# Patient Record
Sex: FEMALE | Race: WHITE | Employment: FULL TIME | URBAN - METROPOLITAN AREA
[De-identification: names, ages, dates, MRNs, and addresses within clinical notes are randomized per-mention and may not be internally consistent; named-entity substitution may affect disease eponyms.]

---

## 2017-07-09 PROBLEM — R10.84 GENERALIZED ABDOMINAL PAIN: Status: ACTIVE | Noted: 2017-07-09

## 2017-07-09 PROBLEM — R19.5 OCCULT GI BLEEDING: Status: ACTIVE | Noted: 2017-07-09

## 2018-03-26 ENCOUNTER — HOSPITAL ENCOUNTER (OUTPATIENT)
Dept: SURGERY | Age: 43
Discharge: HOME | End: 2018-03-26
Attending: SPECIALIST | Admitting: SPECIALIST

## 2018-03-26 VITALS
HEART RATE: 72 BPM | WEIGHT: 223 LBS | OXYGEN SATURATION: 97 % | TEMPERATURE: 98 F | DIASTOLIC BLOOD PRESSURE: 83 MMHG | BODY MASS INDEX: 43.78 KG/M2 | HEIGHT: 60 IN | SYSTOLIC BLOOD PRESSURE: 152 MMHG | RESPIRATION RATE: 16 BRPM

## 2018-03-26 RX ORDER — DICYCLOMINE HYDROCHLORIDE 10 MG/1
10 CAPSULE ORAL
Qty: 90 CAPSULE | Refills: 3 | Status: SHIPPED | OUTPATIENT
Start: 2018-03-26 | End: 2019-04-18 | Stop reason: SDUPTHER

## 2018-03-26 RX ORDER — SODIUM CHLORIDE 9 MG/ML
INJECTION, SOLUTION INTRAVENOUS CONTINUOUS
Status: DISCONTINUED | OUTPATIENT
Start: 2018-03-26 | End: 2018-03-27 | Stop reason: HOSPADM

## 2018-03-26 RX ORDER — SODIUM CHLORIDE, SODIUM LACTATE, POTASSIUM CHLORIDE, CALCIUM CHLORIDE 600; 310; 30; 20 MG/100ML; MG/100ML; MG/100ML; MG/100ML
INJECTION, SOLUTION INTRAVENOUS CONTINUOUS
Status: CANCELLED | OUTPATIENT
Start: 2018-03-26

## 2018-03-26 RX ADMIN — SODIUM CHLORIDE: 9 INJECTION, SOLUTION INTRAVENOUS at 12:32

## 2018-03-26 ASSESSMENT — PAIN - FUNCTIONAL ASSESSMENT: PAIN_FUNCTIONAL_ASSESSMENT: 0-10

## 2018-03-26 ASSESSMENT — PAIN SCALES - GENERAL
PAINLEVEL_OUTOF10: 0
PAINLEVEL_OUTOF10: 0

## 2018-03-26 ASSESSMENT — PAIN DESCRIPTION - DESCRIPTORS: DESCRIPTORS: ACHING

## 2018-03-26 ASSESSMENT — LIFESTYLE VARIABLES: SMOKING_STATUS: 1

## 2018-03-26 NOTE — PROGRESS NOTES
1426 Patient transferred from GI lab to [facility] via cart, patient is very sleepy and is resting quietly with her eyes closed. 1440 Patient's son is at bedside, Dr Charbel Morrison to talk to patient and her son. 1450 Patient is sitting up drinking a twist mist.1508 Patient and her son given home instructions. 1520 Patient up to bathroom to void via wheelchair. 1530 Patient discharged to home, her son will drive her.

## 2018-03-26 NOTE — BRIEF OP NOTE
BRIEF COLONOSCOPY REPORT:    Impression:    1) normal exam    2) suspect IBS        Suggest:   1) Bentyl 10 mg ac tid       The complete operative report (including photos) is available in the following locations:   1) soft chart now   2) report will also be scanned and can then be found by going to \"chart review\" then \"notes\" then \"op report\" or by going to \"chart review\" then \"media\" then \"op report\". For review of photos, may need to go to page 2.

## 2018-10-31 ENCOUNTER — APPOINTMENT (OUTPATIENT)
Dept: GENERAL RADIOLOGY | Age: 43
End: 2018-10-31
Payer: MEDICAID

## 2018-10-31 ENCOUNTER — APPOINTMENT (OUTPATIENT)
Dept: CT IMAGING | Age: 43
End: 2018-10-31
Payer: MEDICAID

## 2018-10-31 ENCOUNTER — HOSPITAL ENCOUNTER (EMERGENCY)
Age: 43
Discharge: HOME | End: 2018-10-31
Payer: MEDICAID

## 2018-10-31 VITALS
HEIGHT: 61 IN | WEIGHT: 225 LBS | BODY MASS INDEX: 42.48 KG/M2 | SYSTOLIC BLOOD PRESSURE: 147 MMHG | RESPIRATION RATE: 18 BRPM | DIASTOLIC BLOOD PRESSURE: 89 MMHG | TEMPERATURE: 98.7 F | HEART RATE: 64 BPM | OXYGEN SATURATION: 97 %

## 2018-10-31 DIAGNOSIS — M25.512 ACUTE PAIN OF LEFT SHOULDER: ICD-10-CM

## 2018-10-31 DIAGNOSIS — S80.12XA CONTUSION OF MULTIPLE SITES OF LEFT LOWER EXTREMITY, INITIAL ENCOUNTER: ICD-10-CM

## 2018-10-31 DIAGNOSIS — M54.50 BILATERAL LOW BACK PAIN WITHOUT SCIATICA, UNSPECIFIED CHRONICITY: ICD-10-CM

## 2018-10-31 DIAGNOSIS — S16.1XXA STRAIN OF NECK MUSCLE, INITIAL ENCOUNTER: ICD-10-CM

## 2018-10-31 DIAGNOSIS — T14.8XXA ABRASION: ICD-10-CM

## 2018-10-31 DIAGNOSIS — R07.89 CHEST WALL PAIN: ICD-10-CM
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
## 2018-10-31 PROCEDURE — 73590 X-RAY EXAM OF LOWER LEG: CPT

## 2018-10-31 PROCEDURE — 71045 X-RAY EXAM CHEST 1 VIEW: CPT

## 2018-10-31 PROCEDURE — 6360000002 HC RX W HCPCS: Performed by: PHYSICIAN ASSISTANT

## 2018-10-31 PROCEDURE — 6370000000 HC RX 637 (ALT 250 FOR IP): Performed by: PHYSICIAN ASSISTANT

## 2018-10-31 PROCEDURE — 72100 X-RAY EXAM L-S SPINE 2/3 VWS: CPT

## 2018-10-31 PROCEDURE — 72125 CT NECK SPINE W/O DYE: CPT

## 2018-10-31 PROCEDURE — 71250 CT THORAX DX C-: CPT

## 2018-10-31 PROCEDURE — 96372 THER/PROPH/DIAG INJ SC/IM: CPT

## 2018-10-31 PROCEDURE — 99284 EMERGENCY DEPT VISIT MOD MDM: CPT

## 2018-10-31 PROCEDURE — 73030 X-RAY EXAM OF SHOULDER: CPT

## 2018-10-31 PROCEDURE — 72072 X-RAY EXAM THORAC SPINE 3VWS: CPT

## 2018-10-31 RX ORDER — KETOROLAC TROMETHAMINE 30 MG/ML
30 INJECTION, SOLUTION INTRAMUSCULAR; INTRAVENOUS ONCE
Status: COMPLETED | OUTPATIENT
Start: 2018-10-31 | End: 2018-10-31

## 2018-10-31 RX ORDER — NAPROXEN 500 MG/1
500 TABLET ORAL 2 TIMES DAILY PRN
Qty: 30 TABLET | Refills: 0 | Status: ON HOLD | OUTPATIENT
Start: 2018-10-31 | End: 2019-02-08 | Stop reason: HOSPADM

## 2018-10-31 RX ORDER — DIAZEPAM 5 MG/1
5 TABLET ORAL ONCE
Status: COMPLETED | OUTPATIENT
Start: 2018-10-31 | End: 2018-10-31

## 2018-10-31 RX ORDER — METHOCARBAMOL 500 MG/1
500 TABLET, FILM COATED ORAL 4 TIMES DAILY
Qty: 20 TABLET | Refills: 0 | Status: SHIPPED | OUTPATIENT
Start: 2018-10-31 | End: 2019-03-04 | Stop reason: ALTCHOICE

## 2018-10-31 RX ADMIN — DIAZEPAM 5 MG: 5 TABLET ORAL at 14:26

## 2018-10-31 RX ADMIN — KETOROLAC TROMETHAMINE 30 MG: 30 INJECTION, SOLUTION INTRAMUSCULAR; INTRAVENOUS at 14:26

## 2018-10-31 ASSESSMENT — PAIN DESCRIPTION - FREQUENCY: FREQUENCY: CONTINUOUS

## 2018-10-31 ASSESSMENT — PAIN DESCRIPTION - DESCRIPTORS: DESCRIPTORS: SORE;TENDER

## 2018-10-31 ASSESSMENT — PAIN DESCRIPTION - ONSET: ONSET: ON-GOING

## 2018-10-31 ASSESSMENT — PAIN SCALES - GENERAL
PAINLEVEL_OUTOF10: 9
PAINLEVEL_OUTOF10: 9

## 2018-10-31 ASSESSMENT — PAIN DESCRIPTION - PAIN TYPE: TYPE: ACUTE PAIN
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
## 2019-02-03 ENCOUNTER — APPOINTMENT (OUTPATIENT)
Dept: GENERAL RADIOLOGY | Age: 44
DRG: 313 | End: 2019-02-03
Payer: MEDICAID

## 2019-02-03 ENCOUNTER — HOSPITAL ENCOUNTER (INPATIENT)
Age: 44
LOS: 5 days | Discharge: HOME HEALTH | DRG: 313 | End: 2019-02-08
Attending: EMERGENCY MEDICINE | Admitting: INTERNAL MEDICINE
Payer: MEDICAID
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
## 2019-02-03 DIAGNOSIS — S91.052A DOG BITE OF ANKLE, LEFT, INITIAL ENCOUNTER: Primary | ICD-10-CM
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
## 2019-02-03 DIAGNOSIS — S82.892B TYPE I OR II OPEN FRACTURE OF LEFT ANKLE, INITIAL ENCOUNTER: ICD-10-CM

## 2019-02-03 PROBLEM — S91.059A: Status: ACTIVE | Noted: 2019-02-03

## 2019-02-03 PROBLEM — S82.52XB: Status: ACTIVE | Noted: 2019-02-03

## 2019-02-03 PROBLEM — S82.892A: Status: ACTIVE | Noted: 2019-02-03

## 2019-02-03 PROBLEM — S82.52XA: Status: ACTIVE | Noted: 2019-02-03

## 2019-02-03 PROCEDURE — 6360000002 HC RX W HCPCS

## 2019-02-03 PROCEDURE — 2580000003 HC RX 258: Performed by: NURSE PRACTITIONER

## 2019-02-03 PROCEDURE — 6360000002 HC RX W HCPCS: Performed by: EMERGENCY MEDICINE

## 2019-02-03 PROCEDURE — 2580000003 HC RX 258: Performed by: EMERGENCY MEDICINE

## 2019-02-03 PROCEDURE — 1200000000 HC SEMI PRIVATE

## 2019-02-03 PROCEDURE — 6360000002 HC RX W HCPCS: Performed by: NURSE PRACTITIONER

## 2019-02-03 PROCEDURE — 4500000029 HC MAJOR PROCEDURE

## 2019-02-03 PROCEDURE — 99284 EMERGENCY DEPT VISIT MOD MDM: CPT

## 2019-02-03 PROCEDURE — 90471 IMMUNIZATION ADMIN: CPT | Performed by: EMERGENCY MEDICINE

## 2019-02-03 PROCEDURE — 73090 X-RAY EXAM OF FOREARM: CPT

## 2019-02-03 PROCEDURE — 6360000002 HC RX W HCPCS: Performed by: PHYSICIAN ASSISTANT

## 2019-02-03 PROCEDURE — 90715 TDAP VACCINE 7 YRS/> IM: CPT | Performed by: EMERGENCY MEDICINE

## 2019-02-03 PROCEDURE — 73610 X-RAY EXAM OF ANKLE: CPT

## 2019-02-03 PROCEDURE — 96376 TX/PRO/DX INJ SAME DRUG ADON: CPT

## 2019-02-03 PROCEDURE — 6370000000 HC RX 637 (ALT 250 FOR IP): Performed by: EMERGENCY MEDICINE

## 2019-02-03 PROCEDURE — 96365 THER/PROPH/DIAG IV INF INIT: CPT

## 2019-02-03 PROCEDURE — 6370000000 HC RX 637 (ALT 250 FOR IP): Performed by: NURSE PRACTITIONER

## 2019-02-03 PROCEDURE — 2500000003 HC RX 250 WO HCPCS: Performed by: EMERGENCY MEDICINE

## 2019-02-03 PROCEDURE — 96375 TX/PRO/DX INJ NEW DRUG ADDON: CPT

## 2019-02-03 PROCEDURE — 73590 X-RAY EXAM OF LOWER LEG: CPT

## 2019-02-03 RX ORDER — 0.9 % SODIUM CHLORIDE 0.9 %
1000 INTRAVENOUS SOLUTION INTRAVENOUS ONCE
Status: COMPLETED | OUTPATIENT
Start: 2019-02-03 | End: 2019-02-03

## 2019-02-03 RX ORDER — HYDROMORPHONE HCL 110MG/55ML
PATIENT CONTROLLED ANALGESIA SYRINGE INTRAVENOUS
Status: COMPLETED
Start: 2019-02-03 | End: 2019-02-03

## 2019-02-03 RX ORDER — DICYCLOMINE HYDROCHLORIDE 10 MG/1
10 CAPSULE ORAL
Status: DISCONTINUED | OUTPATIENT
Start: 2019-02-04 | End: 2019-02-08 | Stop reason: HOSPADM

## 2019-02-03 RX ORDER — CEFAZOLIN SODIUM 1 G/50ML
1 INJECTION, SOLUTION INTRAVENOUS ONCE
Status: COMPLETED | OUTPATIENT
Start: 2019-02-03 | End: 2019-02-03

## 2019-02-03 RX ORDER — FENTANYL CITRATE 50 UG/ML
100 INJECTION, SOLUTION INTRAMUSCULAR; INTRAVENOUS EVERY 30 MIN PRN
Status: DISPENSED | OUTPATIENT
Start: 2019-02-03 | End: 2019-02-03

## 2019-02-03 RX ORDER — SODIUM CHLORIDE 0.9 % (FLUSH) 0.9 %
10 SYRINGE (ML) INJECTION PRN
Status: DISCONTINUED | OUTPATIENT
Start: 2019-02-03 | End: 2019-02-04 | Stop reason: SDUPTHER

## 2019-02-03 RX ORDER — FENTANYL CITRATE 50 UG/ML
50 INJECTION, SOLUTION INTRAMUSCULAR; INTRAVENOUS ONCE
Status: COMPLETED | OUTPATIENT
Start: 2019-02-03 | End: 2019-02-03

## 2019-02-03 RX ORDER — IBUPROFEN 600 MG/1
600 TABLET ORAL EVERY 6 HOURS PRN
Status: DISCONTINUED | OUTPATIENT
Start: 2019-02-03 | End: 2019-02-03

## 2019-02-03 RX ORDER — ONDANSETRON 4 MG/1
4 TABLET, ORALLY DISINTEGRATING ORAL ONCE
Status: COMPLETED | OUTPATIENT
Start: 2019-02-03 | End: 2019-02-03

## 2019-02-03 RX ORDER — PANTOPRAZOLE SODIUM 20 MG/1
20 TABLET, DELAYED RELEASE ORAL DAILY
Status: DISCONTINUED | OUTPATIENT
Start: 2019-02-04 | End: 2019-02-08 | Stop reason: HOSPADM

## 2019-02-03 RX ORDER — METHOCARBAMOL 500 MG/1
500 TABLET, FILM COATED ORAL 4 TIMES DAILY
Status: DISCONTINUED | OUTPATIENT
Start: 2019-02-03 | End: 2019-02-08 | Stop reason: HOSPADM

## 2019-02-03 RX ORDER — LIDOCAINE HYDROCHLORIDE AND EPINEPHRINE 10; 10 MG/ML; UG/ML
20 INJECTION, SOLUTION INFILTRATION; PERINEURAL ONCE
Status: COMPLETED | OUTPATIENT
Start: 2019-02-03 | End: 2019-02-03

## 2019-02-03 RX ORDER — CEFAZOLIN SODIUM 1 G/50ML
1 INJECTION, SOLUTION INTRAVENOUS ONCE
Status: DISCONTINUED | OUTPATIENT
Start: 2019-02-03 | End: 2019-02-03

## 2019-02-03 RX ORDER — ONDANSETRON 2 MG/ML
4 INJECTION INTRAMUSCULAR; INTRAVENOUS EVERY 6 HOURS PRN
Status: DISCONTINUED | OUTPATIENT
Start: 2019-02-03 | End: 2019-02-08 | Stop reason: HOSPADM

## 2019-02-03 RX ORDER — SODIUM CHLORIDE 0.9 % (FLUSH) 0.9 %
10 SYRINGE (ML) INJECTION EVERY 12 HOURS SCHEDULED
Status: DISCONTINUED | OUTPATIENT
Start: 2019-02-03 | End: 2019-02-04 | Stop reason: SDUPTHER

## 2019-02-03 RX ORDER — SODIUM CHLORIDE 9 MG/ML
INJECTION, SOLUTION INTRAVENOUS CONTINUOUS
Status: DISCONTINUED | OUTPATIENT
Start: 2019-02-03 | End: 2019-02-07

## 2019-02-03 RX ORDER — FENTANYL CITRATE 50 UG/ML
INJECTION, SOLUTION INTRAMUSCULAR; INTRAVENOUS
Status: COMPLETED
Start: 2019-02-03 | End: 2019-02-03

## 2019-02-03 RX ORDER — NAPROXEN 500 MG/1
500 TABLET ORAL 2 TIMES DAILY PRN
Status: DISCONTINUED | OUTPATIENT
Start: 2019-02-03 | End: 2019-02-08 | Stop reason: HOSPADM

## 2019-02-03 RX ORDER — LIDOCAINE HYDROCHLORIDE AND EPINEPHRINE 10; 10 MG/ML; UG/ML
20 INJECTION, SOLUTION INFILTRATION; PERINEURAL ONCE
Status: DISCONTINUED | OUTPATIENT
Start: 2019-02-03 | End: 2019-02-08 | Stop reason: HOSPADM

## 2019-02-03 RX ADMIN — SODIUM CHLORIDE 1000 ML: 9 INJECTION, SOLUTION INTRAVENOUS at 18:36

## 2019-02-03 RX ADMIN — FENTANYL CITRATE 50 MCG: 50 INJECTION, SOLUTION INTRAMUSCULAR; INTRAVENOUS at 18:22

## 2019-02-03 RX ADMIN — CEFAZOLIN SODIUM 1 G: 1 INJECTION, SOLUTION INTRAVENOUS at 18:39

## 2019-02-03 RX ADMIN — AMPICILLIN SODIUM AND SULBACTAM SODIUM 3 G: 2; 1 INJECTION, POWDER, FOR SOLUTION INTRAMUSCULAR; INTRAVENOUS at 22:56

## 2019-02-03 RX ADMIN — SODIUM CHLORIDE: 900 INJECTION INTRAVENOUS at 22:57

## 2019-02-03 RX ADMIN — TETANUS TOXOID, REDUCED DIPHTHERIA TOXOID AND ACELLULAR PERTUSSIS VACCINE, ADSORBED 0.5 ML: 5; 2.5; 8; 8; 2.5 SUSPENSION INTRAMUSCULAR at 21:47

## 2019-02-03 RX ADMIN — HYDROMORPHONE HYDROCHLORIDE 2 MG: 2 INJECTION INTRAMUSCULAR; INTRAVENOUS; SUBCUTANEOUS at 20:13

## 2019-02-03 RX ADMIN — HYDROMORPHONE HYDROCHLORIDE 1 MG: 1 INJECTION, SOLUTION INTRAMUSCULAR; INTRAVENOUS; SUBCUTANEOUS at 21:10

## 2019-02-03 RX ADMIN — METHOCARBAMOL TABLETS 500 MG: 500 TABLET, COATED ORAL at 22:55

## 2019-02-03 RX ADMIN — FENTANYL CITRATE 100 MCG: 50 INJECTION INTRAMUSCULAR; INTRAVENOUS at 19:00

## 2019-02-03 RX ADMIN — FENTANYL CITRATE 100 MCG: 50 INJECTION INTRAMUSCULAR; INTRAVENOUS at 18:35

## 2019-02-03 RX ADMIN — SODIUM CHLORIDE, PRESERVATIVE FREE 10 ML: 5 INJECTION INTRAVENOUS at 23:28

## 2019-02-03 RX ADMIN — ONDANSETRON 4 MG: 4 TABLET, ORALLY DISINTEGRATING ORAL at 20:08

## 2019-02-03 RX ADMIN — HYDROMORPHONE HYDROCHLORIDE 0.5 MG: 1 INJECTION, SOLUTION INTRAMUSCULAR; INTRAVENOUS; SUBCUTANEOUS at 22:56

## 2019-02-03 RX ADMIN — LIDOCAINE HYDROCHLORIDE AND EPINEPHRINE 20 ML: 10; 10 INJECTION, SOLUTION INFILTRATION; PERINEURAL at 21:00

## 2019-02-03 ASSESSMENT — PAIN SCALES - GENERAL
PAINLEVEL_OUTOF10: 10

## 2019-02-03 ASSESSMENT — PAIN DESCRIPTION - FREQUENCY
FREQUENCY: CONTINUOUS
FREQUENCY: CONTINUOUS

## 2019-02-03 ASSESSMENT — PAIN DESCRIPTION - DESCRIPTORS: DESCRIPTORS: ACHING;THROBBING;SHARP

## 2019-02-03 ASSESSMENT — PAIN DESCRIPTION - ONSET
ONSET: ON-GOING
ONSET: SUDDEN

## 2019-02-03 ASSESSMENT — PAIN DESCRIPTION - LOCATION: LOCATION: ANKLE;ARM

## 2019-02-03 ASSESSMENT — PAIN DESCRIPTION - ORIENTATION: ORIENTATION: LEFT;RIGHT

## 2019-02-03 ASSESSMENT — PAIN DESCRIPTION - PAIN TYPE: TYPE: ACUTE PAIN

## 2019-02-03 ASSESSMENT — PAIN DESCRIPTION - PROGRESSION: CLINICAL_PROGRESSION: GRADUALLY WORSENING

## 2019-02-04 ENCOUNTER — ANESTHESIA (OUTPATIENT)
Dept: OPERATING ROOM | Age: 44
DRG: 313 | End: 2019-02-04
Payer: MEDICAID

## 2019-02-04 ENCOUNTER — ANESTHESIA EVENT (OUTPATIENT)
Dept: OPERATING ROOM | Age: 44
DRG: 313 | End: 2019-02-04
Payer: MEDICAID

## 2019-02-04 VITALS
SYSTOLIC BLOOD PRESSURE: 122 MMHG | TEMPERATURE: 99.5 F | DIASTOLIC BLOOD PRESSURE: 71 MMHG | RESPIRATION RATE: 18 BRPM | OXYGEN SATURATION: 100 %
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
## 2019-02-04 PROBLEM — S91.052A DOG BITE OF ANKLE, LEFT, INITIAL ENCOUNTER: Status: ACTIVE | Noted: 2019-02-03

## 2019-02-04 LAB
ANION GAP SERPL CALCULATED.3IONS-SCNC: 12 MMOL/L (ref 4–16)
BASOPHILS ABSOLUTE: 0 K/CU MM
BASOPHILS RELATIVE PERCENT: 0.3 % (ref 0–1)
BUN BLDV-MCNC: 19 MG/DL (ref 6–23)
CALCIUM SERPL-MCNC: 8.3 MG/DL (ref 8.3–10.6)
CHLORIDE BLD-SCNC: 105 MMOL/L (ref 99–110)
CO2: 21 MMOL/L (ref 21–32)
CREAT SERPL-MCNC: 0.9 MG/DL (ref 0.6–1.1)
DIFFERENTIAL TYPE: ABNORMAL
EOSINOPHILS ABSOLUTE: 0 K/CU MM
EOSINOPHILS RELATIVE PERCENT: 0.4 % (ref 0–3)
GFR AFRICAN AMERICAN: >60 ML/MIN/1.73M2
GFR NON-AFRICAN AMERICAN: >60 ML/MIN/1.73M2
GLUCOSE BLD-MCNC: 120 MG/DL (ref 70–99)
HCT VFR BLD CALC: 40 % (ref 37–47)
HEMOGLOBIN: 12.3 GM/DL (ref 12.5–16)
IMMATURE NEUTROPHIL %: 0.4 % (ref 0–0.43)
LYMPHOCYTES ABSOLUTE: 1.4 K/CU MM
LYMPHOCYTES RELATIVE PERCENT: 12.1 % (ref 24–44)
MCH RBC QN AUTO: 28.4 PG (ref 27–31)
MCHC RBC AUTO-ENTMCNC: 30.8 % (ref 32–36)
MCV RBC AUTO: 92.4 FL (ref 78–100)
MONOCYTES ABSOLUTE: 1 K/CU MM
MONOCYTES RELATIVE PERCENT: 8.5 % (ref 0–4)
NUCLEATED RBC %: 0 %
PDW BLD-RTO: 13 % (ref 11.7–14.9)
PLATELET # BLD: 161 K/CU MM (ref 140–440)
PMV BLD AUTO: 11.1 FL (ref 7.5–11.1)
POTASSIUM SERPL-SCNC: 3.9 MMOL/L (ref 3.5–5.1)
RBC # BLD: 4.33 M/CU MM (ref 4.2–5.4)
SEGMENTED NEUTROPHILS ABSOLUTE COUNT: 8.8 K/CU MM
SEGMENTED NEUTROPHILS RELATIVE PERCENT: 78.3 % (ref 36–66)
SODIUM BLD-SCNC: 138 MMOL/L (ref 135–145)
TOTAL IMMATURE NEUTOROPHIL: 0.04 K/CU MM
TOTAL NUCLEATED RBC: 0 K/CU MM
WBC # BLD: 11.2 K/CU MM (ref 4–10.5)

## 2019-02-04 PROCEDURE — 2500000003 HC RX 250 WO HCPCS: Performed by: ORTHOPAEDIC SURGERY

## 2019-02-04 PROCEDURE — 2580000003 HC RX 258

## 2019-02-04 PROCEDURE — 6370000000 HC RX 637 (ALT 250 FOR IP): Performed by: NURSE PRACTITIONER

## 2019-02-04 PROCEDURE — 2709999900 HC NON-CHARGEABLE SUPPLY: Performed by: ORTHOPAEDIC SURGERY

## 2019-02-04 PROCEDURE — 7100000000 HC PACU RECOVERY - FIRST 15 MIN: Performed by: ORTHOPAEDIC SURGERY

## 2019-02-04 PROCEDURE — 3600000002 HC SURGERY LEVEL 2 BASE: Performed by: ORTHOPAEDIC SURGERY

## 2019-02-04 PROCEDURE — 2500000003 HC RX 250 WO HCPCS: Performed by: NURSE ANESTHETIST, CERTIFIED REGISTERED

## 2019-02-04 PROCEDURE — 6360000002 HC RX W HCPCS: Performed by: INTERNAL MEDICINE

## 2019-02-04 PROCEDURE — 3700000000 HC ANESTHESIA ATTENDED CARE: Performed by: ORTHOPAEDIC SURGERY

## 2019-02-04 PROCEDURE — 3600000012 HC SURGERY LEVEL 2 ADDTL 15MIN: Performed by: ORTHOPAEDIC SURGERY

## 2019-02-04 PROCEDURE — 6370000000 HC RX 637 (ALT 250 FOR IP): Performed by: NURSE ANESTHETIST, CERTIFIED REGISTERED

## 2019-02-04 PROCEDURE — 0QBH0ZZ EXCISION OF LEFT TIBIA, OPEN APPROACH: ICD-10-PCS | Performed by: ORTHOPAEDIC SURGERY

## 2019-02-04 PROCEDURE — 6360000002 HC RX W HCPCS: Performed by: NURSE ANESTHETIST, CERTIFIED REGISTERED

## 2019-02-04 PROCEDURE — C9359 IMPLNT,BON VOID FILLER-PUTTY: HCPCS | Performed by: ORTHOPAEDIC SURGERY

## 2019-02-04 PROCEDURE — 85025 COMPLETE CBC W/AUTO DIFF WBC: CPT

## 2019-02-04 PROCEDURE — 99254 IP/OBS CNSLTJ NEW/EST MOD 60: CPT | Performed by: INTERNAL MEDICINE

## 2019-02-04 PROCEDURE — 0QUH0JZ SUPPLEMENT LEFT TIBIA WITH SYNTHETIC SUBSTITUTE, OPEN APPROACH: ICD-10-PCS | Performed by: ORTHOPAEDIC SURGERY

## 2019-02-04 PROCEDURE — 6360000002 HC RX W HCPCS: Performed by: NURSE PRACTITIONER

## 2019-02-04 PROCEDURE — 80048 BASIC METABOLIC PNL TOTAL CA: CPT

## 2019-02-04 PROCEDURE — 6360000002 HC RX W HCPCS: Performed by: ORTHOPAEDIC SURGERY

## 2019-02-04 PROCEDURE — 2700000000 HC OXYGEN THERAPY PER DAY

## 2019-02-04 PROCEDURE — 2580000003 HC RX 258: Performed by: INTERNAL MEDICINE

## 2019-02-04 PROCEDURE — 94761 N-INVAS EAR/PLS OXIMETRY MLT: CPT

## 2019-02-04 PROCEDURE — 0JQG0ZZ REPAIR RIGHT LOWER ARM SUBCUTANEOUS TISSUE AND FASCIA, OPEN APPROACH: ICD-10-PCS | Performed by: ORTHOPAEDIC SURGERY

## 2019-02-04 PROCEDURE — 2580000003 HC RX 258: Performed by: ORTHOPAEDIC SURGERY

## 2019-02-04 PROCEDURE — 13122 CMPLX RPR S/A/L ADDL 5 CM/>: CPT | Performed by: ORTHOPAEDIC SURGERY

## 2019-02-04 PROCEDURE — 2580000003 HC RX 258: Performed by: NURSE ANESTHETIST, CERTIFIED REGISTERED

## 2019-02-04 PROCEDURE — 0JQP0ZZ REPAIR LEFT LOWER LEG SUBCUTANEOUS TISSUE AND FASCIA, OPEN APPROACH: ICD-10-PCS | Performed by: ORTHOPAEDIC SURGERY

## 2019-02-04 PROCEDURE — 36415 COLL VENOUS BLD VENIPUNCTURE: CPT

## 2019-02-04 PROCEDURE — 3700000001 HC ADD 15 MINUTES (ANESTHESIA): Performed by: ORTHOPAEDIC SURGERY

## 2019-02-04 PROCEDURE — 6370000000 HC RX 637 (ALT 250 FOR IP): Performed by: ORTHOPAEDIC SURGERY

## 2019-02-04 PROCEDURE — 1200000000 HC SEMI PRIVATE

## 2019-02-04 PROCEDURE — 0YQL0ZZ REPAIR LEFT ANKLE REGION, OPEN APPROACH: ICD-10-PCS | Performed by: ORTHOPAEDIC SURGERY

## 2019-02-04 PROCEDURE — 7100000001 HC PACU RECOVERY - ADDTL 15 MIN: Performed by: ORTHOPAEDIC SURGERY

## 2019-02-04 PROCEDURE — 13121 CMPLX RPR S/A/L 2.6-7.5 CM: CPT | Performed by: ORTHOPAEDIC SURGERY

## 2019-02-04 PROCEDURE — 2580000003 HC RX 258: Performed by: NURSE PRACTITIONER

## 2019-02-04 DEVICE — DBX PUTTY, 1CC
Type: IMPLANTABLE DEVICE | Site: ANKLE | Status: FUNCTIONAL
Brand: DBX®

## 2019-02-04 RX ORDER — BUPIVACAINE HYDROCHLORIDE 5 MG/ML
INJECTION, SOLUTION PERINEURAL
Status: COMPLETED | OUTPATIENT
Start: 2019-02-04 | End: 2019-02-04

## 2019-02-04 RX ORDER — MIDAZOLAM HYDROCHLORIDE 1 MG/ML
INJECTION INTRAMUSCULAR; INTRAVENOUS PRN
Status: DISCONTINUED | OUTPATIENT
Start: 2019-02-04 | End: 2019-02-04 | Stop reason: SDUPTHER

## 2019-02-04 RX ORDER — FENTANYL CITRATE 50 UG/ML
INJECTION, SOLUTION INTRAMUSCULAR; INTRAVENOUS PRN
Status: DISCONTINUED | OUTPATIENT
Start: 2019-02-04 | End: 2019-02-04 | Stop reason: SDUPTHER

## 2019-02-04 RX ORDER — LIDOCAINE HYDROCHLORIDE 20 MG/ML
INJECTION, SOLUTION INFILTRATION; PERINEURAL PRN
Status: DISCONTINUED | OUTPATIENT
Start: 2019-02-04 | End: 2019-02-04 | Stop reason: SDUPTHER

## 2019-02-04 RX ORDER — DOCUSATE SODIUM 100 MG/1
100 CAPSULE, LIQUID FILLED ORAL 2 TIMES DAILY
Status: DISCONTINUED | OUTPATIENT
Start: 2019-02-04 | End: 2019-02-08 | Stop reason: HOSPADM

## 2019-02-04 RX ORDER — HYDROMORPHONE HCL 110MG/55ML
PATIENT CONTROLLED ANALGESIA SYRINGE INTRAVENOUS PRN
Status: DISCONTINUED | OUTPATIENT
Start: 2019-02-04 | End: 2019-02-04 | Stop reason: SDUPTHER

## 2019-02-04 RX ORDER — SODIUM CHLORIDE 0.9 % (FLUSH) 0.9 %
10 SYRINGE (ML) INJECTION EVERY 12 HOURS SCHEDULED
Status: DISCONTINUED | OUTPATIENT
Start: 2019-02-04 | End: 2019-02-08 | Stop reason: HOSPADM

## 2019-02-04 RX ORDER — FENTANYL CITRATE 50 UG/ML
25 INJECTION, SOLUTION INTRAMUSCULAR; INTRAVENOUS EVERY 5 MIN PRN
Status: DISCONTINUED | OUTPATIENT
Start: 2019-02-04 | End: 2019-02-04 | Stop reason: HOSPADM

## 2019-02-04 RX ORDER — SODIUM CHLORIDE, SODIUM LACTATE, POTASSIUM CHLORIDE, CALCIUM CHLORIDE 600; 310; 30; 20 MG/100ML; MG/100ML; MG/100ML; MG/100ML
INJECTION, SOLUTION INTRAVENOUS CONTINUOUS
Status: DISCONTINUED | OUTPATIENT
Start: 2019-02-04 | End: 2019-02-07

## 2019-02-04 RX ORDER — ONDANSETRON 2 MG/ML
INJECTION INTRAMUSCULAR; INTRAVENOUS PRN
Status: DISCONTINUED | OUTPATIENT
Start: 2019-02-04 | End: 2019-02-04 | Stop reason: SDUPTHER

## 2019-02-04 RX ORDER — OXYCODONE HYDROCHLORIDE AND ACETAMINOPHEN 5; 325 MG/1; MG/1
1 TABLET ORAL EVERY 4 HOURS PRN
Status: DISCONTINUED | OUTPATIENT
Start: 2019-02-04 | End: 2019-02-08 | Stop reason: HOSPADM

## 2019-02-04 RX ORDER — SCOLOPAMINE TRANSDERMAL SYSTEM 1 MG/1
PATCH, EXTENDED RELEASE TRANSDERMAL PRN
Status: DISCONTINUED | OUTPATIENT
Start: 2019-02-04 | End: 2019-02-04 | Stop reason: SDUPTHER

## 2019-02-04 RX ORDER — SODIUM CHLORIDE 9 MG/ML
INJECTION, SOLUTION INTRAVENOUS CONTINUOUS PRN
Status: DISCONTINUED | OUTPATIENT
Start: 2019-02-04 | End: 2019-02-04 | Stop reason: SDUPTHER

## 2019-02-04 RX ORDER — KETOROLAC TROMETHAMINE 30 MG/ML
15 INJECTION, SOLUTION INTRAMUSCULAR; INTRAVENOUS EVERY 6 HOURS
Status: DISPENSED | OUTPATIENT
Start: 2019-02-04 | End: 2019-02-06

## 2019-02-04 RX ORDER — SUCCINYLCHOLINE CHLORIDE 20 MG/ML
INJECTION INTRAMUSCULAR; INTRAVENOUS PRN
Status: DISCONTINUED | OUTPATIENT
Start: 2019-02-04 | End: 2019-02-04 | Stop reason: SDUPTHER

## 2019-02-04 RX ORDER — SODIUM CHLORIDE 0.9 % (FLUSH) 0.9 %
10 SYRINGE (ML) INJECTION PRN
Status: DISCONTINUED | OUTPATIENT
Start: 2019-02-04 | End: 2019-02-08 | Stop reason: HOSPADM

## 2019-02-04 RX ORDER — SODIUM CHLORIDE, SODIUM LACTATE, POTASSIUM CHLORIDE, CALCIUM CHLORIDE 600; 310; 30; 20 MG/100ML; MG/100ML; MG/100ML; MG/100ML
INJECTION, SOLUTION INTRAVENOUS
Status: COMPLETED
Start: 2019-02-04 | End: 2019-02-04

## 2019-02-04 RX ORDER — DEXAMETHASONE SODIUM PHOSPHATE 4 MG/ML
INJECTION, SOLUTION INTRA-ARTICULAR; INTRALESIONAL; INTRAMUSCULAR; INTRAVENOUS; SOFT TISSUE PRN
Status: DISCONTINUED | OUTPATIENT
Start: 2019-02-04 | End: 2019-02-04 | Stop reason: SDUPTHER

## 2019-02-04 RX ORDER — ACETAMINOPHEN 325 MG/1
650 TABLET ORAL EVERY 4 HOURS PRN
Status: DISCONTINUED | OUTPATIENT
Start: 2019-02-04 | End: 2019-02-08 | Stop reason: HOSPADM

## 2019-02-04 RX ORDER — PROPOFOL 10 MG/ML
INJECTION, EMULSION INTRAVENOUS PRN
Status: DISCONTINUED | OUTPATIENT
Start: 2019-02-04 | End: 2019-02-04 | Stop reason: SDUPTHER

## 2019-02-04 RX ORDER — OXYCODONE HYDROCHLORIDE AND ACETAMINOPHEN 5; 325 MG/1; MG/1
2 TABLET ORAL EVERY 4 HOURS PRN
Status: DISCONTINUED | OUTPATIENT
Start: 2019-02-04 | End: 2019-02-08 | Stop reason: HOSPADM

## 2019-02-04 RX ORDER — HYDRALAZINE HYDROCHLORIDE 20 MG/ML
5 INJECTION INTRAMUSCULAR; INTRAVENOUS EVERY 10 MIN PRN
Status: DISCONTINUED | OUTPATIENT
Start: 2019-02-04 | End: 2019-02-04 | Stop reason: HOSPADM

## 2019-02-04 RX ORDER — DIPHENHYDRAMINE HYDROCHLORIDE 50 MG/ML
INJECTION INTRAMUSCULAR; INTRAVENOUS PRN
Status: DISCONTINUED | OUTPATIENT
Start: 2019-02-04 | End: 2019-02-04 | Stop reason: SDUPTHER

## 2019-02-04 RX ADMIN — PHENYLEPHRINE HYDROCHLORIDE 150 MCG: 10 INJECTION INTRAVENOUS at 08:51

## 2019-02-04 RX ADMIN — KETOROLAC TROMETHAMINE 15 MG: 30 INJECTION, SOLUTION INTRAMUSCULAR; INTRAVENOUS at 23:08

## 2019-02-04 RX ADMIN — PHENYLEPHRINE HYDROCHLORIDE 150 MCG: 10 INJECTION INTRAVENOUS at 08:57

## 2019-02-04 RX ADMIN — SODIUM CHLORIDE, POTASSIUM CHLORIDE, SODIUM LACTATE AND CALCIUM CHLORIDE 1000 ML: 600; 310; 30; 20 INJECTION, SOLUTION INTRAVENOUS at 11:24

## 2019-02-04 RX ADMIN — NAPROXEN 500 MG: 500 TABLET ORAL at 17:40

## 2019-02-04 RX ADMIN — PHENYLEPHRINE HYDROCHLORIDE 150 MCG: 10 INJECTION INTRAVENOUS at 09:22

## 2019-02-04 RX ADMIN — AMPICILLIN SODIUM AND SULBACTAM SODIUM 1.5 G: 1; .5 INJECTION, POWDER, FOR SOLUTION INTRAMUSCULAR; INTRAVENOUS at 11:57

## 2019-02-04 RX ADMIN — DOCUSATE SODIUM 100 MG: 100 CAPSULE, LIQUID FILLED ORAL at 20:34

## 2019-02-04 RX ADMIN — DIPHENHYDRAMINE HYDROCHLORIDE 25 MG: 50 INJECTION INTRAMUSCULAR; INTRAVENOUS at 09:53

## 2019-02-04 RX ADMIN — AMPICILLIN SODIUM AND SULBACTAM SODIUM 3 G: 2; 1 INJECTION, POWDER, FOR SOLUTION INTRAMUSCULAR; INTRAVENOUS at 17:40

## 2019-02-04 RX ADMIN — FENTANYL CITRATE 100 MCG: 50 INJECTION INTRAMUSCULAR; INTRAVENOUS at 07:47

## 2019-02-04 RX ADMIN — LIDOCAINE HYDROCHLORIDE 100 MG: 20 INJECTION, SOLUTION INFILTRATION; PERINEURAL at 07:47

## 2019-02-04 RX ADMIN — PHENYLEPHRINE HYDROCHLORIDE 200 MCG: 10 INJECTION INTRAVENOUS at 07:57

## 2019-02-04 RX ADMIN — DOCUSATE SODIUM 100 MG: 100 CAPSULE, LIQUID FILLED ORAL at 14:28

## 2019-02-04 RX ADMIN — SUCCINYLCHOLINE CHLORIDE 100 MG: 20 INJECTION, SOLUTION INTRAMUSCULAR; INTRAVENOUS at 07:47

## 2019-02-04 RX ADMIN — DEXAMETHASONE SODIUM PHOSPHATE 4 MG: 4 INJECTION, SOLUTION INTRAMUSCULAR; INTRAVENOUS at 08:53

## 2019-02-04 RX ADMIN — PHENYLEPHRINE HYDROCHLORIDE 150 MCG: 10 INJECTION INTRAVENOUS at 08:39

## 2019-02-04 RX ADMIN — KETOROLAC TROMETHAMINE 15 MG: 30 INJECTION, SOLUTION INTRAMUSCULAR; INTRAVENOUS at 12:28

## 2019-02-04 RX ADMIN — PROPOFOL 200 MG: 10 INJECTION, EMULSION INTRAVENOUS at 07:47

## 2019-02-04 RX ADMIN — PHENYLEPHRINE HYDROCHLORIDE 150 MCG: 10 INJECTION INTRAVENOUS at 09:06

## 2019-02-04 RX ADMIN — PHENYLEPHRINE HYDROCHLORIDE 150 MCG: 10 INJECTION INTRAVENOUS at 08:45

## 2019-02-04 RX ADMIN — ASPIRIN 325 MG: 325 TABLET, DELAYED RELEASE ORAL at 20:34

## 2019-02-04 RX ADMIN — HYDROMORPHONE HYDROCHLORIDE 0.5 MG: 2 INJECTION INTRAMUSCULAR; INTRAVENOUS; SUBCUTANEOUS at 08:17

## 2019-02-04 RX ADMIN — HYDROMORPHONE HYDROCHLORIDE 0.5 MG: 2 INJECTION INTRAMUSCULAR; INTRAVENOUS; SUBCUTANEOUS at 08:32

## 2019-02-04 RX ADMIN — PHENYLEPHRINE HYDROCHLORIDE 150 MCG: 10 INJECTION INTRAVENOUS at 08:33

## 2019-02-04 RX ADMIN — SCOPALAMINE 1 PATCH: 1 PATCH, EXTENDED RELEASE TRANSDERMAL at 07:40

## 2019-02-04 RX ADMIN — METHOCARBAMOL TABLETS 500 MG: 500 TABLET, COATED ORAL at 20:34

## 2019-02-04 RX ADMIN — PHENYLEPHRINE HYDROCHLORIDE 150 MCG: 10 INJECTION INTRAVENOUS at 09:35

## 2019-02-04 RX ADMIN — HYDROMORPHONE HYDROCHLORIDE 0.5 MG: 1 INJECTION, SOLUTION INTRAMUSCULAR; INTRAVENOUS; SUBCUTANEOUS at 05:17

## 2019-02-04 RX ADMIN — OXYCODONE AND ACETAMINOPHEN 2 TABLET: 5; 325 TABLET ORAL at 14:28

## 2019-02-04 RX ADMIN — HYDROMORPHONE HYDROCHLORIDE 0.5 MG: 2 INJECTION INTRAMUSCULAR; INTRAVENOUS; SUBCUTANEOUS at 09:44

## 2019-02-04 RX ADMIN — AMPICILLIN SODIUM AND SULBACTAM SODIUM 1.5 G: 1; .5 INJECTION, POWDER, FOR SOLUTION INTRAMUSCULAR; INTRAVENOUS at 05:17

## 2019-02-04 RX ADMIN — SODIUM CHLORIDE: 9 INJECTION, SOLUTION INTRAVENOUS at 08:07

## 2019-02-04 RX ADMIN — MIDAZOLAM HYDROCHLORIDE 2 MG: 1 INJECTION, SOLUTION INTRAMUSCULAR; INTRAVENOUS at 07:42

## 2019-02-04 RX ADMIN — DICYCLOMINE HYDROCHLORIDE 10 MG: 10 CAPSULE ORAL at 11:57

## 2019-02-04 RX ADMIN — HYDROMORPHONE HYDROCHLORIDE 0.5 MG: 1 INJECTION, SOLUTION INTRAMUSCULAR; INTRAVENOUS; SUBCUTANEOUS at 01:56

## 2019-02-04 RX ADMIN — SODIUM CHLORIDE: 9 INJECTION, SOLUTION INTRAVENOUS at 09:44

## 2019-02-04 RX ADMIN — PHENYLEPHRINE HYDROCHLORIDE 100 MCG: 10 INJECTION INTRAVENOUS at 09:27

## 2019-02-04 RX ADMIN — PHENYLEPHRINE HYDROCHLORIDE 150 MCG: 10 INJECTION INTRAVENOUS at 09:12

## 2019-02-04 RX ADMIN — ASPIRIN 325 MG: 325 TABLET, DELAYED RELEASE ORAL at 14:28

## 2019-02-04 RX ADMIN — SODIUM CHLORIDE, SODIUM LACTATE, POTASSIUM CHLORIDE, CALCIUM CHLORIDE 1000 ML: 600; 310; 30; 20 INJECTION, SOLUTION INTRAVENOUS at 11:24

## 2019-02-04 RX ADMIN — METHOCARBAMOL TABLETS 500 MG: 500 TABLET, COATED ORAL at 14:28

## 2019-02-04 RX ADMIN — SODIUM CHLORIDE: 9 INJECTION, SOLUTION INTRAVENOUS at 07:15

## 2019-02-04 RX ADMIN — METHOCARBAMOL TABLETS 500 MG: 500 TABLET, COATED ORAL at 17:40

## 2019-02-04 RX ADMIN — HYDROMORPHONE HYDROCHLORIDE 0.5 MG: 2 INJECTION INTRAMUSCULAR; INTRAVENOUS; SUBCUTANEOUS at 08:19

## 2019-02-04 RX ADMIN — DICYCLOMINE HYDROCHLORIDE 10 MG: 10 CAPSULE ORAL at 17:40

## 2019-02-04 RX ADMIN — SODIUM CHLORIDE: 900 INJECTION INTRAVENOUS at 20:34

## 2019-02-04 RX ADMIN — ONDANSETRON HYDROCHLORIDE 4 MG: 2 SOLUTION INTRAMUSCULAR; INTRAVENOUS at 08:54

## 2019-02-04 RX ADMIN — OXYCODONE AND ACETAMINOPHEN 2 TABLET: 5; 325 TABLET ORAL at 19:50

## 2019-02-04 RX ADMIN — PHENYLEPHRINE HYDROCHLORIDE 150 MCG: 10 INJECTION INTRAVENOUS at 09:01

## 2019-02-04 ASSESSMENT — PULMONARY FUNCTION TESTS
PIF_VALUE: 19
PIF_VALUE: 17
PIF_VALUE: 1
PIF_VALUE: 20
PIF_VALUE: 14
PIF_VALUE: 14
PIF_VALUE: 20
PIF_VALUE: 20
PIF_VALUE: 14
PIF_VALUE: 0
PIF_VALUE: 20
PIF_VALUE: 20
PIF_VALUE: 21
PIF_VALUE: 20
PIF_VALUE: 5
PIF_VALUE: 18
PIF_VALUE: 0
PIF_VALUE: 20
PIF_VALUE: 2
PIF_VALUE: 16
PIF_VALUE: 15
PIF_VALUE: 14
PIF_VALUE: 19
PIF_VALUE: 25
PIF_VALUE: 20
PIF_VALUE: 14
PIF_VALUE: 21
PIF_VALUE: 21
PIF_VALUE: 25
PIF_VALUE: 1
PIF_VALUE: 14
PIF_VALUE: 20
PIF_VALUE: 19
PIF_VALUE: 25
PIF_VALUE: 20
PIF_VALUE: 24
PIF_VALUE: 20
PIF_VALUE: 16
PIF_VALUE: 20
PIF_VALUE: 24
PIF_VALUE: 25
PIF_VALUE: 24
PIF_VALUE: 6
PIF_VALUE: 20
PIF_VALUE: 14
PIF_VALUE: 20
PIF_VALUE: 24
PIF_VALUE: 1
PIF_VALUE: 5
PIF_VALUE: 20
PIF_VALUE: 24
PIF_VALUE: 24
PIF_VALUE: 19
PIF_VALUE: 19
PIF_VALUE: 3
PIF_VALUE: 15
PIF_VALUE: 0
PIF_VALUE: 16
PIF_VALUE: 17
PIF_VALUE: 20
PIF_VALUE: 23
PIF_VALUE: 16
PIF_VALUE: 20
PIF_VALUE: 17
PIF_VALUE: 1
PIF_VALUE: 20
PIF_VALUE: 14
PIF_VALUE: 24
PIF_VALUE: 25
PIF_VALUE: 20
PIF_VALUE: 14
PIF_VALUE: 21
PIF_VALUE: 20
PIF_VALUE: 23
PIF_VALUE: 20
PIF_VALUE: 14
PIF_VALUE: 20
PIF_VALUE: 1
PIF_VALUE: 14
PIF_VALUE: 20
PIF_VALUE: 20
PIF_VALUE: 16
PIF_VALUE: 19
PIF_VALUE: 21
PIF_VALUE: 14
PIF_VALUE: 20
PIF_VALUE: 25
PIF_VALUE: 16
PIF_VALUE: 16
PIF_VALUE: 20
PIF_VALUE: 20
PIF_VALUE: 23
PIF_VALUE: 24
PIF_VALUE: 20
PIF_VALUE: 20
PIF_VALUE: 19
PIF_VALUE: 0
PIF_VALUE: 4
PIF_VALUE: 34
PIF_VALUE: 21
PIF_VALUE: 20
PIF_VALUE: 16
PIF_VALUE: 16
PIF_VALUE: 21
PIF_VALUE: 0
PIF_VALUE: 20
PIF_VALUE: 17
PIF_VALUE: 25
PIF_VALUE: 20
PIF_VALUE: 0
PIF_VALUE: 9
PIF_VALUE: 14
PIF_VALUE: 20
PIF_VALUE: 19
PIF_VALUE: 20
PIF_VALUE: 20
PIF_VALUE: 14
PIF_VALUE: 20
PIF_VALUE: 0
PIF_VALUE: 0
PIF_VALUE: 20
PIF_VALUE: 19
PIF_VALUE: 14
PIF_VALUE: 20
PIF_VALUE: 14
PIF_VALUE: 20
PIF_VALUE: 15
PIF_VALUE: 1
PIF_VALUE: 20
PIF_VALUE: 20
PIF_VALUE: 15
PIF_VALUE: 2
PIF_VALUE: 20
PIF_VALUE: 21
PIF_VALUE: 20
PIF_VALUE: 16
PIF_VALUE: 1
PIF_VALUE: 24

## 2019-02-04 ASSESSMENT — PAIN DESCRIPTION - LOCATION
LOCATION: ARM;ANKLE
LOCATION: ANKLE;ARM
LOCATION: ANKLE;ARM

## 2019-02-04 ASSESSMENT — PAIN SCALES - GENERAL
PAINLEVEL_OUTOF10: 4
PAINLEVEL_OUTOF10: 8
PAINLEVEL_OUTOF10: 5
PAINLEVEL_OUTOF10: 9
PAINLEVEL_OUTOF10: 10
PAINLEVEL_OUTOF10: 8

## 2019-02-04 ASSESSMENT — PAIN DESCRIPTION - ONSET
ONSET: AWAKENED FROM SLEEP
ONSET: ON-GOING
ONSET: ON-GOING

## 2019-02-04 ASSESSMENT — LIFESTYLE VARIABLES: SMOKING_STATUS: 1

## 2019-02-04 ASSESSMENT — PAIN DESCRIPTION - PAIN TYPE
TYPE: ACUTE PAIN

## 2019-02-04 ASSESSMENT — PAIN DESCRIPTION - DESCRIPTORS
DESCRIPTORS: THROBBING;SQUEEZING
DESCRIPTORS: ACHING
DESCRIPTORS: ACHING;THROBBING

## 2019-02-04 ASSESSMENT — PAIN DESCRIPTION - ORIENTATION
ORIENTATION: RIGHT;LEFT

## 2019-02-04 ASSESSMENT — PAIN DESCRIPTION - FREQUENCY
FREQUENCY: CONTINUOUS

## 2019-02-04 ASSESSMENT — PAIN DESCRIPTION - PROGRESSION
CLINICAL_PROGRESSION: NOT CHANGED

## 2019-02-05 PROCEDURE — 1200000000 HC SEMI PRIVATE

## 2019-02-05 PROCEDURE — 6360000002 HC RX W HCPCS: Performed by: HOSPITALIST

## 2019-02-05 PROCEDURE — 94761 N-INVAS EAR/PLS OXIMETRY MLT: CPT

## 2019-02-05 PROCEDURE — 2700000000 HC OXYGEN THERAPY PER DAY

## 2019-02-05 PROCEDURE — 97116 GAIT TRAINING THERAPY: CPT

## 2019-02-05 PROCEDURE — 6370000000 HC RX 637 (ALT 250 FOR IP): Performed by: ORTHOPAEDIC SURGERY

## 2019-02-05 PROCEDURE — 97167 OT EVAL HIGH COMPLEX 60 MIN: CPT

## 2019-02-05 PROCEDURE — 6370000000 HC RX 637 (ALT 250 FOR IP): Performed by: NURSE PRACTITIONER

## 2019-02-05 PROCEDURE — 97535 SELF CARE MNGMENT TRAINING: CPT

## 2019-02-05 PROCEDURE — 99232 SBSQ HOSP IP/OBS MODERATE 35: CPT | Performed by: INTERNAL MEDICINE

## 2019-02-05 PROCEDURE — 6360000002 HC RX W HCPCS: Performed by: NURSE PRACTITIONER

## 2019-02-05 PROCEDURE — 6360000002 HC RX W HCPCS: Performed by: ORTHOPAEDIC SURGERY

## 2019-02-05 PROCEDURE — 97530 THERAPEUTIC ACTIVITIES: CPT

## 2019-02-05 PROCEDURE — 6360000002 HC RX W HCPCS: Performed by: INTERNAL MEDICINE

## 2019-02-05 PROCEDURE — 97162 PT EVAL MOD COMPLEX 30 MIN: CPT

## 2019-02-05 PROCEDURE — 2580000003 HC RX 258: Performed by: NURSE PRACTITIONER

## 2019-02-05 PROCEDURE — 2580000003 HC RX 258: Performed by: INTERNAL MEDICINE

## 2019-02-05 RX ORDER — LORAZEPAM 2 MG/ML
0.5 INJECTION INTRAMUSCULAR EVERY 6 HOURS PRN
Status: DISCONTINUED | OUTPATIENT
Start: 2019-02-05 | End: 2019-02-08 | Stop reason: HOSPADM

## 2019-02-05 RX ORDER — DIPHENHYDRAMINE HYDROCHLORIDE 50 MG/ML
25 INJECTION INTRAMUSCULAR; INTRAVENOUS EVERY 6 HOURS PRN
Status: DISCONTINUED | OUTPATIENT
Start: 2019-02-05 | End: 2019-02-08 | Stop reason: HOSPADM

## 2019-02-05 RX ORDER — OXYCODONE HYDROCHLORIDE AND ACETAMINOPHEN 5; 325 MG/1; MG/1
1 TABLET ORAL EVERY 4 HOURS PRN
Status: DISCONTINUED | OUTPATIENT
Start: 2019-02-05 | End: 2019-02-06

## 2019-02-05 RX ADMIN — PANTOPRAZOLE SODIUM 20 MG: 20 TABLET, DELAYED RELEASE ORAL at 06:04

## 2019-02-05 RX ADMIN — HYDROMORPHONE HYDROCHLORIDE 0.5 MG: 1 INJECTION, SOLUTION INTRAMUSCULAR; INTRAVENOUS; SUBCUTANEOUS at 21:43

## 2019-02-05 RX ADMIN — DICYCLOMINE HYDROCHLORIDE 10 MG: 10 CAPSULE ORAL at 06:04

## 2019-02-05 RX ADMIN — AMPICILLIN SODIUM AND SULBACTAM SODIUM 3 G: 2; 1 INJECTION, POWDER, FOR SOLUTION INTRAMUSCULAR; INTRAVENOUS at 18:15

## 2019-02-05 RX ADMIN — DOCUSATE SODIUM 100 MG: 100 CAPSULE, LIQUID FILLED ORAL at 21:43

## 2019-02-05 RX ADMIN — HYDROMORPHONE HYDROCHLORIDE 1 MG: 1 INJECTION, SOLUTION INTRAMUSCULAR; INTRAVENOUS; SUBCUTANEOUS at 10:18

## 2019-02-05 RX ADMIN — OXYCODONE AND ACETAMINOPHEN 2 TABLET: 5; 325 TABLET ORAL at 15:59

## 2019-02-05 RX ADMIN — AMPICILLIN SODIUM AND SULBACTAM SODIUM 3 G: 2; 1 INJECTION, POWDER, FOR SOLUTION INTRAMUSCULAR; INTRAVENOUS at 12:00

## 2019-02-05 RX ADMIN — HYDROMORPHONE HYDROCHLORIDE 0.5 MG: 1 INJECTION, SOLUTION INTRAMUSCULAR; INTRAVENOUS; SUBCUTANEOUS at 06:05

## 2019-02-05 RX ADMIN — KETOROLAC TROMETHAMINE 15 MG: 30 INJECTION, SOLUTION INTRAMUSCULAR; INTRAVENOUS at 12:58

## 2019-02-05 RX ADMIN — METHOCARBAMOL TABLETS 500 MG: 500 TABLET, COATED ORAL at 21:43

## 2019-02-05 RX ADMIN — ASPIRIN 325 MG: 325 TABLET, DELAYED RELEASE ORAL at 21:43

## 2019-02-05 RX ADMIN — DOCUSATE SODIUM 100 MG: 100 CAPSULE, LIQUID FILLED ORAL at 10:06

## 2019-02-05 RX ADMIN — KETOROLAC TROMETHAMINE 15 MG: 30 INJECTION, SOLUTION INTRAMUSCULAR; INTRAVENOUS at 18:15

## 2019-02-05 RX ADMIN — SODIUM CHLORIDE: 900 INJECTION INTRAVENOUS at 04:45

## 2019-02-05 RX ADMIN — AMPICILLIN SODIUM AND SULBACTAM SODIUM 3 G: 2; 1 INJECTION, POWDER, FOR SOLUTION INTRAMUSCULAR; INTRAVENOUS at 00:17

## 2019-02-05 RX ADMIN — METHOCARBAMOL TABLETS 500 MG: 500 TABLET, COATED ORAL at 18:14

## 2019-02-05 RX ADMIN — OXYCODONE AND ACETAMINOPHEN 2 TABLET: 5; 325 TABLET ORAL at 20:03

## 2019-02-05 RX ADMIN — ASPIRIN 325 MG: 325 TABLET, DELAYED RELEASE ORAL at 10:06

## 2019-02-05 RX ADMIN — AMPICILLIN SODIUM AND SULBACTAM SODIUM 3 G: 2; 1 INJECTION, POWDER, FOR SOLUTION INTRAMUSCULAR; INTRAVENOUS at 06:05

## 2019-02-05 RX ADMIN — SODIUM CHLORIDE: 900 INJECTION INTRAVENOUS at 16:08

## 2019-02-05 RX ADMIN — DICYCLOMINE HYDROCHLORIDE 10 MG: 10 CAPSULE ORAL at 11:08

## 2019-02-05 RX ADMIN — LORAZEPAM 0.5 MG: 2 INJECTION INTRAMUSCULAR; INTRAVENOUS at 11:13

## 2019-02-05 RX ADMIN — METHOCARBAMOL TABLETS 500 MG: 500 TABLET, COATED ORAL at 12:59

## 2019-02-05 RX ADMIN — DICYCLOMINE HYDROCHLORIDE 10 MG: 10 CAPSULE ORAL at 16:15

## 2019-02-05 RX ADMIN — KETOROLAC TROMETHAMINE 15 MG: 30 INJECTION, SOLUTION INTRAMUSCULAR; INTRAVENOUS at 06:04

## 2019-02-05 RX ADMIN — METHOCARBAMOL TABLETS 500 MG: 500 TABLET, COATED ORAL at 10:06

## 2019-02-05 RX ADMIN — OXYCODONE AND ACETAMINOPHEN 2 TABLET: 5; 325 TABLET ORAL at 00:15

## 2019-02-05 ASSESSMENT — PAIN DESCRIPTION - ORIENTATION
ORIENTATION: LEFT

## 2019-02-05 ASSESSMENT — PAIN SCALES - GENERAL
PAINLEVEL_OUTOF10: 9
PAINLEVEL_OUTOF10: 9
PAINLEVEL_OUTOF10: 7
PAINLEVEL_OUTOF10: 5
PAINLEVEL_OUTOF10: 9
PAINLEVEL_OUTOF10: 7
PAINLEVEL_OUTOF10: 9
PAINLEVEL_OUTOF10: 4
PAINLEVEL_OUTOF10: 5
PAINLEVEL_OUTOF10: 10
PAINLEVEL_OUTOF10: 6
PAINLEVEL_OUTOF10: 8
PAINLEVEL_OUTOF10: 9

## 2019-02-05 ASSESSMENT — PAIN DESCRIPTION - PAIN TYPE
TYPE: SURGICAL PAIN
TYPE: SURGICAL PAIN
TYPE: ACUTE PAIN
TYPE: SURGICAL PAIN

## 2019-02-05 ASSESSMENT — PAIN DESCRIPTION - DESCRIPTORS
DESCRIPTORS: SHARP
DESCRIPTORS: ACHING
DESCRIPTORS: ACHING

## 2019-02-05 ASSESSMENT — PAIN DESCRIPTION - LOCATION
LOCATION: LEG

## 2019-02-05 ASSESSMENT — PAIN DESCRIPTION - FREQUENCY
FREQUENCY: CONTINUOUS

## 2019-02-06 PROCEDURE — 6370000000 HC RX 637 (ALT 250 FOR IP): Performed by: ORTHOPAEDIC SURGERY

## 2019-02-06 PROCEDURE — 97530 THERAPEUTIC ACTIVITIES: CPT

## 2019-02-06 PROCEDURE — 6370000000 HC RX 637 (ALT 250 FOR IP): Performed by: NURSE PRACTITIONER

## 2019-02-06 PROCEDURE — 2580000003 HC RX 258: Performed by: NURSE PRACTITIONER

## 2019-02-06 PROCEDURE — 6360000002 HC RX W HCPCS: Performed by: INTERNAL MEDICINE

## 2019-02-06 PROCEDURE — 6360000002 HC RX W HCPCS: Performed by: NURSE PRACTITIONER

## 2019-02-06 PROCEDURE — 99024 POSTOP FOLLOW-UP VISIT: CPT | Performed by: ORTHOPAEDIC SURGERY

## 2019-02-06 PROCEDURE — 97110 THERAPEUTIC EXERCISES: CPT

## 2019-02-06 PROCEDURE — 99232 SBSQ HOSP IP/OBS MODERATE 35: CPT | Performed by: INTERNAL MEDICINE

## 2019-02-06 PROCEDURE — 6360000002 HC RX W HCPCS: Performed by: ORTHOPAEDIC SURGERY

## 2019-02-06 PROCEDURE — 6360000002 HC RX W HCPCS: Performed by: HOSPITALIST

## 2019-02-06 PROCEDURE — 1200000000 HC SEMI PRIVATE

## 2019-02-06 PROCEDURE — 6370000000 HC RX 637 (ALT 250 FOR IP): Performed by: HOSPITALIST

## 2019-02-06 PROCEDURE — 2580000003 HC RX 258: Performed by: INTERNAL MEDICINE

## 2019-02-06 RX ORDER — HYDROMORPHONE HCL 110MG/55ML
0.25 PATIENT CONTROLLED ANALGESIA SYRINGE INTRAVENOUS
Status: DISCONTINUED | OUTPATIENT
Start: 2019-02-06 | End: 2019-02-08 | Stop reason: HOSPADM

## 2019-02-06 RX ORDER — CLONAZEPAM 0.5 MG/1
0.5 TABLET ORAL 2 TIMES DAILY
Status: DISCONTINUED | OUTPATIENT
Start: 2019-02-06 | End: 2019-02-08 | Stop reason: HOSPADM

## 2019-02-06 RX ORDER — HYDROMORPHONE HCL 110MG/55ML
0.5 PATIENT CONTROLLED ANALGESIA SYRINGE INTRAVENOUS
Status: DISCONTINUED | OUTPATIENT
Start: 2019-02-06 | End: 2019-02-08 | Stop reason: HOSPADM

## 2019-02-06 RX ADMIN — CLONAZEPAM 0.5 MG: 0.5 TABLET ORAL at 21:00

## 2019-02-06 RX ADMIN — AMPICILLIN SODIUM AND SULBACTAM SODIUM 3 G: 2; 1 INJECTION, POWDER, FOR SOLUTION INTRAMUSCULAR; INTRAVENOUS at 00:17

## 2019-02-06 RX ADMIN — OXYCODONE AND ACETAMINOPHEN 2 TABLET: 5; 325 TABLET ORAL at 09:12

## 2019-02-06 RX ADMIN — KETOROLAC TROMETHAMINE 15 MG: 30 INJECTION, SOLUTION INTRAMUSCULAR; INTRAVENOUS at 06:55

## 2019-02-06 RX ADMIN — DICYCLOMINE HYDROCHLORIDE 10 MG: 10 CAPSULE ORAL at 18:18

## 2019-02-06 RX ADMIN — DIPHENHYDRAMINE HYDROCHLORIDE 25 MG: 50 INJECTION INTRAMUSCULAR; INTRAVENOUS at 00:17

## 2019-02-06 RX ADMIN — AMPICILLIN SODIUM AND SULBACTAM SODIUM 3 G: 2; 1 INJECTION, POWDER, FOR SOLUTION INTRAMUSCULAR; INTRAVENOUS at 06:56

## 2019-02-06 RX ADMIN — HYDROMORPHONE HYDROCHLORIDE 0.5 MG: 2 INJECTION, SOLUTION INTRAMUSCULAR; INTRAVENOUS; SUBCUTANEOUS at 23:49

## 2019-02-06 RX ADMIN — AMPICILLIN SODIUM AND SULBACTAM SODIUM 3 G: 2; 1 INJECTION, POWDER, FOR SOLUTION INTRAMUSCULAR; INTRAVENOUS at 18:17

## 2019-02-06 RX ADMIN — DICYCLOMINE HYDROCHLORIDE 10 MG: 10 CAPSULE ORAL at 06:55

## 2019-02-06 RX ADMIN — CLONAZEPAM 0.5 MG: 0.5 TABLET ORAL at 14:03

## 2019-02-06 RX ADMIN — SODIUM CHLORIDE: 900 INJECTION INTRAVENOUS at 02:53

## 2019-02-06 RX ADMIN — LORAZEPAM 0.5 MG: 2 INJECTION INTRAMUSCULAR; INTRAVENOUS at 11:30

## 2019-02-06 RX ADMIN — ASPIRIN 325 MG: 325 TABLET, DELAYED RELEASE ORAL at 09:12

## 2019-02-06 RX ADMIN — METHOCARBAMOL TABLETS 500 MG: 500 TABLET, COATED ORAL at 09:11

## 2019-02-06 RX ADMIN — DOCUSATE SODIUM 100 MG: 100 CAPSULE, LIQUID FILLED ORAL at 20:59

## 2019-02-06 RX ADMIN — KETOROLAC TROMETHAMINE 15 MG: 30 INJECTION, SOLUTION INTRAMUSCULAR; INTRAVENOUS at 00:22

## 2019-02-06 RX ADMIN — METHOCARBAMOL TABLETS 500 MG: 500 TABLET, COATED ORAL at 18:18

## 2019-02-06 RX ADMIN — PANTOPRAZOLE SODIUM 20 MG: 20 TABLET, DELAYED RELEASE ORAL at 06:56

## 2019-02-06 RX ADMIN — HYDROMORPHONE HYDROCHLORIDE 0.5 MG: 1 INJECTION, SOLUTION INTRAMUSCULAR; INTRAVENOUS; SUBCUTANEOUS at 18:18

## 2019-02-06 RX ADMIN — AMPICILLIN SODIUM AND SULBACTAM SODIUM 3 G: 2; 1 INJECTION, POWDER, FOR SOLUTION INTRAMUSCULAR; INTRAVENOUS at 23:49

## 2019-02-06 RX ADMIN — OXYCODONE AND ACETAMINOPHEN 2 TABLET: 5; 325 TABLET ORAL at 02:53

## 2019-02-06 RX ADMIN — HYDROMORPHONE HYDROCHLORIDE 0.5 MG: 1 INJECTION, SOLUTION INTRAMUSCULAR; INTRAVENOUS; SUBCUTANEOUS at 10:13

## 2019-02-06 RX ADMIN — METHOCARBAMOL TABLETS 500 MG: 500 TABLET, COATED ORAL at 14:03

## 2019-02-06 RX ADMIN — METHOCARBAMOL TABLETS 500 MG: 500 TABLET, COATED ORAL at 21:00

## 2019-02-06 RX ADMIN — OXYCODONE AND ACETAMINOPHEN 2 TABLET: 5; 325 TABLET ORAL at 21:00

## 2019-02-06 RX ADMIN — DOCUSATE SODIUM 100 MG: 100 CAPSULE, LIQUID FILLED ORAL at 09:11

## 2019-02-06 RX ADMIN — ASPIRIN 325 MG: 325 TABLET, DELAYED RELEASE ORAL at 21:00

## 2019-02-06 RX ADMIN — AMPICILLIN SODIUM AND SULBACTAM SODIUM 3 G: 2; 1 INJECTION, POWDER, FOR SOLUTION INTRAMUSCULAR; INTRAVENOUS at 11:23

## 2019-02-06 ASSESSMENT — PAIN DESCRIPTION - FREQUENCY: FREQUENCY: CONTINUOUS

## 2019-02-06 ASSESSMENT — PAIN SCALES - GENERAL
PAINLEVEL_OUTOF10: 10
PAINLEVEL_OUTOF10: 8
PAINLEVEL_OUTOF10: 7
PAINLEVEL_OUTOF10: 7
PAINLEVEL_OUTOF10: 9

## 2019-02-06 ASSESSMENT — PAIN DESCRIPTION - LOCATION: LOCATION: LEG

## 2019-02-06 ASSESSMENT — PAIN DESCRIPTION - ORIENTATION: ORIENTATION: LEFT

## 2019-02-06 ASSESSMENT — PAIN DESCRIPTION - ONSET: ONSET: ON-GOING

## 2019-02-06 ASSESSMENT — PAIN DESCRIPTION - PAIN TYPE: TYPE: ACUTE PAIN

## 2019-02-06 ASSESSMENT — PAIN DESCRIPTION - DESCRIPTORS: DESCRIPTORS: ACHING

## 2019-02-06 ASSESSMENT — PAIN DESCRIPTION - PROGRESSION: CLINICAL_PROGRESSION: NOT CHANGED

## 2019-02-07 LAB
ANION GAP SERPL CALCULATED.3IONS-SCNC: 8 MMOL/L (ref 4–16)
BUN BLDV-MCNC: 9 MG/DL (ref 6–23)
CALCIUM SERPL-MCNC: 8.2 MG/DL (ref 8.3–10.6)
CHLORIDE BLD-SCNC: 103 MMOL/L (ref 99–110)
CO2: 29 MMOL/L (ref 21–32)
CREAT SERPL-MCNC: 0.8 MG/DL (ref 0.6–1.1)
GFR AFRICAN AMERICAN: >60 ML/MIN/1.73M2
GFR NON-AFRICAN AMERICAN: >60 ML/MIN/1.73M2
GLUCOSE BLD-MCNC: 103 MG/DL (ref 70–99)
HCT VFR BLD CALC: 29.5 % (ref 37–47)
HEMOGLOBIN: 9.6 GM/DL (ref 12.5–16)
MCH RBC QN AUTO: 29.2 PG (ref 27–31)
MCHC RBC AUTO-ENTMCNC: 32.5 % (ref 32–36)
MCV RBC AUTO: 89.7 FL (ref 78–100)
PDW BLD-RTO: 13.2 % (ref 11.7–14.9)
PLATELET # BLD: 154 K/CU MM (ref 140–440)
PMV BLD AUTO: 11.8 FL (ref 7.5–11.1)
POTASSIUM SERPL-SCNC: 4.4 MMOL/L (ref 3.5–5.1)
RBC # BLD: 3.29 M/CU MM (ref 4.2–5.4)
SODIUM BLD-SCNC: 140 MMOL/L (ref 135–145)
WBC # BLD: 4.1 K/CU MM (ref 4–10.5)

## 2019-02-07 PROCEDURE — 1200000000 HC SEMI PRIVATE

## 2019-02-07 PROCEDURE — 6370000000 HC RX 637 (ALT 250 FOR IP): Performed by: HOSPITALIST

## 2019-02-07 PROCEDURE — 97116 GAIT TRAINING THERAPY: CPT

## 2019-02-07 PROCEDURE — 6370000000 HC RX 637 (ALT 250 FOR IP): Performed by: NURSE PRACTITIONER

## 2019-02-07 PROCEDURE — 85027 COMPLETE CBC AUTOMATED: CPT

## 2019-02-07 PROCEDURE — 6360000002 HC RX W HCPCS: Performed by: INTERNAL MEDICINE

## 2019-02-07 PROCEDURE — 97530 THERAPEUTIC ACTIVITIES: CPT

## 2019-02-07 PROCEDURE — 2580000003 HC RX 258: Performed by: INTERNAL MEDICINE

## 2019-02-07 PROCEDURE — 80048 BASIC METABOLIC PNL TOTAL CA: CPT

## 2019-02-07 PROCEDURE — 6370000000 HC RX 637 (ALT 250 FOR IP): Performed by: INTERNAL MEDICINE

## 2019-02-07 PROCEDURE — 99232 SBSQ HOSP IP/OBS MODERATE 35: CPT | Performed by: INTERNAL MEDICINE

## 2019-02-07 PROCEDURE — 2580000003 HC RX 258: Performed by: NURSE PRACTITIONER

## 2019-02-07 PROCEDURE — 2580000003 HC RX 258: Performed by: HOSPITALIST

## 2019-02-07 PROCEDURE — 6370000000 HC RX 637 (ALT 250 FOR IP): Performed by: ORTHOPAEDIC SURGERY

## 2019-02-07 PROCEDURE — 36415 COLL VENOUS BLD VENIPUNCTURE: CPT

## 2019-02-07 RX ORDER — AMOXICILLIN AND CLAVULANATE POTASSIUM 875; 125 MG/1; MG/1
1 TABLET, FILM COATED ORAL EVERY 12 HOURS SCHEDULED
Status: DISCONTINUED | OUTPATIENT
Start: 2019-02-07 | End: 2019-02-08 | Stop reason: HOSPADM

## 2019-02-07 RX ORDER — SODIUM CHLORIDE 0.9 % (FLUSH) 0.9 %
10 SYRINGE (ML) INJECTION PRN
Status: DISCONTINUED | OUTPATIENT
Start: 2019-02-07 | End: 2019-02-08 | Stop reason: HOSPADM

## 2019-02-07 RX ORDER — LIDOCAINE HYDROCHLORIDE 10 MG/ML
5 INJECTION, SOLUTION EPIDURAL; INFILTRATION; INTRACAUDAL; PERINEURAL ONCE
Status: DISCONTINUED | OUTPATIENT
Start: 2019-02-07 | End: 2019-02-08 | Stop reason: HOSPADM

## 2019-02-07 RX ORDER — SODIUM CHLORIDE 0.9 % (FLUSH) 0.9 %
10 SYRINGE (ML) INJECTION EVERY 12 HOURS SCHEDULED
Status: DISCONTINUED | OUTPATIENT
Start: 2019-02-07 | End: 2019-02-08 | Stop reason: HOSPADM

## 2019-02-07 RX ADMIN — HYDROMORPHONE HYDROCHLORIDE 0.5 MG: 2 INJECTION, SOLUTION INTRAMUSCULAR; INTRAVENOUS; SUBCUTANEOUS at 14:58

## 2019-02-07 RX ADMIN — SODIUM CHLORIDE: 900 INJECTION INTRAVENOUS at 03:45

## 2019-02-07 RX ADMIN — AMPICILLIN SODIUM AND SULBACTAM SODIUM 3 G: 2; 1 INJECTION, POWDER, FOR SOLUTION INTRAMUSCULAR; INTRAVENOUS at 05:52

## 2019-02-07 RX ADMIN — DOCUSATE SODIUM 100 MG: 100 CAPSULE, LIQUID FILLED ORAL at 20:50

## 2019-02-07 RX ADMIN — DICYCLOMINE HYDROCHLORIDE 10 MG: 10 CAPSULE ORAL at 14:57

## 2019-02-07 RX ADMIN — DICYCLOMINE HYDROCHLORIDE 10 MG: 10 CAPSULE ORAL at 10:58

## 2019-02-07 RX ADMIN — HYDROMORPHONE HYDROCHLORIDE 0.5 MG: 2 INJECTION, SOLUTION INTRAMUSCULAR; INTRAVENOUS; SUBCUTANEOUS at 07:43

## 2019-02-07 RX ADMIN — CLONAZEPAM 0.5 MG: 0.5 TABLET ORAL at 07:43

## 2019-02-07 RX ADMIN — AMOXICILLIN AND CLAVULANATE POTASSIUM 1 TABLET: 875; 125 TABLET, FILM COATED ORAL at 20:50

## 2019-02-07 RX ADMIN — ASPIRIN 325 MG: 325 TABLET, DELAYED RELEASE ORAL at 10:15

## 2019-02-07 RX ADMIN — PANTOPRAZOLE SODIUM 20 MG: 20 TABLET, DELAYED RELEASE ORAL at 05:52

## 2019-02-07 RX ADMIN — SODIUM CHLORIDE, PRESERVATIVE FREE 10 ML: 5 INJECTION INTRAVENOUS at 20:50

## 2019-02-07 RX ADMIN — AMPICILLIN SODIUM AND SULBACTAM SODIUM 3 G: 2; 1 INJECTION, POWDER, FOR SOLUTION INTRAMUSCULAR; INTRAVENOUS at 12:55

## 2019-02-07 RX ADMIN — OXYCODONE AND ACETAMINOPHEN 2 TABLET: 5; 325 TABLET ORAL at 03:58

## 2019-02-07 RX ADMIN — METHOCARBAMOL TABLETS 500 MG: 500 TABLET, COATED ORAL at 14:57

## 2019-02-07 RX ADMIN — DICYCLOMINE HYDROCHLORIDE 10 MG: 10 CAPSULE ORAL at 05:51

## 2019-02-07 RX ADMIN — ASPIRIN 325 MG: 325 TABLET, DELAYED RELEASE ORAL at 20:50

## 2019-02-07 RX ADMIN — DOCUSATE SODIUM 100 MG: 100 CAPSULE, LIQUID FILLED ORAL at 10:16

## 2019-02-07 RX ADMIN — METHOCARBAMOL TABLETS 500 MG: 500 TABLET, COATED ORAL at 20:50

## 2019-02-07 RX ADMIN — CLONAZEPAM 0.5 MG: 0.5 TABLET ORAL at 20:50

## 2019-02-07 RX ADMIN — OXYCODONE AND ACETAMINOPHEN 2 TABLET: 5; 325 TABLET ORAL at 20:50

## 2019-02-07 ASSESSMENT — PAIN DESCRIPTION - PROGRESSION
CLINICAL_PROGRESSION: NOT CHANGED
CLINICAL_PROGRESSION: NOT CHANGED

## 2019-02-07 ASSESSMENT — PAIN DESCRIPTION - LOCATION
LOCATION: ARM
LOCATION: LEG
LOCATION: ARM
LOCATION: ANKLE;ARM

## 2019-02-07 ASSESSMENT — PAIN DESCRIPTION - ONSET
ONSET: ON-GOING
ONSET: ON-GOING
ONSET: GRADUAL

## 2019-02-07 ASSESSMENT — PAIN DESCRIPTION - DESCRIPTORS
DESCRIPTORS: ACHING
DESCRIPTORS: ACHING
DESCRIPTORS: PRESSURE;POUNDING

## 2019-02-07 ASSESSMENT — PAIN DESCRIPTION - PAIN TYPE
TYPE: SURGICAL PAIN
TYPE: ACUTE PAIN
TYPE: ACUTE PAIN

## 2019-02-07 ASSESSMENT — PAIN DESCRIPTION - ORIENTATION
ORIENTATION: LEFT
ORIENTATION: RIGHT;LOWER
ORIENTATION: RIGHT;LOWER
ORIENTATION: RIGHT;LEFT

## 2019-02-07 ASSESSMENT — PAIN SCALES - GENERAL
PAINLEVEL_OUTOF10: 6
PAINLEVEL_OUTOF10: 10
PAINLEVEL_OUTOF10: 8
PAINLEVEL_OUTOF10: 8
PAINLEVEL_OUTOF10: 6
PAINLEVEL_OUTOF10: 8

## 2019-02-07 ASSESSMENT — PAIN DESCRIPTION - FREQUENCY
FREQUENCY: CONTINUOUS
FREQUENCY: INTERMITTENT
FREQUENCY: CONTINUOUS

## 2019-02-08 VITALS
TEMPERATURE: 97.8 F | BODY MASS INDEX: 50.09 KG/M2 | SYSTOLIC BLOOD PRESSURE: 135 MMHG | HEIGHT: 61 IN | WEIGHT: 265.3 LBS | OXYGEN SATURATION: 98 % | RESPIRATION RATE: 16 BRPM | DIASTOLIC BLOOD PRESSURE: 70 MMHG | HEART RATE: 71 BPM

## 2019-02-08 LAB
HCT VFR BLD CALC: 31.4 % (ref 37–47)
HEMOGLOBIN: 10 GM/DL (ref 12.5–16)
MCH RBC QN AUTO: 28.2 PG (ref 27–31)
MCHC RBC AUTO-ENTMCNC: 31.8 % (ref 32–36)
MCV RBC AUTO: 88.7 FL (ref 78–100)
PDW BLD-RTO: 13.2 % (ref 11.7–14.9)
PLATELET # BLD: 183 K/CU MM (ref 140–440)
PMV BLD AUTO: 11.6 FL (ref 7.5–11.1)
RBC # BLD: 3.54 M/CU MM (ref 4.2–5.4)
WBC # BLD: 4.3 K/CU MM (ref 4–10.5)

## 2019-02-08 PROCEDURE — 97530 THERAPEUTIC ACTIVITIES: CPT

## 2019-02-08 PROCEDURE — 85027 COMPLETE CBC AUTOMATED: CPT

## 2019-02-08 PROCEDURE — 99231 SBSQ HOSP IP/OBS SF/LOW 25: CPT | Performed by: INTERNAL MEDICINE

## 2019-02-08 PROCEDURE — 6370000000 HC RX 637 (ALT 250 FOR IP): Performed by: NURSE PRACTITIONER

## 2019-02-08 PROCEDURE — 99024 POSTOP FOLLOW-UP VISIT: CPT | Performed by: ORTHOPAEDIC SURGERY

## 2019-02-08 PROCEDURE — 36415 COLL VENOUS BLD VENIPUNCTURE: CPT

## 2019-02-08 PROCEDURE — 6370000000 HC RX 637 (ALT 250 FOR IP): Performed by: HOSPITALIST

## 2019-02-08 PROCEDURE — 6370000000 HC RX 637 (ALT 250 FOR IP): Performed by: INTERNAL MEDICINE

## 2019-02-08 PROCEDURE — 6370000000 HC RX 637 (ALT 250 FOR IP): Performed by: ORTHOPAEDIC SURGERY

## 2019-02-08 PROCEDURE — 97110 THERAPEUTIC EXERCISES: CPT

## 2019-02-08 PROCEDURE — 97116 GAIT TRAINING THERAPY: CPT

## 2019-02-08 RX ORDER — PSEUDOEPHEDRINE HCL 30 MG
100 TABLET ORAL 2 TIMES DAILY
Qty: 60 CAPSULE | Refills: 0 | Status: SHIPPED | OUTPATIENT
Start: 2019-02-08 | End: 2019-04-29 | Stop reason: ALTCHOICE

## 2019-02-08 RX ORDER — CLONAZEPAM 0.5 MG/1
0.5 TABLET ORAL 2 TIMES DAILY
Qty: 14 TABLET | Refills: 3 | Status: SHIPPED | OUTPATIENT
Start: 2019-02-08 | End: 2019-04-29 | Stop reason: ALTCHOICE

## 2019-02-08 RX ORDER — IBUPROFEN 600 MG/1
600 TABLET ORAL EVERY 6 HOURS PRN
Qty: 90 TABLET | Refills: 1 | Status: SHIPPED | OUTPATIENT
Start: 2019-02-08 | End: 2019-06-05 | Stop reason: SDUPTHER

## 2019-02-08 RX ORDER — OXYCODONE HYDROCHLORIDE AND ACETAMINOPHEN 5; 325 MG/1; MG/1
2 TABLET ORAL EVERY 4 HOURS PRN
Qty: 20 TABLET | Refills: 0 | Status: SHIPPED | OUTPATIENT
Start: 2019-02-08 | End: 2019-02-11 | Stop reason: SDUPTHER

## 2019-02-08 RX ORDER — AMOXICILLIN AND CLAVULANATE POTASSIUM 875; 125 MG/1; MG/1
1 TABLET, FILM COATED ORAL EVERY 12 HOURS SCHEDULED
Qty: 20 TABLET | Refills: 0 | Status: SHIPPED | OUTPATIENT
Start: 2019-02-08 | End: 2019-02-18

## 2019-02-08 RX ADMIN — OXYCODONE AND ACETAMINOPHEN 2 TABLET: 5; 325 TABLET ORAL at 09:55

## 2019-02-08 RX ADMIN — NAPROXEN 500 MG: 500 TABLET ORAL at 06:53

## 2019-02-08 RX ADMIN — METHOCARBAMOL TABLETS 500 MG: 500 TABLET, COATED ORAL at 11:48

## 2019-02-08 RX ADMIN — DICYCLOMINE HYDROCHLORIDE 10 MG: 10 CAPSULE ORAL at 06:41

## 2019-02-08 RX ADMIN — OXYCODONE AND ACETAMINOPHEN 2 TABLET: 5; 325 TABLET ORAL at 03:54

## 2019-02-08 RX ADMIN — AMOXICILLIN AND CLAVULANATE POTASSIUM 1 TABLET: 875; 125 TABLET, FILM COATED ORAL at 11:49

## 2019-02-08 RX ADMIN — DICYCLOMINE HYDROCHLORIDE 10 MG: 10 CAPSULE ORAL at 16:35

## 2019-02-08 RX ADMIN — DICYCLOMINE HYDROCHLORIDE 10 MG: 10 CAPSULE ORAL at 11:48

## 2019-02-08 RX ADMIN — ASPIRIN 325 MG: 325 TABLET, DELAYED RELEASE ORAL at 11:49

## 2019-02-08 RX ADMIN — CLONAZEPAM 0.5 MG: 0.5 TABLET ORAL at 11:48

## 2019-02-08 RX ADMIN — PANTOPRAZOLE SODIUM 20 MG: 20 TABLET, DELAYED RELEASE ORAL at 06:41

## 2019-02-08 RX ADMIN — METHOCARBAMOL TABLETS 500 MG: 500 TABLET, COATED ORAL at 16:35

## 2019-02-08 RX ADMIN — DOCUSATE SODIUM 100 MG: 100 CAPSULE, LIQUID FILLED ORAL at 11:48

## 2019-02-08 ASSESSMENT — PAIN DESCRIPTION - PAIN TYPE: TYPE: SURGICAL PAIN

## 2019-02-08 ASSESSMENT — PAIN DESCRIPTION - FREQUENCY: FREQUENCY: CONTINUOUS

## 2019-02-08 ASSESSMENT — PAIN SCALES - GENERAL
PAINLEVEL_OUTOF10: 8
PAINLEVEL_OUTOF10: 9
PAINLEVEL_OUTOF10: 5
PAINLEVEL_OUTOF10: 9

## 2019-02-08 ASSESSMENT — PAIN DESCRIPTION - ORIENTATION: ORIENTATION: RIGHT;LEFT

## 2019-02-08 ASSESSMENT — PAIN DESCRIPTION - ONSET: ONSET: ON-GOING

## 2019-02-08 ASSESSMENT — PAIN DESCRIPTION - DESCRIPTORS: DESCRIPTORS: ACHING;THROBBING

## 2019-02-08 ASSESSMENT — PAIN DESCRIPTION - LOCATION: LOCATION: ARM;ANKLE

## 2019-02-11 ENCOUNTER — OFFICE VISIT (OUTPATIENT)
Dept: FAMILY MEDICINE CLINIC | Age: 44
End: 2019-02-11
Payer: MEDICAID

## 2019-02-11 VITALS
WEIGHT: 242.2 LBS | HEART RATE: 82 BPM | HEIGHT: 61 IN | BODY MASS INDEX: 45.73 KG/M2 | SYSTOLIC BLOOD PRESSURE: 122 MMHG | OXYGEN SATURATION: 98 % | TEMPERATURE: 98.7 F | DIASTOLIC BLOOD PRESSURE: 78 MMHG

## 2019-02-11 DIAGNOSIS — F17.200 TOBACCO DEPENDENCE: ICD-10-CM

## 2019-02-11 DIAGNOSIS — S91.052D DOG BITE OF LEFT ANKLE, SUBSEQUENT ENCOUNTER: ICD-10-CM
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
## 2019-02-11 DIAGNOSIS — S82.52XE TYPE I OR II OPEN DISPLACED FRACTURE OF MEDIAL MALLEOLUS OF LEFT TIBIA WITH ROUTINE HEALING, SUBSEQUENT ENCOUNTER: Primary | ICD-10-CM

## 2019-02-11 PROCEDURE — 99496 TRANSJ CARE MGMT HIGH F2F 7D: CPT | Performed by: NURSE PRACTITIONER

## 2019-02-11 RX ORDER — OXYCODONE HYDROCHLORIDE AND ACETAMINOPHEN 5; 325 MG/1; MG/1
2 TABLET ORAL EVERY 4 HOURS PRN
Qty: 20 TABLET | Refills: 0 | Status: SHIPPED | OUTPATIENT
Start: 2019-02-11 | End: 2019-02-14

## 2019-02-13 ENCOUNTER — OFFICE VISIT (OUTPATIENT)
Dept: ORTHOPEDIC SURGERY | Age: 44
End: 2019-02-13

## 2019-02-13 VITALS — OXYGEN SATURATION: 95 % | HEART RATE: 89 BPM

## 2019-02-13 DIAGNOSIS — S82.52XB TYPE I OR II OPEN DISPLACED FRACTURE OF MEDIAL MALLEOLUS OF LEFT TIBIA, INITIAL ENCOUNTER: Primary | ICD-10-CM

## 2019-02-13 PROCEDURE — 99024 POSTOP FOLLOW-UP VISIT: CPT | Performed by: ORTHOPAEDIC SURGERY

## 2019-02-13 RX ORDER — HYDROCODONE BITARTRATE AND ACETAMINOPHEN 5; 325 MG/1; MG/1
1 TABLET ORAL EVERY 6 HOURS PRN
Qty: 18 TABLET | Refills: 0 | Status: SHIPPED | OUTPATIENT
Start: 2019-02-13 | End: 2019-02-20

## 2019-02-13 ASSESSMENT — ENCOUNTER SYMPTOMS
RESPIRATORY NEGATIVE: 1
VOMITING: 0
COLOR CHANGE: 0
SHORTNESS OF BREATH: 0
DIARRHEA: 0
NAUSEA: 0

## 2019-02-15 ASSESSMENT — ENCOUNTER SYMPTOMS
CHEST TIGHTNESS: 0
COLOR CHANGE: 0
BACK PAIN: 0

## 2019-02-20 ENCOUNTER — TELEPHONE (OUTPATIENT)
Dept: ORTHOPEDIC SURGERY | Age: 44
End: 2019-02-20

## 2019-02-21 ENCOUNTER — OFFICE VISIT (OUTPATIENT)
Dept: ORTHOPEDIC SURGERY | Age: 44
End: 2019-02-21

## 2019-02-21 DIAGNOSIS — Z48.89 ENCOUNTER FOR POSTOPERATIVE WOUND CHECK: Primary | ICD-10-CM

## 2019-02-21 DIAGNOSIS — S82.52XB TYPE I OR II OPEN DISPLACED FRACTURE OF MEDIAL MALLEOLUS OF LEFT TIBIA, INITIAL ENCOUNTER: ICD-10-CM

## 2019-02-21 PROCEDURE — 99024 POSTOP FOLLOW-UP VISIT: CPT | Performed by: PHYSICIAN ASSISTANT

## 2019-02-21 RX ORDER — AMOXICILLIN AND CLAVULANATE POTASSIUM 875; 125 MG/1; MG/1
1 TABLET, FILM COATED ORAL 2 TIMES DAILY
Qty: 20 TABLET | Refills: 0 | Status: SHIPPED | OUTPATIENT
Start: 2019-02-21 | End: 2019-03-03

## 2019-02-21 RX ORDER — HYDROCODONE BITARTRATE AND ACETAMINOPHEN 5; 325 MG/1; MG/1
1 TABLET ORAL EVERY 6 HOURS PRN
Qty: 28 TABLET | Refills: 0 | Status: SHIPPED | OUTPATIENT
Start: 2019-02-21 | End: 2019-02-28

## 2019-02-26 ENCOUNTER — TELEPHONE (OUTPATIENT)
Dept: ORTHOPEDIC SURGERY | Age: 44
End: 2019-02-26

## 2019-02-28 ENCOUNTER — TELEPHONE (OUTPATIENT)
Dept: ORTHOPEDIC SURGERY | Age: 44
End: 2019-02-28

## 2019-03-04 ENCOUNTER — OFFICE VISIT (OUTPATIENT)
Dept: INTERNAL MEDICINE CLINIC | Age: 44
End: 2019-03-04
Payer: MEDICAID

## 2019-03-04 VITALS
RESPIRATION RATE: 18 BRPM | WEIGHT: 238.2 LBS | HEIGHT: 61 IN | DIASTOLIC BLOOD PRESSURE: 70 MMHG | BODY MASS INDEX: 44.97 KG/M2 | SYSTOLIC BLOOD PRESSURE: 130 MMHG

## 2019-03-04 DIAGNOSIS — I10 ESSENTIAL HYPERTENSION: ICD-10-CM

## 2019-03-04 DIAGNOSIS — S91.052D DOG BITE OF ANKLE, LEFT, SUBSEQUENT ENCOUNTER: ICD-10-CM

## 2019-03-04 DIAGNOSIS — F41.9 ANXIETY AND DEPRESSION: ICD-10-CM

## 2019-03-04 DIAGNOSIS — Z00.00 ENCOUNTER FOR MEDICAL EXAMINATION TO ESTABLISH CARE: Primary | ICD-10-CM
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
## 2019-03-04 DIAGNOSIS — F32.A ANXIETY AND DEPRESSION: ICD-10-CM

## 2019-03-04 DIAGNOSIS — K21.9 GASTROESOPHAGEAL REFLUX DISEASE WITHOUT ESOPHAGITIS: ICD-10-CM

## 2019-03-04 DIAGNOSIS — F17.200 SMOKER: ICD-10-CM

## 2019-03-04 PROCEDURE — 99213 OFFICE O/P EST LOW 20 MIN: CPT | Performed by: NURSE PRACTITIONER

## 2019-03-04 PROCEDURE — G8484 FLU IMMUNIZE NO ADMIN: HCPCS | Performed by: NURSE PRACTITIONER

## 2019-03-04 PROCEDURE — G8427 DOCREV CUR MEDS BY ELIG CLIN: HCPCS | Performed by: NURSE PRACTITIONER

## 2019-03-04 PROCEDURE — G8417 CALC BMI ABV UP PARAM F/U: HCPCS | Performed by: NURSE PRACTITIONER

## 2019-03-04 PROCEDURE — 96160 PT-FOCUSED HLTH RISK ASSMT: CPT | Performed by: NURSE PRACTITIONER

## 2019-03-04 PROCEDURE — 4004F PT TOBACCO SCREEN RCVD TLK: CPT | Performed by: NURSE PRACTITIONER

## 2019-03-04 RX ORDER — CLONAZEPAM 0.5 MG/1
TABLET ORAL
Refills: 0 | COMMUNITY
Start: 2019-03-01 | End: 2019-04-29 | Stop reason: ALTCHOICE

## 2019-03-04 RX ORDER — PAROXETINE HYDROCHLORIDE 20 MG/1
20 TABLET, FILM COATED ORAL DAILY
Qty: 30 TABLET | Refills: 3 | Status: SHIPPED | OUTPATIENT
Start: 2019-03-04 | End: 2019-04-29

## 2019-03-04 RX ORDER — OXYCODONE HYDROCHLORIDE AND ACETAMINOPHEN 5; 325 MG/1; MG/1
1 TABLET ORAL EVERY 8 HOURS PRN
Qty: 20 TABLET | Refills: 0 | Status: SHIPPED | OUTPATIENT
Start: 2019-03-04 | End: 2019-03-11

## 2019-03-04 RX ORDER — NICOTINE 21 MG/24HR
1 PATCH, TRANSDERMAL 24 HOURS TRANSDERMAL EVERY 24 HOURS
Qty: 30 PATCH | Refills: 3 | Status: SHIPPED | OUTPATIENT
Start: 2019-03-04 | End: 2020-07-20 | Stop reason: CLARIF

## 2019-03-04 RX ORDER — OMEPRAZOLE 20 MG/1
20 CAPSULE, DELAYED RELEASE ORAL DAILY
Qty: 30 CAPSULE | Refills: 3 | Status: SHIPPED | OUTPATIENT
Start: 2019-03-04 | End: 2019-06-29 | Stop reason: SDUPTHER

## 2019-03-04 RX ORDER — AMOXICILLIN 875 MG/1
875 TABLET, COATED ORAL 2 TIMES DAILY
COMMUNITY
End: 2019-04-29 | Stop reason: ALTCHOICE

## 2019-03-04 RX ORDER — LISINOPRIL 5 MG/1
5 TABLET ORAL DAILY
Qty: 30 TABLET | Refills: 3 | Status: SHIPPED | OUTPATIENT
Start: 2019-03-04 | End: 2019-04-29 | Stop reason: ALTCHOICE

## 2019-03-04 RX ORDER — OXYCODONE HYDROCHLORIDE AND ACETAMINOPHEN 5; 325 MG/1; MG/1
TABLET ORAL
Refills: 0 | COMMUNITY
Start: 2019-02-14 | End: 2019-03-04 | Stop reason: SDUPTHER

## 2019-03-04 ASSESSMENT — PATIENT HEALTH QUESTIONNAIRE - PHQ9
4. FEELING TIRED OR HAVING LITTLE ENERGY: 3
10. IF YOU CHECKED OFF ANY PROBLEMS, HOW DIFFICULT HAVE THESE PROBLEMS MADE IT FOR YOU TO DO YOUR WORK, TAKE CARE OF THINGS AT HOME, OR GET ALONG WITH OTHER PEOPLE: 3
SUM OF ALL RESPONSES TO PHQ QUESTIONS 1-9: 24
8. MOVING OR SPEAKING SO SLOWLY THAT OTHER PEOPLE COULD HAVE NOTICED. OR THE OPPOSITE, BEING SO FIGETY OR RESTLESS THAT YOU HAVE BEEN MOVING AROUND A LOT MORE THAN USUAL: 3
SUM OF ALL RESPONSES TO PHQ QUESTIONS 1-9: 24
9. THOUGHTS THAT YOU WOULD BE BETTER OFF DEAD, OR OF HURTING YOURSELF: 1
DEPRESSION UNABLE TO ASSESS: FUNCTIONAL CAPACITY MOTIVATION LIMITS ACCURACY
7. TROUBLE CONCENTRATING ON THINGS, SUCH AS READING THE NEWSPAPER OR WATCHING TELEVISION: 2
5. POOR APPETITE OR OVEREATING: 3
1. LITTLE INTEREST OR PLEASURE IN DOING THINGS: 3
3. TROUBLE FALLING OR STAYING ASLEEP: 3
SUM OF ALL RESPONSES TO PHQ9 QUESTIONS 1 & 2: 6
2. FEELING DOWN, DEPRESSED OR HOPELESS: 3
6. FEELING BAD ABOUT YOURSELF - OR THAT YOU ARE A FAILURE OR HAVE LET YOURSELF OR YOUR FAMILY DOWN: 3

## 2019-03-05 ASSESSMENT — ENCOUNTER SYMPTOMS
VOMITING: 0
COUGH: 0
COLOR CHANGE: 0
EYES NEGATIVE: 1
APNEA: 0
DIARRHEA: 0
ALLERGIC/IMMUNOLOGIC NEGATIVE: 1
SINUS PRESSURE: 0
NAUSEA: 0
CHEST TIGHTNESS: 0
ABDOMINAL PAIN: 0
SHORTNESS OF BREATH: 0
SINUS PAIN: 0

## 2019-03-06 ENCOUNTER — OFFICE VISIT (OUTPATIENT)
Dept: INFECTIOUS DISEASES | Age: 44
End: 2019-03-06
Payer: MEDICAID

## 2019-03-06 ENCOUNTER — HOSPITAL ENCOUNTER (OUTPATIENT)
Dept: GENERAL RADIOLOGY | Age: 44
Discharge: HOME | End: 2019-03-06
Payer: MEDICAID

## 2019-03-06 ENCOUNTER — HOSPITAL ENCOUNTER (OUTPATIENT)
Age: 44
Discharge: HOME | End: 2019-03-06
Payer: MEDICAID

## 2019-03-06 VITALS
WEIGHT: 232.8 LBS | SYSTOLIC BLOOD PRESSURE: 147 MMHG | RESPIRATION RATE: 13 BRPM | DIASTOLIC BLOOD PRESSURE: 83 MMHG | HEART RATE: 64 BPM | BODY MASS INDEX: 43.99 KG/M2 | TEMPERATURE: 97.7 F
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
## 2019-03-06 DIAGNOSIS — S91.052S DOG BITE OF ANKLE, LEFT, SEQUELA: ICD-10-CM
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
## 2019-03-06 DIAGNOSIS — S41.151S DOG BITE OF ARM, RIGHT, SEQUELA: ICD-10-CM

## 2019-03-06 DIAGNOSIS — S91.052A DOG BITE OF ANKLE, LEFT, INITIAL ENCOUNTER: Primary | ICD-10-CM

## 2019-03-06 LAB
ALBUMIN SERPL-MCNC: 4.6 GM/DL (ref 3.4–5)
ALP BLD-CCNC: 105 IU/L (ref 40–129)
ALT SERPL-CCNC: 19 U/L (ref 10–40)
ANION GAP SERPL CALCULATED.3IONS-SCNC: 13 MMOL/L (ref 4–16)
AST SERPL-CCNC: 18 IU/L (ref 15–37)
BASOPHILS ABSOLUTE: 0.1 K/CU MM
BASOPHILS RELATIVE PERCENT: 1.2 % (ref 0–1)
BILIRUB SERPL-MCNC: 0.4 MG/DL (ref 0–1)
BILIRUBIN DIRECT: 0.2 MG/DL (ref 0–0.3)
BILIRUBIN, INDIRECT: 0.2 MG/DL (ref 0–0.7)
BUN BLDV-MCNC: 17 MG/DL (ref 6–23)
CALCIUM SERPL-MCNC: 8.8 MG/DL (ref 8.3–10.6)
CHLORIDE BLD-SCNC: 103 MMOL/L (ref 99–110)
CO2: 25 MMOL/L (ref 21–32)
CREAT SERPL-MCNC: 1 MG/DL (ref 0.6–1.1)
DIFFERENTIAL TYPE: ABNORMAL
EOSINOPHILS ABSOLUTE: 0.3 K/CU MM
EOSINOPHILS RELATIVE PERCENT: 6.6 % (ref 0–3)
ERYTHROCYTE SEDIMENTATION RATE: 35 MM/HR (ref 0–20)
GFR AFRICAN AMERICAN: >60 ML/MIN/1.73M2
GFR NON-AFRICAN AMERICAN: 60 ML/MIN/1.73M2
GLUCOSE BLD-MCNC: 86 MG/DL (ref 70–99)
HCT VFR BLD CALC: 42.7 % (ref 37–47)
HEMOGLOBIN: 13.9 GM/DL (ref 12.5–16)
IMMATURE NEUTROPHIL %: 0.4 % (ref 0–0.43)
LYMPHOCYTES ABSOLUTE: 1.6 K/CU MM
LYMPHOCYTES RELATIVE PERCENT: 31.1 % (ref 24–44)
MCH RBC QN AUTO: 28.1 PG (ref 27–31)
MCHC RBC AUTO-ENTMCNC: 32.6 % (ref 32–36)
MCV RBC AUTO: 86.3 FL (ref 78–100)
MONOCYTES ABSOLUTE: 0.4 K/CU MM
MONOCYTES RELATIVE PERCENT: 8 % (ref 0–4)
NUCLEATED RBC %: 0 %
PDW BLD-RTO: 12.9 % (ref 11.7–14.9)
PLATELET # BLD: 196 K/CU MM (ref 140–440)
PMV BLD AUTO: 12 FL (ref 7.5–11.1)
POTASSIUM SERPL-SCNC: 4.2 MMOL/L (ref 3.5–5.1)
RBC # BLD: 4.95 M/CU MM (ref 4.2–5.4)
SEGMENTED NEUTROPHILS ABSOLUTE COUNT: 2.6 K/CU MM
SEGMENTED NEUTROPHILS RELATIVE PERCENT: 52.7 % (ref 36–66)
SODIUM BLD-SCNC: 141 MMOL/L (ref 135–145)
TOTAL IMMATURE NEUTOROPHIL: 0.02 K/CU MM
TOTAL NUCLEATED RBC: 0 K/CU MM
TOTAL PROTEIN: 6.9 GM/DL (ref 6.4–8.2)
WBC # BLD: 5 K/CU MM (ref 4–10.5)

## 2019-03-06 PROCEDURE — 36415 COLL VENOUS BLD VENIPUNCTURE: CPT

## 2019-03-06 PROCEDURE — 82248 BILIRUBIN DIRECT: CPT

## 2019-03-06 PROCEDURE — 86141 C-REACTIVE PROTEIN HS: CPT

## 2019-03-06 PROCEDURE — 85652 RBC SED RATE AUTOMATED: CPT

## 2019-03-06 PROCEDURE — 73630 X-RAY EXAM OF FOOT: CPT

## 2019-03-06 PROCEDURE — 80053 COMPREHEN METABOLIC PANEL: CPT

## 2019-03-06 PROCEDURE — 85025 COMPLETE CBC W/AUTO DIFF WBC: CPT

## 2019-03-06 PROCEDURE — 73590 X-RAY EXAM OF LOWER LEG: CPT

## 2019-03-06 PROCEDURE — 99214 OFFICE O/P EST MOD 30 MIN: CPT | Performed by: INTERNAL MEDICINE

## 2019-03-07 ENCOUNTER — TELEPHONE (OUTPATIENT)
Dept: ORTHOPEDIC SURGERY | Age: 44
End: 2019-03-07

## 2019-03-07 LAB — HIGH SENSITIVE C-REACTIVE PROTEIN: 2 MG/L

## 2019-03-10 ASSESSMENT — ENCOUNTER SYMPTOMS
EYES NEGATIVE: 1
RESPIRATORY NEGATIVE: 1
GASTROINTESTINAL NEGATIVE: 1
ALLERGIC/IMMUNOLOGIC NEGATIVE: 1

## 2019-03-18 ENCOUNTER — OFFICE VISIT (OUTPATIENT)
Dept: ORTHOPEDIC SURGERY | Age: 44
End: 2019-03-18

## 2019-03-18 VITALS — RESPIRATION RATE: 14 BRPM | HEART RATE: 56 BPM | OXYGEN SATURATION: 98 %

## 2019-03-18 DIAGNOSIS — Z48.89 ENCOUNTER FOR POSTOPERATIVE WOUND CHECK: Primary | ICD-10-CM

## 2019-03-18 PROCEDURE — 99024 POSTOP FOLLOW-UP VISIT: CPT | Performed by: ORTHOPAEDIC SURGERY

## 2019-03-18 ASSESSMENT — ENCOUNTER SYMPTOMS
SHORTNESS OF BREATH: 0
COLOR CHANGE: 0

## 2019-03-28 ENCOUNTER — TELEPHONE (OUTPATIENT)
Dept: ORTHOPEDIC SURGERY | Age: 44
End: 2019-03-28

## 2019-03-28 NOTE — TELEPHONE ENCOUNTER
Home care called and patient slipped and fell again. Ankle is more swollen and painful. Advised home care patient should go to the ER to be evaluated and seen to make sure the ankle fx has not displaced.

## 2019-04-22 ENCOUNTER — TELEPHONE (OUTPATIENT)
Dept: ORTHOPEDIC SURGERY | Age: 44
End: 2019-04-22

## 2019-04-22 NOTE — TELEPHONE ENCOUNTER
Patient stated that there was a paper given to her at the last visit for to keep her insurance and her food stamps. She wants another 6 weeks due to her PT and she still has not followed up with the neurosurgereon.  Please Advise

## 2019-04-29 ENCOUNTER — OFFICE VISIT (OUTPATIENT)
Dept: ORTHOPEDIC SURGERY | Age: 44
End: 2019-04-29

## 2019-04-29 DIAGNOSIS — Z48.89 ENCOUNTER FOR POSTOPERATIVE WOUND CHECK: Primary | ICD-10-CM

## 2019-04-29 PROCEDURE — 99024 POSTOP FOLLOW-UP VISIT: CPT | Performed by: ORTHOPAEDIC SURGERY

## 2019-04-29 NOTE — LETTER
[address] [place] [address] and Sports [address] [place] [address]  Phone: [phone]  Fax: [phone]    Jeffrey Lehman DO        April 29, 2019     Patient: Elissa Johnson   YOB: 1975   Date of Visit: 4/29/2019       To Whom It May Concern: It is my medical opinion that Linda Hanks may return to work in 2 weeks with no restrictions     If you have any questions or concerns, please don't hesitate to call.     Sincerely,        Jeffrey Lehman DO

## 2019-05-02 ENCOUNTER — TELEPHONE (OUTPATIENT)
Dept: ORTHOPEDIC SURGERY | Age: 44
End: 2019-05-02

## 2019-05-02 ASSESSMENT — ENCOUNTER SYMPTOMS
SHORTNESS OF BREATH: 0
COLOR CHANGE: 0

## 2019-05-02 NOTE — TELEPHONE ENCOUNTER
Can we please sent a referral for Bryson Murray. Patient stated that she spoke with you in the last office visit and she states Dr. Dillon Michael said that she can not full use of the left foot and right arm from the scar tissue. Patient needs that put into a work note so she can hold off on getting a job. Please Advise.

## 2019-05-02 NOTE — PROGRESS NOTES
Subjective:      Patient ID: Ruben Weiner is a 53 y.o. female. Patient returns today for her left arm and right ankle. She reports persistent swelling and has been weight bearing as tolerated with the assistance of a cane. 5/10 pain level. She states that she feels like she needs to see a neurologist for burning, pins and needles and discoloration to arm. Pain level in arm 6/10. She has been using ibuprofen for pain relief. She states that since I saw her last she has continued to improve. she states that overall her incisions and lacerations have all healed well but she continues to have burning pain down her left leg. She is scheduled to meet with a neurologist for evaluation of chronic pain management. Patient denies any new injury to the involved extremity/ joint, denies numbness or tingling in the involved extremity and denies fever or chills. Review of Systems   Constitutional: Negative for activity change, chills and fever. Respiratory: Negative for shortness of breath. Cardiovascular: Negative for chest pain. Musculoskeletal: Positive for arthralgias, gait problem, joint swelling and myalgias. Skin: Negative for color change, pallor, rash and wound. Neurological: Negative for weakness and numbness. Objective:   Physical Exam   Constitutional: She is oriented to person, place, and time. Vital signs are normal. She appears well-developed and well-nourished. HENT:   Head: Normocephalic and atraumatic. Eyes: Pupils are equal, round, and reactive to light. Neck: Normal range of motion. Musculoskeletal: Normal range of motion. She exhibits tenderness. She exhibits no edema or deformity. Right wrist: She exhibits normal range of motion, no tenderness, no bony tenderness, no swelling, no effusion, no crepitus, no deformity and no laceration.         Left ankle: She exhibits normal range of motion, no swelling, no ecchymosis, no deformity, no laceration and normal pulse. Tenderness. No lateral malleolus and no medial malleolus tenderness found. Achilles tendon normal.   Neurological: She is alert and oriented to person, place, and time. She has normal strength. No sensory deficit. Skin: Skin is warm and dry. No rash noted. No erythema. No pallor. Right forearm-multiple lacerations healing well, no drainage, no signs of infection, compartment soft. She has full range of motion in the right fingers, wrist and elbow. Left ankle-all of the lacerations around the lower leg have healed well with carpal appearance to all of the scars, no redness, no signs of infection. Assessment:      Left ankle open fracture with multiple lacerations status post I&D and suture repair, 3 months    Right forearm multiple lacerations status post I&D and suture repair, 3 months        Plan:        I explained to her that she is progressing extremely well . I discussed with the patient today that their are symptoms are normal and should improve with time. I explained to her that she will likely always have some pain in her leg and forearm as a result of this injury. I reassured her that maximum improvement is about a year after this type of injury. Continue weight-bearing as tolerated. Continue range of motion exercises as instructed. Ice and elevate as needed. Tylenol or Motrin for pain. She will continue with formal physical therapy. I will let her stay off of work for the next 2 weeks after that she can return to work with no restrictions. I will get her set up for an evaluation by a neurologist.    Follow up  will be as needed.           Crista Sampson, DO

## 2019-06-05 ENCOUNTER — OFFICE VISIT (OUTPATIENT)
Dept: INTERNAL MEDICINE CLINIC | Age: 44
End: 2019-06-05
Payer: MEDICAID

## 2019-06-05 VITALS
SYSTOLIC BLOOD PRESSURE: 138 MMHG | BODY MASS INDEX: 44.18 KG/M2 | HEIGHT: 61 IN | HEART RATE: 80 BPM | RESPIRATION RATE: 14 BRPM | WEIGHT: 234 LBS | DIASTOLIC BLOOD PRESSURE: 84 MMHG | OXYGEN SATURATION: 98 %

## 2019-06-05 DIAGNOSIS — K21.9 GASTROESOPHAGEAL REFLUX DISEASE WITHOUT ESOPHAGITIS: ICD-10-CM

## 2019-06-05 DIAGNOSIS — F41.9 ANXIETY AND DEPRESSION: ICD-10-CM

## 2019-06-05 DIAGNOSIS — I10 ESSENTIAL HYPERTENSION: Primary | ICD-10-CM

## 2019-06-05 DIAGNOSIS — G47.00 INSOMNIA, UNSPECIFIED TYPE: ICD-10-CM

## 2019-06-05 DIAGNOSIS — F32.A ANXIETY AND DEPRESSION: ICD-10-CM

## 2019-06-05 DIAGNOSIS — F17.200 SMOKER: ICD-10-CM

## 2019-06-05 PROCEDURE — 99213 OFFICE O/P EST LOW 20 MIN: CPT | Performed by: NURSE PRACTITIONER

## 2019-06-05 PROCEDURE — G8417 CALC BMI ABV UP PARAM F/U: HCPCS | Performed by: NURSE PRACTITIONER

## 2019-06-05 PROCEDURE — G8427 DOCREV CUR MEDS BY ELIG CLIN: HCPCS | Performed by: NURSE PRACTITIONER

## 2019-06-05 PROCEDURE — 4004F PT TOBACCO SCREEN RCVD TLK: CPT | Performed by: NURSE PRACTITIONER

## 2019-06-05 RX ORDER — IBUPROFEN 600 MG/1
600 TABLET ORAL EVERY 6 HOURS PRN
Qty: 90 TABLET | Refills: 1 | Status: SHIPPED | OUTPATIENT
Start: 2019-06-05 | End: 2019-07-22 | Stop reason: SDUPTHER

## 2019-06-05 RX ORDER — BUPROPION HYDROCHLORIDE 150 MG/1
150 TABLET ORAL 2 TIMES DAILY
Qty: 60 TABLET | Refills: 1 | Status: SHIPPED | OUTPATIENT
Start: 2019-06-05 | End: 2019-08-11 | Stop reason: SDUPTHER

## 2019-06-05 RX ORDER — TRAZODONE HYDROCHLORIDE 50 MG/1
50 TABLET ORAL NIGHTLY
Qty: 90 TABLET | Refills: 1 | Status: SHIPPED | OUTPATIENT
Start: 2019-06-05 | End: 2020-06-19 | Stop reason: ALTCHOICE

## 2019-06-05 RX ORDER — PAROXETINE HYDROCHLORIDE 20 MG/1
1 TABLET, FILM COATED ORAL DAILY
Refills: 0 | COMMUNITY
Start: 2019-05-28 | End: 2019-06-05 | Stop reason: CLARIF

## 2019-06-05 RX ORDER — LISINOPRIL 5 MG/1
1 TABLET ORAL DAILY
Refills: 0 | COMMUNITY
Start: 2019-05-28 | End: 2020-07-20 | Stop reason: CLARIF

## 2019-06-05 ASSESSMENT — ENCOUNTER SYMPTOMS
SINUS PAIN: 0
VOMITING: 0
ALLERGIC/IMMUNOLOGIC NEGATIVE: 1
COLOR CHANGE: 0
SHORTNESS OF BREATH: 0
DIARRHEA: 0
EYES NEGATIVE: 1
COUGH: 0
ABDOMINAL PAIN: 0
SINUS PRESSURE: 0
APNEA: 0
CHEST TIGHTNESS: 0
NAUSEA: 0

## 2019-06-05 NOTE — PROGRESS NOTES
Angel Luis Woods  1975  06/05/19    Chief Complaint   Patient presents with    3 Month Follow-Up       SUBJECTIVE:      Patient presents to the office for 3 month follow up:    She still has not had labs completed since her March visit. As can been seen from her previous office visits she has been healing well since her traumatic injury being attacked by her dog. Dr Vivian Brown has followed her closely from ortho and she is now ambulating free of her walker and states she is no longer attending physical therapy or receiving home health care. Her concern is that she has some residual \"nerve pain\" in her leg which she requested to see neurology for but was told that is was normal at this time. She has not proceeded through with this neuro eval and is concerned about her ability to stand for prolonged times/return to work this upcoming week. She admits that she often forgets to take her Lisinopril as ordered, at least 1-2 times per week. Denies chest pain, dyspnea, edema, palpiations. Blood pressure has been averaging  130-140//80 over the last several months. Patient's reflux well controlled on current medications. Patient denies any blood in stool black stool, heartburn, reflux. Denies issues with frequent coughing or reflux while sleeping. Able to eat and drink appropriately without complications. Anxiety: She reports that she stopped taking her Paxil shortly after starting due to nausea which she experienced. She states that she also started using the Nicoderm patches at the same and she was unsure of which was causing her symptoms so she quit taking them both. She denies feeling depressed, but continues feeling mostly anxious at night and unable to stay asleep. She is also curious of other available options for smoking cessation. Health Maintenance:  -declines pneumonia vaccine again after strongly encouraged.      Review of Systems   Constitutional: Negative for activity change, appetite change, fatigue and fever. HENT: Negative for congestion, nosebleeds, sinus pressure and sinus pain. Eyes: Negative. Respiratory: Negative for apnea, cough, chest tightness and shortness of breath. Cardiovascular: Negative for chest pain and palpitations. Gastrointestinal: Negative for abdominal pain, diarrhea, nausea and vomiting. Endocrine: Negative. Genitourinary: Negative for difficulty urinating, flank pain and hematuria. Musculoskeletal: Positive for arthralgias. Negative for gait problem, joint swelling and myalgias. Skin: Negative for color change and rash. Allergic/Immunologic: Negative. Neurological: Negative for dizziness, light-headedness and headaches. Psychiatric/Behavioral: Positive for sleep disturbance. Negative for behavioral problems, decreased concentration, self-injury and suicidal ideas. The patient is nervous/anxious. OBJECTIVE:    /84 (Site: Left Upper Arm, Position: Sitting, Cuff Size: Large Adult)   Pulse 80   Resp 14   Ht 5' 1\" (1.549 m)   Wt 234 lb (106.1 kg)   LMP 06/05/2012   SpO2 98%   BMI 44.21 kg/m²     Physical Exam   Constitutional: She is oriented to person, place, and time. She appears well-developed and well-nourished. No distress. HENT:   Head: Normocephalic and atraumatic. Eyes: Pupils are equal, round, and reactive to light. Conjunctivae and EOM are normal.   Neck: Normal range of motion. Neck supple. No muscular tenderness present. No edema and no erythema present. Cardiovascular: Normal rate, regular rhythm and normal heart sounds. No murmur heard. Pulmonary/Chest: Effort normal and breath sounds normal. No respiratory distress. She has no wheezes. Abdominal: Soft. Bowel sounds are normal. There is no tenderness. Musculoskeletal: Normal range of motion. She exhibits no edema. Neurological: She is alert and oriented to person, place, and time. Coordination normal.   Skin: Skin is warm and dry.  Capillary refill takes less than 2 seconds. No rash noted. She is not diaphoretic. No erythema. Multiple scars in various stages of healing. No active drainage, surrounding erythema, or other signs of infection noted. Psychiatric: She has a normal mood and affect. Her behavior is normal. Judgment and thought content normal.       ASSESSMENT:    1. Essential hypertension    2. Gastroesophageal reflux disease without esophagitis    3. Anxiety and depression    4. Smoker    5. Insomnia, unspecified type        PLAN:    Naz Trammell was seen today for 3 month follow-up. Diagnoses and all orders for this visit:    Essential hypertension- still not at goal, however, do not feel comfortable increasing dose at this time as patient does not take her current med compliantly. Strongly encouraged her to take her lisinopril daily without any missed doses and if still elevated by her next appointment will consider increasing at that time. Gastroesophageal reflux disease without esophagitis- well controlled; continue current regiment    Anxiety and depression- start Trazodone at night for sleep aide and Wellbutrin for dual benefit of this and smoking cessation. Smoker- did not tolerate patches; will start Wellbutrin at this time and if not improving by next appointment will switch to Chantix. -     buPROPion (WELLBUTRIN XL) 150 MG extended release tablet; Take 1 tablet by mouth 2 times daily Take 1 tablet daily for first 3 days, then 1 tablet twice daily thereafter    Insomnia, unspecified type  -     traZODone (DESYREL) 50 MG tablet; Take 1 tablet by mouth nightly    Other orders  -     ibuprofen (ADVIL;MOTRIN) 600 MG tablet; Take 1 tablet by mouth every 6 hours as needed for Pain    Course of treatment, including any medications, possible imaging, referrals, and follow ups discussed with patient. All risks and benefits and possible side effects discussed with patient who agrees to plan of care and verbalizes understanding.  All

## 2019-06-17 ENCOUNTER — TELEPHONE (OUTPATIENT)
Dept: ORTHOPEDIC SURGERY | Age: 44
End: 2019-06-17

## 2019-06-17 DIAGNOSIS — S82.52XB TYPE I OR II OPEN DISPLACED FRACTURE OF MEDIAL MALLEOLUS OF LEFT TIBIA, INITIAL ENCOUNTER: Primary | ICD-10-CM

## 2019-06-19 ENCOUNTER — TELEPHONE (OUTPATIENT)
Dept: ORTHOPEDIC SURGERY | Age: 44
End: 2019-06-19

## 2019-06-19 NOTE — TELEPHONE ENCOUNTER
Would like prescription for cane sent to [facility] at [address] [place] [address]. Ph. [phone].

## 2019-06-20 NOTE — TELEPHONE ENCOUNTER
Maco rx faxed to [facility] since [facility] does not fill Rxs for DME

## 2019-07-23 RX ORDER — IBUPROFEN 600 MG/1
600 TABLET ORAL EVERY 6 HOURS PRN
Qty: 90 TABLET | Refills: 1 | Status: SHIPPED | OUTPATIENT
Start: 2019-07-23 | End: 2019-10-12 | Stop reason: SDUPTHER

## 2019-07-30 ENCOUNTER — APPOINTMENT (OUTPATIENT)
Dept: GENERAL RADIOLOGY | Age: 44
End: 2019-07-30
Payer: MEDICAID

## 2019-07-30 ENCOUNTER — APPOINTMENT (OUTPATIENT)
Dept: ULTRASOUND IMAGING | Age: 44
End: 2019-07-30
Payer: MEDICAID

## 2019-07-30 ENCOUNTER — HOSPITAL ENCOUNTER (EMERGENCY)
Age: 44
Discharge: HOME | End: 2019-07-30
Payer: MEDICAID

## 2019-07-30 VITALS
HEART RATE: 95 BPM | OXYGEN SATURATION: 98 % | DIASTOLIC BLOOD PRESSURE: 86 MMHG | RESPIRATION RATE: 16 BRPM | BODY MASS INDEX: 43.43 KG/M2 | TEMPERATURE: 97.9 F | SYSTOLIC BLOOD PRESSURE: 162 MMHG | HEIGHT: 61 IN | WEIGHT: 230 LBS

## 2019-07-30 DIAGNOSIS — M79.89 LEG SWELLING: Primary | ICD-10-CM

## 2019-07-30 PROCEDURE — 73590 X-RAY EXAM OF LOWER LEG: CPT

## 2019-07-30 PROCEDURE — 73630 X-RAY EXAM OF FOOT: CPT

## 2019-07-30 PROCEDURE — 99284 EMERGENCY DEPT VISIT MOD MDM: CPT

## 2019-07-30 PROCEDURE — 93971 EXTREMITY STUDY: CPT

## 2019-07-30 RX ORDER — TRAMADOL HYDROCHLORIDE 50 MG/1
50 TABLET ORAL EVERY 6 HOURS PRN
Qty: 12 TABLET | Refills: 0 | Status: SHIPPED | OUTPATIENT
Start: 2019-07-30 | End: 2019-08-02
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
## 2019-08-11 DIAGNOSIS — F17.200 SMOKER: ICD-10-CM

## 2019-08-12 RX ORDER — BUPROPION HYDROCHLORIDE 150 MG/1
TABLET ORAL
Qty: 60 TABLET | Refills: 1 | Status: SHIPPED | OUTPATIENT
Start: 2019-08-12 | End: 2020-06-19 | Stop reason: ALTCHOICE

## 2019-10-14 RX ORDER — IBUPROFEN 600 MG/1
TABLET ORAL
Qty: 90 TABLET | Refills: 1 | Status: SHIPPED | OUTPATIENT
Start: 2019-10-14 | End: 2020-04-27 | Stop reason: SDUPTHER

## 2020-04-21 ENCOUNTER — HOSPITAL ENCOUNTER (EMERGENCY)
Age: 45
Discharge: HOME | End: 2020-04-21
Payer: MEDICAID

## 2020-04-21 ENCOUNTER — APPOINTMENT (OUTPATIENT)
Dept: GENERAL RADIOLOGY | Age: 45
End: 2020-04-21
Payer: MEDICAID

## 2020-04-21 VITALS
DIASTOLIC BLOOD PRESSURE: 90 MMHG | OXYGEN SATURATION: 95 % | BODY MASS INDEX: 43.43 KG/M2 | RESPIRATION RATE: 18 BRPM | SYSTOLIC BLOOD PRESSURE: 181 MMHG | HEART RATE: 84 BPM | TEMPERATURE: 97.8 F | WEIGHT: 230 LBS | HEIGHT: 61 IN

## 2020-04-21 LAB
ALBUMIN SERPL-MCNC: 4 GM/DL (ref 3.4–5)
ALP BLD-CCNC: 103 IU/L (ref 40–129)
ALT SERPL-CCNC: 24 U/L (ref 10–40)
ANION GAP SERPL CALCULATED.3IONS-SCNC: 11 MMOL/L (ref 4–16)
AST SERPL-CCNC: 13 IU/L (ref 15–37)
BASE EXCESS MIXED: 0.7 (ref 0–2.3)
BASOPHILS ABSOLUTE: 0 K/CU MM
BASOPHILS RELATIVE PERCENT: 0.5 % (ref 0–1)
BILIRUB SERPL-MCNC: 0.2 MG/DL (ref 0–1)
BUN BLDV-MCNC: 18 MG/DL (ref 6–23)
CALCIUM SERPL-MCNC: 8.6 MG/DL (ref 8.3–10.6)
CHLORIDE BLD-SCNC: 104 MMOL/L (ref 99–110)
CO2: 23 MMOL/L (ref 21–32)
COMMENT: ABNORMAL
CREAT SERPL-MCNC: 1.1 MG/DL (ref 0.6–1.1)
DIFFERENTIAL TYPE: ABNORMAL
EOSINOPHILS ABSOLUTE: 0.3 K/CU MM
EOSINOPHILS RELATIVE PERCENT: 3.7 % (ref 0–3)
GFR AFRICAN AMERICAN: >60 ML/MIN/1.73M2
GFR NON-AFRICAN AMERICAN: 53 ML/MIN/1.73M2
GLUCOSE BLD-MCNC: 109 MG/DL (ref 70–99)
HCO3 VENOUS: 25.4 MMOL/L (ref 19–25)
HCT VFR BLD CALC: 39.9 % (ref 37–47)
HEMOGLOBIN: 13 GM/DL (ref 12.5–16)
IMMATURE NEUTROPHIL %: 0.4 % (ref 0–0.43)
LACTATE: 1.9 MMOL/L (ref 0.4–2)
LACTATE: 2.1 MMOL/L (ref 0.4–2)
LYMPHOCYTES ABSOLUTE: 1.5 K/CU MM
LYMPHOCYTES RELATIVE PERCENT: 18.9 % (ref 24–44)
MCH RBC QN AUTO: 27.5 PG (ref 27–31)
MCHC RBC AUTO-ENTMCNC: 32.6 % (ref 32–36)
MCV RBC AUTO: 84.4 FL (ref 78–100)
MONOCYTES ABSOLUTE: 0.5 K/CU MM
MONOCYTES RELATIVE PERCENT: 6.1 % (ref 0–4)
NUCLEATED RBC %: 0 %
O2 SAT, VEN: 88.5 % (ref 50–70)
PCO2, VEN: 40 MMHG (ref 38–52)
PDW BLD-RTO: 13.2 % (ref 11.7–14.9)
PH VENOUS: 7.41 (ref 7.32–7.42)
PLATELET # BLD: 168 K/CU MM (ref 140–440)
PMV BLD AUTO: 11.2 FL (ref 7.5–11.1)
PO2, VEN: 57 MMHG (ref 28–48)
POTASSIUM SERPL-SCNC: 4.1 MMOL/L (ref 3.5–5.1)
PRO-BNP: 368.7 PG/ML
RBC # BLD: 4.73 M/CU MM (ref 4.2–5.4)
SEGMENTED NEUTROPHILS ABSOLUTE COUNT: 5.6 K/CU MM
SEGMENTED NEUTROPHILS RELATIVE PERCENT: 70.4 % (ref 36–66)
SODIUM BLD-SCNC: 138 MMOL/L (ref 135–145)
TOTAL IMMATURE NEUTOROPHIL: 0.03 K/CU MM
TOTAL NUCLEATED RBC: 0 K/CU MM
TOTAL PROTEIN: 6.6 GM/DL (ref 6.4–8.2)
TROPONIN T: <0.01 NG/ML
WBC # BLD: 7.9 K/CU MM (ref 4–10.5)

## 2020-04-21 PROCEDURE — 84484 ASSAY OF TROPONIN QUANT: CPT

## 2020-04-21 PROCEDURE — 82805 BLOOD GASES W/O2 SATURATION: CPT

## 2020-04-21 PROCEDURE — 71045 X-RAY EXAM CHEST 1 VIEW: CPT

## 2020-04-21 PROCEDURE — 83880 ASSAY OF NATRIURETIC PEPTIDE: CPT

## 2020-04-21 PROCEDURE — 83605 ASSAY OF LACTIC ACID: CPT

## 2020-04-21 PROCEDURE — ~ 87635 HC SO COVID-19 ANY TECHNIQUE NON-CDC

## 2020-04-21 PROCEDURE — 6370000000 HC RX 637 (ALT 250 FOR IP): Performed by: PHYSICIAN ASSISTANT

## 2020-04-21 PROCEDURE — 80053 COMPREHEN METABOLIC PANEL: CPT

## 2020-04-21 PROCEDURE — 93010 ELECTROCARDIOGRAM REPORT: CPT | Performed by: INTERNAL MEDICINE

## 2020-04-21 PROCEDURE — U0002 COVID-19 LAB TEST NON-CDC: HCPCS

## 2020-04-21 PROCEDURE — 6360000002 HC RX W HCPCS: Performed by: PHYSICIAN ASSISTANT

## 2020-04-21 PROCEDURE — 99285 EMERGENCY DEPT VISIT HI MDM: CPT

## 2020-04-21 PROCEDURE — 93005 ELECTROCARDIOGRAM TRACING: CPT | Performed by: PHYSICIAN ASSISTANT

## 2020-04-21 PROCEDURE — 87040 BLOOD CULTURE FOR BACTERIA: CPT

## 2020-04-21 PROCEDURE — 85025 COMPLETE CBC W/AUTO DIFF WBC: CPT

## 2020-04-21 RX ORDER — DOXYCYCLINE 100 MG/1
100 TABLET ORAL 2 TIMES DAILY
Qty: 20 TABLET | Refills: 0 | Status: SHIPPED | OUTPATIENT
Start: 2020-04-21 | End: 2020-05-01

## 2020-04-21 RX ORDER — GUAIFENESIN/DEXTROMETHORPHAN 100-10MG/5
5 SYRUP ORAL 3 TIMES DAILY PRN
Qty: 120 ML | Refills: 0 | Status: SHIPPED | OUTPATIENT
Start: 2020-04-21 | End: 2020-05-01

## 2020-04-21 RX ORDER — ALBUTEROL SULFATE 90 UG/1
2 AEROSOL, METERED RESPIRATORY (INHALATION) EVERY 4 HOURS PRN
Qty: 1 INHALER | Refills: 1 | Status: SHIPPED | OUTPATIENT
Start: 2020-04-21 | End: 2020-05-29 | Stop reason: SDUPTHER

## 2020-04-21 RX ORDER — GUAIFENESIN/DEXTROMETHORPHAN 100-10MG/5
10 SYRUP ORAL ONCE
Status: COMPLETED | OUTPATIENT
Start: 2020-04-21 | End: 2020-04-21

## 2020-04-21 RX ADMIN — DEXAMETHASONE 10 MG: 2 TABLET ORAL at 08:17

## 2020-04-21 RX ADMIN — GUAIFENESIN AND DEXTROMETHORPHAN 10 ML: 100; 10 SYRUP ORAL at 08:17

## 2020-04-21 NOTE — ED PROVIDER NOTES
BRO   albuterol sulfate HFA (PROVENTIL HFA) 108 (90 Base) MCG/ACT inhaler Inhale 2 puffs into the lungs every 4 hours as needed for Wheezing or Shortness of Breath With spacer (and mask if indicated). Thanks. 4/21/20 5/21/20 Yes Jules Segal PA-C   guaiFENesin-dextromethorphan (ROBITUSSIN DM) 100-10 MG/5ML syrup Take 5 mLs by mouth 3 times daily as needed for Cough 4/21/20 5/1/20 Yes Jules Segal PA-C   ibuprofen (ADVIL;MOTRIN) 600 MG tablet take 1 tablet by mouth every 6 hours if needed for pain 10/14/19   SHUKRI Kaur CNP   buPROPion (WELLBUTRIN XL) 150 MG extended release tablet TAKE 1 TABLET BY MOUTH ONCE DAILY FOR 3 DAYS THEN TAKE 1 TABLET TWICE DAILY THEREAFTER 8/12/19   SHUKRI Kaur CNP   omeprazole (PRILOSEC) 20 MG delayed release capsule take 1 capsule by mouth once daily 7/1/19   SHUKRI Kaur CNP   lisinopril (PRINIVIL;ZESTRIL) 5 MG tablet take 1 tablet by mouth once daily 7/1/19   SHUKRI Kaur CNP   lisinopril (PRINIVIL;ZESTRIL) 5 MG tablet Take 1 tablet by mouth daily 5/28/19   Historical Provider, MD   traZODone (DESYREL) 50 MG tablet Take 1 tablet by mouth nightly 6/5/19   SHUKRI Kaur CNP   dicyclomine (BENTYL) 10 MG capsule take 1 capsule by mouth three times a day before meals 4/18/19   Lavonne Denny MD   nicotine (NICODERM CQ) 14 MG/24HR Place 1 patch onto the skin every 24 hours 3/4/19 3/3/20  SHUKRI Kaur CNP   aspirin 325 MG EC tablet Take 1 tablet by mouth 2 times daily 2/8/19   Yoshi Garner MD       Social history:  reports that she has been smoking cigarettes. She started smoking about 19 years ago. She has been smoking about 0.25 packs per day. She has never used smokeless tobacco. She reports current drug use. Drug: Marijuana. She reports that she does not drink alcohol.     Family history:    Family History   Problem Relation Age of Onset    Alcohol Abuse Mother     Sleep Apnea Father          Exam  BP (!) 181/90   Pulse

## 2020-04-21 NOTE — ED NOTES
Notified [employer] of [address] approval for Emergent Disease Panel.       Davia Dancer, RN  04/21/20 5265

## 2020-04-22 ENCOUNTER — CARE COORDINATION (OUTPATIENT)
Dept: CARE COORDINATION | Age: 45
End: 2020-04-22

## 2020-04-22 LAB — EMERGENT DISEASE RESULT: NOT DETECTED

## 2020-04-23 NOTE — CARE COORDINATION
Unsuccessful attempts to reach pt regarding COVID monitoring. No further outreaches at this time. Cristobal Engel RN, BSN  Care Coordinator  Cell [phone]  Email [email or web address]

## 2020-04-24 RX ORDER — TRAZODONE HYDROCHLORIDE 50 MG/1
50 TABLET ORAL NIGHTLY
Qty: 90 TABLET | Refills: 1 | OUTPATIENT
Start: 2020-04-24

## 2020-04-24 RX ORDER — OMEPRAZOLE 20 MG/1
CAPSULE, DELAYED RELEASE ORAL
Qty: 30 CAPSULE | Refills: 3 | OUTPATIENT
Start: 2020-04-24

## 2020-04-24 RX ORDER — IBUPROFEN 600 MG/1
TABLET ORAL
Qty: 90 TABLET | Refills: 1 | OUTPATIENT
Start: 2020-04-24

## 2020-04-26 LAB
CULTURE: NORMAL
CULTURE: NORMAL
Lab: NORMAL
Lab: NORMAL
SPECIMEN: NORMAL
SPECIMEN: NORMAL

## 2020-04-27 ENCOUNTER — OFFICE VISIT (OUTPATIENT)
Dept: PRIMARY CARE CLINIC | Age: 45
End: 2020-04-27
Payer: MEDICAID

## 2020-04-27 VITALS — HEART RATE: 74 BPM | OXYGEN SATURATION: 99 % | TEMPERATURE: 97.9 F

## 2020-04-27 PROBLEM — J18.9 PNEUMONIA DUE TO INFECTIOUS ORGANISM: Status: ACTIVE | Noted: 2020-04-27

## 2020-04-27 PROBLEM — R68.89 FLU-LIKE SYMPTOMS: Status: ACTIVE | Noted: 2020-04-27

## 2020-04-27 PROCEDURE — G8417 CALC BMI ABV UP PARAM F/U: HCPCS | Performed by: FAMILY MEDICINE

## 2020-04-27 PROCEDURE — 4004F PT TOBACCO SCREEN RCVD TLK: CPT | Performed by: FAMILY MEDICINE

## 2020-04-27 PROCEDURE — G8428 CUR MEDS NOT DOCUMENT: HCPCS | Performed by: FAMILY MEDICINE

## 2020-04-27 PROCEDURE — 99213 OFFICE O/P EST LOW 20 MIN: CPT | Performed by: FAMILY MEDICINE

## 2020-04-27 NOTE — PROGRESS NOTES
exudate or posterior oropharyngeal erythema. Eyes:      General:         Right eye: No discharge. Left eye: No discharge. Conjunctiva/sclera: Conjunctivae normal.   Neck:      Musculoskeletal: Normal range of motion and neck supple. No neck rigidity. Cardiovascular:      Rate and Rhythm: Normal rate and regular rhythm. Heart sounds: Normal heart sounds. Pulmonary:      Effort: Pulmonary effort is normal.      Breath sounds: Normal breath sounds. No wheezing or rales. Skin:     General: Skin is warm and dry. Neurological:      Mental Status: She is alert. TESTS:  Covid negative  POCT FLU:  [] Positive     []Negative    ASSESSMENT:  PNA- diagnosed 6 days ago, long term through antibiotic course. Covid negative. Feeling improved from last week but still with significant symptoms- requesting extension of work note. Note provided, continue antibiotics and supportive care. [] Flu  [] Possible COVID-19  [] Strep    PLAN:    [x] Discharge home with written instructions for:  [x] PNA management  [] Possible COVID-19 infection with self-quarantine and management of symptoms  [x] Follow-up with primary care physician or emergency department if worsens  [] Evaluation per physician/nurse practitioner in clinic  [] Sent to ER       An  electronic signature was used to authenticate this note. --Lon Lowry MD on 4/27/2020 at 2:40 PM  [address] with COVID-19 may have no symptoms, mild symptoms, such as fever, cough, and shortness of breath or they may have more severe illness, developing severe and fatal pneumonia. As a result, Advance Care Planning with attention to naming a health care decision maker (someone you trust to make healthcare decisions for you if you could not speak for yourself) and sharing other health care preferences is important BEFORE a possible health crisis.  Please contact your Primary Care Provider to discuss Advance Care including petting, snuggling, being kissed or licked, and sharing food. If you must care for your pet or be around animals while you are sick, wash your hands before and after you interact with pets and wear a facemask. Call ahead before visiting your doctor  If you have a medical appointment, call the healthcare provider and tell them that you have or may have COVID-19. This will help the healthcare providers office take steps to keep other people from getting infected or exposed. Wear a facemask  You should wear a facemask when you are around other people (e.g., sharing a room or vehicle) or pets and before you enter a healthcare providers office. If you are not able to wear a facemask (for example, because it causes trouble breathing), then people who live with you should not stay in the same room with you, or they should wear a facemask if they enter your room. Cover your coughs and sneezes  Cover your mouth and nose with a tissue when you cough or sneeze. Throw used tissues in a lined trash can. Immediately wash your hands with soap and water for at least 20 seconds or, if soap and water are not available, clean your hands with an alcohol-based hand  that contains at least 60% alcohol. Clean your hands often  Wash your hands often with soap and water for at least 20 seconds, especially after blowing your nose, coughing, or sneezing; going to the bathroom; and before eating or preparing food. If soap and water are not readily available, use an alcohol-based hand  with at least 60% alcohol, covering all surfaces of your hands and rubbing them together until they feel dry. Soap and water are the best option if hands are visibly dirty. Avoid touching your eyes, nose, and mouth with unwashed hands. Avoid sharing personal household items  You should not share dishes, drinking glasses, cups, eating utensils, towels, or bedding with other people or pets in your home.  After using these items, they should be washed thoroughly with soap and water. Clean all high-touch surfaces everyday  High touch surfaces include counters, tabletops, doorknobs, bathroom fixtures, toilets, phones, keyboards, tablets, and bedside tables. Also, clean any surfaces that may have blood, stool, or body fluids on them. Use a household cleaning spray or wipe, according to the label instructions. Labels contain instructions for safe and effective use of the cleaning product including precautions you should take when applying the product, such as wearing gloves and making sure you have good ventilation during use of the product. Monitor your symptoms  Seek prompt medical attention if your illness is worsening (e.g., difficulty breathing). Before seeking care, call your healthcare provider and tell them that you have, or are being evaluated for, COVID-19. Put on a facemask before you enter the facility. These steps will help the healthcare providers office to keep other people in the office or waiting room from getting infected or exposed. Ask your healthcare provider to call the local or state health department. Persons who are placed under active monitoring or facilitated self-monitoring should follow instructions provided by their local health department or occupational health professionals, as appropriate. When working with your local health department check their available hours. If you have a medical emergency and need to call 911, notify the dispatch personnel that you have, or are being evaluated for COVID-19. If possible, put on a facemask before emergency medical services arrive. Discontinuing home isolation  Patients with confirmed COVID-19 should remain under home isolation precautions until the risk of secondary transmission to others is thought to be low.  The decision to discontinue home isolation precautions should be made on a case-by-case basis, in consultation with healthcare providers and state and local health departments.

## 2020-04-28 LAB
EKG ATRIAL RATE: 74 BPM
EKG DIAGNOSIS: NORMAL
EKG P AXIS: 23 DEGREES
EKG P-R INTERVAL: 152 MS
EKG Q-T INTERVAL: 388 MS
EKG QRS DURATION: 84 MS
EKG QTC CALCULATION (BAZETT): 430 MS
EKG R AXIS: 19 DEGREES
EKG T AXIS: 63 DEGREES
EKG VENTRICULAR RATE: 74 BPM

## 2020-04-28 RX ORDER — IBUPROFEN 600 MG/1
600 TABLET ORAL EVERY 6 HOURS PRN
Qty: 90 TABLET | Refills: 1 | Status: SHIPPED | OUTPATIENT
Start: 2020-04-28 | End: 2020-07-20 | Stop reason: CLARIF

## 2020-05-07 RX ORDER — OMEPRAZOLE 20 MG/1
20 CAPSULE, DELAYED RELEASE ORAL DAILY
Qty: 30 CAPSULE | Refills: 3 | Status: SHIPPED | OUTPATIENT
Start: 2020-05-07 | End: 2020-09-02

## 2020-05-11 ENCOUNTER — HOSPITAL ENCOUNTER (OUTPATIENT)
Age: 45
Discharge: HOME | End: 2020-05-11
Payer: MEDICAID

## 2020-05-11 ENCOUNTER — HOSPITAL ENCOUNTER (OUTPATIENT)
Dept: GENERAL RADIOLOGY | Age: 45
Discharge: HOME | End: 2020-05-11
Payer: MEDICAID

## 2020-05-11 ENCOUNTER — TELEPHONE (OUTPATIENT)
Dept: INTERNAL MEDICINE CLINIC | Age: 45
End: 2020-05-11

## 2020-05-11 PROCEDURE — 71046 X-RAY EXAM CHEST 2 VIEWS: CPT

## 2020-05-11 RX ORDER — LEVOFLOXACIN 500 MG/1
500 TABLET, FILM COATED ORAL DAILY
Qty: 7 TABLET | Refills: 0 | Status: SHIPPED | OUTPATIENT
Start: 2020-05-11 | End: 2020-05-29 | Stop reason: SDUPTHER

## 2020-05-11 RX ORDER — PREDNISONE 10 MG/1
TABLET ORAL
Qty: 20 TABLET | Refills: 0 | Status: SHIPPED | OUTPATIENT
Start: 2020-05-11 | End: 2020-05-29 | Stop reason: SDUPTHER

## 2020-05-11 NOTE — TELEPHONE ENCOUNTER
Pt called and stated that she is still currently coughing with sob. Pt states she has tested negative for covid 19 per the hospital and had followed up at the flu clinic. Pt states she cannot go back to work until she is sx free for 3 days but is still experiencing sx. Please advise.

## 2020-05-29 ENCOUNTER — TELEPHONE (OUTPATIENT)
Dept: INTERNAL MEDICINE CLINIC | Age: 45
End: 2020-05-29

## 2020-05-29 RX ORDER — PREDNISONE 10 MG/1
TABLET ORAL
Qty: 20 TABLET | Refills: 0 | Status: SHIPPED | OUTPATIENT
Start: 2020-05-29 | End: 2020-07-20 | Stop reason: CLARIF

## 2020-05-29 RX ORDER — ALBUTEROL SULFATE 90 UG/1
2 AEROSOL, METERED RESPIRATORY (INHALATION) EVERY 4 HOURS PRN
Qty: 1 INHALER | Refills: 1 | Status: SHIPPED | OUTPATIENT
Start: 2020-05-29 | End: 2020-07-16

## 2020-05-29 RX ORDER — LEVOFLOXACIN 500 MG/1
500 TABLET, FILM COATED ORAL DAILY
Qty: 7 TABLET | Refills: 0 | Status: SHIPPED | OUTPATIENT
Start: 2020-05-29 | End: 2020-06-05

## 2020-05-29 RX ORDER — BENZONATATE 100 MG/1
100 CAPSULE ORAL 3 TIMES DAILY PRN
Qty: 30 CAPSULE | Refills: 0 | Status: SHIPPED | OUTPATIENT
Start: 2020-05-29 | End: 2020-06-05

## 2020-06-05 ENCOUNTER — HOSPITAL ENCOUNTER (OUTPATIENT)
Age: 45
Discharge: HOME | End: 2020-06-05
Payer: MEDICAID

## 2020-06-05 ENCOUNTER — HOSPITAL ENCOUNTER (OUTPATIENT)
Dept: GENERAL RADIOLOGY | Age: 45
Discharge: HOME | End: 2020-06-05
Payer: MEDICAID

## 2020-06-05 PROCEDURE — 71046 X-RAY EXAM CHEST 2 VIEWS: CPT

## 2020-06-19 ENCOUNTER — TELEPHONE (OUTPATIENT)
Dept: INTERNAL MEDICINE CLINIC | Age: 45
End: 2020-06-19

## 2020-06-19 RX ORDER — HYDROXYZINE HYDROCHLORIDE 25 MG/1
25 TABLET, FILM COATED ORAL EVERY 8 HOURS PRN
Qty: 30 TABLET | Refills: 0 | Status: SHIPPED | OUTPATIENT
Start: 2020-06-19 | End: 2020-06-29

## 2020-06-19 RX ORDER — BUSPIRONE HYDROCHLORIDE 5 MG/1
5 TABLET ORAL 2 TIMES DAILY
Qty: 60 TABLET | Refills: 0 | Status: SHIPPED | OUTPATIENT
Start: 2020-06-19 | End: 2020-07-19

## 2020-06-19 NOTE — TELEPHONE ENCOUNTER
Pt called and stated that she is still experiencing a cough but now is having panic attacks that have been consistent the past 4 days. Pt says the only thing that helps is a cool shower. Please advise.

## 2020-06-22 ENCOUNTER — APPOINTMENT (OUTPATIENT)
Dept: GENERAL RADIOLOGY | Age: 45
End: 2020-06-22
Payer: MEDICAID

## 2020-06-22 ENCOUNTER — HOSPITAL ENCOUNTER (EMERGENCY)
Age: 45
Discharge: HOME | End: 2020-06-22
Attending: EMERGENCY MEDICINE
Payer: MEDICAID

## 2020-06-22 VITALS
HEART RATE: 94 BPM | RESPIRATION RATE: 24 BRPM | OXYGEN SATURATION: 94 % | SYSTOLIC BLOOD PRESSURE: 169 MMHG | TEMPERATURE: 97.4 F | DIASTOLIC BLOOD PRESSURE: 98 MMHG

## 2020-06-22 LAB
ANION GAP SERPL CALCULATED.3IONS-SCNC: 11 MMOL/L (ref 4–16)
BASOPHILS ABSOLUTE: 0 K/CU MM
BASOPHILS RELATIVE PERCENT: 0.4 % (ref 0–1)
BUN BLDV-MCNC: 20 MG/DL (ref 6–23)
CALCIUM SERPL-MCNC: 8.7 MG/DL (ref 8.3–10.6)
CHLORIDE BLD-SCNC: 104 MMOL/L (ref 99–110)
CO2: 21 MMOL/L (ref 21–32)
CREAT SERPL-MCNC: 1 MG/DL (ref 0.6–1.1)
DIFFERENTIAL TYPE: ABNORMAL
EOSINOPHILS ABSOLUTE: 0.2 K/CU MM
EOSINOPHILS RELATIVE PERCENT: 2.8 % (ref 0–3)
GFR AFRICAN AMERICAN: >60 ML/MIN/1.73M2
GFR NON-AFRICAN AMERICAN: 59 ML/MIN/1.73M2
GLUCOSE BLD-MCNC: 92 MG/DL (ref 70–99)
HCT VFR BLD CALC: 38.8 % (ref 37–47)
HEMOGLOBIN: 12.4 GM/DL (ref 12.5–16)
IMMATURE NEUTROPHIL %: 0.4 % (ref 0–0.43)
LYMPHOCYTES ABSOLUTE: 2 K/CU MM
LYMPHOCYTES RELATIVE PERCENT: 26.4 % (ref 24–44)
MCH RBC QN AUTO: 27.3 PG (ref 27–31)
MCHC RBC AUTO-ENTMCNC: 32 % (ref 32–36)
MCV RBC AUTO: 85.3 FL (ref 78–100)
MONOCYTES ABSOLUTE: 0.6 K/CU MM
MONOCYTES RELATIVE PERCENT: 8.4 % (ref 0–4)
NUCLEATED RBC %: 0 %
PDW BLD-RTO: 13.8 % (ref 11.7–14.9)
PLATELET # BLD: 164 K/CU MM (ref 140–440)
PMV BLD AUTO: 12.1 FL (ref 7.5–11.1)
POTASSIUM SERPL-SCNC: 3.8 MMOL/L (ref 3.5–5.1)
PRO-BNP: 516.7 PG/ML
RBC # BLD: 4.55 M/CU MM (ref 4.2–5.4)
SEGMENTED NEUTROPHILS ABSOLUTE COUNT: 4.6 K/CU MM
SEGMENTED NEUTROPHILS RELATIVE PERCENT: 61.6 % (ref 36–66)
SODIUM BLD-SCNC: 136 MMOL/L (ref 135–145)
TOTAL IMMATURE NEUTOROPHIL: 0.03 K/CU MM
TOTAL NUCLEATED RBC: 0 K/CU MM
TROPONIN T: <0.01 NG/ML
WBC # BLD: 7.4 K/CU MM (ref 4–10.5)

## 2020-06-22 PROCEDURE — 96375 TX/PRO/DX INJ NEW DRUG ADDON: CPT

## 2020-06-22 PROCEDURE — 84484 ASSAY OF TROPONIN QUANT: CPT

## 2020-06-22 PROCEDURE — 99285 EMERGENCY DEPT VISIT HI MDM: CPT

## 2020-06-22 PROCEDURE — 80048 BASIC METABOLIC PNL TOTAL CA: CPT

## 2020-06-22 PROCEDURE — U0002 COVID-19 LAB TEST NON-CDC: HCPCS

## 2020-06-22 PROCEDURE — 96374 THER/PROPH/DIAG INJ IV PUSH: CPT

## 2020-06-22 PROCEDURE — 93005 ELECTROCARDIOGRAM TRACING: CPT | Performed by: EMERGENCY MEDICINE

## 2020-06-22 PROCEDURE — 6360000002 HC RX W HCPCS: Performed by: EMERGENCY MEDICINE

## 2020-06-22 PROCEDURE — 83880 ASSAY OF NATRIURETIC PEPTIDE: CPT

## 2020-06-22 PROCEDURE — 85025 COMPLETE CBC W/AUTO DIFF WBC: CPT

## 2020-06-22 PROCEDURE — 71045 X-RAY EXAM CHEST 1 VIEW: CPT

## 2020-06-22 RX ORDER — GUAIFENESIN AND DEXTROMETHORPHAN HYDROBROMIDE 100; 10 MG/5ML; MG/5ML
5 SOLUTION ORAL EVERY 4 HOURS PRN
Qty: 120 ML | Refills: 0 | Status: SHIPPED | OUTPATIENT
Start: 2020-06-22 | End: 2020-12-16 | Stop reason: SDUPTHER

## 2020-06-22 RX ORDER — LORAZEPAM 2 MG/ML
1 INJECTION INTRAMUSCULAR ONCE
Status: COMPLETED | OUTPATIENT
Start: 2020-06-22 | End: 2020-06-22

## 2020-06-22 RX ORDER — DOXYCYCLINE HYCLATE 100 MG
100 TABLET ORAL 2 TIMES DAILY
Qty: 14 TABLET | Refills: 0 | Status: SHIPPED | OUTPATIENT
Start: 2020-06-22 | End: 2020-06-29

## 2020-06-22 RX ORDER — KETOROLAC TROMETHAMINE 30 MG/ML
15 INJECTION, SOLUTION INTRAMUSCULAR; INTRAVENOUS ONCE
Status: COMPLETED | OUTPATIENT
Start: 2020-06-22 | End: 2020-06-22

## 2020-06-22 RX ADMIN — KETOROLAC TROMETHAMINE 15 MG: 30 INJECTION, SOLUTION INTRAMUSCULAR; INTRAVENOUS at 10:04

## 2020-06-22 RX ADMIN — LORAZEPAM 1 MG: 2 INJECTION INTRAMUSCULAR; INTRAVENOUS at 10:04

## 2020-06-22 ASSESSMENT — PAIN SCALES - GENERAL
PAINLEVEL_OUTOF10: 10
PAINLEVEL_OUTOF10: 10

## 2020-06-22 NOTE — ED NOTES
Reviewed discharge instructions, follow up care and prescriptions with patient at this time. No questions or concerns were voiced. Patient ambulated out of ED without issue.      Batool Rojo RN  06/22/20 [id number]

## 2020-06-22 NOTE — ED PROVIDER NOTES
EMERGENCY DEPARTMENT ENCOUNTER    Patient: Jen Mortensen  MRN: [id number]  : 1975  Date of Evaluation: 2020  ED Provider:  [address],[address] COMPLAINT  Chief Complaint   Patient presents with    Cough    Shortness of Breath       HPI  Jen Mortensen is a 50 y.o. female who presents with shortness of breath and productive cough and wheezing with some chest tightness for last 3 days. She reports she has some tightness in her chest when she is coughing but does not have any when she is not coughing and she denies any overt chest pain. Symptoms have been mostly constant for the last 3 days and are moderate in severity. Denies any known modifying factors. Denies any fever. Denies any other associated symptoms or complaints or concerns.       REVIEW OF SYSTEMS    Constitutional: negative for fever, chills  Neurological: negative for HA, focal weakness, loss of sensation  Ophthalmic: negative for vision change  ENT: negative for congestion, rhinorrhea  Cardiovascular: negative for chest pain  Respiratory: negative for SOB, cough  GI: negative for abdominal pain, nausea, vomiting, diarrhea, constipation  : negative for dysuria, hematuria  Musculoskeletal: negative for myalgias, decreased ROM, joint swelling  Dermatological: negative for rash, wounds  Heme: Negative for bleeding, bruising      PAST MEDICAL HISTORY  Past Medical History:   Diagnosis Date    Diverticulitis     GERD (gastroesophageal reflux disease)     IBS (irritable bowel syndrome)     Kidney stone        CURRENT MEDICATIONS  [unfilled]    ALLERGIES  Allergies   Allergen Reactions    Dye [Iodides]     Morphine Hives       SURGICAL HISTORY  Past Surgical History:   Procedure Laterality Date    ABDOMEN SURGERY      BLADDER SURGERY      CHOLECYSTECTOMY      COLONOSCOPY  2018    normal colonoscopy    HYSTERECTOMY      HYSTERECTOMY, TOTAL ABDOMINAL      INCISION AND DRAINAGE Left 2019    ANKLE INCISION AND DRAINAGE performed by Aly Mathew DO at [address] Right 2/4/2019    ARM INCISION AND DRAINAGE performed by Aly Mathew DO at [facility]      tumor removal    OVARIAN CYST REMOVAL      TONSILLECTOMY      TUBAL LIGATION         FAMILY HISTORY  Family History   Problem Relation Age of Onset    Alcohol Abuse Mother     Sleep Apnea Father        SOCIAL HISTORY  Social History     Socioeconomic History    Marital status: Single     Spouse name: None    Number of children: None    Years of education: None    Highest education level: None   Occupational History    None   Social Needs    Financial resource strain: None    Food insecurity     Worry: None     Inability: None    Transportation needs     Medical: None     Non-medical: None   Tobacco Use    Smoking status: Current Every Day Smoker     Packs/day: 0.25     Types: Cigarettes     Start date: 2/11/2001    Smokeless tobacco: Never Used   Substance and Sexual Activity    Alcohol use: No    Drug use: Yes     Types: Marijuana    Sexual activity: Yes     Partners: Male   Lifestyle    Physical activity     Days per week: None     Minutes per session: None    Stress: None   Relationships    Social connections     Talks on phone: None     Gets together: None     Attends [employer] service: None     Active member of club or organization: None     Attends meetings of clubs or organizations: None     Relationship status: None    Intimate partner violence     Fear of current or ex partner: None     Emotionally abused: None     Physically abused: None     Forced sexual activity: None   Other Topics Concern    None   Social History Narrative    None         **Past medical, family and social histories, and nursing notes reviewed and verified by me**      PHYSICAL EXAM  VITAL SIGNS:   ED Triage Vitals   Enc Vitals Group      BP 06/22/20 0908 (!) 181/121      Pulse 06/22/20 0908 82      Resp 06/22/20 0908 22 Temp 06/22/20 0910 97.4 °F (36.3 °C)      Temp Source 06/22/20 0910 Oral      SpO2 06/22/20 0908 97 %      Weight --       Height --       Head Circumference --       Peak Flow --       Pain Score --       Pain Loc --       Pain Edu? --       Excl. in [address] during ED course were reviewed and are as charted. Constitutional: Minimal distress, Non-toxic appearance  Eyes: Conjunctiva normal, No discharge  HENT: Normocephalic, Atraumatic, bilateral external ears normal, posterior oropharynx is nonerythematous and without exudate, uvula is midline, no trismus, no \"hot potato voice\" or dysphonia, oropharynx moist  Neck: Supple, no stridor, no grossly visible or palpable masses  Cardiovascular: Regular rate and rhythm, No murmurs, No rubs, No gallops  Pulmonary/Chest: Normal breath sounds, No respiratory distress or accessory muscle use, No wheezing, crackles or rhonchi. Abdomen: Soft, nondistended and nonrigid, No tenderness or peritoneal signs, No masses, normal bowel sounds  Back: No midline point tenderness, No paraspinous muscle tenderness.  No CVA tenderness  Extremities: No gross deformities, no edema, no tenderness  Neurologic: Normal motor function, Normal sensory function, No focal deficits  Skin: Warm, Dry, No erythema, No rash, No cyanosis, No mottling  Lymphatic: No lymphadenopathy in the following location(s): cervical  Psychiatric: Alert and oriented x3, Affect normal          EKG Interpretation    Interpreted by emergency department physician    Rhythm: normal sinus   Rate: normal  Axis: normal  Ectopy: none  Conduction: normal  ST Segments: normal  T Waves: normal  Q Waves: none    Clinical Impression: Normal EKG      RADIOLOGY/PROCEDURES/LABS/MEDICATIONS ADMINISTERED:    I have reviewed and interpreted all of the currently available lab results from this visit (if applicable):  Results for orders placed or performed during the hospital encounter of 06/22/20   CBC auto diff   Result Value ER immediately as above with any concerns. I provided the patient counseling with regard to my customary list of strict return precautions as well as return precautions specific to the cause for today's emergency department visit. The patient will return under these provided conditions, but should also return for new concerns or further worsening. Pt and/or family understand and agree with plan. Clinical Impression:  1. Cough    2. Dyspnea, unspecified type        Disposition referral (if applicable):  SHUKRI Lyons - CNP  [address]  [phone]    Schedule an appointment as soon as possible for a visit       [facility] Emergency Department  [facility] [place] [phone]  [phone]    If symptoms worsen      Disposition medications (if applicable):  New Prescriptions    DEXTROMETHORPHAN-GUAIFENESIN (TUSSIN DM)  MG/5ML SYRP    Take 5 mLs by mouth every 4 hours as needed for Cough    DOXYCYCLINE HYCLATE (VIBRA-TABS) 100 MG TABLET    Take 1 tablet by mouth 2 times daily for 7 days       ED Provider Disposition Time  DISPOSITION            Electronically signed by: Jb Nash M.D., 6/22/2020 11:41 AM      This dictation was created with voice recognition software. While attempts have been made to review the dictation as it is transcribed, on occasion the spoken word can be misinterpreted by the technology leading to omissions or inappropriate words, phrases or sentences.         Sirena Bonilla MD  06/22/20 [id number]

## 2020-06-22 NOTE — ED NOTES
[place], lactic acid, and first set of blood cultures drawn on IV start and sent to lab     [facility], [address] [place] [address]  06/22/20 [id number]

## 2020-06-22 NOTE — ED NOTES
Patient to ER for c/o cough and sob. Patient states she's been \"fighting pneumonia since April\". Patient tells she's had 3 different rounds of antibiotics and steroids, all of which she states she's had no relief. States she recently had an xray and was told her pneumonia was gone. Patient states for the past 2-3 days, she's been very sob and had a non productive cough. Also tells she was started on medication for anxiety but this has given no relief.  Reports no sleep at night due to cough     Jose, RN  06/22/20 3053

## 2020-06-23 ENCOUNTER — CARE COORDINATION (OUTPATIENT)
Dept: CARE COORDINATION | Age: 45
End: 2020-06-23

## 2020-06-23 LAB
SARS-COV-2: NOT DETECTED
SOURCE: NORMAL

## 2020-06-23 NOTE — CARE COORDINATION
ED follow up call completed. ED visit on 20 for cough and dyspnea. She has been sick since April. Additional covid testing pending. She is having a lot of anxiety and panic attacks as well . Started buspar last week and has vistaril to use prn. She has not seen much improvement yet. Plans follow up with PCP. Covid s/s discussed and care at home reviewed. Emailed patient additional resources related to [facility] (1-), patient hotline and enrolled in get well loop. Patient contacted regarding Kristyn Weiss. Discussed COVID-19 related testing which was pending at this time. Test results were pending. Patient informed of results, if available? No    Care Transition Nurse/ Ambulatory Care Manager contacted the patient by telephone to perform post discharge assessment. Verified name and  with patient as identifiers. Provided introduction to self, and explanation of the CTN/ACM role, and reason for call due to risk factors for infection and/or exposure to COVID-19. Symptoms reviewed with patient who verbalized the following symptoms: cough, shortness of breath, nausea, diarrhea and anxious feeling . Due to no new or worsening symptoms encounter was not routed to provider for escalation. Discussed follow-up appointments. If no appointment was previously scheduled, appointment scheduling offered: Yes discussed her getting an earlier appt due to her continued symptoms  REHABILITATION [facility] follow up appointment(s):   Future Appointments   Date Time Provider Vidal Morocho   2020  1:30 PM SHUKRI George -  [facility] follow up appointment(s): na       Patient has following risk factors of: pneumonia. CTN/ACM reviewed discharge instructions, medical action plan and red flags such as increased shortness of breath, increasing fever and signs of decompensation with patient who verbalized understanding.    Discussed exposure protocols and quarantine with CDC Guidelines What to do if you are sick with

## 2020-06-24 ENCOUNTER — TELEPHONE (OUTPATIENT)
Dept: INTERNAL MEDICINE CLINIC | Age: 45
End: 2020-06-24

## 2020-06-24 ENCOUNTER — CARE COORDINATION (OUTPATIENT)
Dept: CARE COORDINATION | Age: 45
End: 2020-06-24

## 2020-06-24 RX ORDER — POTASSIUM CHLORIDE 750 MG/1
10 TABLET, EXTENDED RELEASE ORAL DAILY
Qty: 30 TABLET | Refills: 0 | Status: SHIPPED | OUTPATIENT
Start: 2020-06-24 | End: 2020-07-20 | Stop reason: SDUPTHER

## 2020-06-24 RX ORDER — FUROSEMIDE 20 MG/1
20 TABLET ORAL DAILY
Qty: 30 TABLET | Refills: 0 | Status: SHIPPED | OUTPATIENT
Start: 2020-06-24 | End: 2020-07-20 | Stop reason: SDUPTHER

## 2020-06-26 ENCOUNTER — HOSPITAL ENCOUNTER (OUTPATIENT)
Dept: CT IMAGING | Age: 45
Discharge: HOME | End: 2020-06-26
Payer: MEDICAID

## 2020-06-26 PROCEDURE — 71250 CT THORAX DX C-: CPT

## 2020-06-29 ENCOUNTER — CARE COORDINATION (OUTPATIENT)
Dept: CARE COORDINATION | Age: 45
End: 2020-06-29

## 2020-07-13 ENCOUNTER — TELEPHONE (OUTPATIENT)
Dept: INTERNAL MEDICINE CLINIC | Age: 45
End: 2020-07-13

## 2020-07-13 RX ORDER — GUAIFENESIN AND CODEINE PHOSPHATE 100; 10 MG/5ML; MG/5ML
5 SOLUTION ORAL 2 TIMES DAILY PRN
Qty: 100 ML | Refills: 0 | Status: SHIPPED | OUTPATIENT
Start: 2020-07-13 | End: 2020-07-18

## 2020-07-13 NOTE — TELEPHONE ENCOUNTER
Pt states that she took her medication and started to feel better but is now back to where she was in the beginning. Pt reports cough, congestion, sob. She has an appt with Dr. Guillermo Locke on 7/21/2020. Please advise.

## 2020-07-13 NOTE — TELEPHONE ENCOUNTER
Pt called and informed that her appt with Dr. Wilbert Solomon was changed to 7/16/2020 at 9 am. Pt was encouraged to get stress test and echo completed and given the information to make the appt. Pt also request a cough medication per provider guaifenesin with codeine was sent in.

## 2020-07-15 NOTE — PROGRESS NOTES
Subjective:   CHIEF COMPLAINT / HPI:       Norberto Wheeler is a 50 y.o. female with history of HTN, GERD, current smoker, morbid obesity and recently diagnosed with COPD, pulmonary nodules and pnuemonia, presents today to the pulmonary clinic for evaluation of COPD and pneumonia. Olya Villanueva recently had a CT of her chest done on 6/26/2020 which demonstrated mild emphysema and bilateral upper lobes, small bilateral pleural effusions, multiple pulmonary nodules both solid and part solid most likely inflammatory reaction and a 6 mm right upper lobe and mediastinal adenopathy most likely reactive. She was also noted to have signs of congestive heart failure on the CT scan, labs obtained around the same time demonstrated BNP of 516.7, she was started on Lasix by her primary care provider. She states in 2/21/2020 she was diagnosed with pneumonia when she was seen in the emergency room for complaints of cough and shortness of breath. Chest x-ray obtained demonstrated moderate bilateral interstitial thickening due to interstitial edema, atypical pneumonia or bronchitis. She was placed on antibiotics and steroids at that time. Her COVID test completed at that time was negative. She was seen in the walk-in clinic 6 days later for complaints of cough shortness of breath and fatigue. She had completed half of her antibiotics dose and needed additional time off from work. .  On 5/11/20 she had a repeat chest x-ray done which demonstrated signs suspicious for pneumonia with perihilar reticular opacities and again mild cardiomegaly was noted. Primary care provider started her on Levaquin and prednisone taper. She had follow-up chest x-ray on 6/5/2020, which demonstrated mild cardiomegaly. She again had a second chest x-ray obtained on 6/22/2020 when she presented to the emergency room complaints of cough and shortness of breath.   Chest x-ray at that time demonstrated cardiomegaly a questionable small right pleural effusion and pulmonary edema. She followed up with her primary care provider who ordered a CT of her chest which was completed on 6/26/2020 with findings as noted above, and prompting pulmonary consult. She states she continues to have cough and was recently placed on Guasfin with codeine by her primary care provider    Iris Lwoery is a smoker she is currently smoking approximately half a pack of cigarettes a day. She states she has been cutting down but is not ready to fully quit smoking at this time. She states she was using the NicoDerm patches but is not using at this time. She states she also smokes marijuana several times a week. She states that she goes to bed when she feels tired anywhere from 11 PM to 3 AM.  She states she generally sleeps broken sleep through the night but for the most feels she sleeps 5 to 6 hours total.  She awakens during the night several times for nocturia, thirst, general restlessness. She states she is also woken up in the middle the night noticing that she was gasping. Family states she does snore and is a very restless sleeper. She states her  and she sleep in separate rooms due to her disruptive sleep. She states when she does wake up in the middle the night it can take her anywhere from 10 to 30 minutes to fall back to sleep. She denies symptoms of restless leg. States she does have chronic pain in her left ankle where she has had multiple surgeries from a dog bite. She states in the morning when she wakes up she does not feel well rested. She will often go back to sleep after her  leaves for work and may sleep several hours. She states she is sleepy throughout the day but does not nap. There have been no witnessed sleeps generally up. For states she has been practicing social distancing since the start of the pandemic for coronavirus. She states she is wearing a mask in public, washing her hands frequently or using hand . She denies any recent fever, chills, upper respiratory infection, malaise, change in sensation of taste or smell. She denies recent contact with persons with respiratory illness, positive for coronavirus or under investigation for possible coronavirus exposure. Influenza immunization: States she cannot remember if she received last year  Pneumococcal immunization: Has not received  Smoker: current quarter pack cigarettes a day, started smoking 2001, at most she states she smokes slightly over a pack a day  PCP Tam Villarreal CNP    Past Medical History:  Past Medical History:   Diagnosis Date    Diverticulitis     GERD (gastroesophageal reflux disease)     IBS (irritable bowel syndrome)     Kidney stone        Current Medications:      Current Outpatient Medications:     Spacer/Aero-Holding Chambers CAYLA, 1 Device by Does not apply route daily as needed (use with MDI), Disp: 1 Device, Rfl: 0    SYMBICORT 160-4.5 MCG/ACT AERO, Inhale 2 puffs into the lungs 2 times daily, Disp: 1 Inhaler, Rfl: 5    guaiFENesin-codeine (TUSSI-ORGANIDIN NR) 100-10 MG/5ML syrup, Take 5 mLs by mouth 2 times daily as needed for Cough or Congestion for up to 5 days. , Disp: 100 mL, Rfl: 0    furosemide (LASIX) 20 MG tablet, Take 1 tablet by mouth daily, Disp: 30 tablet, Rfl: 0    potassium chloride (KLOR-CON M) 10 MEQ extended release tablet, Take 1 tablet by mouth daily, Disp: 30 tablet, Rfl: 0    Dextromethorphan-guaiFENesin (TUSSIN DM)  MG/5ML SYRP, Take 5 mLs by mouth every 4 hours as needed for Cough, Disp: 120 mL, Rfl: 0    busPIRone (BUSPAR) 5 MG tablet, Take 1 tablet by mouth 2 times daily, Disp: 60 tablet, Rfl: 0    omeprazole (PRILOSEC) 20 MG delayed release capsule, Take 1 capsule by mouth Daily, Disp: 30 capsule, Rfl: 3    ibuprofen (ADVIL;MOTRIN) 600 MG tablet, Take 1 tablet by mouth every 6 hours as needed for Pain, Disp: 90 tablet, Rfl: 1    dicyclomine (BENTYL) 10 MG capsule, Take 1 capsule by mouth 3 times daily (before meals), Disp: 90 capsule, Rfl: 5    lisinopril (PRINIVIL;ZESTRIL) 5 MG tablet, take 1 tablet by mouth once daily, Disp: 30 tablet, Rfl: 3    lisinopril (PRINIVIL;ZESTRIL) 5 MG tablet, Take 1 tablet by mouth daily, Disp: , Rfl: 0    nicotine (NICODERM CQ) 14 MG/24HR, Place 1 patch onto the skin every 24 hours, Disp: 30 patch, Rfl: 3    aspirin 325 MG EC tablet, Take 1 tablet by mouth 2 times daily, Disp: 30 tablet, Rfl: 3    albuterol sulfate  (90 Base) MCG/ACT inhaler, inhale 2 puffs by mouth every 4 hours if needed for wheezing or shortness of breath with SPACER, Disp: 8.5 g, Rfl: 2    predniSONE (DELTASONE) 10 MG tablet, Take 4 tablets daily for 2 days, then 3 tablets for 2 days, 2 tablets for 2 days, then 1 tablet for 2 days (Patient not taking: Reported on 7/16/2020), Disp: 20 tablet, Rfl: 0    Allergies   Allergen Reactions    Dye [Iodides]     Morphine Hives       Social History:    Social History     Socioeconomic History    Marital status: Single     Spouse name: None    Number of children: None    Years of education: None    Highest education level: None   Occupational History    None   Social Needs    Financial resource strain: None    Food insecurity     Worry: None     Inability: None    Transportation needs     Medical: None     Non-medical: None   Tobacco Use    Smoking status: Current Every Day Smoker     Packs/day: 0.25     Types: Cigarettes     Start date: 2/11/2001    Smokeless tobacco: Never Used   Substance and Sexual Activity    Alcohol use: No    Drug use: Yes     Types: Marijuana    Sexual activity: Yes     Partners: Male   Lifestyle    Physical activity     Days per week: None     Minutes per session: None    Stress: None   Relationships    Social connections     Talks on phone: None     Gets together: None     Attends Yazdanism service: None     Active member of club or organization: None     Attends meetings of clubs or organizations: None     Relationship status: None    Intimate partner violence     Fear of current or ex partner: None     Emotionally abused: None     Physically abused: None     Forced sexual activity: None   Other Topics Concern    None   Social History Narrative    None       Family History:    Family History   Problem Relation Age of Onset    Alcohol Abuse Mother     Sleep Apnea Father          REVIEW OF SYSTEMS:    CONSTITUTIONAL:  negative for fevers, chills, diaphoresis, activity change, appetite change, night sweats and unexpected weight change. HEENT:  negative for hearing loss,  sinus pressure, nasal congestion, epistaxis and snoring  RESPIRATORY: Negative for shortness of breath, negative for wheeze, negative for cough  CARDIOVASCULAR:  Negative for chest pain, palpitations, exertional chest pressure/discomfort, edema, syncope  GASTROINTESTINAL: negative for nausea, vomiting, diarrhea, constipation, blood in stool and abdominal pain  GENITOURINARY:  negative for frequency, dysuria and hematuria  HEMATOLOGIC/LYMPHATIC:  negative for easy bruising, bleeding and lymphadenopathy  ALLERGIC/IMMUNOLOGIC:  negative for recurrent infections, angioedema, anaphylaxis and drug reaction  MUSCULOSKELETAL:  negative for  pain, joint swelling, decreased range of motion and muscle weakness  NEURO: negative for headache, AMS, decrease sensations  SKIN: Negative for rashes or lesions      Objective:   VITALS:   Vitals:    07/16/20 0900   BP: (!) 158/90   Pulse: 90   SpO2: 96%   Weight: 244 lb 6.4 oz (110.9 kg)   Height: 5' 1\" (1.549 m)     Neck circumference: 19  Inches  [place] - Total score: 18  MALLAMPATI: 3  Body mass index is 46.18 kg/m².     PHYSICAL EXAM:    CONSTITUTIONAL:  awake, alert, cooperative, no apparent distress, and appears stated age, obese  HEENT:  Supple and nontender,  trachea midline, no adenopathy, thyroid normal, no JVD, no wheezing or stridor over neck  CHEST: Chest expansion equal and symmetrical, no intercostal retraction, no increased work of breathing  LUNGS: Bilateral breath sounds clear and equal to auscultation with good air movement, no use of accessory muscles to support respiratory effort, no wheezes, no rales, no rhonchi, no cough noted during exam  CARDIOVASCULAR: Normal S1 and S2 , no murmurs or gallops ,no pericardial rubs  ABDOMEN: Large abdomen, normal bowel sounds, non-distended and no masses palpated, and no tenderness to palpation. LYMPHADENOPATHY:  no axillary or supraclavicular adenopathy. No cervical adenopathy  EXTREMITIES: No edema, no inflammation, no tenderness, no clubbing of digits  NEURO: Oriented X 3, No focal deficits  SKIN: warm and dry    LAB:CBC with Differential:    Lab Results   Component Value Date    WBC 7.4 06/22/2020    RBC 4.55 06/22/2020    HGB 12.4 06/22/2020    HCT 38.8 06/22/2020     06/22/2020    MCV 85.3 06/22/2020    MCH 27.3 06/22/2020    MCHC 32.0 06/22/2020    RDW 13.8 06/22/2020    SEGSPCT 61.6 06/22/2020    LYMPHOPCT 26.4 06/22/2020    MONOPCT 8.4 06/22/2020    BASOPCT 0.4 06/22/2020    MONOSABS 0.6 06/22/2020    LYMPHSABS 2.0 06/22/2020    EOSABS 0.2 06/22/2020    BASOSABS 0.0 06/22/2020    DIFFTYPE AUTOMATED DIFFERENTIAL 06/22/2020     BMP:    Lab Results   Component Value Date     06/22/2020    K 3.8 06/22/2020     06/22/2020    CO2 21 06/22/2020    BUN 20 06/22/2020    CREATININE 1.0 06/22/2020    CALCIUM 8.7 06/22/2020    GFRAA >60 06/22/2020    LABGLOM 59 06/22/2020    GLUCOSE 92 06/22/2020     Hepatic Function Panel:    Lab Results   Component Value Date    ALKPHOS 103 04/21/2020    ALT 24 04/21/2020    AST 13 04/21/2020    PROT 6.6 04/21/2020    PROT 6.0 03/19/2013    BILITOT 0.2 04/21/2020    BILIDIR 0.2 03/06/2019    IBILI 0.2 03/06/2019     ABG:    Lab Results   Component Value Date    HHR4ASL 21.0 07/03/2017    JPC0XWG 38.0 07/03/2017    PO2ART 67 07/03/2017       RADIOLOGY:    PFT: To be obtained      Assessment      1. Pulmonary emphysema, unspecified emphysema type ([address]  Rhesa Bernheim has not completed a PFT which we will schedule here in the near future. CT scan does indicate emphysema. With her history of smoking findings of emphysema on CT scan I will start her on Symbicort and have given her an office sample and have sent a prescription to her pharmacy. She has an albuterol rescue inhaler already and I have instructed her in the use. I will also order her a spacer and have demonstrated to her how to use a spacer with both the Symbicort and albuterol rescue inhaler. Once we have her PFT results we will reassess bronchodilator needs and if changes need to be made. 2. Pulmonary nodule  Pulmonary nodules seen on CT scan are most likely inflammatory related to her recent pneumonia. I will repeat a CT scan in 3 months to follow-up on these nodules. I have discussed with Rhesa Bernheim and her mother pulmonary nodules and what they could mean. We have also discussed the possibility of pulmonary nodules being precancerous and that her continuation of smoking could lead to cancer and worsening lung disease. 3. Congestive heart failure, unspecified HF chronicity, unspecified heart failure type [facility])  Following recent CT scan her primary care provider started her on 20 mg of Lasix daily. She has not been weighing herself daily she has limited her oral intake to 64 ounces a day but states she is still very thirsty after that. She does not have a cardiologist of record nor does she states she has a follow-up. Her mother is in the room with her suggest that they would like to follow-up with Dr. Roxy Beasley as that who the family has seen in the past. I will place a referral for Dr. Roxy Beasley, in the meantime we have discussed her diet and choice of processed meats which are high in sodium.   I have instructed her that she needs to look at the labels and she needs to limit her sodium intake especially being on the Lasix and having congestive heart failure. I have suggested that she purchase or borrow a scale and weigh herself every day should she have a 3 pound weight gain she needs to contact her primary care provider or cardiologist.  I have encouraged her to continue taking her Lasix. 4. Smoker  We have discussed her smoking habits, she states she has decreased her smoking habit but is not totally ready to quit smoking. She states her  still smokes as does her mother. She states she does not buy the cigarettes but she will \"bum cigarettes or take a hit off of someone else's cigarette\". We have discussed that no one should be smoking in the house or around her that this is putting her increased risk of worsening lung disease and cardiovascular disease. She has used NicoDerm patches in the past I have also instructed her that the use of NicoDerm patches continues to put nicotine in her system and will affect her cardiovascular disease. She states she has not wore nicotine patch in over a week. I have discussed with her alternatives to smoking such as hard candy, sugarless gum, alternative healthy activity. I have also instructed her mother who is in the room with her during the visit not to give her any further cigarettes when she is asking for cigarettes as it will continue to affect her overall health. 5. Hypersomnia  6. Fatigue, unspecified type  I have discussed with her that I have suspicion she may have ALAN and would like to do a sleep study. Her mother who is in the room with her states she does not want her to do a sleep study at this time she feels we are asking her to do too much. We will proceed however with an overnight oximetry and apnea link. She is aware as is her mother that if this test should demonstrate that she has sleep apnea with an AHI of greater than 5 she will be asked to complete a formal sleep study.     7. Morbid Obesity  [facility] states last month her weight varied by 40 pounds up and down throughout improve Diamond's overall health. We have discussed the need to maintain yearly flu immunization, pneumococcal vaccination. We have discussed Coronavirus precaution including: social distancing when needing to be in public, handwashing practice, wiping items touched in public such as gas pumps, door handles, shopping carts, etc. Self monitoring for infection - fever, chills, cough, SOB. Should they develop symptoms they should call office for further instructions. Return in about 3 months (around 10/16/2020) for Follow Up, PFT. This dictation was performed with a verbal recognition program and it was checked for errors. It is possible that there are still dictated errors within this office note. Any errors should be brought immediately to my attention for correction. All efforts were made to ensure that this office note is accurate.        Electronically signed by SHUKRI Castillo CNP on 7/17/2020 at 8:59 AM

## 2020-07-16 ENCOUNTER — INITIAL CONSULT (OUTPATIENT)
Dept: PULMONOLOGY | Age: 45
End: 2020-07-16
Payer: MEDICAID

## 2020-07-16 VITALS
BODY MASS INDEX: 46.14 KG/M2 | SYSTOLIC BLOOD PRESSURE: 158 MMHG | DIASTOLIC BLOOD PRESSURE: 90 MMHG | HEIGHT: 61 IN | WEIGHT: 244.4 LBS | HEART RATE: 90 BPM | OXYGEN SATURATION: 96 %

## 2020-07-16 PROCEDURE — G8427 DOCREV CUR MEDS BY ELIG CLIN: HCPCS | Performed by: NURSE PRACTITIONER

## 2020-07-16 PROCEDURE — 99244 OFF/OP CNSLTJ NEW/EST MOD 40: CPT | Performed by: NURSE PRACTITIONER

## 2020-07-16 PROCEDURE — 3023F SPIROM DOC REV: CPT | Performed by: NURSE PRACTITIONER

## 2020-07-16 PROCEDURE — G8926 SPIRO NO PERF OR DOC: HCPCS | Performed by: NURSE PRACTITIONER

## 2020-07-16 PROCEDURE — G8417 CALC BMI ABV UP PARAM F/U: HCPCS | Performed by: NURSE PRACTITIONER

## 2020-07-16 RX ORDER — BUDESONIDE AND FORMOTEROL FUMARATE DIHYDRATE 160; 4.5 UG/1; UG/1
2 AEROSOL RESPIRATORY (INHALATION) 2 TIMES DAILY
Qty: 1 INHALER | Refills: 5 | Status: SHIPPED | OUTPATIENT
Start: 2020-07-16 | End: 2020-12-09 | Stop reason: SDUPTHER

## 2020-07-16 RX ORDER — ALBUTEROL SULFATE 90 UG/1
AEROSOL, METERED RESPIRATORY (INHALATION)
Qty: 8.5 G | Refills: 2 | Status: SHIPPED | OUTPATIENT
Start: 2020-07-16 | End: 2020-09-28

## 2020-07-16 ASSESSMENT — SLEEP AND FATIGUE QUESTIONNAIRES
HOW LIKELY ARE YOU TO NOD OFF OR FALL ASLEEP WHILE SITTING INACTIVE IN A PUBLIC PLACE: 3
HOW LIKELY ARE YOU TO NOD OFF OR FALL ASLEEP WHILE WATCHING TV: 3
HOW LIKELY ARE YOU TO NOD OFF OR FALL ASLEEP WHILE SITTING AND READING: 3
HOW LIKELY ARE YOU TO NOD OFF OR FALL ASLEEP WHILE SITTING AND TALKING TO SOMEONE: 2
HOW LIKELY ARE YOU TO NOD OFF OR FALL ASLEEP WHEN YOU ARE A PASSENGER IN A CAR FOR AN HOUR WITHOUT A BREAK: 3
NECK CIRCUMFERENCE (INCHES): 19
HOW LIKELY ARE YOU TO NOD OFF OR FALL ASLEEP WHILE LYING DOWN TO REST IN THE AFTERNOON WHEN CIRCUMSTANCES PERMIT: 2
HOW LIKELY ARE YOU TO NOD OFF OR FALL ASLEEP IN A CAR, WHILE STOPPED FOR A FEW MINUTES IN TRAFFIC: 0
ESS TOTAL SCORE: 18
HOW LIKELY ARE YOU TO NOD OFF OR FALL ASLEEP WHILE SITTING QUIETLY AFTER LUNCH WITHOUT ALCOHOL: 2

## 2020-07-16 NOTE — PATIENT INSTRUCTIONS
Make appointment for echo and stress test TODAY    Monitor salt intake and weight    CT scan in 3 months    Symbicort 2 puffs 2 times a day with spacer    Start walking program      Patient Education        Learning About Emphysema  What is emphysema? Emphysema is damage to the air sacs in your lungs. In a healthy person, the tiny air sacs in the lungs are like balloons. As you breathe in and out, they get bigger and smaller to move air through your lungs. With emphysema, these air sacs lose their stretch. Less oxygen gets into your blood and you feel short of breath. Emphysema is a form of COPD (chronic obstructive pulmonary disease). Emphysema is usually caused by smoking. But chemical fumes, dust, or air pollution also can cause it over time. People who get it in their 35s or 45s may have a disorder that runs in families, called alpha-1 antitrypsin deficiency. But this is rare. What can you expect when you have emphysema? Emphysema gets worse over time. You cannot undo the damage to your lungs. Over time, you may find that:  · You get short of breath even when you do simple things like get dressed or fix a meal.  · It is hard to eat or exercise. · You lose weight and feel weaker. But there are things you can do to prevent more damage and feel better. What are the symptoms? The main symptoms of emphysema are:  · A cough that will not go away. · Mucus that comes up when you cough. · Shortness of breath that gets worse when you exercise. At times, your symptoms may suddenly flare up and get much worse. This is a called an exacerbation (say \"egg-XAVIER--BAY-charlene\"). When this happens, your usual symptoms quickly get worse and stay bad. This can be dangerous. You may have to go to the hospital.  How can you keep emphysema from getting worse? Don't smoke. That is the best way to keep emphysema from getting worse. If you already smoke, it is never too late to stop.  If you need help quitting, talk to your doctor about stop-smoking programs and medicines. These can increase your chances of quitting for good. You can do other things to keep emphysema from getting worse:  · Avoid bad air. Air pollution, chemical fumes, and dust also can make emphysema worse. · Get a flu shot every year. A shot may keep the flu from turning into something more serious, like pneumonia. A flu shot also may lower your chances of having a flare-up. · Get a pneumococcal shot. A shot can prevent some of the serious complications of pneumonia. Ask your doctor how often you should get this shot. How is emphysema treated? Emphysema is treated with medicines and oxygen. You also can take steps at home to stay healthy and keep your condition from getting worse. Medicines and oxygen therapy  · You may be taking medicines such as:  ? Bronchodilators. These help open your airways and make breathing easier. Bronchodilators are either short-acting (work for 6 to 9 hours) or long-acting (work for 24 hours). You inhale most bronchodilators, so they start to act quickly. Always carry your quick-relief inhaler with you in case you need it while you are away from home. ? Corticosteroids. These reduce airway inflammation. They come in pill or inhaled form. You must take these medicines every day for them to work well. ? Antibiotics. These medicines are used when you have a bacterial lung infection. · Take your medicines exactly as prescribed. Call your doctor if you think you are having a problem with your medicine. · Oxygen therapy boosts the amount of oxygen in your blood and helps you breathe easier. Use the flow rate your doctor has recommended, and do not change it without talking to your doctor first.  Other care at home  · If your doctor recommends it, get more exercise. Walking is a good choice. Bit by bit, increase the amount you walk every day. Try for at least 30 minutes on most days of the week.   · Learn breathing methods--such as

## 2020-07-20 ENCOUNTER — VIRTUAL VISIT (OUTPATIENT)
Dept: INTERNAL MEDICINE CLINIC | Age: 45
End: 2020-07-20
Payer: MEDICAID

## 2020-07-20 PROBLEM — S41.151S DOG BITE OF ARM, RIGHT, SEQUELA: Status: RESOLVED | Noted: 2019-03-06 | Resolved: 2020-07-20
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
## 2020-07-20 PROBLEM — S91.052S: Status: RESOLVED | Noted: 2019-03-06 | Resolved: 2020-07-20
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
## 2020-07-20 PROBLEM — R10.84 GENERALIZED ABDOMINAL PAIN: Status: RESOLVED | Noted: 2017-07-09 | Resolved: 2020-07-20

## 2020-07-20 PROBLEM — R68.89 FLU-LIKE SYMPTOMS: Status: RESOLVED | Noted: 2020-04-27 | Resolved: 2020-07-20

## 2020-07-20 PROCEDURE — G8427 DOCREV CUR MEDS BY ELIG CLIN: HCPCS | Performed by: NURSE PRACTITIONER

## 2020-07-20 PROCEDURE — 99214 OFFICE O/P EST MOD 30 MIN: CPT | Performed by: NURSE PRACTITIONER

## 2020-07-20 RX ORDER — FUROSEMIDE 20 MG/1
20 TABLET ORAL DAILY
Qty: 30 TABLET | Refills: 2 | Status: SHIPPED | OUTPATIENT
Start: 2020-07-20 | End: 2020-10-14

## 2020-07-20 RX ORDER — POTASSIUM CHLORIDE 750 MG/1
10 TABLET, EXTENDED RELEASE ORAL DAILY
Qty: 30 TABLET | Refills: 2 | Status: SHIPPED | OUTPATIENT
Start: 2020-07-20 | End: 2020-10-14

## 2020-07-20 RX ORDER — HYDROXYZINE HYDROCHLORIDE 25 MG/1
25 TABLET, FILM COATED ORAL 3 TIMES DAILY PRN
COMMUNITY
End: 2020-07-29 | Stop reason: SDUPTHER

## 2020-07-20 ASSESSMENT — ENCOUNTER SYMPTOMS
COUGH: 0
SINUS PRESSURE: 0
SHORTNESS OF BREATH: 0
COLOR CHANGE: 0
VOMITING: 0
APNEA: 0
NAUSEA: 0
SINUS PAIN: 0
DIARRHEA: 0
ABDOMINAL PAIN: 0
CHEST TIGHTNESS: 0

## 2020-07-20 NOTE — PROGRESS NOTES
2020    TELEHEALTH EVALUATION -- Audio/Visual (During YVIGQ-72 public health emergency)    HPI:    Rasheed Chaudhry (:  1975) has requested an audio/video evaluation for the following concern(s):    HTN: she is not currently on any anti-hypertensive medications at this time. She is working on reducing sodium in her diet and getting routine exercise. She would like to work on lowering BP with conservative measures alone before restarting medication. BP has been averaging 130-150/90 in the last month. Patient's reflux well controlled on current medications. Patient denies any blood in stool black stool, heartburn, reflux. Denies issues with frequent coughing or reflux while sleeping. Able to eat and drink appropriately without complications. Continues to do well on current Prilosec. Patient's anxiety is reasonably well controlled with current treatment. Patient denies any suicidal ideation, homicidal ideation, hallucinations. She prefers to avoid new medication at this time and pursue more natural measures for her management. Still using Hydroxyzine with modest benefit. She has been smoke free for approximately 4 days now, however, quit taking Wellbutrin as it made her sick to her stomach. She wants to quit cold turkey. She was seen by Pulmonology since our last appointment. Plan is for repeat CT in 3 months to re-evaluate the nodules. Was also started on Symbicort for emphysema; Pending PFT results    She is taking Lasix with potassium currently with noticeable increased diuresis. States her fatigue and SOB have improved since beginning this treatment. She did have noted CHF on chest imaging and is scheduled for Echo and Stress Test on . Has initial consult with cardiology, Dr Leatha Grady on  at 0800. Denies chest pain, palpitations or leg swelling. Patient also with ongoing pain of left ankle and RUE pain ever since previous dog bite with surgical intervention.  Is taking 600 well-developed and well-nourished [x] No apparent distress      [] Abnormal-   Mental status  [x] Alert and awake  [x] Oriented to person/place/time [x]Able to follow commands      Eyes:  EOM    [x]  Normal  [] Abnormal-  Sclera  [x]  Normal  [] Abnormal -         Discharge [x]  None visible  [] Abnormal -    HENT:   [x] Normocephalic, atraumatic. [] Abnormal   [x] Mouth/Throat: Mucous membranes are moist.     External Ears [x] Normal  [] Abnormal-     Neck: [x] No visualized mass     Pulmonary/Chest: [x] Respiratory effort normal.  [x] No visualized signs of difficulty breathing or respiratory distress        [] Abnormal-      Musculoskeletal:   [x] Normal gait with no signs of ataxia         [x] Normal range of motion of neck        [] Abnormal-       Neurological:        [x] No Facial Asymmetry (Cranial nerve 7 motor function) (limited exam to video visit)          [x] No gaze palsy        [] Abnormal-         Skin:        [x] No significant exanthematous lesions or discoloration noted on facial skin         [] Abnormal-            Psychiatric:       [x] Normal Affect [x] No Hallucinations        [] Abnormal-     Other pertinent observable physical exam findings-     ASSESSMENT/PLAN:  1. Essential hypertension- not at goal; DASH diet discussed at length and patient to continue working on weight loss; follow closely x 2 months and if not at goal by follow up will restart medication at that time. - CBC Auto Differential; Future  - Comprehensive Metabolic Panel; Future  - Lipid, Fasting; Future    2. Gastroesophageal reflux disease without esophagitis- well controlled; continue Omeprazole. 3. Anxiety and depression- stable on Hydroxyzine PRN; will continue to pursue exercise as outlet to relieve stress; pt prefers to hold on other medication at this time. - hydrOXYzine (ATARAX) 25 MG tablet;  Take 25 mg by mouth 3 times daily as needed for Itching  - CBC Auto Differential; Future  - Comprehensive Metabolic guardian) has also been advised to contact this office for worsening conditions or problems, and seek emergency medical treatment and/or call 911 if deemed necessary. Patient identification was verified at the start of the visit: Yes    Total time spent on this encounter: 25 minutes    Services were provided through a video synchronous discussion virtually to substitute for in-person clinic visit. Patient and provider were located at their individual homes. --SHUKRI Blackwood CNP on 7/20/2020 at 3:06 PM    An electronic signature was used to authenticate this note.

## 2020-07-22 NOTE — PROGRESS NOTES
Attempted to call patient in regards to needing a COVID test prior to her upcoming PFT test. No answer. Left call back number and encouraged patient to call asap.

## 2020-07-24 ENCOUNTER — HOSPITAL ENCOUNTER (OUTPATIENT)
Dept: LAB | Age: 45
Discharge: HOME | End: 2020-07-24
Payer: MEDICAID

## 2020-07-24 PROCEDURE — U0002 COVID-19 LAB TEST NON-CDC: HCPCS

## 2020-07-26 LAB
SARS-COV-2: NOT DETECTED
SOURCE: NORMAL

## 2020-07-29 ENCOUNTER — HOSPITAL ENCOUNTER (OUTPATIENT)
Dept: NON INVASIVE DIAGNOSTICS | Age: 45
Discharge: HOME | End: 2020-07-29
Payer: MEDICAID

## 2020-07-29 ENCOUNTER — HOSPITAL ENCOUNTER (OUTPATIENT)
Dept: CT IMAGING | Age: 45
Discharge: HOME | End: 2020-07-29
Payer: MEDICAID

## 2020-07-29 ENCOUNTER — HOSPITAL ENCOUNTER (OUTPATIENT)
Dept: PULMONOLOGY | Age: 45
Discharge: HOME | End: 2020-07-29
Payer: MEDICAID

## 2020-07-29 LAB
DLCO %PRED: 133 %
DLCO PRED: NORMAL
DLCO/VA %PRED: NORMAL
DLCO/VA PRED: NORMAL
DLCO/VA: NORMAL
DLCO: NORMAL
EXPIRATORY TIME-POST: NORMAL
EXPIRATORY TIME: NORMAL
FEF 25-75% %CHNG: NORMAL
FEF 25-75% %PRED-POST: NORMAL
FEF 25-75% %PRED-PRE: NORMAL
FEF 25-75% PRED: NORMAL
FEF 25-75%-POST: NORMAL
FEF 25-75%-PRE: NORMAL
FEV1 %PRED-POST: 83 %
FEV1 %PRED-PRE: 77 %
FEV1 PRED: NORMAL
FEV1-POST: NORMAL
FEV1-PRE: NORMAL
FEV1/FVC %PRED-POST: NORMAL
FEV1/FVC %PRED-PRE: NORMAL
FEV1/FVC PRED: NORMAL
FEV1/FVC-POST: 111 %
FEV1/FVC-PRE: 106 %
FVC %PRED-POST: NORMAL
FVC %PRED-PRE: NORMAL
FVC PRED: NORMAL
FVC-POST: NORMAL
FVC-PRE: NORMAL
GAW %PRED: NORMAL
GAW PRED: NORMAL
GAW: NORMAL
IC %PRED: NORMAL
IC PRED: NORMAL
IC: NORMAL
LV EF: 50 %
LVEF MODALITY: NORMAL
MEP: NORMAL
MIP: NORMAL
MVV %PRED-PRE: NORMAL
MVV PRED: NORMAL
MVV-PRE: NORMAL
PEF %PRED-POST: NORMAL
PEF %PRED-PRE: NORMAL
PEF PRED: NORMAL
PEF%CHNG: NORMAL
PEF-POST: NORMAL
PEF-PRE: NORMAL
RAW %PRED: NORMAL
RAW PRED: NORMAL
RAW: NORMAL
RV %PRED: NORMAL
RV PRED: NORMAL
RV: NORMAL
SVC %PRED: NORMAL
SVC PRED: NORMAL
SVC: NORMAL
TLC %PRED: 77 %
TLC PRED: NORMAL
TLC: NORMAL
VA %PRED: NORMAL
VA PRED: NORMAL
VA: NORMAL
VTG %PRED: NORMAL
VTG PRED: NORMAL
VTG: NORMAL

## 2020-07-29 PROCEDURE — 71250 CT THORAX DX C-: CPT

## 2020-07-29 PROCEDURE — 94060 EVALUATION OF WHEEZING: CPT

## 2020-07-29 PROCEDURE — 94726 PLETHYSMOGRAPHY LUNG VOLUMES: CPT

## 2020-07-29 PROCEDURE — 94729 DIFFUSING CAPACITY: CPT

## 2020-07-29 PROCEDURE — 93017 CV STRESS TEST TRACING ONLY: CPT

## 2020-07-29 PROCEDURE — 94618 PULMONARY STRESS TESTING: CPT

## 2020-07-29 PROCEDURE — 94760 N-INVAS EAR/PLS OXIMETRY 1: CPT

## 2020-07-29 PROCEDURE — 94727 GAS DIL/WSHOT DETER LNG VOL: CPT

## 2020-07-29 PROCEDURE — 93306 TTE W/DOPPLER COMPLETE: CPT

## 2020-07-29 RX ORDER — GABAPENTIN 100 MG/1
100 CAPSULE ORAL 2 TIMES DAILY
Qty: 60 CAPSULE | Refills: 2 | Status: SHIPPED | OUTPATIENT
Start: 2020-07-29 | End: 2020-12-08

## 2020-07-29 RX ORDER — HYDROXYZINE HYDROCHLORIDE 25 MG/1
25 TABLET, FILM COATED ORAL 3 TIMES DAILY PRN
Qty: 90 TABLET | Refills: 3 | Status: SHIPPED | OUTPATIENT
Start: 2020-07-29 | End: 2020-08-28

## 2020-07-29 ASSESSMENT — PULMONARY FUNCTION TESTS
FEV1_PERCENT_PREDICTED_PRE: 77
FEV1/FVC_PRE: 106
FEV1_PERCENT_PREDICTED_POST: 83
FEV1/FVC_POST: 111

## 2020-07-29 NOTE — PROGRESS NOTES
[facility] Pulmonary Function Lab - Six Minute Walk   Test Performed on: Room Air_X Oxygen at _____ lpm via N/C  Assist Device Used During Test:    None X Cane______ Walker_____  Shortness of Breath - Donita's Scale  0 Nothing at all 5 Severe    0.5 Very very slight 6    1 Very slight 7 Very severe   2 Slight 8     3 Moderate 9 Very very severe   4 Somewhat severe 10 Maximal     Time SpO2 Heart Rate Respiratory Rate  Donitas Scale Other Symptoms   Comments       Baseline 95   %     75  22    0  Patient came straight from cardio stress test                1 minute 93    %   100        ---------------    0                  2 minutes  93   %     103      ---------------     0                3 minutes 93    %     103      ----------------    2                  4 minutes                94   %     105      ----------------    2                 5 minutes                 93  %     106      ---------------- 2                    6 minutes                   92 %      103     --------------- 2                  Recovery   x 1 Min                97 %      80  26    .5               Recovery   x 2 Min                97   %      84     ---------------  0                 Number of Laps_7 X 170 feet = Total Distance 1190_feet  Stopped or paused before 6 minutes?  No X Yes _____   Pre Blood Pressure:178/91   Post Blood Pressure:170/94  Interpretation:

## 2020-08-06 NOTE — PROGRESS NOTES
Left message with Jamia Caraballo to call pulmonary office in regards to her recent apnea link testing that was completed which demonstrates she needs further testing for sleep study.   Sleep study has been ordered

## 2020-08-14 RX ORDER — IBUPROFEN 600 MG/1
600 TABLET ORAL EVERY 6 HOURS PRN
Qty: 90 TABLET | Refills: 1 | Status: ON HOLD | OUTPATIENT
Start: 2020-08-14 | End: 2020-08-17 | Stop reason: HOSPADM

## 2020-08-16 ENCOUNTER — APPOINTMENT (OUTPATIENT)
Dept: NUCLEAR MEDICINE | Age: 45
End: 2020-08-16
Payer: MEDICAID

## 2020-08-16 ENCOUNTER — APPOINTMENT (OUTPATIENT)
Dept: GENERAL RADIOLOGY | Age: 45
End: 2020-08-16
Payer: MEDICAID

## 2020-08-16 ENCOUNTER — HOSPITAL ENCOUNTER (OUTPATIENT)
Age: 45
Setting detail: OBSERVATION
Discharge: HOME | End: 2020-08-17
Attending: EMERGENCY MEDICINE | Admitting: INTERNAL MEDICINE
Payer: MEDICAID

## 2020-08-16 PROBLEM — R07.9 CHEST PAIN: Status: ACTIVE | Noted: 2020-08-16

## 2020-08-16 LAB
ALBUMIN SERPL-MCNC: 3.9 GM/DL (ref 3.4–5)
ALP BLD-CCNC: 94 IU/L (ref 40–128)
ALT SERPL-CCNC: 21 U/L (ref 10–40)
ANION GAP SERPL CALCULATED.3IONS-SCNC: 9 MMOL/L (ref 4–16)
AST SERPL-CCNC: 17 IU/L (ref 15–37)
BACTERIA: ABNORMAL /HPF
BASOPHILS ABSOLUTE: 0 K/CU MM
BASOPHILS RELATIVE PERCENT: 0.6 % (ref 0–1)
BILIRUB SERPL-MCNC: 0.3 MG/DL (ref 0–1)
BILIRUBIN URINE: NEGATIVE MG/DL
BLOOD, URINE: NEGATIVE
BUN BLDV-MCNC: 23 MG/DL (ref 6–23)
CALCIUM SERPL-MCNC: 9.3 MG/DL (ref 8.3–10.6)
CHLORIDE BLD-SCNC: 102 MMOL/L (ref 99–110)
CLARITY: CLEAR
CO2: 24 MMOL/L (ref 21–32)
COLOR: ABNORMAL
CREAT SERPL-MCNC: 1 MG/DL (ref 0.6–1.1)
D DIMER: 366 NG/ML(DDU)
DIFFERENTIAL TYPE: ABNORMAL
EKG ATRIAL RATE: 72 BPM
EKG DIAGNOSIS: NORMAL
EKG P AXIS: 31 DEGREES
EKG P-R INTERVAL: 158 MS
EKG Q-T INTERVAL: 402 MS
EKG QRS DURATION: 88 MS
EKG QTC CALCULATION (BAZETT): 440 MS
EKG R AXIS: 1 DEGREES
EKG T AXIS: 62 DEGREES
EKG VENTRICULAR RATE: 72 BPM
EOSINOPHILS ABSOLUTE: 0.2 K/CU MM
EOSINOPHILS RELATIVE PERCENT: 3.4 % (ref 0–3)
GFR AFRICAN AMERICAN: >60 ML/MIN/1.73M2
GFR NON-AFRICAN AMERICAN: 59 ML/MIN/1.73M2
GLUCOSE BLD-MCNC: 154 MG/DL (ref 70–99)
GLUCOSE, URINE: NEGATIVE MG/DL
GONADOTROPIN, CHORIONIC (HCG) QUANT: 4.9 UIU/ML
HCT VFR BLD CALC: 40.9 % (ref 37–47)
HEMOGLOBIN: 13 GM/DL (ref 12.5–16)
HIGH SENSITIVE C-REACTIVE PROTEIN: 2.5 MG/L
IMMATURE NEUTROPHIL %: 0.3 % (ref 0–0.43)
KETONES, URINE: NEGATIVE MG/DL
LEUKOCYTE ESTERASE, URINE: NEGATIVE
LIPASE: 26 IU/L (ref 13–60)
LYMPHOCYTES ABSOLUTE: 1.8 K/CU MM
LYMPHOCYTES RELATIVE PERCENT: 27.1 % (ref 24–44)
MCH RBC QN AUTO: 26.7 PG (ref 27–31)
MCHC RBC AUTO-ENTMCNC: 31.8 % (ref 32–36)
MCV RBC AUTO: 84.2 FL (ref 78–100)
MONOCYTES ABSOLUTE: 0.5 K/CU MM
MONOCYTES RELATIVE PERCENT: 7 % (ref 0–4)
NITRITE URINE, QUANTITATIVE: NEGATIVE
NUCLEATED RBC %: 0 %
PDW BLD-RTO: 13.8 % (ref 11.7–14.9)
PH, URINE: 6 (ref 5–8)
PLATELET # BLD: 169 K/CU MM (ref 140–440)
PMV BLD AUTO: 10.7 FL (ref 7.5–11.1)
POTASSIUM SERPL-SCNC: 4.1 MMOL/L (ref 3.5–5.1)
PRO-BNP: 456.2 PG/ML
PROTEIN UA: NEGATIVE MG/DL
RBC # BLD: 4.86 M/CU MM (ref 4.2–5.4)
RBC URINE: ABNORMAL /HPF (ref 0–6)
SEGMENTED NEUTROPHILS ABSOLUTE COUNT: 4.2 K/CU MM
SEGMENTED NEUTROPHILS RELATIVE PERCENT: 61.6 % (ref 36–66)
SODIUM BLD-SCNC: 135 MMOL/L (ref 135–145)
SPECIFIC GRAVITY UA: 1 (ref 1–1.03)
SQUAMOUS EPITHELIAL: 3 /HPF
TOTAL CK: 76 IU/L (ref 26–140)
TOTAL IMMATURE NEUTOROPHIL: 0.02 K/CU MM
TOTAL NUCLEATED RBC: 0 K/CU MM
TOTAL PROTEIN: 6.6 GM/DL (ref 6.4–8.2)
TRICHOMONAS: ABNORMAL /HPF
TROPONIN T: <0.01 NG/ML
TROPONIN T: <0.01 NG/ML
UROBILINOGEN, URINE: NORMAL MG/DL (ref 0.2–1)
WBC # BLD: 6.8 K/CU MM (ref 4–10.5)
WBC UA: 1 /HPF (ref 0–5)

## 2020-08-16 PROCEDURE — 3430000000 HC RX DIAGNOSTIC RADIOPHARMACEUTICAL: Performed by: EMERGENCY MEDICINE

## 2020-08-16 PROCEDURE — 99285 EMERGENCY DEPT VISIT HI MDM: CPT

## 2020-08-16 PROCEDURE — 93005 ELECTROCARDIOGRAM TRACING: CPT | Performed by: EMERGENCY MEDICINE

## 2020-08-16 PROCEDURE — 96374 THER/PROPH/DIAG INJ IV PUSH: CPT

## 2020-08-16 PROCEDURE — 93010 ELECTROCARDIOGRAM REPORT: CPT | Performed by: INTERNAL MEDICINE

## 2020-08-16 PROCEDURE — 84484 ASSAY OF TROPONIN QUANT: CPT

## 2020-08-16 PROCEDURE — A9540 TC99M MAA: HCPCS | Performed by: EMERGENCY MEDICINE

## 2020-08-16 PROCEDURE — 6370000000 HC RX 637 (ALT 250 FOR IP): Performed by: EMERGENCY MEDICINE

## 2020-08-16 PROCEDURE — 80053 COMPREHEN METABOLIC PANEL: CPT

## 2020-08-16 PROCEDURE — 83880 ASSAY OF NATRIURETIC PEPTIDE: CPT

## 2020-08-16 PROCEDURE — 82550 ASSAY OF CK (CPK): CPT

## 2020-08-16 PROCEDURE — 36415 COLL VENOUS BLD VENIPUNCTURE: CPT

## 2020-08-16 PROCEDURE — 85025 COMPLETE CBC W/AUTO DIFF WBC: CPT

## 2020-08-16 PROCEDURE — 71101 X-RAY EXAM UNILAT RIBS/CHEST: CPT

## 2020-08-16 PROCEDURE — G0378 HOSPITAL OBSERVATION PER HR: HCPCS

## 2020-08-16 PROCEDURE — 6370000000 HC RX 637 (ALT 250 FOR IP): Performed by: NURSE PRACTITIONER

## 2020-08-16 PROCEDURE — 78580 LUNG PERFUSION IMAGING: CPT

## 2020-08-16 PROCEDURE — 83690 ASSAY OF LIPASE: CPT

## 2020-08-16 PROCEDURE — 84702 CHORIONIC GONADOTROPIN TEST: CPT

## 2020-08-16 PROCEDURE — 85379 FIBRIN DEGRADATION QUANT: CPT

## 2020-08-16 PROCEDURE — 86141 C-REACTIVE PROTEIN HS: CPT

## 2020-08-16 PROCEDURE — 6360000002 HC RX W HCPCS: Performed by: EMERGENCY MEDICINE

## 2020-08-16 PROCEDURE — 81001 URINALYSIS AUTO W/SCOPE: CPT

## 2020-08-16 PROCEDURE — 93005 ELECTROCARDIOGRAM TRACING: CPT | Performed by: NURSE PRACTITIONER

## 2020-08-16 RX ORDER — PROMETHAZINE HYDROCHLORIDE 25 MG/1
12.5 TABLET ORAL EVERY 6 HOURS PRN
Status: DISCONTINUED | OUTPATIENT
Start: 2020-08-16 | End: 2020-08-17 | Stop reason: HOSPADM

## 2020-08-16 RX ORDER — KETOROLAC TROMETHAMINE 30 MG/ML
15 INJECTION, SOLUTION INTRAMUSCULAR; INTRAVENOUS EVERY 6 HOURS PRN
Status: DISCONTINUED | OUTPATIENT
Start: 2020-08-16 | End: 2020-08-17

## 2020-08-16 RX ORDER — ASPIRIN 81 MG/1
324 TABLET, CHEWABLE ORAL ONCE
Status: COMPLETED | OUTPATIENT
Start: 2020-08-16 | End: 2020-08-16

## 2020-08-16 RX ORDER — POLYETHYLENE GLYCOL 3350 17 G/17G
17 POWDER, FOR SOLUTION ORAL DAILY PRN
Status: DISCONTINUED | OUTPATIENT
Start: 2020-08-16 | End: 2020-08-17 | Stop reason: HOSPADM

## 2020-08-16 RX ORDER — SODIUM CHLORIDE 0.9 % (FLUSH) 0.9 %
10 SYRINGE (ML) INJECTION PRN
Status: DISCONTINUED | OUTPATIENT
Start: 2020-08-16 | End: 2020-08-17 | Stop reason: HOSPADM

## 2020-08-16 RX ORDER — HYDROXYZINE HYDROCHLORIDE 25 MG/1
25 TABLET, FILM COATED ORAL 3 TIMES DAILY PRN
Status: DISCONTINUED | OUTPATIENT
Start: 2020-08-16 | End: 2020-08-17 | Stop reason: HOSPADM

## 2020-08-16 RX ORDER — KETOROLAC TROMETHAMINE 30 MG/ML
30 INJECTION, SOLUTION INTRAMUSCULAR; INTRAVENOUS ONCE
Status: COMPLETED | OUTPATIENT
Start: 2020-08-16 | End: 2020-08-16

## 2020-08-16 RX ORDER — ASPIRIN 81 MG/1
81 TABLET, CHEWABLE ORAL DAILY
Status: DISCONTINUED | OUTPATIENT
Start: 2020-08-17 | End: 2020-08-17 | Stop reason: HOSPADM

## 2020-08-16 RX ORDER — LISINOPRIL 5 MG/1
5 TABLET ORAL DAILY
Status: ON HOLD | COMMUNITY
End: 2020-08-17 | Stop reason: HOSPADM

## 2020-08-16 RX ORDER — LIDOCAINE 4 G/G
1 PATCH TOPICAL DAILY
Status: DISCONTINUED | OUTPATIENT
Start: 2020-08-16 | End: 2020-08-16

## 2020-08-16 RX ORDER — GABAPENTIN 100 MG/1
100 CAPSULE ORAL 2 TIMES DAILY
Status: DISCONTINUED | OUTPATIENT
Start: 2020-08-16 | End: 2020-08-17 | Stop reason: HOSPADM

## 2020-08-16 RX ORDER — LISINOPRIL 5 MG/1
5 TABLET ORAL DAILY
Status: DISCONTINUED | OUTPATIENT
Start: 2020-08-17 | End: 2020-08-17

## 2020-08-16 RX ORDER — POTASSIUM CHLORIDE 750 MG/1
10 TABLET, FILM COATED, EXTENDED RELEASE ORAL DAILY
Status: DISCONTINUED | OUTPATIENT
Start: 2020-08-17 | End: 2020-08-17 | Stop reason: HOSPADM

## 2020-08-16 RX ORDER — ACETAMINOPHEN 325 MG/1
650 TABLET ORAL EVERY 6 HOURS PRN
Status: DISCONTINUED | OUTPATIENT
Start: 2020-08-16 | End: 2020-08-17 | Stop reason: HOSPADM

## 2020-08-16 RX ORDER — HYDROCODONE BITARTRATE AND ACETAMINOPHEN 5; 325 MG/1; MG/1
1 TABLET ORAL EVERY 6 HOURS PRN
Status: DISCONTINUED | OUTPATIENT
Start: 2020-08-16 | End: 2020-08-17 | Stop reason: HOSPADM

## 2020-08-16 RX ORDER — GUAIFENESIN/DEXTROMETHORPHAN 100-10MG/5
5 SYRUP ORAL EVERY 4 HOURS PRN
Status: DISCONTINUED | OUTPATIENT
Start: 2020-08-16 | End: 2020-08-17 | Stop reason: HOSPADM

## 2020-08-16 RX ORDER — ACETAMINOPHEN 650 MG/1
650 SUPPOSITORY RECTAL EVERY 6 HOURS PRN
Status: DISCONTINUED | OUTPATIENT
Start: 2020-08-16 | End: 2020-08-17 | Stop reason: HOSPADM

## 2020-08-16 RX ORDER — NITROGLYCERIN 0.4 MG/1
0.4 TABLET SUBLINGUAL EVERY 5 MIN PRN
Status: DISCONTINUED | OUTPATIENT
Start: 2020-08-16 | End: 2020-08-16

## 2020-08-16 RX ORDER — SODIUM CHLORIDE 0.9 % (FLUSH) 0.9 %
10 SYRINGE (ML) INJECTION EVERY 12 HOURS SCHEDULED
Status: DISCONTINUED | OUTPATIENT
Start: 2020-08-16 | End: 2020-08-17 | Stop reason: HOSPADM

## 2020-08-16 RX ORDER — PANTOPRAZOLE SODIUM 40 MG/1
40 TABLET, DELAYED RELEASE ORAL
Status: DISCONTINUED | OUTPATIENT
Start: 2020-08-17 | End: 2020-08-17 | Stop reason: HOSPADM

## 2020-08-16 RX ORDER — FUROSEMIDE 20 MG/1
20 TABLET ORAL DAILY
Status: DISCONTINUED | OUTPATIENT
Start: 2020-08-17 | End: 2020-08-17 | Stop reason: HOSPADM

## 2020-08-16 RX ORDER — ACETAMINOPHEN 500 MG
1000 TABLET ORAL ONCE
Status: COMPLETED | OUTPATIENT
Start: 2020-08-16 | End: 2020-08-16

## 2020-08-16 RX ORDER — ONDANSETRON 2 MG/ML
4 INJECTION INTRAMUSCULAR; INTRAVENOUS EVERY 6 HOURS PRN
Status: DISCONTINUED | OUTPATIENT
Start: 2020-08-16 | End: 2020-08-17 | Stop reason: HOSPADM

## 2020-08-16 RX ORDER — ALBUTEROL SULFATE 90 UG/1
2 AEROSOL, METERED RESPIRATORY (INHALATION) EVERY 4 HOURS PRN
Status: DISCONTINUED | OUTPATIENT
Start: 2020-08-16 | End: 2020-08-17 | Stop reason: HOSPADM

## 2020-08-16 RX ORDER — FAMOTIDINE 20 MG/1
20 TABLET, FILM COATED ORAL 2 TIMES DAILY
Status: DISCONTINUED | OUTPATIENT
Start: 2020-08-16 | End: 2020-08-17 | Stop reason: HOSPADM

## 2020-08-16 RX ORDER — DICYCLOMINE HYDROCHLORIDE 10 MG/1
10 CAPSULE ORAL 3 TIMES DAILY PRN
Status: DISCONTINUED | OUTPATIENT
Start: 2020-08-16 | End: 2020-08-17 | Stop reason: HOSPADM

## 2020-08-16 RX ORDER — BUDESONIDE AND FORMOTEROL FUMARATE DIHYDRATE 160; 4.5 UG/1; UG/1
2 AEROSOL RESPIRATORY (INHALATION) 2 TIMES DAILY
Status: DISCONTINUED | OUTPATIENT
Start: 2020-08-16 | End: 2020-08-17 | Stop reason: HOSPADM

## 2020-08-16 RX ADMIN — ACETAMINOPHEN 1000 MG: 500 TABLET ORAL at 16:04

## 2020-08-16 RX ADMIN — KETOROLAC TROMETHAMINE 30 MG: 30 INJECTION, SOLUTION INTRAMUSCULAR; INTRAVENOUS at 16:03

## 2020-08-16 RX ADMIN — Medication 6 MILLICURIE: at 18:00

## 2020-08-16 RX ADMIN — ASPIRIN 81 MG CHEWABLE TABLET 324 MG: 81 TABLET CHEWABLE at 16:04

## 2020-08-16 ASSESSMENT — PAIN DESCRIPTION - ORIENTATION: ORIENTATION: LEFT;MID

## 2020-08-16 ASSESSMENT — ENCOUNTER SYMPTOMS
SHORTNESS OF BREATH: 1
GASTROINTESTINAL NEGATIVE: 1
EYES NEGATIVE: 1
ALLERGIC/IMMUNOLOGIC NEGATIVE: 1

## 2020-08-16 ASSESSMENT — PAIN DESCRIPTION - DESCRIPTORS: DESCRIPTORS: SHOOTING

## 2020-08-16 ASSESSMENT — PAIN SCALES - GENERAL
PAINLEVEL_OUTOF10: 7

## 2020-08-16 ASSESSMENT — PAIN DESCRIPTION - LOCATION: LOCATION: BACK

## 2020-08-16 ASSESSMENT — PAIN DESCRIPTION - PAIN TYPE: TYPE: ACUTE PAIN;CHRONIC PAIN

## 2020-08-16 ASSESSMENT — PAIN DESCRIPTION - FREQUENCY: FREQUENCY: CONTINUOUS

## 2020-08-16 ASSESSMENT — PAIN DESCRIPTION - ONSET: ONSET: ON-GOING

## 2020-08-16 NOTE — ED NOTES
Radiology made aware that the lung perfusion scan was ordered for the pt       Lesli Pink  08/16/20 1552

## 2020-08-16 NOTE — ED PROVIDER NOTES
[facility]      TRIAGE CHIEF COMPLAINT:   Chest Pain (left sided chest pain radiating through to back, reproducible)      [place]:  Kaya Croft is a 50 y.o. female that presents with complaint of chest pain left-sided she has had symptoms for several days now constant reproducible. Denies any fevers nausea vomiting cough travel sick contacts history of DVT PE or AAA. History of CHF. Denies any other heart problems. Again constant pain hurts to move hurts to touch denies any injury or trauma she is right-handed. Pain left shoulder radiates to left back. History of pneumonia, was worried about that as well. States her EKG was told she was normal.  No other questions or concerns. No diaphoresis no paresthesias again pain constant for at least several days. REVIEW OF SYSTEMS:  At least 10 systems reviewed and otherwise acutely negative except as in the [address] Review of Systems   Constitutional: Positive for fatigue. HENT: Negative. Eyes: Negative. Respiratory: Positive for shortness of breath. Cardiovascular: Positive for chest pain. Gastrointestinal: Negative. Endocrine: Negative. Genitourinary: Negative. Musculoskeletal: Positive for arthralgias and myalgias. Skin: Negative. Allergic/Immunologic: Negative. Neurological: Negative. Hematological: Negative. Psychiatric/Behavioral: Negative. All other systems reviewed and are negative.       Past Medical History:   Diagnosis Date    Diverticulitis     Emphysema of lung ([address]     GERD (gastroesophageal reflux disease)     IBS (irritable bowel syndrome)     Kidney stone      Past Surgical History:   Procedure Laterality Date    ABDOMEN SURGERY      BLADDER SURGERY      CHOLECYSTECTOMY      COLONOSCOPY  03/26/2018    normal colonoscopy    HYSTERECTOMY      HYSTERECTOMY, TOTAL ABDOMINAL      INCISION AND DRAINAGE Left 2/4/2019    ANKLE INCISION AND DRAINAGE performed by Kyung Holstein, DO at [address] 2019    ARM INCISION AND DRAINAGE performed by Artemio Rodriguez DO at [address] [facility]      tumor removal    OVARIAN CYST REMOVAL      TONSILLECTOMY      TUBAL LIGATION       Family History   Problem Relation Age of Onset    Alcohol Abuse Mother     Sleep Apnea Father      Social History     Socioeconomic History    Marital status: Single     Spouse name: Not on file    Number of children: Not on file    Years of education: Not on file    Highest education level: Not on file   Occupational History    Not on file   Social Needs    Financial resource strain: Not on file    Food insecurity     Worry: Not on file     Inability: Not on file    Transportation needs     Medical: Not on file     Non-medical: Not on file   Tobacco Use    Smoking status: Former Smoker     Packs/day: 0.25     Types: Cigarettes     Start date: 2001     Last attempt to quit: 2020     Years since quittin.0    Smokeless tobacco: Never Used   Substance and Sexual Activity    Alcohol use: No    Drug use: Not Currently     Types: Marijuana    Sexual activity: Yes     Partners: Male   Lifestyle    Physical activity     Days per week: Not on file     Minutes per session: Not on file    Stress: Not on file   Relationships    Social connections     Talks on phone: Not on file     Gets together: Not on file     Attends Druze service: Not on file     Active member of club or organization: Not on file     Attends meetings of clubs or organizations: Not on file     Relationship status: Not on file    Intimate partner violence     Fear of current or ex partner: Not on file     Emotionally abused: Not on file     Physically abused: Not on file     Forced sexual activity: Not on file   Other Topics Concern    Not on file   Social History Narrative    Not on file     Current Facility-Administered Medications   Medication Dose Route Frequency Provider Last Rate Last Dose    nitroGLYCERIN (NITROSTAT) SL tablet 0.4 mg  0.4 mg Sublingual Q5 Min PRN Cholo Kendrick, DO        lidocaine 4 % external patch 1 patch  1 patch Transdermal Daily Cholo Kendrick, DO   1 patch at 08/16/20 1604     Current Outpatient Medications   Medication Sig Dispense Refill    ibuprofen (ADVIL;MOTRIN) 600 MG tablet Take 1 tablet by mouth every 6 hours as needed for Pain 90 tablet 1    hydrOXYzine (ATARAX) 25 MG tablet Take 1 tablet by mouth 3 times daily as needed for Anxiety 90 tablet 3    gabapentin (NEURONTIN) 100 MG capsule Take 1 capsule by mouth 2 times daily for 30 days.  Intended supply: 30 days 60 capsule 2    diclofenac 1 % CREA Place 1 Tube onto the skin daily as needed for Pain 120 g 0    potassium chloride (KLOR-CON M) 10 MEQ extended release tablet Take 1 tablet by mouth daily 30 tablet 2    furosemide (LASIX) 20 MG tablet Take 1 tablet by mouth daily 30 tablet 2    Spacer/Aero-Holding Chambers CAYLA 1 Device by Does not apply route daily as needed (use with MDI) 1 Device 0    SYMBICORT 160-4.5 MCG/ACT AERO Inhale 2 puffs into the lungs 2 times daily 1 Inhaler 5    albuterol sulfate  (90 Base) MCG/ACT inhaler inhale 2 puffs by mouth every 4 hours if needed for wheezing or shortness of breath with SPACER 8.5 g 2    Dextromethorphan-guaiFENesin (TUSSIN DM)  MG/5ML SYRP Take 5 mLs by mouth every 4 hours as needed for Cough 120 mL 0    omeprazole (PRILOSEC) 20 MG delayed release capsule Take 1 capsule by mouth Daily 30 capsule 3    dicyclomine (BENTYL) 10 MG capsule Take 1 capsule by mouth 3 times daily (before meals) 90 capsule 5      Allergies   Allergen Reactions    Dye [Iodides]     Morphine Hives     Current Facility-Administered Medications   Medication Dose Route Frequency Provider Last Rate Last Dose    nitroGLYCERIN (NITROSTAT) SL tablet 0.4 mg  0.4 mg Sublingual Q5 Min PRN Cholo Kendrick, DO        lidocaine 4 % external patch 1 patch  1 patch Normal appearance. She is well-developed and well-groomed. She is obese. She is not ill-appearing, toxic-appearing or diaphoretic. HENT:      Head: Normocephalic and atraumatic. Right Ear: External ear normal.      Left Ear: External ear normal.      Nose: No congestion or rhinorrhea. Eyes:      General: No scleral icterus. Right eye: No discharge. Left eye: No discharge. Extraocular Movements: Extraocular movements intact. Conjunctiva/sclera: Conjunctivae normal.   Neck:      Musculoskeletal: Full passive range of motion without pain and normal range of motion. Normal range of motion. No edema, erythema, neck rigidity, crepitus or injury. Vascular: No JVD. Trachea: Phonation normal.   Cardiovascular:      Rate and Rhythm: Normal rate and regular rhythm. Pulses: Normal pulses. Heart sounds: Normal heart sounds. No murmur. No friction rub. No gallop. Pulmonary:      Effort: Pulmonary effort is normal. No respiratory distress. Breath sounds: Normal breath sounds. No stridor. No wheezing, rhonchi or rales. Chest:      Chest wall: Tenderness present. Abdominal:      General: Bowel sounds are normal. There is no distension. Palpations: Abdomen is soft. There is no mass or pulsatile mass. Tenderness: There is no abdominal tenderness. There is no guarding or rebound. Negative signs include Saunders's sign, Rovsing's sign and McBurney's sign. Hernia: No hernia is present. Musculoskeletal: Normal range of motion. General: Tenderness present. No swelling, deformity or signs of injury. Right lower leg: No edema. Left lower leg: No edema. Skin:     General: Skin is warm. Coloration: Skin is not jaundiced or pale. Findings: No bruising, erythema, lesion or rash. Neurological:      General: No focal deficit present. Mental Status: She is alert and oriented to person, place, and time.       GCS: GCS eye subscore is 10.6 MG/DL    Alb 3.9 3.4 - 5.0 GM/DL    Total Protein 6.6 6.4 - 8.2 GM/DL    Total Bilirubin 0.3 0.0 - 1.0 MG/DL    ALT 21 10 - 40 U/L    AST 17 15 - 37 IU/L    Alkaline Phosphatase 94 40 - 128 IU/L    GFR Non- 59 (L) >60 mL/min/1.73m2    GFR African American >60 >60 mL/min/1.73m2    Anion Gap 9 4 - 16   Troponin   Result Value Ref Range    Troponin T <0.010 <0.01 NG/ML   Brain Natriuretic Peptide   Result Value Ref Range    Pro-.2 (H) <300 PG/ML   C-Reactive Protein   Result Value Ref Range    CRP, High Sensitivity 2.5 mg/L   Urinalysis Reflex to Culture    Specimen: Urine   Result Value Ref Range    Color, UA STRAW (A) YELLOW    Clarity, UA CLEAR CLEAR    Glucose, Urine NEGATIVE NEGATIVE MG/DL    Bilirubin Urine NEGATIVE NEGATIVE MG/DL    Ketones, Urine NEGATIVE NEGATIVE MG/DL    Specific Gravity, UA 1.003 1.001 - 1.035    Blood, Urine NEGATIVE NEGATIVE    pH, Urine 6.0 5.0 - 8.0    Protein, UA NEGATIVE NEGATIVE MG/DL    Urobilinogen, Urine NORMAL 0.2 - 1.0 MG/DL    Nitrite Urine, Quantitative NEGATIVE NEGATIVE    Leukocyte Esterase, Urine NEGATIVE NEGATIVE    RBC, UA NONE SEEN 0 - 6 /HPF    WBC, UA 1 0 - 5 /HPF    Bacteria, UA OCCASIONAL (A) NEGATIVE /HPF    Squam Epithel, UA 3 /HPF    Trichomonas, UA NONE SEEN NONE SEEN /HPF   D-Dimer, Quantitative   Result Value Ref Range    D-Dimer, Quant 366 (H) <230 NG/mL(DDU)   Lipase   Result Value Ref Range    Lipase 26 13 - 60 IU/L   HCG Serum, Quantitative   Result Value Ref Range    hCG Quant 4.9 UIU/ML   CK   Result Value Ref Range    Total CK 76 26 - 140 IU/L   EKG 12 Lead   Result Value Ref Range    Ventricular Rate 72 BPM    Atrial Rate 72 BPM    P-R Interval 158 ms    QRS Duration 88 ms    Q-T Interval 402 ms    QTc Calculation (Bazett) 440 ms    P Axis 31 degrees    R Axis 1 degrees    T Axis 62 degrees    Diagnosis       Normal sinus rhythm  Normal ECG  When compared with ECG of 22-JUN-2020 08:58,  No significant change was found  Confirmed by Margarita Petty 63 ([id number]) on 8/16/2020 3:57:13 PM          Radiographs (if obtained):  [] The following radiograph was interpreted by myself in the absence of a radiologist:  [x] Radiologist's Report Reviewed:    VQ    Xr Ribs Left Include Chest (min 3 Views)    Result Date: 8/16/2020  EXAMINATION: 3 XRAY VIEWS OF THE LEFT RIBS WITH FRONTAL XRAY VIEW OF THE CHEST 8/16/2020 2:25 pm COMPARISON: Chest radiograph 06/22/2020. High-resolution CT chest 07/29/2020. HISTORY: ORDERING SYSTEM PROVIDED HISTORY: pain left chest and in ribs TECHNOLOGIST PROVIDED HISTORY: Reason for exam:->pain left chest and in ribs Reason for Exam: chest pain / no injury Acuity: Acute Type of Exam: Initial FINDINGS: Three views provided. Stable borderline enlarged heart size. Normal lung volumes with no acute consolidation or interstitial changes. No effusion or pneumothorax. No free subdiaphragmatic air. No subcutaneous emphysema. Normal bone mineral density. Multilevel thoracic spine degenerative changes. No acute fracture. No suspicious sclerotic or lytic lesions. 1. Stable borderline enlarged cardiac silhouette. 2. No acute pulmonary process. 3. No acute left rib abnormality. Ct Chest High Resolution    Result Date: 7/30/2020  EXAMINATION: CT IMAGES OF THE CHEST WITHOUT CONTRAST, HIGH RESOLUTION 7/29/2020 TECHNIQUE: CT of the chest was performed without the administration of intravenous contrast. High resolution CT imaging was performed of the lungs. Multiplanar reformatted images are provided for review. Dose modulation, iterative reconstruction, and/or weight based adjustment of the mA/kV was utilized to reduce the radiation dose to as low as reasonably achievable. High resolution CT images were performed in the supine inspiration and supine expiration positions.  COMPARISON: 06/26/2020 HISTORY: ORDERING SYSTEM PROVIDED HISTORY: Pulmonary nodule TECHNOLOGIST PROVIDED HISTORY: Reason for exam:->Pulmonary nodule follow-up, original CT 6/26/2020 Is the patient pregnant?->No Reason for Exam: Pulmonary nodule follow-up, original CT 6/26/2020 Acuity: Acute Type of Exam: Initial FINDINGS: Mediastinum: There are multiple subcentimeter mediastinal lymph nodes measuring up to 7 mm short axis dimension. No evidence of pathologically enlarged mediastinal lymphadenopathy. Heart and pericardium are unremarkable. The central airways are clear. No evidence of mediastinal, hilar or axillary lymphadenopathy. HRCT Findings/Lungs/pleura: There is mild centrilobular emphysema noted throughout the lungs predominantly in the upper lung zones. No consolidation or pulmonary edema. No pleural effusion or pneumothorax. No evidence of bronchiectasis. No interlobular septal thickening. No evidence of air trapping. The previously seen right upper lobe pulmonary nodules have resolved suggesting resolved infectious/inflammatory pulmonary nodules. Upper Abdomen:  Limited images of the upper abdomen demonstrate no acute abnormality. Status post cholecystectomy. Soft Tissues/Bones:  Age related degenerative changes of the visualized osseous structures without focal destructive lesion. Resolution of the previously seen sub solid right upper lobe pulmonary nodule suggesting resolved infectious/inflammatory pulmonary nodules. No acute focal process in the lungs. No evidence of pneumonia. Mild centrilobular emphysema. EKG (if obtained): (All EKG's are interpreted by myself in the absence of a cardiologist)    12 lead EKG per my interpretation:  Normal Sinus Rhythm 72  Axis is   Normal  QTc is  440  There is no specific T wave changes appreciated. There is no specific ST wave changes appreciated. Prior EKG to compare with was not available       MDM:    Patient here with left-sided chest pain for several days constant in nature hurts to move hurts to touch it is reproducible on my examination.   She is had no trauma she is right-handed nothing makes symptoms better she is been taking ibuprofen. Denies any fevers nausea vomiting cough travel sick contacts coronavirus exposure. History of pneumonia she was worried about that as well. Denies any history of DVT PE or AAA. History of CHF. She is on blood pressure medicine, diuretics. Her blood pressure was slightly elevated here today I did give her nitro aspirin and Lidoderm patch. She has good pulses good sensation there is no rash no trauma no abdominal pain no pulsatile mass her d-dimer did come back positive this was ordered from triage I did do a VQ scan given she cannot take contrast.  I did wear 95 mask eye protection, gloves. Patient recheck still having pain my nurse did not give nitroglycerin as I ordered unclear why. Patient still having pain high blood pressure so far work-up is negative including troponin d-dimer was positive VQ scan is negative likely musculoskeletal pain but she is obese history of hypertension family history does have some risk factors admit to hospital medicine for ACS rule out blood pressure I did advise staff to give nitro otherwise stable. Admitted.   No pneumothorax no pneumonia    CLINICAL IMPRESSION:  Final diagnoses:   Chest pain, unspecified type   Chest wall pain   Positive D dimer   Dyspnea and respiratory abnormalities       (Please note that portions of this note may have been completed with a voice recognition program. Efforts were made to edit the dictations but occasionally words aremis-transcribed.)    DISPOSITION REFERRAL (if applicable):  SHUKRI Montalvo - MARK Coronel [address] [place] [id number] Livan Archibald Ne (if applicable):  New Prescriptions    No medications on file          Larry Edge, [address] Jocelyn Hirsch,   08/16/20 2031

## 2020-08-17 ENCOUNTER — APPOINTMENT (OUTPATIENT)
Dept: CT IMAGING | Age: 45
End: 2020-08-17
Payer: MEDICAID

## 2020-08-17 VITALS
DIASTOLIC BLOOD PRESSURE: 94 MMHG | TEMPERATURE: 97.6 F | HEIGHT: 61 IN | WEIGHT: 249.3 LBS | BODY MASS INDEX: 47.07 KG/M2 | RESPIRATION RATE: 20 BRPM | HEART RATE: 58 BPM | SYSTOLIC BLOOD PRESSURE: 148 MMHG | OXYGEN SATURATION: 97 %

## 2020-08-17 LAB
ANION GAP SERPL CALCULATED.3IONS-SCNC: 10 MMOL/L (ref 4–16)
BASOPHILS ABSOLUTE: 0.1 K/CU MM
BASOPHILS RELATIVE PERCENT: 0.8 % (ref 0–1)
BUN BLDV-MCNC: 25 MG/DL (ref 6–23)
CALCIUM SERPL-MCNC: 9.2 MG/DL (ref 8.3–10.6)
CHLORIDE BLD-SCNC: 105 MMOL/L (ref 99–110)
CO2: 26 MMOL/L (ref 21–32)
CREAT SERPL-MCNC: 1.1 MG/DL (ref 0.6–1.1)
DIFFERENTIAL TYPE: ABNORMAL
EKG ATRIAL RATE: 69 BPM
EKG DIAGNOSIS: NORMAL
EKG P AXIS: 34 DEGREES
EKG P-R INTERVAL: 160 MS
EKG Q-T INTERVAL: 412 MS
EKG QRS DURATION: 86 MS
EKG QTC CALCULATION (BAZETT): 441 MS
EKG R AXIS: 68 DEGREES
EKG T AXIS: 36 DEGREES
EKG VENTRICULAR RATE: 69 BPM
EOSINOPHILS ABSOLUTE: 0.3 K/CU MM
EOSINOPHILS RELATIVE PERCENT: 4.1 % (ref 0–3)
GFR AFRICAN AMERICAN: >60 ML/MIN/1.73M2
GFR NON-AFRICAN AMERICAN: 53 ML/MIN/1.73M2
GLUCOSE BLD-MCNC: 96 MG/DL (ref 70–99)
HCT VFR BLD CALC: 39.6 % (ref 37–47)
HEMOGLOBIN: 12.6 GM/DL (ref 12.5–16)
IMMATURE NEUTROPHIL %: 0.3 % (ref 0–0.43)
LYMPHOCYTES ABSOLUTE: 1.9 K/CU MM
LYMPHOCYTES RELATIVE PERCENT: 31.2 % (ref 24–44)
MCH RBC QN AUTO: 26.7 PG (ref 27–31)
MCHC RBC AUTO-ENTMCNC: 31.8 % (ref 32–36)
MCV RBC AUTO: 83.9 FL (ref 78–100)
MONOCYTES ABSOLUTE: 0.5 K/CU MM
MONOCYTES RELATIVE PERCENT: 7.4 % (ref 0–4)
NUCLEATED RBC %: 0 %
PDW BLD-RTO: 13.9 % (ref 11.7–14.9)
PLATELET # BLD: 168 K/CU MM (ref 140–440)
PMV BLD AUTO: 11.6 FL (ref 7.5–11.1)
POTASSIUM SERPL-SCNC: 4.3 MMOL/L (ref 3.5–5.1)
RBC # BLD: 4.72 M/CU MM (ref 4.2–5.4)
SEGMENTED NEUTROPHILS ABSOLUTE COUNT: 3.4 K/CU MM
SEGMENTED NEUTROPHILS RELATIVE PERCENT: 56.2 % (ref 36–66)
SODIUM BLD-SCNC: 141 MMOL/L (ref 135–145)
TOTAL IMMATURE NEUTOROPHIL: 0.02 K/CU MM
TOTAL NUCLEATED RBC: 0 K/CU MM
TROPONIN T: <0.01 NG/ML
WBC # BLD: 6.1 K/CU MM (ref 4–10.5)

## 2020-08-17 PROCEDURE — G0378 HOSPITAL OBSERVATION PER HR: HCPCS

## 2020-08-17 PROCEDURE — 99243 OFF/OP CNSLTJ NEW/EST LOW 30: CPT | Performed by: INTERNAL MEDICINE

## 2020-08-17 PROCEDURE — 6370000000 HC RX 637 (ALT 250 FOR IP): Performed by: NURSE PRACTITIONER

## 2020-08-17 PROCEDURE — 2580000003 HC RX 258: Performed by: NURSE PRACTITIONER

## 2020-08-17 PROCEDURE — 94640 AIRWAY INHALATION TREATMENT: CPT

## 2020-08-17 PROCEDURE — 74176 CT ABD & PELVIS W/O CONTRAST: CPT

## 2020-08-17 PROCEDURE — 6370000000 HC RX 637 (ALT 250 FOR IP): Performed by: INTERNAL MEDICINE

## 2020-08-17 PROCEDURE — 6360000002 HC RX W HCPCS: Performed by: NURSE PRACTITIONER

## 2020-08-17 PROCEDURE — 85025 COMPLETE CBC W/AUTO DIFF WBC: CPT

## 2020-08-17 PROCEDURE — 93010 ELECTROCARDIOGRAM REPORT: CPT | Performed by: INTERNAL MEDICINE

## 2020-08-17 PROCEDURE — 94761 N-INVAS EAR/PLS OXIMETRY MLT: CPT

## 2020-08-17 PROCEDURE — 84484 ASSAY OF TROPONIN QUANT: CPT

## 2020-08-17 PROCEDURE — 96376 TX/PRO/DX INJ SAME DRUG ADON: CPT

## 2020-08-17 PROCEDURE — 96372 THER/PROPH/DIAG INJ SC/IM: CPT

## 2020-08-17 PROCEDURE — 80048 BASIC METABOLIC PNL TOTAL CA: CPT

## 2020-08-17 RX ORDER — HYDRALAZINE HYDROCHLORIDE 20 MG/ML
10 INJECTION INTRAMUSCULAR; INTRAVENOUS EVERY 6 HOURS PRN
Status: DISCONTINUED | OUTPATIENT
Start: 2020-08-17 | End: 2020-08-17 | Stop reason: HOSPADM

## 2020-08-17 RX ORDER — LISINOPRIL 10 MG/1
10 TABLET ORAL DAILY
Qty: 30 TABLET | Refills: 3 | Status: SHIPPED | OUTPATIENT
Start: 2020-08-18 | End: 2020-12-09 | Stop reason: SDUPTHER

## 2020-08-17 RX ORDER — LISINOPRIL 10 MG/1
10 TABLET ORAL DAILY
Status: DISCONTINUED | OUTPATIENT
Start: 2020-08-17 | End: 2020-08-17 | Stop reason: HOSPADM

## 2020-08-17 RX ADMIN — HYDROCODONE BITARTRATE AND ACETAMINOPHEN 1 TABLET: 5; 325 TABLET ORAL at 06:24

## 2020-08-17 RX ADMIN — KETOROLAC TROMETHAMINE 15 MG: 30 INJECTION, SOLUTION INTRAMUSCULAR; INTRAVENOUS at 00:07

## 2020-08-17 RX ADMIN — ENOXAPARIN SODIUM 40 MG: 40 INJECTION SUBCUTANEOUS at 10:57

## 2020-08-17 RX ADMIN — POTASSIUM CHLORIDE 10 MEQ: 750 TABLET, FILM COATED, EXTENDED RELEASE ORAL at 10:57

## 2020-08-17 RX ADMIN — BUDESONIDE AND FORMOTEROL FUMARATE DIHYDRATE 2 PUFF: 160; 4.5 AEROSOL RESPIRATORY (INHALATION) at 07:50

## 2020-08-17 RX ADMIN — LISINOPRIL 10 MG: 10 TABLET ORAL at 10:56

## 2020-08-17 RX ADMIN — HYDROXYZINE HYDROCHLORIDE 25 MG: 25 TABLET, FILM COATED ORAL at 00:08

## 2020-08-17 RX ADMIN — FAMOTIDINE 20 MG: 20 TABLET ORAL at 10:56

## 2020-08-17 RX ADMIN — ASPIRIN 81 MG CHEWABLE TABLET 81 MG: 81 TABLET CHEWABLE at 10:56

## 2020-08-17 RX ADMIN — GABAPENTIN 100 MG: 100 CAPSULE ORAL at 10:56

## 2020-08-17 RX ADMIN — FAMOTIDINE 20 MG: 20 TABLET ORAL at 00:07

## 2020-08-17 RX ADMIN — GABAPENTIN 100 MG: 100 CAPSULE ORAL at 00:07

## 2020-08-17 RX ADMIN — SODIUM CHLORIDE, PRESERVATIVE FREE 10 ML: 5 INJECTION INTRAVENOUS at 00:07

## 2020-08-17 RX ADMIN — HYDROXYZINE HYDROCHLORIDE 25 MG: 25 TABLET, FILM COATED ORAL at 11:05

## 2020-08-17 RX ADMIN — HYDROCODONE BITARTRATE AND ACETAMINOPHEN 1 TABLET: 5; 325 TABLET ORAL at 12:32

## 2020-08-17 RX ADMIN — PANTOPRAZOLE SODIUM 40 MG: 40 TABLET, DELAYED RELEASE ORAL at 06:24

## 2020-08-17 RX ADMIN — FUROSEMIDE 20 MG: 20 TABLET ORAL at 10:57

## 2020-08-17 RX ADMIN — SODIUM CHLORIDE, PRESERVATIVE FREE 10 ML: 5 INJECTION INTRAVENOUS at 10:58

## 2020-08-17 ASSESSMENT — PAIN DESCRIPTION - DESCRIPTORS: DESCRIPTORS: ACHING;SHARP

## 2020-08-17 ASSESSMENT — PAIN SCALES - GENERAL
PAINLEVEL_OUTOF10: 7
PAINLEVEL_OUTOF10: 7
PAINLEVEL_OUTOF10: 5

## 2020-08-17 ASSESSMENT — PAIN DESCRIPTION - ONSET: ONSET: ON-GOING

## 2020-08-17 ASSESSMENT — PAIN DESCRIPTION - FREQUENCY: FREQUENCY: CONTINUOUS

## 2020-08-17 ASSESSMENT — PAIN DESCRIPTION - PAIN TYPE: TYPE: ACUTE PAIN

## 2020-08-17 ASSESSMENT — PAIN DESCRIPTION - ORIENTATION: ORIENTATION: LEFT

## 2020-08-17 ASSESSMENT — PAIN DESCRIPTION - PROGRESSION: CLINICAL_PROGRESSION: NOT CHANGED

## 2020-08-17 ASSESSMENT — PAIN DESCRIPTION - LOCATION: LOCATION: RIB CAGE;CHEST

## 2020-08-17 NOTE — PROGRESS NOTES
Hospitalist Progress Note      Name:  Vijay Mueller /Age/Sex: 1975  (50 y.o. female)   MRN & CSN:  [id number] & [id number] Admission Date/Time: 2020  3:10 PM   Location:  [facility] PCP: Rupal Rasmussen, [address] [facility] Day: 2    Assessment and Plan:   Vijay Mueller is a 34 y.o female who presents with chest pain. EKG was positive for LVH, serial troponins negative. Today the patient complains of severe CVA tenderness. Continues to endorse reproducible chest pain.     #Chest pain: Likely musculoskeletal  -Patient presents with chest pain.  -Chest pain is reproducible above the left 4th-5th cervical ribs  -EKG LVH without acute ST-T changes  -Troponins negative  -Echo 2020 showed EF of 50%  -Cardiology on board  -Continue lidocaine patch  -Avoid NSAIDs, given elevated blood pressure  -Continue with Tylenol and Percocet for breakthrough pain    #Hypertension uncontrolled  -Continue home meds  -Continue aspirin    #Left sided CVA tenderness: MSK, rule out urolithiasis  -Patient reports left-sided back pain  -Physical exam positive for CVA tenderness  -Patient has a history of urolithiasis  -Continue pain control  -UA was negative for bacteria or leukocyte esterase  -We will order CT abdomen and pelvis without contrast      Chronic  - COPD- not in exacerbation; cont home regimen  - HFpEF- cont home lasix; not in exacerbation; ECHO 50% EF last month  - HTN- Restart lisinopril       ,  Diet Diet NPO, After Midnight   DVT Prophylaxis [] Lovenox, []  Heparin, [] SCDs, [] Ambulation   GI Prophylaxis [] PPI,  [] H2 Blocker,  [] Carafate,  [] Diet/Tube Feeds   Code Status Full Code   Disposition Patient requires continued admission due to chest   MDM [] Low, [] Moderate,[]  High  Patient's risk as above due to chest     History of Present Illness:     Chief Complaint: Chest pain  Vijay Mueller is a 50 y.o.  female  who presents with chest pain       Ten point ROS reviewed negative, unless as noted above    Objective:   No intake or output data in the 24 hours ending 08/17/20 1019   Vitals:   Vitals:    08/17/20 0838   BP: (!) 148/94   Pulse: 58   Resp: 20   Temp: 97.6 °F (36.4 °C)   SpO2: 97%     Physical Exam:   GEN Awake female, sitting upright in bed in no apparent distress. Appears given age. EYES Pupils are equally round. No scleral erythema, discharge, or conjunctivitis. HENT Mucous membranes are moist. Oral pharynx without exudates, no evidence of thrush. NECK Supple, no apparent thyromegaly or masses. RESP Clear to auscultation, no wheezes, rales or rhonchi. Symmetric chest movement while on room air. CARDIO/VASC S1/S2 auscultated. Regular rate without appreciable murmurs, rubs, or gallops. No JVD or carotid bruits. Peripheral pulses equal bilaterally and palpable. No peripheral edema. GI Abdomen is soft without significant tenderness, masses, or guarding. Bowel sounds are normoactive. Rectal exam deferred.  left-sided CVA tenderness. Normal appearing external genitalia. Mckeon catheter is not present. HEME/LYMPH No palpable cervical lymphadenopathy and no hepatosplenomegaly. No petechiae or ecchymoses. MSK No gross joint deformities. Reproducible chest pain above fourth and fifth ribs  SKIN Normal coloration, warm, dry. NEURO Cranial nerves appear grossly intact, normal speech, no lateralizing weakness. PSYCH Awake, alert, oriented x 4. Affect appropriate.     Medications:   Medications:    sodium chloride flush  10 mL Intravenous 2 times per day    famotidine  20 mg Oral BID    enoxaparin  40 mg Subcutaneous Daily    furosemide  20 mg Oral Daily    gabapentin  100 mg Oral BID    pantoprazole  40 mg Oral QAM AC    potassium chloride  10 mEq Oral Daily    budesonide-formoterol  2 puff Inhalation BID    aspirin  81 mg Oral Daily    lisinopril  5 mg Oral Daily      Infusions:   PRN Meds: sodium chloride flush, 10 mL, PRN  acetaminophen, 650 mg, Q6H PRN    Or  acetaminophen, 650 mg, Q6H PRN  polyethylene glycol, 17 g, Daily PRN  promethazine, 12.5 mg, Q6H PRN    Or  ondansetron, 4 mg, Q6H PRN  albuterol sulfate HFA, 2 puff, Q4H PRN  guaiFENesin-dextromethorphan, 5 mL, Q4H PRN  dicyclomine, 10 mg, TID PRN  hydrOXYzine, 25 mg, TID PRN  ketorolac, 15 mg, Q6H PRN  HYDROcodone 5 mg - acetaminophen, 1 tablet, Q6H PRN          Electronically signed by Keven Chino MD on 8/17/2020 at 10:19 AM

## 2020-08-17 NOTE — PROGRESS NOTES
CLINICAL PHARMACY NOTE: MEDS TO [facility] Patient?: No  Total # of Prescriptions Filled: 1   The following medications were delivered to the patient:  · Lisinopril 10mg  Total # of Interventions Completed: 1  Time Spent (min): 15    Additional Documentation:  She had 90 ibuprofen filled on 8/14/20 and does not want the diclofenac today. Patient stated that she was informed of another prescription being written for pain but we have not received a norco nor percocet prescription.

## 2020-08-17 NOTE — DISCHARGE SUMMARY
Discharge Summary    Name:  Carson Yancey /Age/Sex: 1975  (50 y.o. female)   MRN & CSN:  [id number] & [id number] Admission Date/Time: 2020  3:10 PM   Attending:  Matteo Elias MD Discharging Physician: Kelsie Hewitt MD     Hospital Course:   Carson Yancey is a 50 y.o.  female  who presents with chest pain. EKG was positive for LVH, serial troponins negative. Today the patient complains of severe CVA tenderness. Continues to endorse reproducible chest pain.     #Chest pain: Likely musculoskeletal  -Patient presents with chest pain.  -Chest pain is reproducible above the left 4th-5th cervical ribs  -EKG LVH without acute ST-T changes  -Troponins negative  -Echo 2020 showed EF of 50%  -Cardiology on board  -Continue lidocaine patch  -Avoid NSAIDs, given elevated blood pressure  -Continue with Tylenol and Percocet for breakthrough pain     #Hypertension uncontrolled  -Continue home meds  -Continue aspirin     #Left sided CVA tenderness: MSK  -Patient reports left-sided back pain  -Physical exam positive for CVA tenderness  -Patient has a history of urolithiasis  -Continue pain control  -UA was negative for bacteria or leukocyte esterase  -CT abdomen and pelvis without contrast: non obstructing stone in the left kiney pole.   -pt will be discharged on diclofenac        Chronic  - COPD- not in exacerbation; cont home regimen  - HFpEF- cont home lasix; not in exacerbation; ECHO 50% EF last month  - HTN- Restart lisinopril       The patient expressed appropriate understanding of and agreement with the discharge recommendations, medications, and plan.      Consults this admission:  IP CONSULT TO HOSPITALIST  IP CONSULT TO CARDIOLOGY    Discharge Instruction:   Follow up appointments: PCP  Primary care physician:  within 2 weeks    Diet:  cardiac diet   Activity: activity as tolerated  Disposition: Discharged to:   [x]Home, []HHC, []SNF, []Acute Rehab, []Hospice  Condition on discharge: conjunctivitis. HENT Mucous membranes are moist. Oral pharynx without exudates, no evidence of thrush. NECK Supple, no apparent thyromegaly or masses. RESP Clear to auscultation, no wheezes, rales or rhonchi. Symmetric chest movement while on room air. CARDIO/VASC S1/S2 auscultated. Regular rate without appreciable murmurs, rubs, or gallops. No JVD or carotid bruits. Peripheral pulses equal bilaterally and palpable. No peripheral edema. GI Abdomen is soft without significant tenderness, masses, or guarding. Bowel sounds are normoactive. Rectal exam deferred.  CVA tenderness. Mckeon catheter is not present. HEME/LYMPH No palpable cervical lymphadenopathy and no hepatosplenomegaly. No petechiae or ecchymoses. MSK No gross joint deformities. Reproducible chest pain between 4th and 5th ribs,  SKIN Normal coloration, warm, dry. NEURO Cranial nerves appear grossly intact, normal speech, no lateralizing weakness. PSYCH Awake, alert, oriented x 4. Affect appropriate.     BMP/CBC  Recent Labs     08/16/20  1424 08/17/20  0433    141   K 4.1 4.3    105   CO2 24 26   BUN 23 25*   CREATININE 1.0 1.1   WBC 6.8 6.1   HCT 40.9 39.6    168       IMAGING:    Discharge Time of 35 minutes    Electronically signed by Madonna Mckeon MD on 8/17/2020 at 4:09 PM

## 2020-08-17 NOTE — CONSULTS
CARDIOLOGY CONSULT NOTE   Reason for consultation:  Left sided flank and chest pain     Referring physician:  Cricket Barrera MD     Primary care physician: Laureen Bryant, APRN - CNP      Dear  Dr. Cricket Barrera MD   Thanks for the consult. Chief Complaints :  Chief Complaint   Patient presents with    Chest Pain     left sided chest pain radiating through to back, reproducible        History of present illness:Diamond is a 50 y. o.year old who presents with complains of severe 10 out of 10 pain which is reproducible gets worse with deep inspiration walking moving describes as spasm sharp extending from the left scapular area coming forward below the left breast along the left side of the chest wall. She denies any falls injuries. She is quit smoking few weeks ago says she was walking on a treadmill few days ago and her dog was biting her foot but does not recall falling down she did twist her ankle at that time. The pain is along her left shoulder and radiating forward no associated diaphoresis palpitations she is very tender to touch on raising or moving her shoulder or moving. Past medical history:    has a past medical history of Diverticulitis, Emphysema of lung ([address] GERD (gastroesophageal reflux disease), IBS (irritable bowel syndrome), and Kidney stone. Past surgical history:   has a past surgical history that includes Tonsillectomy; Cholecystectomy; Tubal ligation; Hysterectomy; Abdomen surgery; Kidney surgery; ovarian cyst removal; Bladder surgery; Colonoscopy (03/26/2018); incision and drainage (Left, 2/4/2019); incision and drainage (Right, 2/4/2019); and Hysterectomy, total abdominal.  Social History:   reports that she quit smoking about 4 weeks ago. Her smoking use included cigarettes. She started smoking about 19 years ago. She smoked 0.25 packs per day. She has never used smokeless tobacco. She reports previous drug use. Drug: Marijuana.  She reports that she does not drink tablet 650 mg  650 mg Oral Q6H PRN Kiah Emily, APRN - NP        Or    acetaminophen (TYLENOL) suppository 650 mg  650 mg Rectal Q6H PRN Kiah Emily, APRN - NP        polyethylene glycol (GLYCOLAX) packet 17 g  17 g Oral Daily PRN Kiah Emily, APRN - NP        promethazine (PHENERGAN) tablet 12.5 mg  12.5 mg Oral Q6H PRN Kiah Emily, APRN - NP        Or    ondansetron (ZOFRAN) injection 4 mg  4 mg Intravenous Q6H PRN Kiah Emily, APRN - NP        famotidine (PEPCID) tablet 20 mg  20 mg Oral BID Kiah Emily, APRN - NP   20 mg at 08/17/20 0007    enoxaparin (LOVENOX) injection 40 mg  40 mg Subcutaneous Daily Kiah Emily, APRN - NP        albuterol sulfate  (90 Base) MCG/ACT inhaler 2 puff  2 puff Inhalation Q4H PRN Devorah Emily, APRN - NP        guaiFENesin-dextromethorphan (ROBITUSSIN DM) 100-10 MG/5ML syrup 5 mL  5 mL Oral Q4H PRN Kiah Emily, APRN - NP        dicyclomine (BENTYL) capsule 10 mg  10 mg Oral TID PRN Devorah Emily, APRN - NP        furosemide (LASIX) tablet 20 mg  20 mg Oral Daily Kiah Emily, APRN - NP        gabapentin (NEURONTIN) capsule 100 mg  100 mg Oral BID Devorah Emily, APRN - NP   100 mg at 08/17/20 0007    hydrOXYzine (ATARAX) tablet 25 mg  25 mg Oral TID PRN Kiah Emily, APRN - NP   25 mg at 08/17/20 0008    pantoprazole (PROTONIX) tablet 40 mg  40 mg Oral QAM AC Kiah Emily, APRN - NP   40 mg at 08/17/20 8548    potassium chloride (KLOR-CON) extended release tablet 10 mEq  10 mEq Oral Daily Kiah Emily, APRN - NP        budesonide-formoterol (SYMBICORT) 160-4.5 MCG/ACT inhaler 2 puff  2 puff Inhalation BID Kiah Emily, APRN - NP   2 puff at 08/17/20 0750    aspirin chewable tablet 81 mg  81 mg Oral Daily SHUKRI Jesus NP        HYDROcodone-acetaminophen (NORCO) 5-325 MG per tablet 1 tablet  1 tablet Oral Q6H PRN SHUKRI Jesus NP   1 tablet at 08/17/20 normal, Soft, No tenderness, No masses, No gross visceromegaly   :  No costovertebral angle tenderness   Musculoskeletal:  No edema, no tenderness, no deformities. Back- no tenderness  Integument:  Well hydrated, no rash   Lymphatic:  No lymphadenopathy noted   Neurologic:  Alert & oriented x 3, CN 2-12 normal, normal motor function, normal sensory function, no focal deficits noted           Medical decision making and Data review:    Lab Review   Recent Labs     08/17/20  0433   WBC 6.1   HGB 12.6   HCT 39.6         Recent Labs     08/17/20  0433      K 4.3      CO2 26   BUN 25*   CREATININE 1.1     Recent Labs     08/16/20  1424   AST 17   ALT 21   BILITOT 0.3   ALKPHOS 94     Recent Labs     08/16/20  1424 08/16/20  2247 08/17/20  0433   TROPONINT <0.010 <0.010 <0.010       Recent Labs     08/16/20  1424   PROBNP 456.2*     Lab Results   Component Value Date    INR 0.92 07/06/2017    PROTIME 10.5 07/06/2017       EKG: (reviewed by myself)    ECHO:(reviewed by myself)    Chest Xray:(reviewed by myself)  Xr Ribs Left Include Chest (min 3 Views)    Result Date: 8/16/2020  EXAMINATION: 3 XRAY VIEWS OF THE LEFT RIBS WITH FRONTAL XRAY VIEW OF THE CHEST 8/16/2020 2:25 pm COMPARISON: Chest radiograph 06/22/2020. High-resolution CT chest 07/29/2020. HISTORY: ORDERING SYSTEM PROVIDED HISTORY: pain left chest and in ribs TECHNOLOGIST PROVIDED HISTORY: Reason for exam:->pain left chest and in ribs Reason for Exam: chest pain / no injury Acuity: Acute Type of Exam: Initial FINDINGS: Three views provided. Stable borderline enlarged heart size. Normal lung volumes with no acute consolidation or interstitial changes. No effusion or pneumothorax. No free subdiaphragmatic air. No subcutaneous emphysema. Normal bone mineral density. Multilevel thoracic spine degenerative changes. No acute fracture. No suspicious sclerotic or lytic lesions. 1. Stable borderline enlarged cardiac silhouette.  2. No acute pulmonary process. 3. No acute left rib abnormality. Nm Lung Scan Perfusion Only    Result Date: 8/16/2020  EXAMINATION: NUCLEAR MEDICINE PERFUSION SCAN. 8/16/2020 TECHNIQUE: Ventilation scan not performed due to facility constraints. 5.6 millicuries of Tc 06Y MAA was administered intravenously prior to planar imaging of the lungs in multiple projections. COMPARISON: Chest radiograph 08/16/2020. HISTORY: ORDERING SYSTEM PROVIDED HISTORY: d dimer/chest pain TECHNOLOGIST PROVIDED HISTORY: Reason for exam:->d dimer/chest pain Is the patient pregnant?->No Reason for Exam: elevated d dimer. chest pain Acuity: Unknown Type of Exam: Unknown Relevant Medical/Surgical History: COPD, emphysema FINDINGS: PERFUSION: Distribution of radiotracer is homogeneous. No segmental defects identified. CHEST RADIOGRAPH: No focal areas of consolidation or significant effusions on recent chest radiograph. Low Probability for Pulmonary Embolus. Ct Chest High Resolution    Result Date: 7/30/2020  EXAMINATION: CT IMAGES OF THE CHEST WITHOUT CONTRAST, HIGH RESOLUTION 7/29/2020 TECHNIQUE: CT of the chest was performed without the administration of intravenous contrast. High resolution CT imaging was performed of the lungs. Multiplanar reformatted images are provided for review. Dose modulation, iterative reconstruction, and/or weight based adjustment of the mA/kV was utilized to reduce the radiation dose to as low as reasonably achievable. High resolution CT images were performed in the supine inspiration and supine expiration positions.  COMPARISON: 06/26/2020 HISTORY: ORDERING SYSTEM PROVIDED HISTORY: Pulmonary nodule TECHNOLOGIST PROVIDED HISTORY: Reason for exam:->Pulmonary nodule follow-up, original CT 6/26/2020 Is the patient pregnant?->No Reason for Exam: Pulmonary nodule follow-up, original CT 6/26/2020 Acuity: Acute Type of Exam: Initial FINDINGS: Mediastinum: There are multiple subcentimeter mediastinal lymph nodes work-up at this time  Uncontrolled hypertension probably in the setting of pain increase lisinopril 10 mg daily use hydralazine as needed  DVT prophylaxis if no contraindication  6. Work-up for noncardiac chest pain as per primary team  We will sign off call with questions          Thank you  much for consult and giving us the opportunity in contributing in the care of this patient. Please feel free to call me for any questions.        Ernesto Barrera MD, 8/17/2020 10:30 AM

## 2020-08-17 NOTE — ED NOTES
Called and gave report to [phone]; [id number] [facility] [address] [facility]  08/16/20 2106

## 2020-08-17 NOTE — PLAN OF CARE
Problem: Pain:  Goal: Control of chronic pain  Description: Control of chronic pain  8/17/2020 1116 by Ranjith Hawk RN  Outcome: Met This Shift  [id number] by Emma Padilla RN  Outcome: Ongoing

## 2020-08-17 NOTE — PROGRESS NOTES
Attempted to make follow up appt for patient with Bia Hope NP but office is closed. Will instruct patient to make follow up appt.

## 2020-08-17 NOTE — H&P
History and Physical      Name:  Maddy Olivo /Age/Sex: 1975  (50 y.o. female)   MRN & CSN:  [id number] & [id number] Admission Date/Time: 2020  3:10 PM   Location:  [facility] PCP: SHUKRI Larsen CNP       Discussed patient with Dr. Ky Lai and Plan:     Maddy Olivo is a 50 y.o.  female  who presents with chest pain    - ACS rule out/Chest pain-back pain  Appears reproducible; denies trauma  VQ scan low probability for PE; Xray Left ribs- negative for fx  Serial trop (first < 0.010)  Ekg w/o ischemic changes  Had neg stress test 2 weeks ago  Consult to Dr Yarely FLOWERS  Start ASA  EKG AM  Had echo 2020 50%  Lido patch, NSAIDS      Chronic  - COPD- not in exacerbation; cont home regimen  - HFpEF- cont home lasix; not in exacerbation; ECHO 50% EF last month  - HTN- Restart lisinopril     Discussed patient with ER physician     Diet CARD   DVT Prophylaxis [x] Lovenox, []  Heparin, [] SCDs, [] Ambulation   GI Prophylaxis [] PPI,  [] H2 Blocker,  [] Carafate,  [] Diet/Tube Feeds   Code Status Full   Disposition Patient requires continued admission due to chest pain   MDM [] Low, [x] Moderate,[]  High  Patient's risk as above due to      [] One or more chronic illnesses with exacerbation progression      [x] Two or more stable chronic illnesses      [x] Undiagnosed new problem with uncertain prognosis      [] Elective major surgery      []Prescription drug management       History of Present Illness:     Chief Complaint: chest pain, back pain    Maddy Olivo is a 50 y.o.  female  who presents with the above complaints, onset 4 days ago. Context is, patient woke with pain 4 days ago she felt was chest pain; as pain has continued, she feels like it is more in her back on the Left side. Reports SOB; denies diaphoresis, n/v. Also reports LEFT back pain is tender to touch, worse with inspiration.   She had negative stress test last month and states she takes lasix for CHF.  She denies recent respirator illness, coughing. Quit smoking 5 weeks ago. She reports anxiety hx- takes atarax. When patient is distracted, breathing rate and effort appear unlabored. Denies abdominal pain, back pain, n/v, diarrhea. Denies regular alcohol use, illicit drug use. Ten point ROS reviewed negative, unless as noted above    Objective:     No intake or output data in the 24 hours ending 08/16/20 2004     Vitals:   Vitals:    08/16/20 1932   BP: (!) 159/105   Pulse: 65   Resp: 15   Temp: 98.1   SpO2: 94%       Physical Exam:     GEN Awake female, sitting upright in bed in no apparent distress. Appears given age. EYES Pupils are equally round. No scleral erythema, discharge, or conjunctivitis. HENT Mucous membranes are moist. Oral pharynx without exudates, no evidence of thrush. NECK Supple, no apparent thyromegaly or masses. RESP Clear to auscultation, no wheezes, rales or rhonchi. Symmetric chest movement while on room air. CARDIO/VASC S1/S2 auscultated. Regular rate without appreciable murmurs, rubs, or gallops. No JVD or carotid bruits. Peripheral pulses equal bilaterally and palpable. No peripheral edema. GI Abdomen is soft without significant tenderness, masses, or guarding. Bowel sounds are normoactive. Rectal exam deferred.  No costovertebral angle tenderness. Mckeon catheter is not present. HEME/LYMPH No palpable cervical lymphadenopathy and no hepatosplenomegaly. No petechiae or ecchymoses. MSK No gross joint deformities. Strength equal in BLE and BUE  SKIN Normal coloration, warm, dry. NEURO Cranial nerves appear grossly intact, normal speech, no lateralizing weakness. PSYCH Awake, alert, oriented x 4. Affect appropriate.     Past Medical History:        Past Medical History:   Diagnosis Date    Diverticulitis     Emphysema of lung ([address]     GERD (gastroesophageal reflux disease)     IBS (irritable bowel syndrome)     Kidney stone        PSHX:  has a past surgical history that includes Tonsillectomy; Cholecystectomy; Tubal ligation; Hysterectomy; Abdomen surgery; Kidney surgery; ovarian cyst removal; Bladder surgery; Colonoscopy (2018); incision and drainage (Left, 2019); incision and drainage (Right, 2019); and Hysterectomy, total abdominal.    Allergies: Allergies   Allergen Reactions    Dye [Iodides]     Morphine Hives       FAM HX: family history includes Alcohol Abuse in her mother; Sleep Apnea in her father.     Soc HX:   Social History     Socioeconomic History    Marital status: Single     Spouse name: None    Number of children: None    Years of education: None    Highest education level: None   Occupational History    None   Social Needs    Financial resource strain: None    Food insecurity     Worry: None     Inability: None    Transportation needs     Medical: None     Non-medical: None   Tobacco Use    Smoking status: Former Smoker     Packs/day: 0.25     Types: Cigarettes     Start date: 2001     Last attempt to quit: 2020     Years since quittin.0    Smokeless tobacco: Never Used   Substance and Sexual Activity    Alcohol use: No    Drug use: Not Currently     Types: Marijuana    Sexual activity: Yes     Partners: Male   Lifestyle    Physical activity     Days per week: None     Minutes per session: None    Stress: None   Relationships    Social connections     Talks on phone: None     Gets together: None     Attends Taoist service: None     Active member of club or organization: None     Attends meetings of clubs or organizations: None     Relationship status: None    Intimate partner violence     Fear of current or ex partner: None     Emotionally abused: None     Physically abused: None     Forced sexual activity: None   Other Topics Concern    None   Social History Narrative    None       Medications:     Medications:    lidocaine  1 patch Transdermal Daily      ibuprofen (ADVIL;MOTRIN) 600 MG tablet Take 1 tablet by mouth every 6 hours as needed for Pain  [place] SHUKRI Galvan CNP  Needs Review    hydrOXYzine (ATARAX) 25 MG tablet  Take 1 tablet by mouth 3 times daily as needed for Anxiety  [place] SHUKRI Galvan CNP  Needs Review    gabapentin (NEURONTIN) 100 MG capsule  Take 1 capsule by mouth 2 times daily for 30 days.  Intended supply: 30 days  SHUKRI Atkinson CNP  Needs Review    diclofenac 1 % CREA  Place 1 Tube onto the skin daily as needed for Pain  [place] SHUKRI Galvan CNP  Needs Review    potassium chloride (KLOR-CON M) 10 MEQ extended release tablet  Take 1 tablet by mouth daily  SHUKRI Atkinson CNP  Needs Review    furosemide (LASIX) 20 MG tablet  Take 1 tablet by mouth daily  SHUKRI Atkinson CNP  Needs Review    Spacer/Aero-Holding [place]  1 Device by Does not apply route daily as needed (use with MDI)  SHUKRI Harris CNP  Needs Review    SYMBICORT 160-4.5 MCG/ACT AERO  Inhale 2 puffs into the lungs 2 times daily  SHUKRI Harris CNP  Needs Review    albuterol sulfate  (90 Base) MCG/ACT inhaler  inhale 2 puffs by mouth every 4 hours if needed for wheezing or shortness of breath with SPACER  SHUKRI Atkinson CNP  Needs Review    Dextromethorphan-guaiFENesin (TUSSIN DM)  MG/5ML SYRP  Take 5 mLs by mouth every 4 hours as needed for Cough  Dominique Landeros MD  Needs Review    omeprazole (PRILOSEC) 20 MG delayed release capsule  Take 1 capsule by mouth Daily  SHUKRI Atkinson CNP  Needs Review    dicyclomine (BENTYL) 10 MG capsule  Take 1 capsule by mouth 3 times daily (before meals)  Pepito Rosado MD  Needs Review        Data:     NM LUNG SCAN PERFUSION ONLY [[id number]]  Collected: 08/16/20 1857        Order Status: Completed  Updated: 08/16/20 1901       Narrative:         EXAMINATION:   NUCLEAR MEDICINE PERFUSION SCAN. 8/16/2020     TECHNIQUE:   Ventilation scan not performed due to facility constraints.  5.1 millicuries   of Tc of 22-JUN-2020 08:58,   No significant change was found   Confirmed by SANAZ Hart ([id number]) on 8/16/2020 3:57:13 PM     Electronically signed by SHUKRI Mendoza NP on 8/16/2020 at 8:04 PM

## 2020-08-18 ENCOUNTER — TELEPHONE (OUTPATIENT)
Dept: INTERNAL MEDICINE CLINIC | Age: 45
End: 2020-08-18

## 2020-08-18 ENCOUNTER — TELEPHONE (OUTPATIENT)
Dept: PULMONOLOGY | Age: 45
End: 2020-08-18

## 2020-08-18 NOTE — TELEPHONE ENCOUNTER
PT CALLED STATED THAT SHE WAS IN THE HOSPITAL LAST WEEK AND HAD A FEW OTHER TEST DONE. PT STATED SHE WAS TOLD SHE HAS HEART FAILURE AND WANTED TO LET ;AVNI KNOW WHAT IS GOING ON WITH HER.

## 2020-08-24 ENCOUNTER — HOSPITAL ENCOUNTER (OUTPATIENT)
Age: 45
Setting detail: SPECIMEN
Discharge: HOME | End: 2020-08-24
Payer: MEDICAID

## 2020-08-24 ENCOUNTER — OFFICE VISIT (OUTPATIENT)
Dept: PRIMARY CARE CLINIC | Age: 45
End: 2020-08-24
Payer: MEDICAID

## 2020-08-24 VITALS — OXYGEN SATURATION: 98 % | HEART RATE: 68 BPM | TEMPERATURE: 96.6 F

## 2020-08-24 PROCEDURE — 99213 OFFICE O/P EST LOW 20 MIN: CPT | Performed by: NURSE PRACTITIONER

## 2020-08-24 PROCEDURE — 1036F TOBACCO NON-USER: CPT | Performed by: NURSE PRACTITIONER

## 2020-08-24 PROCEDURE — U0002 COVID-19 LAB TEST NON-CDC: HCPCS

## 2020-08-24 PROCEDURE — G8427 DOCREV CUR MEDS BY ELIG CLIN: HCPCS | Performed by: NURSE PRACTITIONER

## 2020-08-24 PROCEDURE — G8417 CALC BMI ABV UP PARAM F/U: HCPCS | Performed by: NURSE PRACTITIONER

## 2020-08-24 NOTE — PROGRESS NOTES
8/24/20  Eric Choi  1975    FLU/COVID-19 CLINIC EVALUATION    HPI SYMPTOMS:    Employer: Unemployed    [x] Fevers  [x] Chills  [x] Cough  [] Coughing up blood  [] Chest Congestion  [] Nasal Congestion  [x] Feeling short of breath  [x] Sometimes  [] Frequently  [] All the time  [x] Chest pain  [x] Headaches  [x]Tolerable  [] Severe  [x] Sore throat  [x] Muscle aches  [] Nausea  [] Vomiting  []Unable to keep fluids down  [] Diarrhea  []Severe    [] OTHER SYMPTOMS:      Symptom Duration:   [] 1  [] 2   [] 3   [] 4    [] 5   [] 6   [x] 7   [] 8   [] 9   [] 10   [] 11   [] 12   [] 13   [] 14   [] Longer than 14 days    Symptom course:   [] Worsening     [x] Stable     [] Improving    RISK FACTORS:    [] Pregnant or possibly pregnant  [] Age over 61  [] Diabetes  [] Heart disease  [] Asthma  [x] COPD/Other chronic lung diseases  [] Active Cancer  [] On Chemotherapy  [] Taking oral steroids  [] History Lymphoma/Leukemia  [] Close contact with a lab confirmed COVID-19 patient within 14 days of symptom onset  [] History of travel from affected geographical areas within 14 days of symptom onset       VITALS:  There were no vitals filed for this visit. TESTS:    POCT FLU:  [] Positive     []Negative    ASSESSMENT:    [] Flu  [] Possible COVID-19  [] Strep    PLAN:    [] Discharge home with written instructions for:  [] Flu management  [] Possible COVID-19 infection with self-quarantine and management of symptoms  [] Follow-up with primary care physician or emergency department if worsens  [] Evaluation per physician/nurse practitioner in clinic  [] Sent to ER       An  electronic signature was used to authenticate this note.      --Guzman Benavides MA on 8/24/2020 at 8:10 AM

## 2020-08-24 NOTE — PROGRESS NOTES
8/24/2020    HPI:  Chief complaint and history of present illness as per medical assistant/nurse documented today in the Flu/COVID-19 clinic. Patient with intermittent cough, mild nasal congestion, and PND over the last 24-48 hours. Denies any fevers, chills, N/V/D. Denies any significant change in recent baseline status, but is wanting checked because boyfriend's daughter tested positive last week and she was around her yesterday. MEDICATIONS:  Prior to Visit Medications    Medication Sig Taking? Authorizing Provider   lisinopril (PRINIVIL;ZESTRIL) 10 MG tablet Take 1 tablet by mouth daily  Silva Aquino MD   hydrOXYzine (ATARAX) 25 MG tablet Take 1 tablet by mouth 3 times daily as needed for Anxiety  SHUKRI Rashid CNP   gabapentin (NEURONTIN) 100 MG capsule Take 1 capsule by mouth 2 times daily for 30 days.  Intended supply: 30 days  SHUKRI Rashid CNP   diclofenac 1 % CREA Place 1 Tube onto the skin daily as needed for Pain  SHUKRI Rashid CNP   potassium chloride (KLOR-CON M) 10 MEQ extended release tablet Take 1 tablet by mouth daily  SHUKRI Rashid CNP   furosemide (LASIX) 20 MG tablet Take 1 tablet by mouth daily  SHUKRI Rashid CNP   Spacer/Aero-Holding [place] 1 Device by Does not apply route daily as needed (use with MDI)  SHUKRI Farnsworth CNP   SYMBICORT 160-4.5 MCG/ACT AERO Inhale 2 puffs into the lungs 2 times daily  SHUKRI Collier CNP   albuterol sulfate  (90 Base) MCG/ACT inhaler inhale 2 puffs by mouth every 4 hours if needed for wheezing or shortness of breath with SPACER  SHUKRI Rashid CNP   Dextromethorphan-guaiFENesin (TUSSIN DM)  MG/5ML SYRP Take 5 mLs by mouth every 4 hours as needed for Cough  [address] Jonathan Rdz MD   omeprazole (PRILOSEC) 20 MG delayed release capsule Take 1 capsule by mouth Daily  SHUKRI Rashid CNP   dicyclomine (BENTYL) 10 MG capsule Take 1 capsule by mouth 3 times daily (before meals)  Chan Arora MD       Allergies   Allergen Reactions    Dye [Iodides]     Morphine Hives   ,   Past Medical History:   Diagnosis Date    Diverticulitis     Emphysema of lung ([address]     GERD (gastroesophageal reflux disease)     IBS (irritable bowel syndrome)     Kidney stone    ,   Past Surgical History:   Procedure Laterality Date    ABDOMEN SURGERY      BLADDER SURGERY      CHOLECYSTECTOMY      COLONOSCOPY  2018    normal colonoscopy    HYSTERECTOMY      HYSTERECTOMY, TOTAL ABDOMINAL      INCISION AND DRAINAGE Left 2019    ANKLE INCISION AND DRAINAGE performed by Emily Abdullahi DO at [facility] Right 2019    ARM INCISION AND DRAINAGE performed by Emily Abdullahi DO at [facility]      tumor removal    OVARIAN CYST REMOVAL      TONSILLECTOMY      TUBAL LIGATION     ,   Social History     Tobacco Use    Smoking status: Former Smoker     Packs/day: 0.25     Types: Cigarettes     Start date: 2001     Last attempt to quit: 2020     Years since quittin.1    Smokeless tobacco: Never Used   Substance Use Topics    Alcohol use: No    Drug use: Not Currently     Types: Marijuana       PHYSICAL EXAM:  Physical Exam  Constitutional:       General: She is not in acute distress. Appearance: She is well-developed. She is not toxic-appearing. HENT:      Right Ear: External ear normal.      Left Ear: External ear normal.      Nose: Mucosal edema and rhinorrhea present. Mouth/Throat:      Mouth: Mucous membranes are moist.      Pharynx: Oropharynx is clear. No oropharyngeal exudate or posterior oropharyngeal erythema. Eyes:      Conjunctiva/sclera: Conjunctivae normal.      Pupils: Pupils are equal, round, and reactive to light. Neck:      Musculoskeletal: Normal range of motion and neck supple. No neck rigidity. Cardiovascular:      Rate and Rhythm: Normal rate and regular rhythm.    Pulmonary:      Effort: Pulmonary

## 2020-08-24 NOTE — PATIENT INSTRUCTIONS
Your COVID 19 test can take 3-5 days for the results come back. We ask that you make a Mychart page and view your test results this way. You will need to Self quarantine until you know your results. Increase fluids rest  Saline nasal spray as directed  Warm salt gargles for throat discomfort  Monitor temperature twice a day  Tylenol for fevers and/or discomfort. If symptoms are worse -Go to the ER. Follow up with your primary doctor    To Whom it May Concern:    Kwasi De Leon has been tested for COVID on 08/24/20. They may NOT return to work until their lab test results back and they been fever free for 3 days. If test is positive they must stay home for 2 weeks or until they test negative or as directed by the [place] [employer] [facility].

## 2020-08-25 LAB
SARS-COV-2: NOT DETECTED
SOURCE: NORMAL

## 2020-09-01 ENCOUNTER — HOSPITAL ENCOUNTER (OUTPATIENT)
Dept: SLEEP CENTER | Age: 45
Discharge: HOME | End: 2020-09-01
Payer: MEDICAID

## 2020-09-01 PROCEDURE — 95810 POLYSOM 6/> YRS 4/> PARAM: CPT

## 2020-09-01 ASSESSMENT — SLEEP AND FATIGUE QUESTIONNAIRES
HOW LIKELY ARE YOU TO NOD OFF OR FALL ASLEEP WHILE SITTING QUIETLY AFTER LUNCH WITHOUT ALCOHOL: 3
HOW LIKELY ARE YOU TO NOD OFF OR FALL ASLEEP WHILE SITTING INACTIVE IN A PUBLIC PLACE: 2
ESS TOTAL SCORE: 17
HOW LIKELY ARE YOU TO NOD OFF OR FALL ASLEEP IN A CAR, WHILE STOPPED FOR A FEW MINUTES IN TRAFFIC: 0
HOW LIKELY ARE YOU TO NOD OFF OR FALL ASLEEP WHILE LYING DOWN TO REST IN THE AFTERNOON WHEN CIRCUMSTANCES PERMIT: 2
HOW LIKELY ARE YOU TO NOD OFF OR FALL ASLEEP WHILE WATCHING TV: 3
HOW LIKELY ARE YOU TO NOD OFF OR FALL ASLEEP WHEN YOU ARE A PASSENGER IN A CAR FOR AN HOUR WITHOUT A BREAK: 2
HOW LIKELY ARE YOU TO NOD OFF OR FALL ASLEEP WHILE SITTING AND TALKING TO SOMEONE: 2
HOW LIKELY ARE YOU TO NOD OFF OR FALL ASLEEP WHILE SITTING AND READING: 3

## 2020-09-02 ENCOUNTER — TELEPHONE (OUTPATIENT)
Dept: PULMONOLOGY | Age: 45
End: 2020-09-02

## 2020-09-02 RX ORDER — OMEPRAZOLE 20 MG/1
CAPSULE, DELAYED RELEASE ORAL
Qty: 30 CAPSULE | Refills: 3 | Status: SHIPPED | OUTPATIENT
Start: 2020-09-02 | End: 2020-12-09 | Stop reason: SDUPTHER

## 2020-09-03 ENCOUNTER — TELEPHONE (OUTPATIENT)
Dept: PULMONOLOGY | Age: 45
End: 2020-09-03

## 2020-09-03 LAB — STATUS: NORMAL

## 2020-09-03 PROCEDURE — 95810 POLYSOM 6/> YRS 4/> PARAM: CPT | Performed by: INTERNAL MEDICINE

## 2020-09-04 ENCOUNTER — OFFICE VISIT (OUTPATIENT)
Dept: BARIATRICS/WEIGHT MGMT | Age: 45
End: 2020-09-04
Payer: MEDICAID

## 2020-09-04 ENCOUNTER — TELEPHONE (OUTPATIENT)
Dept: BARIATRICS/WEIGHT MGMT | Age: 45
End: 2020-09-04

## 2020-09-04 VITALS
OXYGEN SATURATION: 97 % | HEIGHT: 61 IN | RESPIRATION RATE: 16 BRPM | WEIGHT: 244.2 LBS | DIASTOLIC BLOOD PRESSURE: 98 MMHG | BODY MASS INDEX: 46.11 KG/M2 | SYSTOLIC BLOOD PRESSURE: 132 MMHG | TEMPERATURE: 98 F | HEART RATE: 79 BPM

## 2020-09-04 PROBLEM — E66.01 MORBID OBESITY WITH BMI OF 45.0-49.9, ADULT (HCC): Status: ACTIVE | Noted: 2020-09-04

## 2020-09-04 PROCEDURE — G8427 DOCREV CUR MEDS BY ELIG CLIN: HCPCS | Performed by: SURGERY

## 2020-09-04 PROCEDURE — 1036F TOBACCO NON-USER: CPT | Performed by: SURGERY

## 2020-09-04 PROCEDURE — 99204 OFFICE O/P NEW MOD 45 MIN: CPT | Performed by: SURGERY

## 2020-09-04 PROCEDURE — G8417 CALC BMI ABV UP PARAM F/U: HCPCS | Performed by: SURGERY

## 2020-09-04 RX ORDER — IBUPROFEN 600 MG/1
600 TABLET ORAL EVERY 6 HOURS PRN
COMMUNITY
End: 2020-10-30 | Stop reason: SDUPTHER

## 2020-09-04 ASSESSMENT — ENCOUNTER SYMPTOMS
SORE THROAT: 0
ABDOMINAL DISTENTION: 1
DIARRHEA: 0
COLOR CHANGE: 0
SHORTNESS OF BREATH: 1
WHEEZING: 0
COUGH: 0
VOMITING: 0
VOICE CHANGE: 0
CONSTIPATION: 1
ANAL BLEEDING: 0
TROUBLE SWALLOWING: 0
NAUSEA: 0
BACK PAIN: 1
ABDOMINAL PAIN: 1
BLOOD IN STOOL: 0
PHOTOPHOBIA: 0

## 2020-09-04 NOTE — TELEPHONE ENCOUNTER
[facility]  On 10[facility]/20 at 10:00 with arrival time of 09:30. Prep: Small Meal night before. NPO after midnight. No gum, pills, or smoking day of test.  May take up to 45 ml of Milk of Magnesia for constipation if needed. F/U scheduled 10/5/20 at 09:15. Called and spoke to Remigio Faust, gave dates and times.

## 2020-09-04 NOTE — PROGRESS NOTES
Bariatric Surgery Consultation    Nena Middleton 1975, 50 y.o.,  female, CSN:   09/04/20     Chief Complaint:    Chief Complaint   Patient presents with    Weight Management     New surgical WM       SUBJECTIVE:  Brittney Echeverria is a 50 y.o. female being seen for morbid obesity, considering weight loss surgery; Diamond's, Height: 5' 1\" (154.9 cm), Weight: 244 lb 3.2 oz (110.8 kg), Current Body mass index is 46.14 kg/m². 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 . and has 4 children and 2 grand kids. The patient first recognized that she had a weight problem about 20 years ago. The patient's lowest weight in the last five years was 220   lbs, and the highest weight in the last five years was 270 lbs. Weight Loss Program History:  The patient states she has tried Atkins Diet. The most weight lost ever with diet and exercise was 60 Lbs, but unfortunately only kept them of for 2months. These attempts have been temporarily successful with weight loss, but have failed to sustain adequate weight loss. Mortality from the morbid obesity is very high:       Diamond's life is significantly affected by weight related to her co-morbidities. The patient has also tried but failed self directed diet and exercise.     Comorbid Conditions:  Significant diseases affecting this patient are   Past Medical History:   Diagnosis Date    Diverticulitis     Emphy[facility]ma of lung ([facility] [address]     GERD (gastroesophageal reflux disease)     IBS (irritable bowel syndrome)     Kidney stone      And     Review of Systems - Review of Systems Constitutional: Positive for fatigue. Negative for activity change, chills, diaphoresis and fever. HENT: Negative for sore throat, trouble swallowing and voice change. Eyes: Negative for photophobia and visual disturbance. Respiratory: Positive for shortness of breath (Quit smoking July but now Marijuana. . will quit). Negative for cough and wheezing. Cardiovascular: Positive for leg swelling. Negative for chest pain and palpitations. Gastrointestinal: Positive for abdominal distention, abdominal pain (Colitis july 2016. [place] saw her, diverticulitis, admitted x 1 wkS/p lap dianne 2001) and constipation. Negative for anal bleeding, blood in stool, diarrhea, nausea (GERD) and vomiting. Endocrine: Positive for polyphagia. Negative for cold intolerance, heat intolerance, polydipsia and polyuria. Genitourinary: Positive for urgency. Negative for dysuria, frequency and hematuria. Musculoskeletal: Positive for arthralgias, back pain and gait problem. Negative for joint swelling, myalgias and neck stiffness. Skin: Negative for color change and rash. Neurological: Negative for seizures, speech difficulty, light-headedness and numbness. Hematological: Negative for adenopathy. Does not bruise/bleed easily. Psychiatric/Behavioral: Positive for sleep disturbance (And ALAN). The patient is nervous/anxious. All others reviewed and are negative. Allergies: Allergies   Allergen Reactions    Dye [Iodides]     Morphine Hives       Medications:  Current Outpatient Medications   Medication Sig Dispense Refill    ibuprofen (ADVIL;MOTRIN) 600 MG tablet Take 600 mg by mouth every 6 hours as needed for Pain      omeprazole (PRILOSEC) 20 MG delayed release capsule take 1 capsule by mouth once daily 30 capsule 3    lisinopril (PRINIVIL;ZESTRIL) 10 MG tablet Take 1 tablet by mouth daily 30 tablet 3    gabapentin (NEURONTIN) 100 MG capsule Take 1 capsule by mouth 2 times daily for 30 days.  Intended supply: 30 days 60 capsule 2    diclofenac 1 % CREA Place 1 Tube onto the skin daily as needed for Pain 120 g 0    potassium chloride (KLOR-CON M) 10 MEQ extended release tablet Take 1 tablet by mouth daily 30 tablet 2    furosemide (LASIX) 20 MG tablet Take 1 tablet by mouth daily 30 tablet 2    Spacer/Aero-Holding Chambers CAYLA 1 Device by Does not apply route daily as needed (use with MDI) 1 Device 0    SYMBICORT 160-4.5 MCG/ACT AERO Inhale 2 puffs into the lungs 2 times daily 1 Inhaler 5    albuterol sulfate  (90 Base) MCG/ACT inhaler inhale 2 puffs by mouth every 4 hours if needed for wheezing or shortness of breath with SPACER 8.5 g 2    Dextromethorphan-guaiFENesin (TUSSIN DM)  MG/5ML SYRP Take 5 mLs by mouth every 4 hours as needed for Cough 120 mL 0    dicyclomine (BENTYL) 10 MG capsule Take 1 capsule by mouth 3 times daily (before meals) 90 capsule 5     No current facility-administered medications for this visit.         Past Surgical History:  Past Surgical History:   Procedure Laterality Date    ABDOMEN SURGERY      BLADDER SURGERY      CHOLECYSTECTOMY      COLONOSCOPY  03/26/2018    normal colonoscopy    HYSTERECTOMY      HYSTERECTOMY, TOTAL ABDOMINAL      INCISION AND DRAINAGE Left 2/4/2019    ANKLE INCISION AND DRAINAGE performed by Renzo Oakley DO at [address] [place] [address] Right 2/4/2019    ARM INCISION AND DRAINAGE performed by Renzo Oakley DO at [address] [place] [address]      tumor removal    OVARIAN CYST REMOVAL      TONSILLECTOMY      TUBAL LIGATION         Family History:  Family History   Problem Relation Age of Onset    Alcohol Abuse Mother     Sleep Apnea Father        Social History:  Social History     Socioeconomic History    Marital status: Single     Spouse name: Not on file    Number of children: Not on file    Years of education: Not on file    Highest education level: Not on file   Occupational History    Not on file   Social Needs    Financial resource strain: Not on file    Food insecurity     Worry: Not on file     Inability: Not on file    Transportation needs     Medical: Not on file     Non-medical: Not on file   Tobacco Use    Smoking status: Former Smoker     Packs/day: 0.25     Types: Cigarettes     Start date: 2001     Last attempt to quit: 2020     Years since quittin.1    Smokeless tobacco: Never Used   Substance and Sexual Activity    Alcohol use: No    Drug use: Not Currently     Types: Marijuana    Sexual activity: Yes     Partners: Male   Lifestyle    Physical activity     Days per week: Not on file     Minutes per session: Not on file    Stress: Not on file   Relationships    Social connections     Talks on phone: Not on file     Gets together: Not on file     Attends Shinto service: Not on file     Active member of club or organization: Not on file     Attends meetings of clubs or organizations: Not on file     Relationship status: Not on file    Intimate partner violence     Fear of current or ex partner: Not on file     Emotionally abused: Not on file     Physically abused: Not on file     Forced sexual activity: Not on file   Other Topics Concern    Not on file   Social History Narrative    Not on file         OBJECTIVE:     Physical Exam   BP (!) 132/98   Pulse 79   Temp 98 °F (36.7 °C) (Infrared)   Resp 16   Ht 5' 1\" (1.549 m)   Wt 244 lb 3.2 oz (110.8 kg)   LMP 2012   SpO2 97%   BMI 46.14 kg/m²    Constitutional:  Vital signs are normal. The patient appears well-developed and well-nourished. Head: Normocephalic. Neck: No mass and no thyromegaly present. Cardiovascular: Normal rate, regular rhythm, S1 normal and S2 normal.  No murmurs. Edema + 1-2  Pulmonary/Chest: Effort normal and breath sounds normal.   Abdominal: Soft. Normal appearance. There is no splenomegaly, but fatty liver. No tenderness. There is no rigidity, no rebound, no guarding and no Saunders's sign. Musculoskeletal:        Right lower leg: Normal. No tenderness and no edema. Left lower leg: Normal. No tenderness and no edema. Lymphadenopathy:     No cervical adenopathy. No axillary adenopathy. Skin: Skin is warm, dry and intact. No rash. Psychiatric: The patient is alert and oriented. The patient has a normal mood and affect. Speech is normal and behavior is normal. Judgment and thought content normal. Cognition and memory are normal.     ASSESSMENT & PLAN:    Patient Active Problem List   Diagnosis    Fracture of ankle, medial malleolus, left, open    Dog bite of ankle, left, initial encounter    Encounter for postoperative wound check    Essential hypertension    Anxiety and depression    GERD (gastroesophageal reflux disease)    Smoker    Pneumonia due to infectious organism    Chest pain    ALAN (obstructive sleep apnea)    Morbid obesity with BMI of 45.0-49.9, adult ([facility] [address]       Gastroparesis? ? Will be RYGB not sleeve? Will check upper GI with SBFT first..    The patient is good candidate for surgery. The patient is interested in the RoboticSleeve Gastrectomy - OR RYGB based on the upper GI. Will continue work up toward surgery. Counseled extensively regardin) DIET and MONITOR the Weight:  - 1500 Kcal Diet/day. - Write down everything you eat and drink for 1 wk  - No calories from drinks  - Slim fast diet: 4 cans per day 1-2 days a week with only NO caloriesdrinks those days    2) EXERCISE: 1 hr/day 5 days a week    3) DIETITIAN / NUTRITIONAL CONSULT, evaluation and counseling to beyer healthy diet ~1500 Kcal /day    4) EXERCISE PHYSIOLOGIST CONSULT    5)PSYCHOLOGICAL EVALUATION    6) MONTHLY FOLLOW UP,  to follow and document diet and exercise trial    7) Planning a Sleeve Gastrectomy in few months    8) 2 Pictures were taken today to concretely monitorweight loss over time.     9) CARDIOLOGY OPTIMIZATION AND CLEARANCE BEFORE SURGERY    10) PULMONOLOGY OPTIMIZATION AND CLEARANCE BEFORE SURGERY    WILL CHECK BASELINE BARIATRIC COMPREHENSIVE LABS. Orders Placed This Encounter   Procedures    FL UPPER GI WITH SMALL BOWEL    Lipid Panel    TSH without Reflex    Amb Referral to Nutrition Services    Ambulatory referral to Physical Therapy        Pt was handed a food diary notebook to help monitor Jairo intake, and patientinformation pamphlet on Sleeve Gastrectomy. I counseled the patient regarding weight loss, appropriate diet and exercise, and healthy eating habits.    - Eat slow, and chew 50-60 times before swallowing  - Eat smaller portions in smaller plates  - Weigh yourself at least 2-3 times a week    The patient will be scheduled for a follow up visit in Return in about 4 weeks (around 10/2/2020) for Bariatric follow up: diet, exercise & weight loss, For imaging and tests results review. .    Bariatric 1200 calorie diet, CASSIA, heart healthy, 60-80 gram protein.    ____________________________________________    Andi Sanchez MD, FACS, FICS  Member of the American Society of Metabolic and Bariatric Surgeons    9/4/2020  10:49 AM

## 2020-09-10 ENCOUNTER — HOSPITAL ENCOUNTER (OUTPATIENT)
Dept: PHYSICAL THERAPY | Age: 45
Setting detail: THERAPIES SERIES
Discharge: HOME | End: 2020-09-10
Payer: MEDICAID

## 2020-09-10 PROCEDURE — 97161 PT EVAL LOW COMPLEX 20 MIN: CPT

## 2020-09-10 PROCEDURE — 97535 SELF CARE MNGMENT TRAINING: CPT

## 2020-09-10 ASSESSMENT — PAIN DESCRIPTION - PROGRESSION: CLINICAL_PROGRESSION: NOT CHANGED

## 2020-09-10 ASSESSMENT — PAIN SCALES - GENERAL: PAINLEVEL_OUTOF10: 6

## 2020-09-10 ASSESSMENT — PAIN DESCRIPTION - FREQUENCY: FREQUENCY: CONTINUOUS

## 2020-09-10 ASSESSMENT — PAIN - FUNCTIONAL ASSESSMENT: PAIN_FUNCTIONAL_ASSESSMENT: PREVENTS OR INTERFERES WITH MANY ACTIVE NOT PASSIVE ACTIVITIES

## 2020-09-10 ASSESSMENT — PAIN DESCRIPTION - ORIENTATION: ORIENTATION: LEFT

## 2020-09-10 ASSESSMENT — PAIN DESCRIPTION - PAIN TYPE: TYPE: CHRONIC PAIN

## 2020-09-10 ASSESSMENT — PAIN DESCRIPTION - ONSET: ONSET: SUDDEN

## 2020-09-10 NOTE — FLOWSHEET NOTE
[employer], calling insurance to see if cover any fitness center memberships since only has TM at home, etc... [place] how to be most successful in program,. All components      Home Exercise Program: none this date ---will need this       Manual Treatments:        Modalities:        Communication with other providers:  POC set for cosign 9/10/20      Assessment:  Pt is a 51 yo female who presents to PT for bariatric program assesment and orthopedic screening. Pt does present w/ orthopedic and general med limitations that could impact her success in the bariatric program.  She has ephysema and reports recend CHF which limits exercise capacity and 1 yo injury to L foot and ankle that limits her WB ability and therefore ability to elevate HR w/ exercise. She demonstrates limited ROM and strength in L ankle and decreased WB tolerance. She lacks good understanding of proper modifications to achieve exercise goals. She will benefit from a few sessions of PT to help w/ her exercise prescription so she can be successful w/ the rest of her program.  She has fair to good support at home but some anxiety too which may impact success also. She seems well motivated to make a change for the better for herself. Plan for Next Session:  please work on exercise prescription for pt, alternate seated activity w/ WB to help w/ ankle pain issue, strength and making it as cardio as can to HR elevated to help her burn needed calories.   Issue HEP      Time In / Time Out:     1430/1530      Timed Code/Total Treatment Minutes:  25/60  2 ADL 25', 1 Eval 35'      Next Progress Note due:  dc      Plan of Care Interventions:  [x] Therapeutic Exercise  [] Modalities:  [x] Therapeutic Activity     [] Ultrasound  [] Estim  [] Gait Training      [] Cervical Traction [] Lumbar Traction  [x] Neuromuscular Re-education    [] Cold/hotpack [] Iontophoresis   [x] Instruction in HEP      [] Vasopneumatic   [] Dry Needling    [] Manual Therapy [] Aquatic Therapy              Electronically signed by:  Greer Gonzalez, PT, MPT, ATC  9/10/2020, 6:35 PM    9/10/2020, 6:36 PM

## 2020-09-10 NOTE — PROGRESS NOTES
Physical Therapy  Initial Assessment  Date: 9/10/2020  Patient Name: Dyana Vera  MRN: [id number]  : 1975     Treatment Diagnosis: bariatric, L ankle pain and stiffness, poor exercise tolerance    Restrictions  Position Activity Restriction  Other position/activity restrictions: emphysema, L ankle, recent pnemonia and some CHF too    Subjective   General  Chart Reviewed: Yes  Patient assessed for rehabilitation services?: Yes  Additional Pertinent Hx: Pt here for bariatric program, does have concerns about R UE and L foot/ankle from dog bite injuries (significant), pt can only tolerate 3-5 mins of TM exercise. She reports an injury recently she thought she might have sprained L ankle too. Can't do much too much WB activity d/t foot. Only thing she has at home is a TM or regular bike and doesn't feel safe riding outside. She's not working d/t lung disease (Emphysema) and fear of being out d/t Covid. Referring Practitioner: Anh Landin  Diagnosis: bariatric  General Comment  Comments: usees cane w/ any long distance. Subjective  Subjective: talked about possible gym membership, but limited by Covid now. so far pt has been trying some leg lifts and some TB exercises, can do a few step ups. Can alternate between sitting and standing activity, has done some of this already. Has used [employer] for exercise videos too. She reports some support at home, but may be doing a lot her self. Pain Screening  Patient Currently in Pain: Yes  Pain Assessment  Pain Assessment: 0-10  Pain Level: 6(max pain 10/10 in ankle, terry w/ WB)  Patient's Stated Pain Goal: 4  Pain Type: Chronic pain  Pain Location: Arm;Back;Leg; Ankle  Pain Orientation: Left(ankle, back and R UE)  Pain Descriptors: Aching;Dull;Tingling;Numbness; Sharp;Burning;Radiating(N/T in R UE and L ankle/foot from dog bite)  Pain Frequency: Continuous  Pain Onset: Sudden  Clinical Progression: Not changed  Functional Pain Assessment: Prevents or interferes with many active not passive activities  Vital Signs  Patient Currently in Pain: Yes    Vision/Hearing       Orientation  Orientation  Overall Orientation Status: Within Normal Limits    Social/Functional History  Social/Functional History  Lives With: Significant other(son too)  Type of Home: House  Home Layout: Two level  Occupation: Unemployed  Type of occupation: d/t Covid, but is filing disability    Objective     Observation/Palpation  Palpation: TTP all around ankle to palpation and also makes it tingle w/ some sharp pain too  Observation: overweight  Edema: some lateral L ankle  Scar: R UE and L ankle    AROM RLE (degrees)  RLE AROM: WFL  AROM LLE (degrees)  LLE AROM : WFL  LLE General AROM: ankle limited DF, inversion and eversion compared to R  AROM RUE (degrees)  RUE AROM : WFL  AROM LUE (degrees)  LUE AROM : WFL    Strength RLE  Strength RLE: WNL  Strength LLE  Strength LLE: WNL  Comment: limited ankle DF, inversion and eversion. Strength RUE  Strength RUE: WNL  Strength LUE  Strength LUE: WNL     Additional Measures  Special Tests: 6 MWT 957ft, noted increased ankle pain and swelling too. Assessment   Conditions Requiring Skilled Therapeutic Intervention  Body structures, Functions, Activity limitations: Decreased functional mobility ; Decreased ADL status; Decreased ROM; Decreased strength; Increased pain;Decreased sensation;Decreased balance  Assessment: Pt is a 49 yo female who presents to PT for bariatric program assesment and orthopedic screening. Pt does present w/ orthopedic and general med limitations that could impact her success in the bariatric program.  She has ephysema and reports recend CHF which limits exercise capacity and 1 yo injury to L foot and ankle that limits her WB ability and therefore ability to elevate HR w/ exercise. She demonstrates limited ROM and strength in L ankle and decreased WB tolerance.   She lacks good understanding of proper modifications to achieve exercise goals.  She will benefit from a few sessions of PT to help w/ her exercise prescription so she can be successful w/ the rest of her program.  She has fair to good support at home but some anxiety too which may impact success also. She seems well motivated to make a change for the better for herself. Patient agrees with established plan of care and assisted in the development of their short term and long term goals. Patient had no adverse reaction with initial treatment and there are no barriers to learning. Demonstrates no mental or cognitive disorder. Treatment Diagnosis: bariatric, L ankle pain and stiffness, poor exercise tolerance  Prognosis: Good  Decision Making: Medium Complexity  Barriers to Learning: none  REQUIRES PT FOLLOW UP: Yes  Treatment Initiated : education this date         Plan   Plan  Times per week: 1  Plan weeks: 4-6 weeks prn  Specific instructions for Next Treatment: please work on exercise prescription for pt, alternate seated activity w/ WB to help w/ ankle pain issue, strength and making it as cardio as can to HR elevated to help her burn needed calories.   Current Treatment Recommendations: Strengthening, ROM, Home Exercise Program, Neuromuscular Re-education, Patient/Caregiver Education & Training  Plan Comment: group class also for [facility] program education       OutComes Score      6 MWT 957ft     Goals  Short term goals  Time Frame for Short term goals: 4 sessions  Short term goal 1: Pt will be able to find some good exercises she can string together at least 15-20 mins of exercise w/o pain or limitation  Long term goals  Time Frame for Long term goals : 6-8 sessions  Long term goal 1: Pt will be independent w/ HEP w/ min or limitation in order to help bariatric program success  Long term goal 2: Utilizing group and individual instruction, patient will be educated in physical activity/ exercise concepts including use of basic fitness equipment and developing a personal exercise program  Patient Goals   Patient goals : find an exercise program that will work for her.           Edson Meigs, PT   PT, MPT, ATC     9/10/2020, 6:33 PM

## 2020-09-22 ENCOUNTER — HOSPITAL ENCOUNTER (OUTPATIENT)
Dept: LAB | Age: 45
Discharge: HOME | End: 2020-09-22
Payer: MEDICAID

## 2020-09-22 PROCEDURE — U0002 COVID-19 LAB TEST NON-CDC: HCPCS

## 2020-09-22 PROCEDURE — C9803 HOPD COVID-19 SPEC COLLECT: HCPCS

## 2020-09-23 LAB
SARS-COV-2: NOT DETECTED
SOURCE: NORMAL

## 2020-09-28 ENCOUNTER — TELEPHONE (OUTPATIENT)
Dept: INTERNAL MEDICINE CLINIC | Age: 45
End: 2020-09-28

## 2020-09-28 RX ORDER — LIDOCAINE HYDROCHLORIDE 20 MG/ML
10 SOLUTION OROPHARYNGEAL 2 TIMES DAILY PRN
Qty: 100 ML | Refills: 0 | Status: SHIPPED | OUTPATIENT
Start: 2020-09-28 | End: 2021-01-26 | Stop reason: ALTCHOICE

## 2020-09-28 RX ORDER — ALBUTEROL SULFATE 90 UG/1
AEROSOL, METERED RESPIRATORY (INHALATION)
Qty: 8.5 G | Refills: 2 | Status: SHIPPED | OUTPATIENT
Start: 2020-09-28 | End: 2020-12-09 | Stop reason: SDUPTHER

## 2020-09-28 NOTE — TELEPHONE ENCOUNTER
PT called and would like to know if she could get medication for an exposed nerve in her tooth. Pt states that she has a dentist appt. On wed and had asked for medication to help with the pain but the dentist declined stating that she has to be seen first. Please advise.

## 2020-09-28 NOTE — TELEPHONE ENCOUNTER
Unfortunately, she does need to see her dentist; Its not recommend to start treatment on our end unless there is concern for infection, etc, however, I can send in some viscous lidocaine to apply topically to the painful area. Still encourage to keep her dentist appointment.

## 2020-09-29 ENCOUNTER — HOSPITAL ENCOUNTER (OUTPATIENT)
Dept: SLEEP CENTER | Age: 45
Discharge: HOME | End: 2020-09-29
Payer: MEDICAID

## 2020-09-29 PROCEDURE — 95811 POLYSOM 6/>YRS CPAP 4/> PARM: CPT

## 2020-09-29 PROCEDURE — 95811 POLYSOM 6/>YRS CPAP 4/> PARM: CPT | Performed by: INTERNAL MEDICINE

## 2020-09-30 LAB — STATUS: NORMAL

## 2020-09-30 NOTE — PROGRESS NOTES
9/30/2020  sleep study  for Vegas Gene  1975 is complete. Results are pending physician review.     Electronically signed by Margarito Vazquez RCP on 9/30/2020 at 5:54 AM

## 2020-10-02 ENCOUNTER — HOSPITAL ENCOUNTER (OUTPATIENT)
Dept: GENERAL RADIOLOGY | Age: 45
Discharge: HOME | End: 2020-10-02
Payer: MEDICAID

## 2020-10-02 PROCEDURE — 74248 X-RAY SM INT F-THRU STD: CPT

## 2020-10-05 ENCOUNTER — TELEMEDICINE (OUTPATIENT)
Dept: BARIATRICS/WEIGHT MGMT | Age: 45
End: 2020-10-05
Payer: MEDICAID

## 2020-10-05 ENCOUNTER — TELEPHONE (OUTPATIENT)
Dept: BARIATRICS/WEIGHT MGMT | Age: 45
End: 2020-10-05

## 2020-10-05 VITALS — WEIGHT: 245 LBS | BODY MASS INDEX: 46.29 KG/M2

## 2020-10-05 PROCEDURE — 99443 PR PHYS/QHP TELEPHONE EVALUATION 21-30 MIN: CPT | Performed by: NURSE PRACTITIONER

## 2020-10-05 NOTE — TELEPHONE ENCOUNTER
Called patient to collect fee and make next visits with Cassy Nichols and 1 mo in person with [place]. Patient did not have program fee and unsure if she can even pay it.  She will call back to make appointments

## 2020-10-05 NOTE — PROGRESS NOTES
Abi Pelletier is a 50 y.o. female evaluated via telephone on 10/5/2020. Consent:  She and/or health care decision maker is aware that that she may receive a bill for this telephone service, depending on her insurance coverage, and has provided verbal consent to proceed: Yes    Documentation:  I communicated with the patient and/or health care decision maker about current diet, weight loss, program expectations. States ongoing abdominal pain, GERD and diarrhea some. Presents today for her 2nd WM visit. PT completed. Weight today, 245 has gained +1 lb. Diet recall: yogurt in am, turkey breast at lunch. States sticking around 1200 diet. States does retain water. Tertiary uncoordinated esophageal contractions do occur causing    mild-to-moderate esophageal spasm.  I did not see any significant GE reflux    during the exam.         Hypertrophic gastritis.         No duodenitis.         Rapid small intestinal transit time (less than 30 minutes)      Reviewed UGI study. Details of this discussion including any medical advice provided: Continued diet, weight loss. Program workup discussed. Will need to discuss UGI with surgeon. Cardiac and pulmonary ordered. In-person visits given edema and retention. I affirm this is a Patient Initiated Episode with a Patient who has not had a related appointment within my department in the past 7 days or scheduled within the next 24 hours.     Patient identification was verified at the start of the visit: Yes    Total Time: minutes: 21-30 minutes    [address] Vaz[address]

## 2020-10-05 NOTE — TELEPHONE ENCOUNTER
----- Message from SHUKRI Sams CNP sent at 10/5/2020  9:13 AM EDT -----  RTC 1 month with NP, in person. 3rd  visit. Cardiac and pulmonary placed.  Cardiac external.

## 2020-10-06 ENCOUNTER — TELEPHONE (OUTPATIENT)
Dept: BARIATRICS/WEIGHT MGMT | Age: 45
End: 2020-10-06

## 2020-10-06 NOTE — TELEPHONE ENCOUNTER
Outpatient Nutrition Counseling    REASON FOR VISIT: Initial Surgical Education    Chief Complaint:  No chief complaint on file. SUBJECTIVE:  Pt called to instruct on new surgical education. Pt c/o belly pain with solid foods, she was hard to keep focused on topics discussed but receptive. Instructed on calorie counting, healthy food choices and goal setting. Pt was emailed all handouts including copy of pt handbook. The patient is a 50 y.o. female being seen for morbid obesity, considering weight loss surgery; Diamond's,  ,  , Current There is no height or weight on file to calculate BMI. The patient's PCP is SHUKRI Musa - CNP     Comorbid Conditions:  Significant diseases affecting this patient are   Past Medical History:   Diagnosis Date    Diverticulitis     Emphysema of lung ([address]     GERD (gastroesophageal reflux disease)     IBS (irritable bowel syndrome)     Kidney stone    . Review of Systems - [unfilled]  Otherwise per HPI. Allergies:   Allergies   Allergen Reactions    Dye [Iodides]     Morphine Hives       Past Surgical History:  Past Surgical History:   Procedure Laterality Date    ABDOMEN SURGERY      BLADDER SURGERY      CHOLECYSTECTOMY      COLONOSCOPY  03/26/2018    normal colonoscopy    HYSTERECTOMY      HYSTERECTOMY, TOTAL ABDOMINAL      INCISION AND DRAINAGE Left 2/4/2019    ANKLE INCISION AND DRAINAGE performed by Sherlyn Cardenas DO at [address] Right 2/4/2019    ARM INCISION AND DRAINAGE performed by Sherlyn Cardenas DO at [address] [facility]      tumor removal    OVARIAN CYST REMOVAL      TONSILLECTOMY      TUBAL LIGATION         Family History:  Family History   Problem Relation Age of Onset    Alcohol Abuse Mother     Sleep Apnea Father        Social History:  Social History     Socioeconomic History    Marital status: Single     Spouse name: Not on file    Number of children: Not on file    Years of education: Not on file    Highest education level: Not on file   Occupational History    Not on file   Social Needs    Financial resource strain: Not on file    Food insecurity     Worry: Not on file     Inability: Not on file    Transportation needs     Medical: Not on file     Non-medical: Not on file   Tobacco Use    Smoking status: Former Smoker     Packs/day: 0.25     Types: Cigarettes     Start date: 2001     Last attempt to quit: 2020     Years since quittin.2    Smokeless tobacco: Never Used   Substance and Sexual Activity    Alcohol use: No    Drug use: Not Currently     Types: Marijuana    Sexual activity: Yes     Partners: Male   Lifestyle    Physical activity     Days per week: Not on file     Minutes per session: Not on file    Stress: Not on file   Relationships    Social connections     Talks on phone: Not on file     Gets together: Not on file     Attends [employer] service: Not on file     Active member of club or organization: Not on file     Attends meetings of clubs or organizations: Not on file     Relationship status: Not on file    Intimate partner violence     Fear of current or ex partner: Not on file     Emotionally abused: Not on file     Physically abused: Not on file     Forced sexual activity: Not on file   Other Topics Concern    Not on file   Social History Narrative    Not on file         OBJECTIVE:  Physical Exam   @VS@       NUTRITION DIAGNOSIS: Overweight / Obesity   Problem: Increased adiposity compared to reference standard or established norms   Etiology: Excess intake compared to output over time   S/S: Ht: 61\" Wt: 245 lbs BMI: 46.29    NUTRITION INTERVENTIONS:    Individualized treatment goals to address nutritiondiagnosis:   Instructed on 1200 kcal diet for weight loss   Provided pt handbook, food lists, and goal card   Encouraged Physical activity as approved by physician    MONITORING/ EVALUATION/ PLAN:   Pt verbalized understanding of allmaterials covered   Pt asked pertinent questions throughout the session - expect compliance with nutrition guidelines presented   Provided pt with contact information should questions arise prior to next visit   Will f/u with pt in 2-3 months for further education   Paul Cardenas MS, RDN, LD  10/6/2020

## 2020-10-06 NOTE — DISCHARGE SUMMARY
To: Referring Practitioner: Cassy Thao                                      From: Sagrario Fulton, PT             Patient: Maryellen العراقي                                                    : 1975  Diagnosis: Diagnosis: bariatric            Treatment Diagnosis: Treatment Diagnosis: bariatric, L ankle pain and stiffness, poor exercise tolerance   10/6/2020         Pt never returned after initial eval so we are unable to assess any further. She was scheduled for 3-4 sessions of PT to help train for home exercise but she cancelled all of them and never returned so we will formally d/c at this time.         Thank you,   Sagrario Fulton PT, MPT, ATC     10/6/2020

## 2020-10-08 ENCOUNTER — TELEPHONE (OUTPATIENT)
Dept: INTERNAL MEDICINE CLINIC | Age: 45
End: 2020-10-08

## 2020-10-08 RX ORDER — CETIRIZINE HYDROCHLORIDE, PSEUDOEPHEDRINE HYDROCHLORIDE 5; 120 MG/1; MG/1
1 TABLET, FILM COATED, EXTENDED RELEASE ORAL 2 TIMES DAILY
Qty: 60 TABLET | Refills: 0 | Status: SHIPPED | OUTPATIENT
Start: 2020-10-08 | End: 2020-11-07

## 2020-10-08 RX ORDER — FLUTICASONE PROPIONATE 50 MCG
2 SPRAY, SUSPENSION (ML) NASAL DAILY
Qty: 1 BOTTLE | Refills: 0 | Status: SHIPPED | OUTPATIENT
Start: 2020-10-08 | End: 2020-12-09 | Stop reason: SDUPTHER

## 2020-10-08 NOTE — TELEPHONE ENCOUNTER
Was just on ATB so possible more allergic related. Would recommend Zyrtec D, Flonase and sinus rinses over the next few days (these were sent in). If no improvement by Monday, then call back.

## 2020-10-08 NOTE — TELEPHONE ENCOUNTER
Pt states that she believes that she has a sinus infection. She states that she is currently experiencing a nasal congestion and nasal discharge sx started 1 week ago. Pt states she just completed amoxicillin. Please advise.

## 2020-10-14 RX ORDER — POTASSIUM CHLORIDE 750 MG/1
TABLET, EXTENDED RELEASE ORAL
Qty: 30 TABLET | Refills: 2 | Status: SHIPPED | OUTPATIENT
Start: 2020-10-14 | End: 2020-12-09 | Stop reason: SDUPTHER

## 2020-10-14 RX ORDER — FUROSEMIDE 20 MG/1
TABLET ORAL
Qty: 30 TABLET | Refills: 2 | Status: SHIPPED | OUTPATIENT
Start: 2020-10-14 | End: 2020-12-09 | Stop reason: SDUPTHER

## 2020-10-30 RX ORDER — IBUPROFEN 600 MG/1
600 TABLET ORAL EVERY 6 HOURS PRN
Qty: 120 TABLET | Refills: 0 | Status: SHIPPED | OUTPATIENT
Start: 2020-10-30 | End: 2020-12-09 | Stop reason: SDUPTHER

## 2020-11-11 ENCOUNTER — OFFICE VISIT (OUTPATIENT)
Dept: BARIATRICS/WEIGHT MGMT | Age: 45
End: 2020-11-11
Payer: MEDICAID

## 2020-11-11 VITALS
HEIGHT: 61 IN | DIASTOLIC BLOOD PRESSURE: 74 MMHG | HEART RATE: 80 BPM | TEMPERATURE: 97.7 F | BODY MASS INDEX: 46.35 KG/M2 | WEIGHT: 245.5 LBS | SYSTOLIC BLOOD PRESSURE: 118 MMHG

## 2020-11-11 PROCEDURE — 1036F TOBACCO NON-USER: CPT | Performed by: NURSE PRACTITIONER

## 2020-11-11 PROCEDURE — G8417 CALC BMI ABV UP PARAM F/U: HCPCS | Performed by: NURSE PRACTITIONER

## 2020-11-11 PROCEDURE — 99213 OFFICE O/P EST LOW 20 MIN: CPT | Performed by: NURSE PRACTITIONER

## 2020-11-11 PROCEDURE — G8484 FLU IMMUNIZE NO ADMIN: HCPCS | Performed by: NURSE PRACTITIONER

## 2020-11-11 PROCEDURE — G8427 DOCREV CUR MEDS BY ELIG CLIN: HCPCS | Performed by: NURSE PRACTITIONER

## 2020-11-11 RX ORDER — FEEDER CONTAINER WITH PUMP SET
1 EACH MISCELLANEOUS DAILY
Qty: 32 CAN | Refills: 2 | Status: SHIPPED | OUTPATIENT
Start: 2020-11-11 | End: 2020-12-09 | Stop reason: SDUPTHER

## 2020-11-11 ASSESSMENT — ENCOUNTER SYMPTOMS
SHORTNESS OF BREATH: 0
WHEEZING: 0
RHINORRHEA: 0
CHEST TIGHTNESS: 0
EYE PAIN: 0
ABDOMINAL PAIN: 0
ABDOMINAL DISTENTION: 0
NAUSEA: 0
TROUBLE SWALLOWING: 0
BACK PAIN: 1
DIARRHEA: 0

## 2020-11-11 NOTE — PROGRESS NOTES
BARIATRIC SURGERYOFFICE NOTE    SUBJECTIVE:    Patient presenting today referred from SHUKRI Norris CNP, for   Chief Complaint   Patient presents with    Weight Management     3rd WM visit, diet, exercise and pre-surgical weight loss. .    Vitals:    11/11/20 1043   BP: 118/74   Pulse: 80   Temp: 97.7 °F (36.5 °C)        BMI: Body mass index is 46.39 kg/m². Obesity Classification: III Morbid Obesity. Weight History: Wt Readings from Last 3 Encounters:   11/11/20 245 lb 8 oz (111.4 kg)   10/05/20 245 lb (111.1 kg)   09/04/20 244 lb 3.2 oz (110.8 kg)       HPI: Corky Donis is a 50 y.o. female presenting in third bariatric visit, follow up diet and exercise - pre-operative weight loss, in consideration for bariatric surgery. Patient dines out to a sit down restaurant 0 times per month. Patient eats fast food meals 0 times per month. Drinks mostly water    24 hour recall/food frequency chart:  Breakfast: none  Snack: none  Lunch: none  Snack: none  Dinner: turkey breast sandwich  Snack: none    Total daily calories: 1200      Exercise: treadmill 3 mins per day and some chair exercises. Water intake is good. States constipation. Still not losing weight. Will work on med assist for protein shakes. Total weight loss/gain +1.3 Lbs over 3 month. Thoroughly reviewed thepatient's medical history, family history, social history and review of systems with the patient today in the office. Please see medical record for pertinent positives.       Past Medical History:   Diagnosis Date    Diverticulitis     Emphysema of lung (HCC)     GERD (gastroesophageal reflux disease)     IBS (irritable bowel syndrome)     Kidney stone       Patient Active Problem List   Diagnosis    Fracture of ankle, medial malleolus, left, open    Dog bite of ankle, left, initial encounter    Encounter for postoperative wound check    Essential hypertension    Anxiety and depression    GERD (gastroesophageal reflux disease)    Smoker    Pneumonia due to infectious organism    Chest pain    ALAN (obstructive sleep apnea)    Morbid obesity with BMI of 45.0-49.9, adult [facility])     Past Surgical History:   Procedure Laterality Date    ABDOMEN SURGERY      BLADDER SURGERY      CHOLECYSTECTOMY      COLONOSCOPY  03/26/2018    normal colonoscopy    HYSTERECTOMY      HYSTERECTOMY, TOTAL ABDOMINAL      INCISION AND DRAINAGE Left 2/4/2019    ANKLE INCISION AND DRAINAGE performed by Samuel Osborne DO at [address] Right 2/4/2019    ARM INCISION AND DRAINAGE performed by Samuel Osborne DO at [address] [facility]      tumor removal    OVARIAN CYST REMOVAL      TONSILLECTOMY      TUBAL LIGATION          Current Outpatient Medications   Medication Sig Dispense Refill    Nutritional Supplements (ENSURE HIGH PROTEIN) LIQD Take 1 Can by mouth daily Meal replacement.  32 Can 2    ibuprofen (ADVIL;MOTRIN) 600 MG tablet Take 1 tablet by mouth every 6 hours as needed for Pain 120 tablet 0    furosemide (LASIX) 20 MG tablet take 1 tablet by mouth once daily 30 tablet 2    potassium chloride (KLOR-CON M) 10 MEQ extended release tablet take 1 tablet by mouth once daily 30 tablet 2    fluticasone (FLONASE) 50 MCG/ACT nasal spray 2 sprays by Each Nostril route daily 1 Bottle 0    albuterol sulfate  (90 Base) MCG/ACT inhaler inhale 2 puffs by mouth every 4 hours if needed for wheezing or shortness of breath with SPACER 8.5 g 2    lidocaine viscous hcl (XYLOCAINE) 2 % SOLN solution Take 10 mLs by mouth 2 times daily as needed for Dental Pain 100 mL 0    omeprazole (PRILOSEC) 20 MG delayed release capsule take 1 capsule by mouth once daily 30 capsule 3    lisinopril (PRINIVIL;ZESTRIL) 10 MG tablet Take 1 tablet by mouth daily 30 tablet 3    diclofenac 1 % CREA Place 1 Tube onto the skin daily as needed for Pain 120 g 0    Spacer/Aero-Holding Chambers CAYLA 1 Device by Does not apply route daily as needed (use with MDI) 1 Device 0    SYMBICORT 160-4.5 MCG/ACT AERO Inhale 2 puffs into the lungs 2 times daily 1 Inhaler 5    dicyclomine (BENTYL) 10 MG capsule Take 1 capsule by mouth 3 times daily (before meals) 90 capsule 5    gabapentin (NEURONTIN) 100 MG capsule Take 1 capsule by mouth 2 times daily for 30 days. Intended supply: 30 days 60 capsule 2    Dextromethorphan-guaiFENesin (TUSSIN DM)  MG/5ML SYRP Take 5 mLs by mouth every 4 hours as needed for Cough 120 mL 0     No current facility-administered medications for this visit. Allergies   Allergen Reactions    Dye [Iodides]     Morphine Hives           Review of Systems   Constitutional: Negative. Negative for appetite change, fatigue and fever. HENT: Negative for congestion, dental problem, hearing loss, rhinorrhea and trouble swallowing. Eyes: Negative for pain. Respiratory: Negative for chest tightness, shortness of breath and wheezing. Cardiovascular: Negative for chest pain, palpitations and leg swelling. Gastrointestinal: Negative for abdominal distention, abdominal pain, diarrhea and nausea. Endocrine: Negative for cold intolerance and polydipsia. Genitourinary: Negative for difficulty urinating and frequency. Musculoskeletal: Positive for arthralgias and back pain. Negative for gait problem. Skin: Negative for rash. Allergic/Immunologic: Negative for environmental allergies. Neurological: Negative for dizziness, seizures and syncope. Hematological: Does not bruise/bleed easily. Psychiatric/Behavioral: Negative for behavioral problems and suicidal ideas. OBJECTIVE:    /74   Pulse 80   Temp 97.7 °F (36.5 °C) (Infrared)   Ht 5' 1\" (1.549 m)   Wt 245 lb 8 oz (111.4 kg)   LMP 06/05/2012   BMI 46.39 kg/m²       Physical Exam  Vitals signs and nursing note reviewed. Constitutional:       Appearance: She is well-developed.       Comments: Obese   HENT:      Head: Normocephalic and atraumatic. Right Ear: Hearing and ear canal normal.      Left Ear: Hearing and ear canal normal.      Nose: Nose normal.      Mouth/Throat:      Pharynx: Uvula midline. Eyes:      Conjunctiva/sclera: Conjunctivae normal.      Pupils: Pupils are equal, round, and reactive to light. Neck:      Musculoskeletal: Normal range of motion. Cardiovascular:      Rate and Rhythm: Normal rate and regular rhythm. Heart sounds: Normal heart sounds. Pulmonary:      Effort: Pulmonary effort is normal.      Breath sounds: Normal breath sounds. No decreased breath sounds, wheezing, rhonchi or rales. Abdominal:      General: Bowel sounds are normal.      Palpations: Abdomen is soft. Tenderness: There is no abdominal tenderness. Musculoskeletal: Normal range of motion. General: No tenderness. Comments: In all 4 extremities. Skin:     General: Skin is warm and dry. Findings: No rash. Neurological:      Mental Status: She is alert and oriented to person, place, and time. GCS: GCS eye subscore is 4. GCS verbal subscore is 5. GCS motor subscore is 6. Motor: No abnormal muscle tone. Psychiatric:         Behavior: Behavior normal.         Judgment: Judgment normal.         ASSESSMENT & PLAN:    1. Morbid obesity with BMI of 45.0-49.9, adult (HCC)  - still not losing, discussed 3 meals per day and protein shake.   - Will see if qualifies for med assist. Discussed in detail.   - Nutritional Supplements (ENSURE HIGH PROTEIN) LIQD; Take 1 Can by mouth daily Meal replacement. Dispense: 32 Can; Refill: 2  - Amb External Referral To Psychology  - PT needs group class. - Psych placed. - Cardiac and pulmonary in progress. - revisit with RD, needs weight loss each month. - RTC  1 month with surgeon, in person. 2. Pre-op evaluation  - Continue working on clearances.    - Amb External Referral To Psychology      Discussed in length complying with the dietary recommendations, complying with the preoperative workup including dietary counseling completed, exercise physiologist counseling completed, andpre-operative optimization of pulmonologist in process and cardiologist in process. The patient expressed understanding and willingness to comply nicely; all questions and concerns addressed. Orders Placed This Encounter   Medications    Nutritional Supplements (ENSURE HIGH PROTEIN) LIQD     Sig: Take 1 Can by mouth daily Meal replacement. Dispense:  32 Can     Refill:  2     Orders Placed This Encounter   Procedures    Amb External Referral To Psychology     Referral Priority:   Routine     Referral Reason:   Specialty Services Required     Referred to Provider:   Jillian Javed     Requested Specialty:   Psychology     Number of Visits Requested:   1       Follow Up:  Return in about 1 month (around 12/11/2020) for Weight Check.     Sathish Dick, CNP

## 2020-11-13 ENCOUNTER — TELEPHONE (OUTPATIENT)
Dept: BARIATRICS/WEIGHT MGMT | Age: 45
End: 2020-11-13

## 2020-11-13 NOTE — TELEPHONE ENCOUNTER
Called pt to give Dr Kenya Lawrence name and number. [employer] Do not accept her insurance.   Dr Teresita Monae, [phone]

## 2020-11-19 ENCOUNTER — TELEMEDICINE (OUTPATIENT)
Dept: PULMONOLOGY | Age: 45
End: 2020-11-19
Payer: MEDICAID

## 2020-11-19 PROCEDURE — G8427 DOCREV CUR MEDS BY ELIG CLIN: HCPCS | Performed by: NURSE PRACTITIONER

## 2020-11-19 PROCEDURE — 99214 OFFICE O/P EST MOD 30 MIN: CPT | Performed by: NURSE PRACTITIONER

## 2020-11-19 RX ORDER — AMOXICILLIN AND CLAVULANATE POTASSIUM 875; 125 MG/1; MG/1
1 TABLET, FILM COATED ORAL 2 TIMES DAILY
Qty: 20 TABLET | Refills: 0 | Status: SHIPPED | OUTPATIENT
Start: 2020-11-19 | End: 2020-11-29

## 2020-11-19 NOTE — PROGRESS NOTES
on file    Food insecurity     Worry: Not on file     Inability: Not on file    Transportation needs     Medical: Not on file     Non-medical: Not on file   Tobacco Use    Smoking status: Former Smoker     Packs/day: 0.25     Types: Cigarettes     Start date: 2001     Last attempt to quit: 2020     Years since quittin.3    Smokeless tobacco: Never Used   Substance and Sexual Activity    Alcohol use: No    Drug use: Not Currently     Types: Marijuana    Sexual activity: Yes     Partners: Male   Lifestyle    Physical activity     Days per week: Not on file     Minutes per session: Not on file    Stress: Not on file   Relationships    Social connections     Talks on phone: Not on file     Gets together: Not on file     Attends [employer]ars[employer] service: Not on file     Active member of club or organization: Not on file     Attends meetings of clubs or organizations: Not on file     Relationship status: Not on file    Intimate partner violence     Fear of current or ex partner: Not on file     Emotionally abused: Not on file     Physically abused: Not on file     Forced sexual activity: Not on file   Other Topics Concern    Not on file   Social History Narrative    Not on file       Prior to Admission medications    Medication Sig Start Date End Date Taking? Authorizing Provider   amoxicillin-clavulanate (AUGMENTIN) 875-125 MG per tablet Take 1 tablet by mouth 2 times daily for 10 days 20 Yes SHUKRI Donahue CNP   Nutritional Supplements (ENSURE HIGH PROTEIN) LIQD Take 1 Can by mouth daily Meal replacement.  20   SHUKRI Cam CNP   ibuprofen (ADVIL;MOTRIN) 600 MG tablet Take 1 tablet by mouth every 6 hours as needed for Pain 10/30/20   Marlen Santoyo MD   furosemide (LASIX) 20 MG tablet take 1 tablet by mouth once daily 10/14/20   SHUKRI Mayers CNP   potassium chloride (KLOR-CON M) 10 MEQ extended release tablet take 1 tablet by mouth once daily 10/14/20   Norah SHUKRI Mckeon CNP   fluticasone [place] Toribio) 50 MCG/ACT nasal spray 2 sprays by Each Nostril route daily 10/8/20   Norah HSUKRI Mckeon CNP   albuterol sulfate  (90 Base) MCG/ACT inhaler inhale 2 puffs by mouth every 4 hours if needed for wheezing or shortness of breath with SPACER 9/28/20   [facility] SHUKRI Mckeon CNP   lidocaine viscous hcl (XYLOCAINE) 2 % SOLN solution Take 10 mLs by mouth 2 times daily as needed for Dental Pain 9/28/20   [facility] SHUKRI Mckeon CNP   omeprazole (PRILOSEC) 20 MG delayed release capsule take 1 capsule by mouth once daily 9/2/20   [facility] AusSHUKRI CNP   lisinopril (PRINIVIL;ZESTRIL) 10 MG tablet Take 1 tablet by mouth daily 8/18/20   Omar Zepeda MD   gabapentin (NEURONTIN) 100 MG capsule Take 1 capsule by mouth 2 times daily for 30 days.  Intended supply: 30 days 7/29/20 9/4/20  Norah SHUKRI Mckeon CNP   diclofenac 1 % CREA Place 1 Tube onto the skin daily as needed for Pain 7/20/20   [facility] SHUKRI Mckeon CNP   Spacer/Aero-Holding [place] Lanius 1 Device by Does not apply route daily as needed (use with MDI) 7/16/20   SHUKRI Hill CNP   SYMBICORT 160-4.5 MCG/ACT AERO Inhale 2 puffs into the lungs 2 times daily 7/16/20   SHUKRI Hill CNP   Dextromethorphan-guaiFENesin (TUSSIN DM)  MG/5ML SYRP Take 5 mLs by mouth every 4 hours as needed for Cough 6/22/20   Liban Terrazas MD   dicyclomine (BENTYL) 10 MG capsule Take 1 capsule by mouth 3 times daily (before meals) 4/23/20   Jack Hopkins MD       Allergies as of 11/19/2020 - Review Complete 11/11/2020   Allergen Reaction Noted    Dye [iodides]  12/31/2017    Morphine Hives 07/06/2017       Patient Active Problem List   Diagnosis    Fracture of ankle, medial malleolus, left, open    Dog bite of ankle, left, initial encounter    Encounter for postoperative wound check    Essential hypertension    Anxiety and depression    GERD (gastroesophageal reflux distress. Eyes:  No sclera icterus. No conjunctival injection. ENT:  External appearance of ears and nose normal. Tenderness reported over frontal and maxillary sinus area, green drainage when blowing nose  Neck: Trachea midline. No obvious mass seen. Resp: No respiratory distress noted, no cough present during Video exam.  Skin: Exposed areas appear in tact  M/S: No cyanosis. No clubbing. Psych: Oriented x 3. No anxiety. Intact judgement and insight. Assessment and Plan     1. ALAN on CPAP-   Patient is in compliance with use of CPAP. Current AHI 4.4. I have discussed the importance of cleaning mask and tubing, need for replacement of mask and tubing on a regular basis, will see back in the office for reevaluation and compliancy. 2. Sinusitis  Tenderness over frontal and maxillary with green drainage, will start on Augmentin. Instructed to clean CPAP equipment (mask and tubing) daily. 3. Health Maintenance  We have discussed the need to maintain yearly flu immunization, pneumococcal vaccination. We have discussed Coronavirus precaution including: social distancing when needing to be in public, handwashing practice, wiping items touched in public such as gas pumps, door handles, shopping carts, etc. Self monitoring for infection - fever, chills, cough, SOB. Should they develop symptoms they should call office for further instructions. Follow-Up:    Return in about 3 months (around 2/19/2021) for Follow Up 6 month ALAN compliancy.     Electronically signed by SHUKRI Jacob CNP on 11/19/2020 at 11:17 AM

## 2020-11-23 ENCOUNTER — TELEPHONE (OUTPATIENT)
Dept: BARIATRICS/WEIGHT MGMT | Age: 45
End: 2020-11-23

## 2020-12-07 ENCOUNTER — HOSPITAL ENCOUNTER (OUTPATIENT)
Age: 45
Discharge: HOME | End: 2020-12-07
Payer: MEDICAID

## 2020-12-07 PROCEDURE — C9803 HOPD COVID-19 SPEC COLLECT: HCPCS

## 2020-12-07 PROCEDURE — U0002 COVID-19 LAB TEST NON-CDC: HCPCS

## 2020-12-08 LAB
SARS-COV-2: NOT DETECTED
SOURCE: NORMAL

## 2020-12-08 RX ORDER — GABAPENTIN 100 MG/1
CAPSULE ORAL
Qty: 60 CAPSULE | Refills: 2 | Status: SHIPPED | OUTPATIENT
Start: 2020-12-08 | End: 2020-12-08 | Stop reason: SDUPTHER

## 2020-12-09 RX ORDER — LIDOCAINE HYDROCHLORIDE 20 MG/ML
10 SOLUTION OROPHARYNGEAL 2 TIMES DAILY PRN
Qty: 100 ML | Refills: 0 | OUTPATIENT
Start: 2020-12-09

## 2020-12-09 RX ORDER — ALBUTEROL SULFATE 90 UG/1
2 AEROSOL, METERED RESPIRATORY (INHALATION) EVERY 4 HOURS PRN
Qty: 8.5 G | Refills: 2 | Status: SHIPPED | OUTPATIENT
Start: 2020-12-09 | End: 2022-01-10

## 2020-12-09 RX ORDER — GUAIFENESIN AND DEXTROMETHORPHAN HYDROBROMIDE 100; 10 MG/5ML; MG/5ML
5 SOLUTION ORAL EVERY 4 HOURS PRN
Qty: 120 ML | Refills: 0 | OUTPATIENT
Start: 2020-12-09

## 2020-12-09 RX ORDER — DICYCLOMINE HYDROCHLORIDE 10 MG/1
10 CAPSULE ORAL
Qty: 90 CAPSULE | Refills: 5 | Status: SHIPPED | OUTPATIENT
Start: 2020-12-09 | End: 2021-09-14

## 2020-12-09 RX ORDER — GABAPENTIN 100 MG/1
100 CAPSULE ORAL 2 TIMES DAILY
Qty: 60 CAPSULE | Refills: 2 | OUTPATIENT
Start: 2020-12-09 | End: 2021-01-08

## 2020-12-09 RX ORDER — GABAPENTIN 100 MG/1
100 CAPSULE ORAL 2 TIMES DAILY
Qty: 60 CAPSULE | Refills: 2 | Status: SHIPPED | OUTPATIENT
Start: 2020-12-09 | End: 2021-07-28

## 2020-12-09 RX ORDER — FEEDER CONTAINER WITH PUMP SET
1 EACH MISCELLANEOUS DAILY
Qty: 32 CAN | Refills: 2 | Status: SHIPPED | OUTPATIENT
Start: 2020-12-09 | End: 2022-01-14 | Stop reason: ALTCHOICE

## 2020-12-09 RX ORDER — BUDESONIDE AND FORMOTEROL FUMARATE DIHYDRATE 160; 4.5 UG/1; UG/1
2 AEROSOL RESPIRATORY (INHALATION) 2 TIMES DAILY
Qty: 1 INHALER | Refills: 5 | Status: SHIPPED | OUTPATIENT
Start: 2020-12-09 | End: 2021-05-12 | Stop reason: SDUPTHER

## 2020-12-09 RX ORDER — FUROSEMIDE 20 MG/1
20 TABLET ORAL DAILY
Qty: 30 TABLET | Refills: 2 | Status: ON HOLD | OUTPATIENT
Start: 2020-12-09 | End: 2021-01-27 | Stop reason: HOSPADM

## 2020-12-09 RX ORDER — POTASSIUM CHLORIDE 750 MG/1
10 TABLET, EXTENDED RELEASE ORAL DAILY
Qty: 30 TABLET | Refills: 2 | Status: SHIPPED | OUTPATIENT
Start: 2020-12-09 | End: 2021-05-12 | Stop reason: SDUPTHER

## 2020-12-09 RX ORDER — OMEPRAZOLE 20 MG/1
20 CAPSULE, DELAYED RELEASE ORAL DAILY
Qty: 30 CAPSULE | Refills: 3 | Status: ON HOLD | OUTPATIENT
Start: 2020-12-09 | End: 2021-01-04 | Stop reason: HOSPADM

## 2020-12-09 RX ORDER — FLUTICASONE PROPIONATE 50 MCG
2 SPRAY, SUSPENSION (ML) NASAL DAILY
Qty: 1 BOTTLE | Refills: 0 | Status: SHIPPED | OUTPATIENT
Start: 2020-12-09 | End: 2021-08-16

## 2020-12-09 RX ORDER — IBUPROFEN 600 MG/1
600 TABLET ORAL EVERY 6 HOURS PRN
Qty: 120 TABLET | Refills: 0 | Status: ON HOLD | OUTPATIENT
Start: 2020-12-09 | End: 2021-01-04 | Stop reason: HOSPADM

## 2020-12-09 RX ORDER — LISINOPRIL 10 MG/1
10 TABLET ORAL DAILY
Qty: 30 TABLET | Refills: 3 | Status: ON HOLD | OUTPATIENT
Start: 2020-12-09 | End: 2021-01-27 | Stop reason: HOSPADM

## 2020-12-11 ENCOUNTER — OFFICE VISIT (OUTPATIENT)
Dept: BARIATRICS/WEIGHT MGMT | Age: 45
End: 2020-12-11
Payer: MEDICAID

## 2020-12-11 VITALS
HEART RATE: 82 BPM | WEIGHT: 227.2 LBS | HEIGHT: 61 IN | DIASTOLIC BLOOD PRESSURE: 98 MMHG | OXYGEN SATURATION: 98 % | BODY MASS INDEX: 42.9 KG/M2 | SYSTOLIC BLOOD PRESSURE: 130 MMHG | TEMPERATURE: 97.3 F

## 2020-12-11 PROBLEM — E66.01 MORBID OBESITY WITH BMI OF 40.0-44.9, ADULT (HCC): Status: ACTIVE | Noted: 2020-12-11

## 2020-12-11 PROCEDURE — 99213 OFFICE O/P EST LOW 20 MIN: CPT | Performed by: SURGERY

## 2020-12-11 PROCEDURE — G8417 CALC BMI ABV UP PARAM F/U: HCPCS | Performed by: SURGERY

## 2020-12-11 PROCEDURE — G8484 FLU IMMUNIZE NO ADMIN: HCPCS | Performed by: SURGERY

## 2020-12-11 PROCEDURE — 1036F TOBACCO NON-USER: CPT | Performed by: SURGERY

## 2020-12-11 PROCEDURE — G8427 DOCREV CUR MEDS BY ELIG CLIN: HCPCS | Performed by: SURGERY

## 2020-12-11 RX ORDER — ERYTHROMYCIN 250 MG/1
250 TABLET, COATED ORAL 4 TIMES DAILY
Qty: 120 TABLET | Refills: 3 | Status: SHIPPED | OUTPATIENT
Start: 2020-12-11 | End: 2021-01-10

## 2020-12-11 ASSESSMENT — ENCOUNTER SYMPTOMS
COUGH: 0
TROUBLE SWALLOWING: 0
CONSTIPATION: 0
ABDOMINAL DISTENTION: 1
NAUSEA: 0
SORE THROAT: 0
PHOTOPHOBIA: 0
VOMITING: 0
SHORTNESS OF BREATH: 0
BACK PAIN: 1
WHEEZING: 0
ANAL BLEEDING: 0
ABDOMINAL PAIN: 0
COLOR CHANGE: 0
DIARRHEA: 0
VOICE CHANGE: 0
BLOOD IN STOOL: 0

## 2020-12-11 NOTE — PROGRESS NOTES
BARIATRIC SURGERY OFFICE NOTE    SUBJECTIVE:    Patient presenting today originally referred from SHUKRI Hamilton CNP, for   Chief Complaint   Patient presents with    Follow-up     4th  visit  EGD CONSENT   .    HPI: David Delacruz is a 50 y.o. female being seen for follow up for bariatric weight loss surgery. Diamond'slast month weight gain/loss was -18.3 Lbs. .. Addressed the status of the following co-morbidities:   1. Constipation worsening. 2. GERD  worsening. 3. Weight loss  improving. Patient dines out to a sit down restaurant 0 times per week. Patient eats fast food meals 0 times per week. Drinks mostly water, protein drinks, ice tea    24 hour recall/food frequency chart:  Breakfast: slim fast  Snack: none  Lunch: slim fast  Snack: none  Dinner: none  Snack: none    Total daily calories: 1200 but shes doing probly 600/800      Exercises normal walking and moving        Thoroughly reviewed the patient's medical history, family history, social history and review of systems with the patient today in theoffice. Please see medical record for pertinent positives.       Past Medical History:   Diagnosis Date    Diverticulitis     Emphysema of lung ([address]     GERD (gastroesophageal reflux disease)     IBS (irritable bowel syndrome)     Kidney stone       Past Surgical History:   Procedure Laterality Date    ABDOMEN SURGERY      BLADDER SURGERY      CHOLECYSTECTOMY      COLONOSCOPY  03/26/2018    normal colonoscopy    HYSTERECTOMY      HYSTERECTOMY, TOTAL ABDOMINAL      INCISION AND DRAINAGE Left 2/4/2019    ANKLE INCISION AND DRAINAGE performed by Argentina Simpson DO at [address] Right 2/4/2019    ARM INCISION AND DRAINAGE performed by Argentina Simpson DO at [address]       tumor removal    OVARIAN CYST REMOVAL      TONSILLECTOMY      TUBAL LIGATION       Current Outpatient Medications   Medication Sig Dispense Refill  gabapentin (NEURONTIN) 100 MG capsule Take 1 capsule by mouth 2 times daily for 30 days. 60 capsule 2    albuterol sulfate  (90 Base) MCG/ACT inhaler Inhale 2 puffs into the lungs every 4 hours as needed for Wheezing 8.5 g 2    furosemide (LASIX) 20 MG tablet Take 1 tablet by mouth daily 30 tablet 2    fluticasone (FLONASE) 50 MCG/ACT nasal spray 2 sprays by Each Nostril route daily 1 Bottle 0    dicyclomine (BENTYL) 10 MG capsule Take 1 capsule by mouth 3 times daily (before meals) 90 capsule 5    ibuprofen (ADVIL;MOTRIN) 600 MG tablet Take 1 tablet by mouth every 6 hours as needed for Pain 120 tablet 0    lisinopril (PRINIVIL;ZESTRIL) 10 MG tablet Take 1 tablet by mouth daily 30 tablet 3    SYMBICORT 160-4.5 MCG/ACT AERO Inhale 2 puffs into the lungs 2 times daily 1 Inhaler 5    Spacer/Aero-Holding Chambers CAYLA 1 Device by Does not apply route daily as needed (use with MDI) 1 Device 0    potassium chloride (KLOR-CON M) 10 MEQ extended release tablet Take 1 tablet by mouth daily 30 tablet 2    Nutritional Supplements (ENSURE HIGH PROTEIN) LIQD Take 1 Can by mouth daily Meal replacement. 32 Can 2    omeprazole (PRILOSEC) 20 MG delayed release capsule Take 1 capsule by mouth Daily 30 capsule 3    lidocaine viscous hcl (XYLOCAINE) 2 % SOLN solution Take 10 mLs by mouth 2 times daily as needed for Dental Pain 100 mL 0    diclofenac 1 % CREA Place 1 Tube onto the skin daily as needed for Pain 120 g 0    Dextromethorphan-guaiFENesin (TUSSIN DM)  MG/5ML SYRP Take 5 mLs by mouth every 4 hours as needed for Cough 120 mL 0     No current facility-administered medications for this visit. Allergies   Allergen Reactions    Dye [Iodides]     Morphine Hives           Review of Systems   Constitutional: Positive for fatigue. Negative for activity change, chills, diaphoresis and fever. HENT: Negative for sore throat, trouble swallowing and voice change.     Eyes: Negative for photophobia and dietitian's recommendations, and complying with the preoperative workup including the dietitian counseling, exercise physiologist counseling,cardiologist evaluation and pre-operative optimization, pulmonologist evaluation and pre operative optimization, pre operative EGD evaluation. The patient expressed understanding and willingness to comply nicely; allquestions and concerns addressed in details. Patient counseled on the risks, benefits, and alternatives oftreatment plan at length while in the office today. Patient states an understanding and willingness to proceed with the plan. Follow Up:    Return EGD, for Bariatric follow up: diet, exercise & weight loss, Follow up Symptoms.       Syed Hidalgo MD, FACS, FICS  Member of the [employer] of [employer] and [employer]    [phone]    12/11/20

## 2020-12-16 RX ORDER — LORATADINE AND PSEUDOEPHEDRINE 10; 240 MG/1; MG/1
1 TABLET, EXTENDED RELEASE ORAL DAILY
Qty: 30 TABLET | Refills: 3 | Status: SHIPPED | OUTPATIENT
Start: 2020-12-16 | End: 2021-06-08 | Stop reason: SDUPTHER

## 2020-12-16 RX ORDER — GUAIFENESIN AND DEXTROMETHORPHAN HYDROBROMIDE 100; 10 MG/5ML; MG/5ML
5 SOLUTION ORAL EVERY 4 HOURS PRN
Qty: 120 ML | Refills: 0 | OUTPATIENT
Start: 2020-12-16

## 2020-12-16 RX ORDER — GUAIFENESIN AND DEXTROMETHORPHAN HYDROBROMIDE 100; 10 MG/5ML; MG/5ML
5 SOLUTION ORAL EVERY 4 HOURS PRN
Qty: 120 ML | Refills: 0 | Status: ON HOLD | OUTPATIENT
Start: 2020-12-16 | End: 2021-01-04

## 2020-12-16 RX ORDER — LORATADINE AND PSEUDOEPHEDRINE 10; 240 MG/1; MG/1
1 TABLET, EXTENDED RELEASE ORAL DAILY
Qty: 30 TABLET | Refills: 3 | OUTPATIENT
Start: 2020-12-16

## 2020-12-16 RX ORDER — LORATADINE AND PSEUDOEPHEDRINE 10; 240 MG/1; MG/1
1 TABLET, EXTENDED RELEASE ORAL DAILY
COMMUNITY
End: 2020-12-16 | Stop reason: SDUPTHER

## 2020-12-18 ENCOUNTER — TELEPHONE (OUTPATIENT)
Dept: BARIATRICS/WEIGHT MGMT | Age: 45
End: 2020-12-18

## 2020-12-18 NOTE — TELEPHONE ENCOUNTER
SPOKE TO  Tahir (EGD WITH BX) SCHEDULED @ [facility].  NOTIFIED OF DATES, TIMES AND LOCATION    PHONE ASSESSMENT   COVID 12/28/20 @ 215  SURGERY - 1/4/21 @ 115  P/O - 1/13/21 @ 1015    NPO AFTER MIDNIGHT  HOLD BLOOD THINNERS 5 DAYS PRIOR  IF TAKING ANY

## 2020-12-28 ENCOUNTER — HOSPITAL ENCOUNTER (OUTPATIENT)
Age: 45
Setting detail: SPECIMEN
Discharge: HOME | End: 2020-12-28
Payer: MEDICAID

## 2020-12-28 ENCOUNTER — NURSE ONLY (OUTPATIENT)
Dept: SURGERY | Age: 45
End: 2020-12-28

## 2020-12-28 PROCEDURE — U0002 COVID-19 LAB TEST NON-CDC: HCPCS

## 2020-12-28 NOTE — PROGRESS NOTES
Patient collected pre-op COVID-19 screening instruction and collection supplies given to patient accordingly. Patient denies fever/cough/sob or recent travels. Patient voiced understanding of collection/quarantine instructions. COVID screening lab ordered, collection completed without difficulty, identifiers placed on specimen. AVS given at discharge. Results will be given via mychart or telephone call [facility] Dept will manage any positive test results, the procedure will be rescheduled at a later date).

## 2020-12-28 NOTE — PATIENT INSTRUCTIONS
Pre-Procedure COVID-19 Self Testing  Quarantine Instructions  Day of Surgery Instructions         What to do before my surgery:   ? All patients scheduled for elective surgery must test for COVID19 72-96 hours prior to the surgery date. ? Pre-Procedure COVID-19 Self-Test will be scheduled for you by your provider. ? You can receive your Pre-Procedure COVID-19 Self-Test at:  ?  [facility] ? [address], [place], [place], [address] [facility],[address]  ? If you do not have the COVID-19 test we will cancel or reschedule your procedure  ? Once you test you must quarantine at home until after your procedure with only your immediate family members or whoever lives with you.   ? If you must work during your quarantine period, we ask that you continue to practice social distancing, wear a mask that covers your mouth and nose and perform all hand hygiene as recommended by the CDC. ? If you must go to the grocery, etc. and cannot get someone to do this for you please wear a mask that covers your mouth and nose and perform all hand hygiene as recommended by the CDC. ? Your surgeon's office will notify you with any concerns about your test result. What can I expect on the day of surgery? ? Arrive at the time the office or hospital staff tell you on the day of your procedure. ? Wear a mask when entering the hospital.    ? A member of the hospital staff will take your temperature and ask you a few questions as you enter the building. ? In abundance of caution for the safety of all our patients and staff, please follow all hospital visitor guidelines in place at the time of your procedure. The staff caring for you will stay in close communication with your loved one and keep them updated on progress. ? Please provide a phone number for us to use when communicating with your family or ride home.

## 2020-12-29 RX ORDER — HYDROXYZINE HYDROCHLORIDE 10 MG/1
10 TABLET, FILM COATED ORAL EVERY 8 HOURS PRN
COMMUNITY
End: 2022-01-14 | Stop reason: ALTCHOICE

## 2020-12-29 NOTE — PROGRESS NOTES
.Surgery 1/4/2021 @ [phone], arrival 1115               1. Do not eat or drink anything within 8 hours of your surgery - unless instructed by your doctor prior to surgery. This includes                   no water, chewing gum or mints. 2. Follow your directions as prescribed by the doctor for your procedure and medications. Take Omeprazole in am with a sip. 3. Check with your Doctor regarding stopping Plavix, Coumadin, Lovenox,Effient,Pradaxa,Xarelto, Fragmin or other blood thinners and                   follow their instructions. 4. Do not smoke, and do not drink any alcoholic beverages 24 hours prior to surgery. This includes NA Beer. 5. You may brush your teeth and gargle the morning of surgery. DO NOT SWALLOW WATER   6. You MUST make arrangements for a responsible adult to take you home after your surgery and be able to check on you every couple                   hours for the day. You will not be allowed to leave alone or drive yourself home. It is strongly suggested someone stay with you the first 24                   hrs. Your surgery will be cancelled if you do not have a ride home. 7. Please wear simple, loose fitting clothing to the hospital.  Betina Villegas not bring valuables (money, credit cards, checkbooks, etc.) Do not wear any                   makeup (including no eye makeup) or nail polish on your fingers or toes. 8. DO NOT wear any jewelry or piercings on day of surgery. All body piercing jewelry must be removed. 9. If you have dentures, they will be removed before going to the OR; we will provide you a container. If you wear contact lenses or glasses,                  they will be removed; please bring a case for them. 10. If you  have a Living Will and Durable Power of  for Healthcare, please bring in a copy. 11. Please bring picture ID,  insurance card, paperwork from the doctors office    (H & P, Consent, & card for implantable devices). 12. Take a shower the night before or morning of your procedure, do not apply any lotion, oil or powder. 13. Wear a mask covering your nose & mouth when entering the hospital. Have your covid-19 test performed within 10 days of your                  Surgery (tested 12/28/20). Quarantine yourself after the test until after your surgery.

## 2020-12-30 LAB
SARS-COV-2: NOT DETECTED
SOURCE: NORMAL

## 2021-01-02 ENCOUNTER — ANESTHESIA EVENT (OUTPATIENT)
Dept: ENDOSCOPY | Age: 46
End: 2021-01-02
Payer: MEDICAID

## 2021-01-02 NOTE — ANESTHESIA PRE PROCEDURE
 fluticasone (FLONASE) 50 MCG/ACT nasal spray 2 sprays by Each Nostril route daily 1 Bottle 0    dicyclomine (BENTYL) 10 MG capsule Take 1 capsule by mouth 3 times daily (before meals) 90 capsule 5    ibuprofen (ADVIL;MOTRIN) 600 MG tablet Take 1 tablet by mouth every 6 hours as needed for Pain 120 tablet 0    lisinopril (PRINIVIL;ZESTRIL) 10 MG tablet Take 1 tablet by mouth daily 30 tablet 3    SYMBICORT 160-4.5 MCG/ACT AERO Inhale 2 puffs into the lungs 2 times daily 1 Inhaler 5    Spacer/Aero-Holding Chambers CAYLA 1 Device by Does not apply route daily as needed (use with MDI) 1 Device 0    potassium chloride (KLOR-CON M) 10 MEQ extended release tablet Take 1 tablet by mouth daily 30 tablet 2    Nutritional Supplements (ENSURE HIGH PROTEIN) LIQD Take 1 Can by mouth daily Meal replacement. 32 Can 2    omeprazole (PRILOSEC) 20 MG delayed release capsule Take 1 capsule by mouth Daily 30 capsule 3    lidocaine viscous hcl (XYLOCAINE) 2 % SOLN solution Take 10 mLs by mouth 2 times daily as needed for Dental Pain 100 mL 0       Allergies: Allergies   Allergen Reactions    Dye [Iodides] Hives    Morphine Hives       Problem List:    Patient Active Problem List   Diagnosis Code    Fracture of ankle, medial malleolus, left, open S82.52XB    Dog bite of ankle, left, initial encounter D02.160T, W54. Emir Potts Encounter for postoperative wound check Z48.89    Essential hypertension I10    Anxiety and depression F41.9, F32.9    GERD (gastroesophageal reflux disease) K21.9    Smoker F17.200    Pneumonia due to infectious organism J18.9    Chest pain R07.9    ALAN (obstructive sleep apnea) G47.33    Morbid obesity with BMI of 45.0-49.9, adult ([facility]) E66.01, Z68.42    Morbid obesity with BMI of 40.0-44.9, adult ([facility]) E66.01, Z68.41       Past Medical History:        Diagnosis Date    Anxiety     Bladder tumor     Dr. Loco Torres Depression     Diverticulitis     Last episode: 7/1/2018  Emphysema of lung ([address]     Follows with Mulu Lower    GERD (gastroesophageal reflux disease)     History of blood transfusion     After surgery for dog attack    Hypertension     Dr. Emily Dyer IBS (irritable bowel syndrome)     Kidney stone     Last stone:     PTSD (post-traumatic stress disorder) 2019    from dog attack    Sleep apnea     Uses BiPap       Past Surgical History:        Procedure Laterality Date    ABDOMEN SURGERY      BLADDER SURGERY      CHOLECYSTECTOMY      COLONOSCOPY  2018    normal colonoscopy - Dr. Arline Matos  1980's    Nose     HYSTERECTOMY      HYSTERECTOMY, TOTAL ABDOMINAL  2012    INCISION AND DRAINAGE Left 2019    ANKLE INCISION AND DRAINAGE performed by Ruthie Goodrich DO at [facility] Right 2019    ARM INCISION AND DRAINAGE performed by Ruthie Goodrich DO at [address] [facility]      tumor removal    OVARIAN CYST REMOVAL      TONSILLECTOMY      TUBAL LIGATION  2011       Social History:    Social History     Tobacco Use    Smoking status: Former Smoker     Packs/day: 1.00     Years: 19.00     Pack years: 19.00     Types: Cigarettes     Start date: 2001     Quit date: 2020     Years since quittin.4    Smokeless tobacco: Never Used   Substance Use Topics    Alcohol use: No                                Counseling given: Not Answered      Vital Signs (Current):   Vitals:    20 1248   Weight: 227 lb (103 kg)   Height: 5' 1\" (1.549 m)                                              BP Readings from Last 3 Encounters:   20 (!) 130/98   20 118/74   20 (!) 132/98       NPO Status:                                                                                 BMI:   Wt Readings from Last 3 Encounters:   20 227 lb 3.2 oz (103.1 kg)   20 245 lb 8 oz (111.4 kg)   10/05/20 245 lb (111.1 kg)     Body mass index is 42.89 kg/m². CBC:   Lab Results   Component Value Date    WBC 6.1 2020    RBC 4.72 2020    HGB 12.6 2020    HCT 39.6 2020    MCV 83.9 2020    RDW 13.9 2020     2020       CMP:   Lab Results   Component Value Date     2020    K 4.3 2020     2020    CO2 26 2020    BUN 25 2020    CREATININE 1.1 2020    GFRAA >60 2020    LABGLOM 53 2020    GLUCOSE 96 2020    PROT 6.6 2020    PROT 6.0 2013    CALCIUM 9.2 2020    BILITOT 0.3 2020    ALKPHOS 94 2020    AST 17 2020    ALT 21 2020       POC Tests: No results for input(s): POCGLU, POCNA, POCK, POCCL, POCBUN, POCHEMO, POCHCT in the last 72 hours. Coags:   Lab Results   Component Value Date    PROTIME 10.5 2017    INR 0.92 2017    APTT 26.3 2017       HCG (If Applicable):   Lab Results   Component Value Date    PREGTESTUR NEGATIVE 2013        ABGs:   Lab Results   Component Value Date    PO2ART 67 2017    UHY9VVU 38.0 2017    DGF2QOH 21.0 2017        Type & Screen (If Applicable):  No results found for: LABABO, LABRH    Drug/Infectious Status (If Applicable):  No results found for: HIV, HEPCAB    COVID-19 Screening (If Applicable):   Lab Results   Component Value Date    COVID19 NOT DETECTED 2020         Anesthesia Evaluation  Patient summary reviewed  Airway: Mallampati: II  TM distance: >3 FB   Neck ROM: full  Mouth opening: > = 3 FB Dental:          Pulmonary:normal exam    (+) COPD:  sleep apnea:                            ROS comment: Former Smoker - 19 pack years  Quit Smokin20    Chest CT 2020: Impression  Resolution of the previously seen sub solid right upper lobe pulmonary nodule  suggesting resolved infectious/inflammatory pulmonary nodules.  No acute  focal process in the lungs.  No evidence of pneumonia.     Mild centrilobular emphysema.        Cardiovascular: (+) hypertension:, valvular problems/murmurs: MR,          Beta Blocker:  Not on Beta Blocker      ROS comment: Stress test 7/2020:  Summary   The patient exercised according to the [address] for 5:01 min:s, achieving a   work level of Max. METS: 7.00. The resting heart rate of 76 bpm claudia to a maximal heart rate of 141 bpm.   This value represents 81 % of the   maximal, age-predicted heart rate. The resting blood pressure of 161/108   mmHg , claudia to a maximum blood   pressure of 197/94 mmHg. The exercise test was stopped due to SOB, ACHIEVED   HR . Echo 7/2020:  Summary   Left ventricular systolic function is low normal.   Ejection fraction is visually estimated at 50%. Moderately dilated left atrium. Mild to moderate mitral regurgitation. Neuro/Psych:   (+) psychiatric history:depression/anxiety             GI/Hepatic/Renal:   (+) GERD:, morbid obesity          Endo/Other: Negative Endo/Other ROS                    Abdominal:           Vascular: negative vascular ROS. Anesthesia Plan      MAC     ASA 3       Induction: intravenous. MIPS: Postoperative opioids intended. Anesthetic plan and risks discussed with patient. Plan discussed with CRNA. Attending anesthesiologist reviewed and agrees with Pre Eval content          SHUKRI Silva - CRNA   1/2/2021       Pre Anesthesia Assessment complete.  Chart reviewed on 1/2/2021

## 2021-01-04 ENCOUNTER — HOSPITAL ENCOUNTER (OUTPATIENT)
Age: 46
Setting detail: OUTPATIENT SURGERY
Discharge: HOME | End: 2021-01-04
Attending: SURGERY | Admitting: SURGERY
Payer: MEDICAID

## 2021-01-04 ENCOUNTER — ANESTHESIA (OUTPATIENT)
Dept: ENDOSCOPY | Age: 46
End: 2021-01-04
Payer: MEDICAID

## 2021-01-04 VITALS
RESPIRATION RATE: 16 BRPM | TEMPERATURE: 98 F | HEIGHT: 61 IN | DIASTOLIC BLOOD PRESSURE: 60 MMHG | WEIGHT: 227 LBS | HEART RATE: 75 BPM | OXYGEN SATURATION: 100 % | SYSTOLIC BLOOD PRESSURE: 115 MMHG | BODY MASS INDEX: 42.86 KG/M2

## 2021-01-04 VITALS — DIASTOLIC BLOOD PRESSURE: 55 MMHG | SYSTOLIC BLOOD PRESSURE: 113 MMHG | OXYGEN SATURATION: 96 %

## 2021-01-04 PROCEDURE — 2580000003 HC RX 258: Performed by: ANESTHESIOLOGY

## 2021-01-04 PROCEDURE — 43251 EGD REMOVE LESION SNARE: CPT | Performed by: SURGERY

## 2021-01-04 PROCEDURE — 3700000001 HC ADD 15 MINUTES (ANESTHESIA): Performed by: SURGERY

## 2021-01-04 PROCEDURE — 88305 TISSUE EXAM BY PATHOLOGIST: CPT | Performed by: PATHOLOGY

## 2021-01-04 PROCEDURE — 2500000003 HC RX 250 WO HCPCS: Performed by: NURSE ANESTHETIST, CERTIFIED REGISTERED

## 2021-01-04 PROCEDURE — 6360000002 HC RX W HCPCS: Performed by: NURSE ANESTHETIST, CERTIFIED REGISTERED

## 2021-01-04 PROCEDURE — 3700000000 HC ANESTHESIA ATTENDED CARE: Performed by: SURGERY

## 2021-01-04 PROCEDURE — 88342 IMHCHEM/IMCYTCHM 1ST ANTB: CPT | Performed by: PATHOLOGY

## 2021-01-04 PROCEDURE — 2720000010 HC SURG SUPPLY STERILE: Performed by: SURGERY

## 2021-01-04 PROCEDURE — 2709999900 HC NON-CHARGEABLE SUPPLY: Performed by: SURGERY

## 2021-01-04 PROCEDURE — 3609013200 HC EGD W/ ABLATION: Performed by: SURGERY

## 2021-01-04 PROCEDURE — 7100000011 HC PHASE II RECOVERY - ADDTL 15 MIN: Performed by: SURGERY

## 2021-01-04 PROCEDURE — 7100000010 HC PHASE II RECOVERY - FIRST 15 MIN: Performed by: SURGERY

## 2021-01-04 RX ORDER — LIDOCAINE HYDROCHLORIDE 20 MG/ML
INJECTION, SOLUTION EPIDURAL; INFILTRATION; INTRACAUDAL; PERINEURAL PRN
Status: DISCONTINUED | OUTPATIENT
Start: 2021-01-04 | End: 2021-01-04 | Stop reason: SDUPTHER

## 2021-01-04 RX ORDER — PANTOPRAZOLE SODIUM 40 MG/1
40 TABLET, DELAYED RELEASE ORAL 2 TIMES DAILY
Qty: 60 TABLET | Refills: 3 | Status: SHIPPED | OUTPATIENT
Start: 2021-01-04 | End: 2022-01-14 | Stop reason: ALTCHOICE

## 2021-01-04 RX ORDER — PROPOFOL 10 MG/ML
INJECTION, EMULSION INTRAVENOUS PRN
Status: DISCONTINUED | OUTPATIENT
Start: 2021-01-04 | End: 2021-01-04 | Stop reason: SDUPTHER

## 2021-01-04 RX ORDER — SODIUM CHLORIDE, SODIUM LACTATE, POTASSIUM CHLORIDE, CALCIUM CHLORIDE 600; 310; 30; 20 MG/100ML; MG/100ML; MG/100ML; MG/100ML
INJECTION, SOLUTION INTRAVENOUS ONCE
Status: COMPLETED | OUTPATIENT
Start: 2021-01-04 | End: 2021-01-04

## 2021-01-04 RX ORDER — ACETAMINOPHEN 500 MG
500 TABLET ORAL 4 TIMES DAILY PRN
Qty: 360 TABLET | Refills: 1 | Status: SHIPPED | OUTPATIENT
Start: 2021-01-04 | End: 2021-10-15 | Stop reason: CLARIF

## 2021-01-04 RX ADMIN — PROPOFOL 270 MG: 10 INJECTION, EMULSION INTRAVENOUS at 12:42

## 2021-01-04 RX ADMIN — LIDOCAINE HYDROCHLORIDE 100 MG: 20 INJECTION, SOLUTION EPIDURAL; INFILTRATION; INTRACAUDAL; PERINEURAL at 12:42

## 2021-01-04 RX ADMIN — SODIUM CHLORIDE, POTASSIUM CHLORIDE, SODIUM LACTATE AND CALCIUM CHLORIDE: 600; 310; 30; 20 INJECTION, SOLUTION INTRAVENOUS at 12:37

## 2021-01-04 ASSESSMENT — PAIN SCALES - GENERAL
PAINLEVEL_OUTOF10: 0
PAINLEVEL_OUTOF10: 0

## 2021-01-04 NOTE — PROGRESS NOTES
1325 Soda given, sips without difficulty. 1331 Repositioned at side of bed.    1345 Up to bathroom with assistance. Call light in reach. 1400 Discharge instructions reviewed with daughter Jose Levin. Daughter states that she won't be available to pick patient up until after son's doctor appointment. Pt notified. 1415 Resting quietly without distress. Denies needs at this time. 1430 Report given to Baron Luana VILLANUEVA. Transfer of care.   Randall Tejeda

## 2021-01-04 NOTE — H&P
HISTORY AND PHYSICAL EXAM    Date of Admission:  (Not on file)    PCP:  SHUKRI Bro - MARK    Chief Complaint / History of Present Illness:  Clifton Pinon is a very pleasant 50 y.o. female who presents today for her preop EGD eval before her bariatric procedure. As noted her Body mass index is 42.89 kg/m². with significant co-morbidities as below. The patient has been complying with the pre-operative workup, lifestyle modifications and changes with the bariatric clinic, including:  Dietitian evaluation and counseling, psychologist evaluation and counseling, Exercise physiologist evaluation and counseling, cardiac preoperative clearance and optimization, pulmonology preoperative clearance and optimization, today will proceed with preoperative EGD for GERD, morbid obesity: Body mass index is 42.89 kg/m². , in preparation for a bariatric procedure; to r/o GERD, Hiatal Hernia, Peptic Ulcer Disease, Gastroparesis, Esophageal dysmotility; etc.    All risks and benefits, potential complications and possible alternatives discussed, and agreeable to proceed. PMH:   has a past medical history of Anxiety, Bladder tumor, Depression, Diverticulitis, Emphysema of lung ([address] GERD (gastroesophageal reflux disease), History of blood transfusion (2018), Hypertension, IBS (irritable bowel syndrome), Kidney stone, PTSD (post-traumatic stress disorder) (02/03/2019), and Sleep apnea. PSH:   has a past surgical history that includes Tubal ligation (07/2011); Abdomen surgery; Kidney surgery (2012); ovarian cyst removal (2012); Bladder surgery (2012); incision and drainage (Left, 2/4/2019); incision and drainage (Right, 2/4/2019); Cholecystectomy (2001); Colonoscopy (03/26/2018); Hysterectomy (2011); Hysterectomy, total abdominal (2012); fracture surgery (1980's); and Tonsillectomy (1980). Allergies:     Allergies   Allergen Reactions    Dye [Iodides] Hives    Morphine Hives        Home Meds:    Prior to Admission medications    Medication Sig Start Date End Date Taking? Authorizing Provider   hydrOXYzine (ATARAX) 10 MG tablet Take 10 mg by mouth every 8 hours as needed for Itching   Yes Historical Provider, MD   diclofenac 1 % CREA Place 1 Tube onto the skin daily as needed for Pain 12/16/20   [facility], APRN - CNP   Dextromethorphan-guaiFENesin (TUSSIN DM)  MG/5ML SYRP Take 5 mLs by mouth every 4 hours as needed for Cough 12/16/20   [facility], APRN - CNP   loratadine-pseudoephedrine (CLARITIN-D 24HR)  MG per extended release tablet Take 1 tablet by mouth daily 12/16/20   [facility], APRN - CNP   erythromycin base (E-MYCIN) 250 MG tablet Take 1 tablet by mouth 4 times daily 12/11/20 1/10/21  Srinivas Gallardo MD   gabapentin (NEURONTIN) 100 MG capsule Take 1 capsule by mouth 2 times daily for 30 days.  12/9/20 1/8/21  [facility], APRN - CNP   albuterol sulfate  (90 Base) MCG/ACT inhaler Inhale 2 puffs into the lungs every 4 hours as needed for Wheezing 12/9/20   [facility], APRN - CNP   furosemide (LASIX) 20 MG tablet Take 1 tablet by mouth daily 12/9/20   [facility], APRN - CNP   fluticasone [facility]) 50 MCG/ACT nasal spray 2 sprays by Each Nostril route daily 12/9/20   [facility], APRN - CNP   dicyclomine (BENTYL) 10 MG capsule Take 1 capsule by mouth 3 times daily (before meals) 12/9/20   [facility], APRN - CNP   ibuprofen (ADVIL;MOTRIN) 600 MG tablet Take 1 tablet by mouth every 6 hours as needed for Pain 12/9/20   [facility], APRN - CNP   lisinopril (PRINIVIL;ZESTRIL) 10 MG tablet Take 1 tablet by mouth daily 12/9/20   [facility], APRN - CNP   SYMBICORT 160-4.5 MCG/ACT AERO Inhale 2 puffs into the lungs 2 times daily 12/9/20   [facility], APRN - CNP   Spacer/Aero-Holding Shakira Bussing 1 Device by Does not apply route daily as needed (use with MDI) 12/9/20   Zhang Bonner, APRN - CNP   potassium chloride (KLOR-CON M) 10 MEQ extended release tablet Take 1 tablet by mouth daily 12/9/20   SHUKRI Villafuerte CNP   Nutritional Supplements (ENSURE HIGH PROTEIN) LIQD Take 1 Can by mouth daily Meal replacement. 12/9/20   SHUKRI Villafuerte CNP   omeprazole (PRILOSEC) 20 MG delayed release capsule Take 1 capsule by mouth Daily 12/9/20   SHUKRI Villafuerte CNP   lidocaine viscous hcl (XYLOCAINE) 2 % SOLN solution Take 10 mLs by mouth 2 times daily as needed for Dental Pain 9/28/20   Amelie Villafuerte [address] Meds:    No current facility-administered medications for this encounter. Current Outpatient Medications   Medication Sig Dispense Refill    hydrOXYzine (ATARAX) 10 MG tablet Take 10 mg by mouth every 8 hours as needed for Itching      diclofenac 1 % CREA Place 1 Tube onto the skin daily as needed for Pain 120 g 0    Dextromethorphan-guaiFENesin (TUSSIN DM)  MG/5ML SYRP Take 5 mLs by mouth every 4 hours as needed for Cough 120 mL 0    loratadine-pseudoephedrine (CLARITIN-D 24HR)  MG per extended release tablet Take 1 tablet by mouth daily 30 tablet 3    erythromycin base (E-MYCIN) 250 MG tablet Take 1 tablet by mouth 4 times daily 120 tablet 3    gabapentin (NEURONTIN) 100 MG capsule Take 1 capsule by mouth 2 times daily for 30 days.  60 capsule 2    albuterol sulfate  (90 Base) MCG/ACT inhaler Inhale 2 puffs into the lungs every 4 hours as needed for Wheezing 8.5 g 2    furosemide (LASIX) 20 MG tablet Take 1 tablet by mouth daily 30 tablet 2    fluticasone (FLONASE) 50 MCG/ACT nasal spray 2 sprays by Each Nostril route daily 1 Bottle 0    dicyclomine (BENTYL) 10 MG capsule Take 1 capsule by mouth 3 times daily (before meals) 90 capsule 5    ibuprofen (ADVIL;MOTRIN) 600 MG tablet Take 1 tablet by mouth every 6 hours as needed for Pain 120 tablet 0    lisinopril (PRINIVIL;ZESTRIL) 10 MG tablet Take 1 tablet by mouth daily 30 tablet 3    SYMBICORT 160-4.5 MCG/ACT AERO Inhale 2 puffs into the lungs 2 times daily 1 Inhaler 5    Spacer/Aero-Holding Chambers CAYLA 1 Device by Does not apply route daily as needed (use with MDI) 1 Device 0    potassium chloride (KLOR-CON M) 10 MEQ extended release tablet Take 1 tablet by mouth daily 30 tablet 2    Nutritional Supplements (ENSURE HIGH PROTEIN) LIQD Take 1 Can by mouth daily Meal replacement.  32 Can 2    omeprazole (PRILOSEC) 20 MG delayed release capsule Take 1 capsule by mouth Daily 30 capsule 3    lidocaine viscous hcl (XYLOCAINE) 2 % SOLN solution Take 10 mLs by mouth 2 times daily as needed for Dental Pain 100 mL 0       Social History / Family History:        Social History     Socioeconomic History    Marital status: Single     Spouse name: None    Number of children: None    Years of education: None    Highest education level: None   Occupational History    None   Social Needs    Financial resource strain: None    Food insecurity     Worry: None     Inability: None    Transportation needs     Medical: None     Non-medical: None   Tobacco Use    Smoking status: Former Smoker     Packs/day: 1.00     Years: 19.00     Pack years: 19.00     Types: Cigarettes     Start date: 2001     Quit date: 2020     Years since quittin.4    Smokeless tobacco: Never Used   Substance and Sexual Activity    Alcohol use: No    Drug use: Yes     Types: Marijuana     Comment: Edibles - last chewed 2020    Sexual activity: Yes     Partners: Male   Lifestyle    Physical activity     Days per week: None     Minutes per session: None    Stress: None   Relationships    Social connections     Talks on phone: None     Gets together: None     Attends Church service: None     Active member of club or organization: None     Attends meetings of clubs or organizations: None     Relationship status: None    Intimate partner violence     Fear of current or ex partner: None     Emotionally abused: None     Physically abused: None     Forced sexual activity: None   Other Topics Concern    None   Social History Narrative    None       Review of Systems:  Constitutional: Negative for fever, chills, diaphoresis, appetite change and fatigue. HENT: Negative for sore throat, trouble swallowing and voice change. Respiratory: Negative for cough, positive for shortness of breath no wheezing. Cardiovascular: Negative for chest pain positive for SOB with one flight of stairs exertion, no pitting LE edema. Gastrointestinal: Negative for nausea, vomiting, diarrhea, constipation, blood in stool, abdominal distention, anal bleeding or rectal pain. Musculoskeletal: Negative for joint swelling and arthralgias. Skin: Warm and dry, well perfused. Neurological: Negative for seizures, syncope, speech difficulty and weakness. Hematological/Lymphatic: Negative for adenopathy. No history of DVT/PE. Does not bruise/bleed easily. Psychiatric/Behavioral: Negative for agitation. Physical Exam:  Vital Signs: There were no vitals filed for this visit. General appearance: Pt Alert and oriented, in no apparent acute distress. HEENT:  ALEJANDRA, EOMI, No JVDs, Bruits, Megalies. Lungs: Clear to auscultation bilaterally. Heart: Regular rate and rhythm, S1, S2 normal, no murmur, rub or gallop. Abdomen: Non tender. Non distended. Positive bowel sounds. No hernias noted, no masses palpable. Extremities: Warm, well perfused, no cyanosis or edema. Skin: Skin color, texture, turgor normal.  Neurologic: Grossly normal, Cranial nerves from II to XII intact. Radiologic / Imaging / TESTING  No results found. Labs:    No results found for this or any previous visit (from the past 24 hour(s)).       Diagnosis:  Patient Active Problem List   Diagnosis    Fracture of ankle, medial malleolus, left, open    Dog bite of ankle, left, initial encounter    Encounter for postoperative wound check    Essential hypertension    Anxiety and depression    GERD (gastroesophageal reflux disease)    Smoker    Pneumonia due to infectious organism    Chest pain    ALAN (obstructive sleep apnea)    Morbid obesity with BMI of 45.0-49.9, adult (HCC)    Morbid obesity with BMI of 40.0-44.9, adult [facility])           Assessment & Plan:    Georgiana Saleem is a very pleasant 50 y.o. female who presents today for her preop EGD eval before her bariatric procedure. As noted her Body mass index is 42.89 kg/m². with significant co-morbidities as below. The patient has been complying with the pre-operative workup, lifestyle modifications and changes with the bariatric clinic, including:  Dietitian evaluation and counseling, psychologist evaluation and counseling, Exercise physiologist evaluation and counseling, cardiac preoperative clearance and optimization, pulmonology preoperative clearance and optimization, today will proceed with preoperative EGD for GERD, morbid obesity: Body mass index is 42.89 kg/m². , in preparation for a bariatric procedure; to r/o GERD, Hiatal Hernia, Peptic Ulcer Disease, Gastroparesis, Esophageal dysmotility; etc.    All risks and benefits, potential complications and possible alternatives discussed, and agreeable to proceed.     ____________________________________________    Tiffanie Chavez MD, FACS, FICS    1/4/2021  7:16 AM

## 2021-01-04 NOTE — PROGRESS NOTES
RECEIVED REPORT FROM CRYS VILLANUEVA PRIOR TO TRANSPORT TO ENDO UNIT  1325 REPORT GIVEN TO ANICETO VILLANUEVA AFTER TRANSPORT TO \[facility]\""

## 2021-01-12 ENCOUNTER — TELEPHONE (OUTPATIENT)
Dept: INTERNAL MEDICINE CLINIC | Age: 46
End: 2021-01-12

## 2021-01-12 NOTE — TELEPHONE ENCOUNTER
Pt stated that she is currently using milk of magnesia every day to have a bowel movement. I instructed pt to call her GI due to not needing to use that medication daily. Pt expressed understanding.

## 2021-01-12 NOTE — TELEPHONE ENCOUNTER
Pt would like to know if she can get a prescription for milk of magnisa or another type of medication to help her have a bowel movement. Please advise.

## 2021-01-12 NOTE — TELEPHONE ENCOUNTER
Would recommend 1-2 Dulcolax first. If she does have a BM in 12 hours, can repeat this. She can get these OTC.

## 2021-01-13 ENCOUNTER — OFFICE VISIT (OUTPATIENT)
Dept: BARIATRICS/WEIGHT MGMT | Age: 46
End: 2021-01-13
Payer: MEDICAID

## 2021-01-13 VITALS
OXYGEN SATURATION: 95 % | SYSTOLIC BLOOD PRESSURE: 126 MMHG | RESPIRATION RATE: 16 BRPM | BODY MASS INDEX: 41.57 KG/M2 | HEART RATE: 92 BPM | HEIGHT: 61 IN | WEIGHT: 220.2 LBS | DIASTOLIC BLOOD PRESSURE: 84 MMHG | TEMPERATURE: 97.7 F

## 2021-01-13 DIAGNOSIS — E66.01 MORBID OBESITY DUE TO EXCESS CALORIES (HCC): Primary | ICD-10-CM

## 2021-01-13 DIAGNOSIS — E66.01 MORBID OBESITY WITH BMI OF 40.0-44.9, ADULT (HCC): ICD-10-CM

## 2021-01-13 PROCEDURE — 1036F TOBACCO NON-USER: CPT | Performed by: SURGERY

## 2021-01-13 PROCEDURE — G8484 FLU IMMUNIZE NO ADMIN: HCPCS | Performed by: SURGERY

## 2021-01-13 PROCEDURE — G8417 CALC BMI ABV UP PARAM F/U: HCPCS | Performed by: SURGERY

## 2021-01-13 PROCEDURE — 99213 OFFICE O/P EST LOW 20 MIN: CPT | Performed by: SURGERY

## 2021-01-13 PROCEDURE — G8427 DOCREV CUR MEDS BY ELIG CLIN: HCPCS | Performed by: SURGERY

## 2021-01-13 ASSESSMENT — ENCOUNTER SYMPTOMS
ANAL BLEEDING: 0
CONSTIPATION: 0
VOICE CHANGE: 0
NAUSEA: 0
SORE THROAT: 0
SHORTNESS OF BREATH: 0
TROUBLE SWALLOWING: 0
COUGH: 0
DIARRHEA: 0
WHEEZING: 0
ABDOMINAL PAIN: 0
VOMITING: 0
PHOTOPHOBIA: 0
COLOR CHANGE: 0
BLOOD IN STOOL: 0

## 2021-01-13 NOTE — PROGRESS NOTES
BARIATRIC SURGERY OFFICE NOTE    SUBJECTIVE:    Patient presenting today originally referred from SHUKRI Champagne CNP, for   Chief Complaint   Patient presents with   [address] [place] [address] Weight Management     5th  visit   . HPI: Navarro Bedoya is a 48 y.o. female being seen for follow up for bariatric weight loss surgery. Diamond'slast month weight gain/loss was -7.0 Lbs. Addressed the status of the following co-morbidities:   1. Anxiety  improving. 2. GERD  improving. 3. HTN  improving. \"Cut all the fried foods\"     Thoroughly reviewed the patient's medical history, family history, social history and review of systems with the patient today in theoffice. Please see medical record for pertinent positives.       Past Medical History:   Diagnosis Date    Anxiety     Bladder tumor     Dr. Shreya Alaniz Depression     Diverticulitis     Last episode: 7/1/2018    Emphysema of lung ([address]     Follows with Wayne Stapleton    GERD (gastroesophageal reflux disease)     History of blood transfusion 2018    After surgery for dog attack    Hypertension     Dr. Baldev Salazar IBS (irritable bowel syndrome)     Kidney stone     Last stone: 2013    PTSD (post-traumatic stress disorder) 02/03/2019    from dog attack    Sleep apnea     Uses BiPap      Past Surgical History:   Procedure Laterality Date   [address] BLADDER SURGERY  2012    CHOLECYSTECTOMY  2001    COLONOSCOPY  03/26/2018    normal colonoscopy - Dr. Charissa Sharp  1980's    Nose    [address], TOTAL ABDOMINAL  2012    INCISION AND DRAINAGE Left 2/4/2019    ANKLE INCISION AND DRAINAGE performed by Aisha Cary DO at [address] Right 2/4/2019    ARM INCISION AND DRAINAGE performed by Aisha Cary DO at [address] [facility]  2012    tumor removal    OVARIAN CYST REMOVAL  2012   [facility]    TUBAL LIGATION  07/2011  UPPER GASTROINTESTINAL ENDOSCOPY N/A 1/4/2021    EGD POLYP ABLATION/OTHER OF ANTRAL POLYP AND BX performed by Marc Richardson MD at [facility] ENDOSCOPY     Current Outpatient Medications   Medication Sig Dispense Refill    acetaminophen (TYLENOL) 500 MG tablet Take 1 tablet by mouth 4 times daily as needed for Pain 360 tablet 1    pantoprazole (PROTONIX) 40 MG tablet Take 1 tablet by mouth 2 times daily 60 tablet 3    hydrOXYzine (ATARAX) 10 MG tablet Take 10 mg by mouth every 8 hours as needed for Itching      diclofenac 1 % CREA Place 1 Tube onto the skin daily as needed for Pain 120 g 0    loratadine-pseudoephedrine (CLARITIN-D 24HR)  MG per extended release tablet Take 1 tablet by mouth daily 30 tablet 3    gabapentin (NEURONTIN) 100 MG capsule Take 1 capsule by mouth 2 times daily for 30 days. 60 capsule 2    albuterol sulfate  (90 Base) MCG/ACT inhaler Inhale 2 puffs into the lungs every 4 hours as needed for Wheezing 8.5 g 2    furosemide (LASIX) 20 MG tablet Take 1 tablet by mouth daily 30 tablet 2    fluticasone (FLONASE) 50 MCG/ACT nasal spray 2 sprays by Each Nostril route daily 1 Bottle 0    dicyclomine (BENTYL) 10 MG capsule Take 1 capsule by mouth 3 times daily (before meals) 90 capsule 5    lisinopril (PRINIVIL;ZESTRIL) 10 MG tablet Take 1 tablet by mouth daily 30 tablet 3    SYMBICORT 160-4.5 MCG/ACT AERO Inhale 2 puffs into the lungs 2 times daily 1 Inhaler 5    Spacer/Aero-Holding Chambers CAYLA 1 Device by Does not apply route daily as needed (use with MDI) 1 Device 0    potassium chloride (KLOR-CON M) 10 MEQ extended release tablet Take 1 tablet by mouth daily 30 tablet 2    Nutritional Supplements (ENSURE HIGH PROTEIN) LIQD Take 1 Can by mouth daily Meal replacement. 32 Can 2    lidocaine viscous hcl (XYLOCAINE) 2 % SOLN solution Take 10 mLs by mouth 2 times daily as needed for Dental Pain 100 mL 0     No current facility-administered medications for this visit. Allergies   Allergen Reactions    Dye [Iodides] Hives    Morphine Hives           Review of Systems   Constitutional: Negative for activity change, chills, diaphoresis and fever. HENT: Negative for sore throat, trouble swallowing and voice change. Eyes: Negative for photophobia and visual disturbance. Respiratory: Negative for cough, shortness of breath and wheezing. Cardiovascular: Negative for chest pain, palpitations and leg swelling. Gastrointestinal: Negative for abdominal pain, anal bleeding, blood in stool, constipation, diarrhea, nausea and vomiting. Endocrine: Negative for cold intolerance, heat intolerance, polydipsia and polyuria. Genitourinary: Negative for dysuria, frequency and hematuria. Musculoskeletal: Negative for joint swelling, myalgias and neck stiffness. Skin: Negative for color change and rash. Neurological: Negative for seizures, speech difficulty, light-headedness and numbness. Hematological: Negative for adenopathy. Does not bruise/bleed easily. OBJECTIVE:    Vitals:    01/13/21 1614   BP: 126/84   Pulse: 92   Resp: 16   Temp: 97.7 °F (36.5 °C)   SpO2: 95%     Body mass index is 41.61 kg/m². Physical Exam  Vitals signs reviewed. Constitutional:       General: She is not in acute distress. Appearance: She is well-developed. She is not diaphoretic. HENT:      Head: Normocephalic and atraumatic. Eyes:      General: No scleral icterus. Conjunctiva/sclera: Conjunctivae normal.      Pupils: Pupils are equal, round, and reactive to light. Neck:      Musculoskeletal: Normal range of motion. Thyroid: No thyromegaly. Vascular: No JVD. Trachea: No tracheal deviation. Cardiovascular:      Rate and Rhythm: Normal rate and regular rhythm. Heart sounds: Normal heart sounds. No murmur. No friction rub. No gallop. Pulmonary:      Effort: Pulmonary effort is normal. No respiratory distress. Breath sounds: No stridor. No wheezing or rales. Chest:      Chest wall: No tenderness. Abdominal:      General: Bowel sounds are normal. There is no distension. Palpations: Abdomen is soft. There is no mass. Tenderness: There is no abdominal tenderness. There is no guarding or rebound. Musculoskeletal: Normal range of motion. General: No tenderness. Lymphadenopathy:      Cervical: No cervical adenopathy. Skin:     General: Skin is warm and dry. Coloration: Skin is not pale. Findings: No erythema or rash. Neurological:      Mental Status: She is alert and oriented to person, place, and time. Cranial Nerves: No cranial nerve deficit. Coordination: Coordination normal.   Psychiatric:         Behavior: Behavior normal.         Thought Content: Thought content normal.         Judgment: Judgment normal.             Cardiac cleared / Pulm Saturday, ALAN on CPAP, and EGD ruled out any major abn:  Final Pathologic Diagnosis:   A.  Stomach, 1.5 cm and 0.5 cm polyps in antrum:        -     Polypoid fragment of gastric mucosa showing small   aggregates/nodules of benign heterotopic pancreatic tissue. Overlying gastric mucosa shows chronic active inflammation,   mucosal erosion and reactive changes. Cautery effect is   prominent. -     H. pylori immunostain does not reveal definitive   Helicobacter-like microorganisms. Chi Le; biopsy:        -     Fragment of gastric mucosa showing mild chronic   active inflammation and reactive changes. -     H. pylori immunostain does not reveal definitive   Helicobacter-like microorganisms.          -     The biopsy findings can be compatible with   reactive gastropathy.      ASSESSMENT & PLAN:    1. Morbid obesity due to excess calories ([facility]    2. Morbid obesity with BMI of 40.0-44.9, adult ([facility]         Needs to come next month for consent. Cleared ALL . Patient was encouraged to journal all food intake. Keep calorie level atapproximately 3840-5526. Protein intake is to be a minimum of 60-80 grams per day. Water drinking was encouraged with a goal of 64oz-128oz daily. Beverages to be calorie free except for milk. Every other beverage should bewater. They are to avoid soda. Continue to increase level of physical activity. I counseled the patient regarding diet and exercise, in preparation for her planned Robotic Sleeve Gastrectomy. Counting calories, complying with the dietitian's recommendations, and complying with the preoperative workup including the dietitian counseling, exercise physiologist counseling,cardiologist evaluation and pre-operative optimization, pulmonologist evaluation and pre operative optimization, pre operative EGD evaluation. The patient expressed understanding and willingness to comply nicely; allquestions and concerns addressed in details. Patient counseled on the risks, benefits, and alternatives oftreatment plan at length while in the office today. Patient states an understanding and willingness to proceed with the plan. Follow Up:  Return in about 4 weeks (around 2/10/2021) for Follow up Symptoms, Bariatric follow up: diet, exercise & weight loss.       Lisha William MD, FACS, FICS  Member of the [employer]    [phone]    1/13/21

## 2021-01-26 ENCOUNTER — APPOINTMENT (OUTPATIENT)
Dept: CT IMAGING | Age: 46
DRG: 469 | End: 2021-01-26
Payer: MEDICAID

## 2021-01-26 ENCOUNTER — HOSPITAL ENCOUNTER (INPATIENT)
Age: 46
LOS: 1 days | Discharge: HOME | DRG: 469 | End: 2021-01-27
Attending: INTERNAL MEDICINE | Admitting: INTERNAL MEDICINE
Payer: MEDICAID

## 2021-01-26 ENCOUNTER — APPOINTMENT (OUTPATIENT)
Dept: GENERAL RADIOLOGY | Age: 46
DRG: 469 | End: 2021-01-26
Payer: MEDICAID

## 2021-01-26 DIAGNOSIS — M79.672 PAIN IN BOTH FEET: ICD-10-CM

## 2021-01-26 DIAGNOSIS — M79.671 PAIN IN BOTH FEET: ICD-10-CM

## 2021-01-26 DIAGNOSIS — E87.6 HYPOKALEMIA: ICD-10-CM

## 2021-01-26 DIAGNOSIS — R55 SYNCOPE AND COLLAPSE: Primary | ICD-10-CM

## 2021-01-26 DIAGNOSIS — N17.9 AKI (ACUTE KIDNEY INJURY) (HCC): ICD-10-CM

## 2021-01-26 LAB
ALBUMIN SERPL-MCNC: 4.7 GM/DL (ref 3.4–5)
ALP BLD-CCNC: 83 IU/L (ref 40–128)
ALT SERPL-CCNC: 17 U/L (ref 10–40)
ANION GAP SERPL CALCULATED.3IONS-SCNC: 13 MMOL/L (ref 4–16)
ANION GAP SERPL CALCULATED.3IONS-SCNC: 20 MMOL/L (ref 4–16)
APTT: 37.6 SECONDS (ref 25.1–37.1)
AST SERPL-CCNC: 18 IU/L (ref 15–37)
BACTERIA: ABNORMAL /HPF
BASOPHILS ABSOLUTE: 0 K/CU MM
BASOPHILS RELATIVE PERCENT: 0.5 % (ref 0–1)
BILIRUB SERPL-MCNC: 0.6 MG/DL (ref 0–1)
BILIRUBIN URINE: NEGATIVE MG/DL
BLOOD, URINE: NEGATIVE
BUN BLDV-MCNC: 31 MG/DL (ref 6–23)
BUN BLDV-MCNC: 32 MG/DL (ref 6–23)
CALCIUM SERPL-MCNC: 9.1 MG/DL (ref 8.3–10.6)
CALCIUM SERPL-MCNC: 9.8 MG/DL (ref 8.3–10.6)
CHLORIDE BLD-SCNC: 100 MMOL/L (ref 99–110)
CHLORIDE BLD-SCNC: 98 MMOL/L (ref 99–110)
CLARITY: CLEAR
CO2: 20 MMOL/L (ref 21–32)
CO2: 23 MMOL/L (ref 21–32)
COLOR: YELLOW
CREAT SERPL-MCNC: 2.5 MG/DL (ref 0.6–1.1)
CREAT SERPL-MCNC: 2.7 MG/DL (ref 0.6–1.1)
CREATININE URINE: 454.5 MG/DL (ref 28–217)
DIFFERENTIAL TYPE: ABNORMAL
EOSINOPHILS ABSOLUTE: 0.1 K/CU MM
EOSINOPHILS RELATIVE PERCENT: 0.9 % (ref 0–3)
GFR AFRICAN AMERICAN: 23 ML/MIN/1.73M2
GFR AFRICAN AMERICAN: 25 ML/MIN/1.73M2
GFR NON-AFRICAN AMERICAN: 19 ML/MIN/1.73M2
GFR NON-AFRICAN AMERICAN: 21 ML/MIN/1.73M2
GLUCOSE BLD-MCNC: 114 MG/DL (ref 70–99)
GLUCOSE BLD-MCNC: 114 MG/DL (ref 70–99)
GLUCOSE BLD-MCNC: 116 MG/DL (ref 70–99)
GLUCOSE, URINE: NEGATIVE MG/DL
HCT VFR BLD CALC: 44.6 % (ref 37–47)
HEMOGLOBIN: 15.3 GM/DL (ref 12.5–16)
HYALINE CASTS: 10 /LPF
IMMATURE NEUTROPHIL %: 0.3 % (ref 0–0.43)
INR BLD: 0.98 INDEX
KETONES, URINE: ABNORMAL MG/DL
LEUKOCYTE ESTERASE, URINE: NEGATIVE
LYMPHOCYTES ABSOLUTE: 2.1 K/CU MM
LYMPHOCYTES RELATIVE PERCENT: 24 % (ref 24–44)
MAGNESIUM: 2 MG/DL (ref 1.8–2.4)
MCH RBC QN AUTO: 28.3 PG (ref 27–31)
MCHC RBC AUTO-ENTMCNC: 34.3 % (ref 32–36)
MCV RBC AUTO: 82.4 FL (ref 78–100)
MONOCYTES ABSOLUTE: 0.7 K/CU MM
MONOCYTES RELATIVE PERCENT: 7.9 % (ref 0–4)
MUCUS: ABNORMAL HPF
NITRITE URINE, QUANTITATIVE: NEGATIVE
NUCLEATED RBC %: 0 %
PDW BLD-RTO: 13.5 % (ref 11.7–14.9)
PH, URINE: 5 (ref 5–8)
PLATELET # BLD: 205 K/CU MM (ref 140–440)
PMV BLD AUTO: 11.1 FL (ref 7.5–11.1)
POTASSIUM SERPL-SCNC: 3.2 MMOL/L (ref 3.5–5.1)
POTASSIUM SERPL-SCNC: 3.7 MMOL/L (ref 3.5–5.1)
PRO-BNP: 23.18 PG/ML
PROT/CREAT RATIO, UR: 0.1
PROTEIN UA: 30 MG/DL
PROTHROMBIN TIME: 11.9 SECONDS (ref 11.7–14.5)
RBC # BLD: 5.41 M/CU MM (ref 4.2–5.4)
RBC URINE: 1 /HPF (ref 0–6)
SEGMENTED NEUTROPHILS ABSOLUTE COUNT: 5.9 K/CU MM
SEGMENTED NEUTROPHILS RELATIVE PERCENT: 66.4 % (ref 36–66)
SODIUM BLD-SCNC: 136 MMOL/L (ref 135–145)
SODIUM BLD-SCNC: 138 MMOL/L (ref 135–145)
SPECIFIC GRAVITY UA: 1.03 (ref 1–1.03)
SQUAMOUS EPITHELIAL: 5 /HPF
T4 FREE: 1.74 NG/DL (ref 0.9–1.8)
TOTAL CK: 184 IU/L (ref 26–140)
TOTAL IMMATURE NEUTOROPHIL: 0.03 K/CU MM
TOTAL NUCLEATED RBC: 0 K/CU MM
TOTAL PROTEIN: 7.9 GM/DL (ref 6.4–8.2)
TRANSITIONAL EPITHELIAL: <1 /HPF
TRICHOMONAS: ABNORMAL /HPF
TROPONIN T: <0.01 NG/ML
TSH HIGH SENSITIVITY: 0.68 UIU/ML (ref 0.27–4.2)
URINE TOTAL PROTEIN: 41.5 MG/DL
UROBILINOGEN, URINE: NORMAL MG/DL (ref 0.2–1)
WBC # BLD: 8.9 K/CU MM (ref 4–10.5)
WBC UA: 2 /HPF (ref 0–5)

## 2021-01-26 PROCEDURE — 96360 HYDRATION IV INFUSION INIT: CPT

## 2021-01-26 PROCEDURE — 82570 ASSAY OF URINE CREATININE: CPT

## 2021-01-26 PROCEDURE — 70450 CT HEAD/BRAIN W/O DYE: CPT

## 2021-01-26 PROCEDURE — 6360000002 HC RX W HCPCS: Performed by: INTERNAL MEDICINE

## 2021-01-26 PROCEDURE — 84443 ASSAY THYROID STIM HORMONE: CPT

## 2021-01-26 PROCEDURE — 93005 ELECTROCARDIOGRAM TRACING: CPT | Performed by: PHYSICIAN ASSISTANT

## 2021-01-26 PROCEDURE — G0378 HOSPITAL OBSERVATION PER HR: HCPCS

## 2021-01-26 PROCEDURE — 85610 PROTHROMBIN TIME: CPT

## 2021-01-26 PROCEDURE — 99285 EMERGENCY DEPT VISIT HI MDM: CPT

## 2021-01-26 PROCEDURE — 84156 ASSAY OF PROTEIN URINE: CPT

## 2021-01-26 PROCEDURE — 72131 CT LUMBAR SPINE W/O DYE: CPT

## 2021-01-26 PROCEDURE — 71045 X-RAY EXAM CHEST 1 VIEW: CPT

## 2021-01-26 PROCEDURE — 6370000000 HC RX 637 (ALT 250 FOR IP): Performed by: NURSE PRACTITIONER

## 2021-01-26 PROCEDURE — 2580000003 HC RX 258: Performed by: PHYSICIAN ASSISTANT

## 2021-01-26 PROCEDURE — 82550 ASSAY OF CK (CPK): CPT

## 2021-01-26 PROCEDURE — 94640 AIRWAY INHALATION TREATMENT: CPT

## 2021-01-26 PROCEDURE — 96374 THER/PROPH/DIAG INJ IV PUSH: CPT

## 2021-01-26 PROCEDURE — 82962 GLUCOSE BLOOD TEST: CPT

## 2021-01-26 PROCEDURE — 81001 URINALYSIS AUTO W/SCOPE: CPT

## 2021-01-26 PROCEDURE — 73630 X-RAY EXAM OF FOOT: CPT

## 2021-01-26 PROCEDURE — 96372 THER/PROPH/DIAG INJ SC/IM: CPT

## 2021-01-26 PROCEDURE — 1200000000 HC SEMI PRIVATE

## 2021-01-26 PROCEDURE — 96376 TX/PRO/DX INJ SAME DRUG ADON: CPT

## 2021-01-26 PROCEDURE — 93308 TTE F-UP OR LMTD: CPT

## 2021-01-26 PROCEDURE — 84439 ASSAY OF FREE THYROXINE: CPT

## 2021-01-26 PROCEDURE — 85730 THROMBOPLASTIN TIME PARTIAL: CPT

## 2021-01-26 PROCEDURE — 6370000000 HC RX 637 (ALT 250 FOR IP): Performed by: INTERNAL MEDICINE

## 2021-01-26 PROCEDURE — 80048 BASIC METABOLIC PNL TOTAL CA: CPT

## 2021-01-26 PROCEDURE — 72125 CT NECK SPINE W/O DYE: CPT

## 2021-01-26 PROCEDURE — 83735 ASSAY OF MAGNESIUM: CPT

## 2021-01-26 PROCEDURE — 36415 COLL VENOUS BLD VENIPUNCTURE: CPT

## 2021-01-26 PROCEDURE — 96361 HYDRATE IV INFUSION ADD-ON: CPT

## 2021-01-26 PROCEDURE — 6370000000 HC RX 637 (ALT 250 FOR IP): Performed by: PHYSICIAN ASSISTANT

## 2021-01-26 PROCEDURE — 84484 ASSAY OF TROPONIN QUANT: CPT

## 2021-01-26 PROCEDURE — 83880 ASSAY OF NATRIURETIC PEPTIDE: CPT

## 2021-01-26 PROCEDURE — 2580000003 HC RX 258: Performed by: INTERNAL MEDICINE

## 2021-01-26 PROCEDURE — 85025 COMPLETE CBC W/AUTO DIFF WBC: CPT

## 2021-01-26 PROCEDURE — 51798 US URINE CAPACITY MEASURE: CPT

## 2021-01-26 PROCEDURE — 80053 COMPREHEN METABOLIC PANEL: CPT

## 2021-01-26 PROCEDURE — 94761 N-INVAS EAR/PLS OXIMETRY MLT: CPT

## 2021-01-26 RX ORDER — DICYCLOMINE HYDROCHLORIDE 10 MG/1
10 CAPSULE ORAL 3 TIMES DAILY
Status: DISCONTINUED | OUTPATIENT
Start: 2021-01-27 | End: 2021-01-27 | Stop reason: HOSPADM

## 2021-01-26 RX ORDER — FENTANYL CITRATE 50 UG/ML
25 INJECTION, SOLUTION INTRAMUSCULAR; INTRAVENOUS ONCE
Status: DISCONTINUED | OUTPATIENT
Start: 2021-01-26 | End: 2021-01-26

## 2021-01-26 RX ORDER — ACETAMINOPHEN 650 MG/1
650 SUPPOSITORY RECTAL EVERY 6 HOURS PRN
Status: DISCONTINUED | OUTPATIENT
Start: 2021-01-26 | End: 2021-01-27 | Stop reason: HOSPADM

## 2021-01-26 RX ORDER — ONDANSETRON 2 MG/ML
4 INJECTION INTRAMUSCULAR; INTRAVENOUS EVERY 6 HOURS PRN
Status: DISCONTINUED | OUTPATIENT
Start: 2021-01-26 | End: 2021-01-27 | Stop reason: HOSPADM

## 2021-01-26 RX ORDER — SODIUM CHLORIDE 0.9 % (FLUSH) 0.9 %
10 SYRINGE (ML) INJECTION EVERY 12 HOURS SCHEDULED
Status: DISCONTINUED | OUTPATIENT
Start: 2021-01-26 | End: 2021-01-27 | Stop reason: HOSPADM

## 2021-01-26 RX ORDER — SODIUM CHLORIDE 0.9 % (FLUSH) 0.9 %
10 SYRINGE (ML) INJECTION PRN
Status: DISCONTINUED | OUTPATIENT
Start: 2021-01-26 | End: 2021-01-27 | Stop reason: HOSPADM

## 2021-01-26 RX ORDER — HYDROCODONE BITARTRATE AND ACETAMINOPHEN 5; 325 MG/1; MG/1
1 TABLET ORAL
Status: COMPLETED | OUTPATIENT
Start: 2021-01-26 | End: 2021-01-26

## 2021-01-26 RX ORDER — POTASSIUM CHLORIDE 750 MG/1
40 TABLET, FILM COATED, EXTENDED RELEASE ORAL ONCE
Status: COMPLETED | OUTPATIENT
Start: 2021-01-26 | End: 2021-01-26

## 2021-01-26 RX ORDER — PROMETHAZINE HYDROCHLORIDE 25 MG/1
12.5 TABLET ORAL EVERY 6 HOURS PRN
Status: DISCONTINUED | OUTPATIENT
Start: 2021-01-26 | End: 2021-01-27 | Stop reason: HOSPADM

## 2021-01-26 RX ORDER — 0.9 % SODIUM CHLORIDE 0.9 %
1000 INTRAVENOUS SOLUTION INTRAVENOUS ONCE
Status: COMPLETED | OUTPATIENT
Start: 2021-01-26 | End: 2021-01-26

## 2021-01-26 RX ORDER — HEPARIN SODIUM 5000 [USP'U]/ML
5000 INJECTION, SOLUTION INTRAVENOUS; SUBCUTANEOUS EVERY 8 HOURS SCHEDULED
Status: DISCONTINUED | OUTPATIENT
Start: 2021-01-26 | End: 2021-01-27 | Stop reason: HOSPADM

## 2021-01-26 RX ORDER — PANTOPRAZOLE SODIUM 40 MG/1
40 TABLET, DELAYED RELEASE ORAL 2 TIMES DAILY
Status: DISCONTINUED | OUTPATIENT
Start: 2021-01-26 | End: 2021-01-27 | Stop reason: HOSPADM

## 2021-01-26 RX ORDER — BUDESONIDE AND FORMOTEROL FUMARATE DIHYDRATE 160; 4.5 UG/1; UG/1
2 AEROSOL RESPIRATORY (INHALATION) 2 TIMES DAILY
Status: DISCONTINUED | OUTPATIENT
Start: 2021-01-26 | End: 2021-01-27 | Stop reason: HOSPADM

## 2021-01-26 RX ORDER — FUROSEMIDE 20 MG/1
20 TABLET ORAL DAILY
Status: CANCELLED | OUTPATIENT
Start: 2021-01-26

## 2021-01-26 RX ORDER — ACETAMINOPHEN 325 MG/1
650 TABLET ORAL EVERY 6 HOURS PRN
Status: DISCONTINUED | OUTPATIENT
Start: 2021-01-26 | End: 2021-01-27 | Stop reason: HOSPADM

## 2021-01-26 RX ORDER — POLYETHYLENE GLYCOL 3350 17 G/17G
17 POWDER, FOR SOLUTION ORAL DAILY PRN
Status: DISCONTINUED | OUTPATIENT
Start: 2021-01-26 | End: 2021-01-27 | Stop reason: HOSPADM

## 2021-01-26 RX ORDER — TRAMADOL HYDROCHLORIDE 50 MG/1
25 TABLET ORAL ONCE
Status: CANCELLED | OUTPATIENT
Start: 2021-01-26

## 2021-01-26 RX ADMIN — POTASSIUM CHLORIDE: 2 INJECTION, SOLUTION, CONCENTRATE INTRAVENOUS at 13:30

## 2021-01-26 RX ADMIN — BUDESONIDE AND FORMOTEROL FUMARATE DIHYDRATE 2 PUFF: 160; 4.5 AEROSOL RESPIRATORY (INHALATION) at 19:38

## 2021-01-26 RX ADMIN — PANTOPRAZOLE SODIUM 40 MG: 40 TABLET, DELAYED RELEASE ORAL at 10:38

## 2021-01-26 RX ADMIN — POTASSIUM CHLORIDE: 2 INJECTION, SOLUTION, CONCENTRATE INTRAVENOUS at 22:23

## 2021-01-26 RX ADMIN — PANTOPRAZOLE SODIUM 40 MG: 40 TABLET, DELAYED RELEASE ORAL at 20:28

## 2021-01-26 RX ADMIN — HEPARIN SODIUM 5000 UNITS: 5000 INJECTION, SOLUTION INTRAVENOUS; SUBCUTANEOUS at 13:35

## 2021-01-26 RX ADMIN — SODIUM CHLORIDE 1000 ML: 9 INJECTION, SOLUTION INTRAVENOUS at 06:49

## 2021-01-26 RX ADMIN — HYDROCODONE BITARTRATE AND ACETAMINOPHEN 1 TABLET: 5; 325 TABLET ORAL at 22:23

## 2021-01-26 RX ADMIN — BUDESONIDE AND FORMOTEROL FUMARATE DIHYDRATE 2 PUFF: 160; 4.5 AEROSOL RESPIRATORY (INHALATION) at 11:46

## 2021-01-26 RX ADMIN — POTASSIUM CHLORIDE 40 MEQ: 750 TABLET, FILM COATED, EXTENDED RELEASE ORAL at 06:49

## 2021-01-26 RX ADMIN — ACETAMINOPHEN 650 MG: 325 TABLET ORAL at 16:31

## 2021-01-26 RX ADMIN — HEPARIN SODIUM 5000 UNITS: 5000 INJECTION, SOLUTION INTRAVENOUS; SUBCUTANEOUS at 20:26

## 2021-01-26 RX ADMIN — ACETAMINOPHEN 650 MG: 325 TABLET ORAL at 10:38

## 2021-01-26 ASSESSMENT — PAIN SCALES - GENERAL
PAINLEVEL_OUTOF10: 10
PAINLEVEL_OUTOF10: 3

## 2021-01-26 ASSESSMENT — PAIN DESCRIPTION - ORIENTATION: ORIENTATION: RIGHT

## 2021-01-26 ASSESSMENT — PAIN DESCRIPTION - FREQUENCY: FREQUENCY: CONTINUOUS

## 2021-01-26 NOTE — H&P
HISTORY AND PHYSICAL  (Hospitalist, Internal Medicine)  IDENTIFYING INFORMATION   PATIENT:  Clare Burks  MRN:  [id number]  ADMIT DATE: 1/26/2021  TIME OF EVALUATION: 1/26/2021 5:53 AM    CHIEF COMPLAINT     Syncope  HISTORY OF PRESENT ILLNESS   Clare Burks is a 48 y.o. female admitted for severe at her last night, patient states that she uses CPAP a night. She got up, was getting to walk around, at first she felt l CPAP tubing was getting caught and was tangled and she felt like she was about to fall from that. However she states that she fell, and did not remember having loss of consciousness, however she must of passed out as her  found her on the ground unresponsive for which she states that that lasted about 5 minutes. Denied any associated chest pain, shortness of breath, palpitations, dizziness or seizure-like activity. This is her first episode, she has not had this before. Pt otherwise has no complaints of CP, SOB, dizziness, N/V/C/D, abdominal pain, dysuria, joint pains, rash/boils, or fevers. Of note patient does complain of back pain, however she is able to move her legs, denies any saddle anesthesia, and does not complain any fevers.     PMH listed below:    PAST MEDICAL, SURGICAL, FAMILY, and SOCIAL HISTORY     Past Medical History:   Diagnosis Date    Anxiety     Bladder tumor     Dr. Cynthia Cole Depression     Diverticulitis     Last episode: 7/1/2018    Emphysema of lung ([facility] [address]     Follows with Jim Chamberlain    GERD (gastroesophageal reflux disease)     History of blood transfusion 2018    After surgery for dog attack    Hypertension     Dr. Dann Jimenez IBS (irritable bowel syndrome)     Kidney stone     Last stone: 2013    PTSD (post-traumatic stress disorder) 02/03/2019    from dog attack    Sleep apnea     Uses BiPap     Past Surgical History:   Procedure Laterality Date   [address] BLADDER SURGERY  2012    CHOLECYSTECTOMY  2001    COLONOSCOPY 2018    normal colonoscopy - Dr. Fernandez Rush  1980's    Nose     HYSTERECTOMY      HYSTERECTOMY, TOTAL ABDOMINAL  2012    INCISION AND DRAINAGE Left 2019    ANKLE INCISION AND DRAINAGE performed by Екатерина Nichole DO at [address] Right 2019    ARM INCISION AND DRAINAGE performed by Екатерина Nichole DO at [address] [facility]  2012    tumor removal    OVARIAN CYST REMOVAL      TONSILLECTOMY  1980    TUBAL LIGATION  2011    UPPER GASTROINTESTINAL ENDOSCOPY N/A 2021    EGD POLYP ABLATION/OTHER OF ANTRAL POLYP AND BX performed by Fawad Hutton MD at [facility] ENDOSCOPY     Family History   Problem Relation Age of Onset    Alcohol Abuse Mother     Sleep Apnea Father      Family Hx of HTN  Family Hx as reviewed above, otherwise non-contributory  Social History     Socioeconomic History    Marital status: Single     Spouse name: None    Number of children: None    Years of education: None    Highest education level: None   Occupational History    None   Social Needs    Financial resource strain: None    Food insecurity     Worry: None     Inability: None    Transportation needs     Medical: None     Non-medical: None   Tobacco Use    Smoking status: Former Smoker     Packs/day: 1.00     Years: 19.00     Pack years: 19.00     Types: Cigarettes     Start date: 2001     Quit date: 2020     Years since quittin.5    Smokeless tobacco: Never Used   Substance and Sexual Activity    Alcohol use: No    Drug use: Yes     Types: Marijuana     Comment: pt states last consumption was sometime within a month    Sexual activity: Yes     Partners: Male   Lifestyle    Physical activity     Days per week: None     Minutes per session: None    Stress: None   Relationships    Social connections     Talks on phone: None     Gets together: None     Attends Sikh service: None     Active member of club or organization: None Attends meetings of clubs or organizations: None     Relationship status: None    Intimate partner violence     Fear of current or ex partner: None     Emotionally abused: None     Physically abused: None     Forced sexual activity: None   Other Topics Concern    None   Social History Narrative    None       MEDICATIONS   Medications Prior to Admission  Not in a hospital admission. Current Medications  Current Facility-Administered Medications   Medication Dose Route Frequency Provider Last Rate Last Admin    0.9 % sodium chloride bolus  1,000 mL Intravenous Once Tyna Schlatter, PA-C        potassium chloride (KLOR-CON) extended release tablet 40 mEq  40 mEq Oral Once Tyna Schlatter, PA-C         Current Outpatient Medications   Medication Sig Dispense Refill    acetaminophen (TYLENOL) 500 MG tablet Take 1 tablet by mouth 4 times daily as needed for Pain 360 tablet 1    pantoprazole (PROTONIX) 40 MG tablet Take 1 tablet by mouth 2 times daily 60 tablet 3    hydrOXYzine (ATARAX) 10 MG tablet Take 10 mg by mouth every 8 hours as needed for Itching      diclofenac 1 % CREA Place 1 Tube onto the skin daily as needed for Pain 120 g 0    loratadine-pseudoephedrine (CLARITIN-D 24HR)  MG per extended release tablet Take 1 tablet by mouth daily 30 tablet 3    gabapentin (NEURONTIN) 100 MG capsule Take 1 capsule by mouth 2 times daily for 30 days.  60 capsule 2    albuterol sulfate  (90 Base) MCG/ACT inhaler Inhale 2 puffs into the lungs every 4 hours as needed for Wheezing 8.5 g 2    furosemide (LASIX) 20 MG tablet Take 1 tablet by mouth daily 30 tablet 2    fluticasone (FLONASE) 50 MCG/ACT nasal spray 2 sprays by Each Nostril route daily 1 Bottle 0    dicyclomine (BENTYL) 10 MG capsule Take 1 capsule by mouth 3 times daily (before meals) 90 capsule 5    lisinopril (PRINIVIL;ZESTRIL) 10 MG tablet Take 1 tablet by mouth daily 30 tablet 3    SYMBICORT 160-4.5 MCG/ACT AERO Inhale 2 puffs into the lungs 2 times daily 1 Inhaler 5    Spacer/Aero-Holding Chambers CAYLA 1 Device by Does not apply route daily as needed (use with MDI) 1 Device 0    potassium chloride (KLOR-CON M) 10 MEQ extended release tablet Take 1 tablet by mouth daily 30 tablet 2    Nutritional Supplements (ENSURE HIGH PROTEIN) LIQD Take 1 Can by mouth daily Meal replacement. 32 Can 2    lidocaine viscous hcl (XYLOCAINE) 2 % SOLN solution Take 10 mLs by mouth 2 times daily as needed for Dental Pain 100 mL 0         Allergies  Allergies   Allergen Reactions    Dye [Iodides] Hives    Morphine Hives       REVIEW OF SYSTEMS   Within above limitations. 14 point review of systems reviewed. Pertinent positive or negative as per HPI or otherwise negative per 14 point systems review. PHYSICAL EXAM     Wt Readings from Last 3 Encounters:   01/26/21 215 lb (97.5 kg)   01/13/21 220 lb 3.2 oz (99.9 kg)   12/29/20 227 lb (103 kg)       Blood pressure 103/67, pulse 66, temperature 97.9 °F (36.6 °C), temperature source Axillary, resp. rate 13, height 5' 1\" (1.549 m), weight 215 lb (97.5 kg), last menstrual period 06/05/2012, SpO2 95 %, not currently breastfeeding. General - AAO x 3  Psych - Appropriate affect/speech. No agitation  Eyes - Eye lids intact. No scleral icterus  ENT - Lips wnl. External ear clear/dry/intact. No thyromegaly on inspection  Neuro - No gross peripheral or central neuro deficits on inspection  Heart - Sinus. RRR. S1 and S2 present. No added HS/murmurs appreciated. No elevated JVD appreciated  Lung - Adequate air entry b/l, No crackles/wheezes appreciated  GI - Soft. No guarding/rigidity. No hepatosplenomegaly/ascites. BS+   - No CVA/suprapubic tenderness or palpable bladder distension  Skin - Intact. No rash/petechiae/ecchymosis. Warm extremities  MSK - Joints with normal ROM.  No joint swellings    Lines/Drains/Airways/Wounds:  [unfilled]    LABS AND IMAGING   CBC  [unfilled]    Last 3 Hemoglobin  Lab Results   Component

## 2021-01-26 NOTE — CONSULTS
[address] [place] [address], [address] [facility]                                  CONSULTATION    PATIENT NAME: Audi Giraldo                  :        1975  MED REC NO:   [id number]                          ROOM:       3009  ACCOUNT NO:   [de-identified]                           ADMIT DATE: 2021  PROVIDER:     Daniele Salmon MD    CONSULT DATE:  2021    INDICATION:  Syncope. HISTORY OF PRESENT ILLNESS:  This is a 77-year-old female patient who  has a history of having severe sleep apnea present. She uses CPAP  machine. She sees Abbi Griffin for that. She has seen Dr. Martinez Edwards.  She  is scheduled to have gastric bypass surgery done for _____ weight loss  surgery. The patient has lost significant weight already. She came in  last time after syncopal episode. She said that she got out the bed to  go to the bathroom, and she fell and found herself on the floor. Next  thing she remembers that her  is yelling at her. Right now, she  is awake, alert, answering the questions. She is in sinus rhythm. She  is eating. She is also being seen by Renal at this time because of  acute renal injury present. She denies any chest pain. She has  dyspnea, stable. The patient had a treadmill stress test done back in 2020 and her  stress test was negative, I think it was a Ivan protocol stress test  with 81% predicted heart rate was noted. Blood pressure was elevated at  190s/94 at that time, this was in 2020. An echo in 2020 also  shows LV function was preserved. PAST MEDICAL HISTORY:  History of hypertension, but she has lost  significant weight. History of having sleep apnea and uses CPAP,  anxiety present, history of GI ulcers, and kidney stones. PAST SURGICAL HISTORY:  Abdominal surgery, bladder surgery, gallbladder  surgery done, and hysterectomy done. SOCIAL HISTORY:  He quit smoking about a year ago. No alcohol use. ALLERGIES:  IODINE and MORPHINE. MEDICATIONS AT HOME:  She is on Tylenol, Protonix, Atarax, Claritin,  Neurontin, lisinopril, potassium, and also Lasix. PHYSICAL EXAMINATION:  GENERAL:  The patient is awake, alert and answering questions, not in  acute distress. VITAL SIGNS:  Temperature is afebrile, pulse is 60 and sinus rhythm,  blood pressure is 96/54. HEENT:  Head is normocephalic and atraumatic. Pupils are equal and  reactive. CHEST:  Equal expansion. LUNGS:  Clear to auscultation. No wheezing or rhonchi. HEART:  Regular rhythm. ABDOMEN:  Soft and nontender. Bowel sounds are present. No  hepatosplenomegaly or guarding appreciated. EXTREMITIES:  No cyanosis or clubbing noted. NEUROLOGIC:  Cranial nerves II through XII are grossly intact. LABORATORY DATA:  Her potassium is 3.2, creatinine is 2.7 which is new  for her. Her baseline creatinine was 1.1. Troponins are negative. BUN  is negative. LFTs are normal.  CBC is normal.    IMPRESSION:  This is a 78-year-old female patient comes in with syncopal  episode, possibly secondary to dehydration and acute renal injury  present. Echo has been ordered, we will review that. At this time, we  will get her renal function corrected and I think we will reevaluate her  after that is done. She may not need blood pressure medication as she  has lost significant weight also. Further recommendations based on the  hospital course.         Brandee Ken MD    D: 01/26/2021 11:45:30       T: 01/26/2021 14:14:33     [id number]  Job#: [id number]     Doc#: [id number]    CC:

## 2021-01-26 NOTE — CARE COORDINATION
.Chart reviewed. The patient is to return home with family at discharge. The patient has a PCP and insurance and can drive to doctor's appointments without issues. The patient can afford and take their medications as prescribed. She states she has a c-pap machine and uses a cane to get around as needed. She states her family is going to bring her c-pap machine to the hospital. The patient does not require [place] [address] or any DME and is independent in ADL's. The patient denies any other needs at this time.

## 2021-01-26 NOTE — ED NOTES
Bed: ED-30  Expected date:   Expected time:   Means of arrival:   Comments:  EMS     Jillian Montalvo RN  01/26/21 [id number]

## 2021-01-26 NOTE — PROGRESS NOTES
Medication History  [facility]    Patient Name: Aicha Campbell 1975     Medication history has been completed by: Luc Schuler CPhT    Source(s) of information: insurance claims     Primary Care Physician: SHUKRI Piper CNP     Pharmacy: [facility]    Allergies as of 01/26/2021 - Review Complete 01/26/2021   Allergen Reaction Noted    Dye [iodides] Hives 12/31/2017    Morphine Hives 07/06/2017        Prior to Admission medications    Medication Sig Start Date End Date Taking? Authorizing Provider   acetaminophen (TYLENOL) 500 MG tablet Take 1 tablet by mouth 4 times daily as needed for Pain 1/4/21   Rita Rosenthal MD   pantoprazole (PROTONIX) 40 MG tablet Take 1 tablet by mouth 2 times daily 1/4/21   Rita Rosenthal MD   hydrOXYzine (ATARAX) 10 MG tablet Take 10 mg by mouth every 8 hours as needed for Itching    Historical Provider, MD   diclofenac 1 % CREA Place 1 Tube onto the skin daily as needed for Pain 12/16/20   SHUKRI Piper CNP   loratadine-pseudoephedrine (CLARITIN-D 24HR)  MG per extended release tablet Take 1 tablet by mouth daily 12/16/20   SHUKRI Piper CNP   gabapentin (NEURONTIN) 100 MG capsule Take 1 capsule by mouth 2 times daily for 30 days.  12/9/20 1/26/21  SHUKRI Piper CNP   albuterol sulfate  (90 Base) MCG/ACT inhaler Inhale 2 puffs into the lungs every 4 hours as needed for Wheezing 12/9/20   SHUKRI Piper CNP   furosemide (LASIX) 20 MG tablet Take 1 tablet by mouth daily 12/9/20   SHUKRI Piper CNP   fluticasone [facility]) 50 MCG/ACT nasal spray 2 sprays by Each Nostril route daily 12/9/20   SHUKRI Piper CNP   dicyclomine (BENTYL) 10 MG capsule Take 1 capsule by mouth 3 times daily (before meals) 12/9/20   SHUKRI Piper CNP   lisinopril (PRINIVIL;ZESTRIL) 10 MG tablet Take 1 tablet by mouth daily 12/9/20   SHUKRI Piper CNP   SYMBICORT 160-4.5 MCG/ACT AERO Inhale 2 puffs into the lungs 2 times daily 12/9/20   SHUKRI Krueger CNP   potassium chloride (KLOR-CON M) 10 MEQ extended release tablet Take 1 tablet by mouth daily 12/9/20   SHUKRI Krueger CNP   Nutritional Supplements (ENSURE HIGH PROTEIN) LIQD Take 1 Can by mouth daily Meal replacement. 12/9/20   SHUKRI Krueger CNP     Medications removed from list (include reason, ex. noncompliance, medication cost, therapy complete etc.):   Spacer chamber clean up list  Lidocaine viscous therapy complete    Comments:  Unable to assess patient d/t falling in and out of sleep. Per claims patient with active scripts with medications as listed. Recent claims for erythromycin 250 mg film tabs 4 times daily unsure if patient still taking this med, received a 30 day supply 12/14/2020.     To my knowledge the above medication history is accurate as of 1/26/2021 7:52 AM.   Faisal Perez CPhT   1/26/2021 7:52 AM

## 2021-01-26 NOTE — PROGRESS NOTES
Patient is seen and examined for syncopal episode. She is still unclear about if she lost consciousness or not. We will continue management as per admitting team, pending imaging.   Cardiology, neurology and nephrology on board

## 2021-01-26 NOTE — CONSULTS
heparin (porcine)  5,000 Units Subcutaneous 3 times per day     Continuous Infusions:  PRN Meds:.sodium chloride flush, promethazine **OR** ondansetron, polyethylene glycol, acetaminophen **OR** acetaminophen    Allergies:  Dye [iodides] and Morphine    Family History:       Problem Relation Age of Onset    Alcohol Abuse Mother     Sleep Apnea Father      Physical Exam:    Vitals: BP (!) 96/54   Pulse 61   Temp 97.3 °F (36.3 °C) (Oral)   Resp 16   Ht 5' 1\" (1.549 m)   Wt 215 lb (97.5 kg)   LMP 06/05/2012   SpO2 98%   BMI 40.62 kg/m²     General appearance:  awake and verbally interactive   HEENT: Head: normocephalic, atraumatic. Neck: supple, symmetrical, trachea midline  Cardiovascular: S1 and S2: normal / no rub  Pulmonary: diminished lung sounds bilaterally  Abdomen:  soft / non-tender   Extremities: Trace edema to bilateral lower legs     CBC:   Recent Labs     01/26/21  0403   WBC 8.9   HGB 15.3        BMP:    Recent Labs     01/26/21  0403      K 3.2*   CL 98*   CO2 20*   BUN 31*   CREATININE 2.7*   GLUCOSE 114*     Hepatic:   Recent Labs     01/26/21  0403   AST 18   ALT 17   BILITOT 0.6   ALKPHOS 83     Troponin: No results for input(s): TROPONINI in the last 72 hours. Mg, Phos:   Recent Labs     01/26/21  0403   MG 2.0       ABGs:   Lab Results   Component Value Date    PO2ART 67 07/03/2017    QUZ5RTC 38.0 07/03/2017     INR: No results for input(s): INR in the last 72 hours. -----------------------------------------------------------------  Patient Active Problem List   Diagnosis Code    Fracture of ankle, medial malleolus, left, open S82.52XB    Dog bite of ankle, left, initial encounter S91.052A, W54. 0XXA    Encounter for postoperative wound check Z48.89    Essential hypertension I10    Anxiety and depression F41.9, F32.9    GERD (gastroesophageal reflux disease) K21.9    Smoker F17.200    Pneumonia due to infectious organism J18.9    Chest pain R07.9    ALAN (obstructive sleep apnea) G47.33    Morbid obesity with BMI of 45.0-49.9, adult (HCC) E66.01, Z68.42    Morbid obesity with BMI of 40.0-44.9, adult (HCC) E66.01, Z68.41    Antral (maxillary) polyp J33.8    Morbid obesity due to excess calories ([facility]) E66.01    Syncope and collapse R55     Assessment and Recommendations     Impression   1. PRIYA on stage 3A CKD (ATN vs. Pre-renal azotemia)  -likely multi-factorial etiology for PRIYA including: diminished   Renal perfusion secondary to hypotension as well as  Hemodynamic instability associated with recent syncopal episode   Consider intravascular volume depletion accentuated by lisinopril / lasix     2. Syncopal episode with fall   3. Hypokalemia   4. Diastolic Heart Failure   5. COPD     PLAN   1.    -orders to start patient on 1/2 NS with 20 meq of KCL at 150 / hour   -bladder scan ordered to screen for urinary retention   -additional labs: upc, chemistry panel qam, ua, magnesium  2.   -referral to cardiology for further evaluation; echo results pending  -non-contrast head CT demonstrated no acute intracranial abnormality   3.   -latest serum potassium: 3.2; following trend  -oral KCL repletion ordered  -[facility] this afternoon to recheck serum potassium   -magnesium level ordered as well   4.   -monitor: O2 saturations / urine output and volume status   -no overt physical exam findings to suggest acute fluid overload    -chest X-ray demonstrates no acute cardiopulmonary process   5.    -Symbicort MDI BID was ordered      Electronically signed by SHUKRI Cartwright - CNP      Nephrology Attending Progress Note  1/26/2021 4:37 PM  Subjective: Interval History: I have personally performed face to face diagnostic evaluation on this patient. I have personally reviewed pertinent labs and imaging and agree with the care plan above. My additional findings are as follows: The patient is a 48 y.o. female who presents with weakness and passed out in bathroom.  Found by spouse and sent er. Was on diuretic not nsaid and bp was low with arf. Hx ckd 3 with bladder surgery.  Was on ace    Objective:   Vitals: BP (!) 96/54   Pulse 61   Temp 97.3 °F (36.3 °C) (Oral)   Resp 16   Ht 5' 1\" (1.549 m)   Wt 215 lb (97.5 kg)   LMP 06/05/2012   SpO2 98%   BMI 40.62 kg/m²   Weak awake  Soft nt  No edema    Assessment and Plan:    1 bp low and hydrate and hold bp meds  2 fu cardio work up  3 replete K  4 o2 monitor and therapy  For now start with hydration hold ace and diuretic and work up etiology syncope       Electronically signed by Anahi Keyes MD on 1/26/2021 at 4:37 PM

## 2021-01-26 NOTE — ED PROVIDER NOTES
Emergency [address] EMERGENCY DEPARTMENT    Patient: Kathryn Martinez  MRN: [id number]  : 1975  Date of Evaluation: 2021  ED Provider: Victor M Ramirez PA-C    Chief Complaint       Chief Complaint   Patient presents with    Foot Pain     R foot pain following syncopal episode    Loss of Consciousness     states \"I woke up and got up to use the bathroom and next thing I know my  was yelling at me to wake up and I was on the floor. \"       Isela Correa is a 48 y.o. female who presents to the emergency department after a syncopal episode. Patient states she woke from sleep to go to the bathroom. She recalls taking off her CPAP and then getting up and walking to the foot of the bed when she apparently passed out. She awoke to her  yelling at her to wake up. She states he attempted to help her up but was unable to do so, so she crawled to the bathroom and he helped her up on to the toilet and then splashed cold water on her which helped her fully wake up. Shortly thereafter, EMS arrived. Patient reports hitting her head and has some pain to the frontal head. She also complains of bilateral foot pain, right > left. Denies neck or back pain. Denies chest pain or palpitations. Denies sob. Denies n/v/d. She states she had felt well prior to going to bed but she is out of her Vistaril--which she takes because her CPAP makes her anxious, so she took 2 Trazodone instead. She has been having GI issues and reports she hasn't been eating well as a result. ROS     CONSTITUTIONAL:  Denies fever. EYES:  Denies visual changes. HEAD:  Denies headache. ENT:  Denies earache, nasal congestion, sore throat. NECK:  Denies neck pain. RESPIRATORY:  Denies any shortness of breath. CARDIOVASCULAR:  Denies chest pain. GI:  Denies nausea or vomiting. :  Denies urinary symptoms.   MUSCULOSKELETAL:  + foot pain. BACK:  Denies back pain. INTEGUMENT:  Denies skin changes. LYMPHATIC:  Denies lymphadenopathy. NEUROLOGIC:  + syncope. PSYCHIATRIC:  Denies SI/HI.     Past History     Past Medical History:   Diagnosis Date    Anxiety     Bladder tumor     Dr. Lyly Harvey Depression     Diverticulitis     Last episode: 7/1/2018    Emphysema of lung ([address]     Follows with Zak Mendoza    GERD (gastroesophageal reflux disease)     History of blood transfusion 2018    After surgery for dog attack    Hypertension     Dr. Duyen Suarez IBS (irritable bowel syndrome)     Kidney stone     Last stone: 2013    PTSD (post-traumatic stress disorder) 02/03/2019    from dog attack    Sleep apnea     Uses BiPap     Past Surgical History:   Procedure Laterality Date    ABDOMEN SURGERY      BLADDER SURGERY  2012    CHOLECYSTECTOMY  2001    COLONOSCOPY  03/26/2018    normal colonoscopy - Dr. Nova Gandhi  1980's    Nose    [address], TOTAL ABDOMINAL  2012    INCISION AND DRAINAGE Left 2/4/2019    ANKLE INCISION AND DRAINAGE performed by Omayra Berumen DO at [address] Right 2/4/2019    ARM INCISION AND DRAINAGE performed by Omayra Berumen DO at [address] [facility]  2012    tumor removal    OVARIAN CYST REMOVAL  2012    TONSILLECTOMY  1980    TUBAL LIGATION  07/2011    UPPER GASTROINTESTINAL ENDOSCOPY N/A 1/4/2021    EGD POLYP ABLATION/OTHER OF ANTRAL POLYP AND BX performed by Teo Tinoco MD at [facility] ENDOSCOPY     Social History     Socioeconomic History    Marital status: Single     Spouse name: None    Number of children: None    Years of education: None    Highest education level: None   Occupational History    None   Social Needs    Financial resource strain: None    Food insecurity     Worry: None     Inability: None    Transportation needs     Medical: None     Non-medical: None   Tobacco Use    Smoking status: Former Smoker Take 1 tablet by mouth daily    LORATADINE-PSEUDOEPHEDRINE (CLARITIN-D 24HR)  MG PER EXTENDED RELEASE TABLET    Take 1 tablet by mouth daily    NUTRITIONAL SUPPLEMENTS (ENSURE HIGH PROTEIN) LIQD    Take 1 Can by mouth daily Meal replacement. PANTOPRAZOLE (PROTONIX) 40 MG TABLET    Take 1 tablet by mouth 2 times daily    POTASSIUM CHLORIDE (KLOR-CON M) 10 MEQ EXTENDED RELEASE TABLET    Take 1 tablet by mouth daily    SPACER/AERO-HOLDING CHAMBERS CAYLA    1 Device by Does not apply route daily as needed (use with MDI)    SYMBICORT 160-4.5 MCG/ACT AERO    Inhale 2 puffs into the lungs 2 times daily     Allergies   Allergen Reactions    Dye [Iodides] Hives    Morphine Hives        Physical Exam       ED Triage Vitals [01/26/21 0335]   BP Temp Temp Source Pulse Resp SpO2 Height Weight   (!) 100/41 97.9 °F (36.6 °C) Axillary 66 26 100 % 5' 1\" (1.549 m) 215 lb (97.5 kg)     GENERAL APPEARANCE:  Well-developed, well-nourished, no acute distress. HEAD:  NC/AT. EYES:  Sclera anicteric. ENT:  Ears, nose, mouth normal.     NECK:  Supple. CARDIO:  RRR. LUNGS:   CTAB. Respirations unlabored. ABDOMEN:  Soft, non-distended, non-tender. BS active. EXTREMITIES:  No swelling or deformities of the bilateral feet although ttp diffusely. DP intact. Able to wiggle toes, rotate ankles. SKIN:  Warm and dry. NEUROLOGICAL:  Alert and oriented. PSYCHIATRIC:  Normal mood.      Diagnostics     Labs:  Results for orders placed or performed during the hospital encounter of 01/26/21   CBC Auto Differential   Result Value Ref Range    WBC 8.9 4.0 - 10.5 K/CU MM    RBC 5.41 (H) 4.2 - 5.4 M/CU MM    Hemoglobin 15.3 12.5 - 16.0 GM/DL    Hematocrit 44.6 37 - 47 %    MCV 82.4 78 - 100 FL    MCH 28.3 27 - 31 PG    MCHC 34.3 32.0 - 36.0 %    RDW 13.5 11.7 - 14.9 %    Platelets 416 584 - 965 K/CU MM    MPV 11.1 7.5 - 11.1 FL    Differential Type AUTOMATED DIFFERENTIAL     Segs Relative 66.4 (H) 36 - 66 %    Lymphocytes % 24.0 24 - 44 %    Monocytes % 7.9 (H) 0 - 4 %    Eosinophils % 0.9 0 - 3 %    Basophils % 0.5 0 - 1 %    Segs Absolute 5.9 K/CU MM    Lymphocytes Absolute 2.1 K/CU MM    Monocytes Absolute 0.7 K/CU MM    Eosinophils Absolute 0.1 K/CU MM    Basophils Absolute 0.0 K/CU MM    Nucleated RBC % 0.0 %    Total Nucleated RBC 0.0 K/CU MM    Total Immature Neutrophil 0.03 K/CU MM    Immature Neutrophil % 0.3 0 - 0.43 %   Comprehensive Metabolic Panel w/ Reflex to MG   Result Value Ref Range    Sodium 138 135 - 145 MMOL/L    Potassium 3.2 (L) 3.5 - 5.1 MMOL/L    Chloride 98 (L) 99 - 110 mMol/L    CO2 20 (L) 21 - 32 MMOL/L    BUN 31 (H) 6 - 23 MG/DL    CREATININE 2.7 (H) 0.6 - 1.1 MG/DL    Glucose 114 (H) 70 - 99 MG/DL    Calcium 9.8 8.3 - 10.6 MG/DL    Alb 4.7 3.4 - 5.0 GM/DL    Total Protein 7.9 6.4 - 8.2 GM/DL    Total Bilirubin 0.6 0.0 - 1.0 MG/DL    ALT 17 10 - 40 U/L    AST 18 15 - 37 IU/L    Alkaline Phosphatase 83 40 - 128 IU/L    GFR Non- 19 (L) >60 mL/min/1.73m2    GFR  23 (L) >60 mL/min/1.73m2    Anion Gap 20 (H) 4 - 16   Troponin   Result Value Ref Range    Troponin T <0.010 <0.01 NG/ML   Brain Natriuretic Peptide   Result Value Ref Range    Pro-BNP 23.18 <300 PG/ML   POCT Glucose   Result Value Ref Range    POC Glucose 114 (H) 70 - 99 MG/DL   EKG 12 Lead   Result Value Ref Range    Ventricular Rate 54 BPM    Atrial Rate 54 BPM    P-R Interval 164 ms    QRS Duration 100 ms    Q-T Interval 442 ms    QTc Calculation (Bazett) 419 ms    P Axis 32 degrees    R Axis 30 degrees    T Axis 53 degrees    Diagnosis       Sinus bradycardia  Otherwise normal ECG  When compared with ECG of 16-AUG-2020 14:17,  No significant change was found         Radiographs:  Xr Foot Left (min 3 Views)    Result Date: 1/26/2021  1. No acute osseous or soft tissue abnormality the left foot. Xr Foot Right (min 3 Views)    Result Date: 1/26/2021  1. No acute osseous abnormality the right foot.  2. No soft tissue injury identified. Ct Head Wo Contrast    Result Date: 1/26/2021  No acute intracranial abnormality. Right maxillary sinus disease. Ct Cervical Spine Wo Contrast    Result Date: 1/26/2021  EXAMINATION: CT OF THE CERVICAL SPINE WITHOUT CONTRAST 1/26/2021 4:32 am TECHNIQUE: CT of the cervical spine was performed without the administration of intravenous contrast. Multiplanar reformatted images are provided for review. Dose modulation, iterative reconstruction, and/or weight based adjustment of the mA/kV was utilized to reduce the radiation dose to as low as reasonably achievable. COMPARISON: CT cervical spine without contrast October 31, 2018. HISTORY: ORDERING SYSTEM PROVIDED HISTORY: syncope TECHNOLOGIST PROVIDED HISTORY: Reason for exam:->syncope Decision Support Exception->Emergency Medical Condition (MA) Is the patient pregnant?->No Reason for Exam: fall FINDINGS: BONES/ALIGNMENT: There is no acute fracture or traumatic malalignment. DEGENERATIVE CHANGES: No significant degenerative changes. SOFT TISSUES: There is no prevertebral soft tissue swelling. No acute abnormality of the cervical spine. Xr Chest Portable    Result Date: 1/26/2021  EXAMINATION: ONE XRAY VIEW OF THE CHEST 1/26/2021 4:53 am COMPARISON: August 16, 2020 HISTORY: ORDERING SYSTEM PROVIDED HISTORY: syncope TECHNOLOGIST PROVIDED HISTORY: Reason for exam:->syncope Reason for Exam: syncope Acuity: Acute Type of Exam: Initial FINDINGS: No lines or tubes. Stable cardiomediastinal silhouette. The lungs are clear without focal consolidation or pleural effusion. No suspicious pulmonary nodules. No pulmonary edema. No pneumothorax. No acute osseous abnormality. No acute cardiopulmonary disease. Procedures/EKG:   Please see Dr. Yisel Morales note for interpretation. ED Course and MDM   -  Patient seen and evaluated in the emergency department. -  Triage and nursing notes reviewed and incorporated.   -  Old chart records reviewed and

## 2021-01-27 ENCOUNTER — APPOINTMENT (OUTPATIENT)
Dept: MRI IMAGING | Age: 46
DRG: 469 | End: 2021-01-27
Payer: MEDICAID

## 2021-01-27 VITALS
WEIGHT: 225.1 LBS | TEMPERATURE: 97.8 F | SYSTOLIC BLOOD PRESSURE: 130 MMHG | OXYGEN SATURATION: 99 % | RESPIRATION RATE: 22 BRPM | HEIGHT: 61 IN | HEART RATE: 74 BPM | BODY MASS INDEX: 42.5 KG/M2 | DIASTOLIC BLOOD PRESSURE: 71 MMHG

## 2021-01-27 LAB
ANION GAP SERPL CALCULATED.3IONS-SCNC: 10 MMOL/L (ref 4–16)
ANION GAP SERPL CALCULATED.3IONS-SCNC: 11 MMOL/L (ref 4–16)
BUN BLDV-MCNC: 23 MG/DL (ref 6–23)
BUN BLDV-MCNC: 26 MG/DL (ref 6–23)
CALCIUM SERPL-MCNC: 8.4 MG/DL (ref 8.3–10.6)
CALCIUM SERPL-MCNC: 8.9 MG/DL (ref 8.3–10.6)
CHLORIDE BLD-SCNC: 103 MMOL/L (ref 99–110)
CHLORIDE BLD-SCNC: 104 MMOL/L (ref 99–110)
CO2: 21 MMOL/L (ref 21–32)
CO2: 22 MMOL/L (ref 21–32)
CREAT SERPL-MCNC: 1.7 MG/DL (ref 0.6–1.1)
CREAT SERPL-MCNC: 1.8 MG/DL (ref 0.6–1.1)
GFR AFRICAN AMERICAN: 36 ML/MIN/1.73M2
GFR AFRICAN AMERICAN: 39 ML/MIN/1.73M2
GFR NON-AFRICAN AMERICAN: 30 ML/MIN/1.73M2
GFR NON-AFRICAN AMERICAN: 32 ML/MIN/1.73M2
GLUCOSE BLD-MCNC: 82 MG/DL (ref 70–99)
GLUCOSE BLD-MCNC: 96 MG/DL (ref 70–99)
MAGNESIUM: 2 MG/DL (ref 1.8–2.4)
PHOSPHORUS: 4 MG/DL (ref 2.5–4.9)
POTASSIUM SERPL-SCNC: 4.1 MMOL/L (ref 3.5–5.1)
POTASSIUM SERPL-SCNC: 4.5 MMOL/L (ref 3.5–5.1)
SODIUM BLD-SCNC: 134 MMOL/L (ref 135–145)
SODIUM BLD-SCNC: 137 MMOL/L (ref 135–145)

## 2021-01-27 PROCEDURE — 83735 ASSAY OF MAGNESIUM: CPT

## 2021-01-27 PROCEDURE — 93010 ELECTROCARDIOGRAM REPORT: CPT | Performed by: INTERNAL MEDICINE

## 2021-01-27 PROCEDURE — 6360000002 HC RX W HCPCS: Performed by: INTERNAL MEDICINE

## 2021-01-27 PROCEDURE — 96361 HYDRATE IV INFUSION ADD-ON: CPT

## 2021-01-27 PROCEDURE — G0378 HOSPITAL OBSERVATION PER HR: HCPCS

## 2021-01-27 PROCEDURE — 70551 MRI BRAIN STEM W/O DYE: CPT

## 2021-01-27 PROCEDURE — 36415 COLL VENOUS BLD VENIPUNCTURE: CPT

## 2021-01-27 PROCEDURE — 6370000000 HC RX 637 (ALT 250 FOR IP): Performed by: NURSE PRACTITIONER

## 2021-01-27 PROCEDURE — 80048 BASIC METABOLIC PNL TOTAL CA: CPT

## 2021-01-27 PROCEDURE — 6370000000 HC RX 637 (ALT 250 FOR IP): Performed by: INTERNAL MEDICINE

## 2021-01-27 PROCEDURE — 84100 ASSAY OF PHOSPHORUS: CPT

## 2021-01-27 PROCEDURE — 96372 THER/PROPH/DIAG INJ SC/IM: CPT

## 2021-01-27 PROCEDURE — 94761 N-INVAS EAR/PLS OXIMETRY MLT: CPT

## 2021-01-27 RX ORDER — POTASSIUM CHLORIDE AND SODIUM CHLORIDE 450; 150 MG/100ML; MG/100ML
INJECTION, SOLUTION INTRAVENOUS CONTINUOUS
Status: DISCONTINUED | OUTPATIENT
Start: 2021-01-27 | End: 2021-01-27 | Stop reason: HOSPADM

## 2021-01-27 RX ADMIN — HEPARIN SODIUM 5000 UNITS: 5000 INJECTION, SOLUTION INTRAVENOUS; SUBCUTANEOUS at 05:24

## 2021-01-27 RX ADMIN — DICYCLOMINE HYDROCHLORIDE 10 MG: 10 CAPSULE ORAL at 16:40

## 2021-01-27 RX ADMIN — DICYCLOMINE HYDROCHLORIDE 10 MG: 10 CAPSULE ORAL at 10:16

## 2021-01-27 RX ADMIN — PANTOPRAZOLE SODIUM 40 MG: 40 TABLET, DELAYED RELEASE ORAL at 10:16

## 2021-01-27 RX ADMIN — HEPARIN SODIUM 5000 UNITS: 5000 INJECTION, SOLUTION INTRAVENOUS; SUBCUTANEOUS at 16:40

## 2021-01-27 RX ADMIN — POTASSIUM CHLORIDE AND SODIUM CHLORIDE: 450; 150 INJECTION, SOLUTION INTRAVENOUS at 07:50

## 2021-01-27 NOTE — PROGRESS NOTES
Called and spoke with heart center regarding pt holter monitor. Heart center is out of monitors, and suggested that pt get outpatient monitor. Ps Dr. Norberto Turcios:   1/27/21 3:32 PM   [phone] Hospital or Facility: [facility] Routine From: Jessica Nunn RE: Clive Martinez RM: [id number] Just spoke with heart center they said that they are out of holter monitor's, are you okay with pt getting it outpt? Need Callback: NO CALLBACK REQ  Read 3:32 PM   Response:  1/27/21 3:32 PM   Yea.

## 2021-01-27 NOTE — PROGRESS NOTES
Nephrology Progress Note  1/27/2021 2:58 PM  Subjective: Interval History: Elena Barahona is a 48 y.o. female with weakness and state better today        Data:   Scheduled Meds:   pantoprazole  40 mg Oral BID    budesonide-formoterol  2 puff Inhalation BID    sodium chloride flush  10 mL Intravenous 2 times per day    heparin (porcine)  5,000 Units Subcutaneous 3 times per day    dicyclomine  10 mg Oral TID     Continuous Infusions:   0.45 % NaCl with KCl 20 mEq Stopped (01/27/21 1215)           CBC:   Recent Labs     01/26/21  0403   WBC 8.9   HGB 15.3        BMP:    Recent Labs     01/26/21  1222 01/27/21  0531 01/27/21  1112    137 134*   K 3.7 4.1 4.5    104 103   CO2 23 22 21   BUN 32* 26* 23   CREATININE 2.5* 1.8* 1.7*   GLUCOSE 116* 96 82       Renal Labs  Albumin:    Lab Results   Component Value Date    LABALBU 4.7 01/26/2021     Calcium:    Lab Results   Component Value Date    CALCIUM 8.9 01/27/2021     Phosphorus:    Lab Results   Component Value Date    PHOS 4.0 01/27/2021     U/A:    Lab Results   Component Value Date    NITRU NEGATIVE 01/26/2021    COLORU YELLOW 01/26/2021    WBCUA 2 01/26/2021    RBCUA 1 01/26/2021    MUCUS OCCASIONAL 01/26/2021    TRICHOMONAS NONE SEEN 01/26/2021    BACTERIA OCCASIONAL 01/26/2021    CLARITYU CLEAR 01/26/2021    SPECGRAV 1.026 01/26/2021    UROBILINOGEN NORMAL 01/26/2021    BILIRUBINUR NEGATIVE 01/26/2021    BLOODU NEGATIVE 01/26/2021    KETUA SMALL 01/26/2021           Objective:   I/O: 01/26 0701 - 01/27 0700  In: 3100 [P.O.:710; I.V.:556]  Out: 1400 [Urine:1400]  Vitals: /71   Pulse 74   Temp 97.8 °F (36.6 °C) (Oral)   Resp 22   Ht 5' 1\" (1.549 m)   Wt 225 lb 1.6 oz (102.1 kg)   LMP 06/05/2012   SpO2 99%   BMI 42.53 kg/m²   General appearance: awake weak  HEENT: Head: Normal, normocephalic, atraumatic.   Neck: supple, symmetrical, trachea midline  Lungs: diminished breath sounds bilaterally  Heart: S1, S2 normal  Abdomen: abnormal findings:  soft nt  Extremities: edema trace  Neurologic: Mental status: alertness: alert        Assessment and Plan:      IMP:  ckd 3 with arf  Syncope  Low K  chf      Plan     Renal improved and monitor  Neuro appear improved mri negative  K STBALE  o2 stable  Can dc and fu outpt           Ramos Serrano MD

## 2021-01-27 NOTE — PROGRESS NOTES
Units Subcutaneous 3 times per day    dicyclomine  10 mg Oral TID      Infusions:   0.45 % NaCl with KCl 20 mEq 150 mL/hr at 01/27/21 0750      PRN Meds:  sodium chloride flush, promethazine **OR** ondansetron, polyethylene glycol, acetaminophen **OR** acetaminophen       Physical Exam:  Vitals:    01/27/21 0331   BP: (!) 113/58   Pulse: 77   Resp: 25   Temp: 98.1 °F (36.7 °C)   SpO2: 98%        General: AAO, NAD  Chest: Nontender  Cardiac: First and Second Heart Sounds are Normal, No Murmurs or Gallops noted  Lungs:Clear to auscultation and percussion. Abdomen: Soft, NT, ND, +BS  Extremities: No clubbing, no edema  Vascular:  Equal 2+ peripheral pulses. Lab Data:  CBC:   Recent Labs     01/26/21  0403   WBC 8.9   HGB 15.3   HCT 44.6   MCV 82.4        BMP:   Recent Labs     01/26/21  0403 01/26/21  1222 01/27/21  0531    136 137   K 3.2* 3.7 4.1   CL 98* 100 104   CO2 20* 23 22   PHOS  --   --  4.0   BUN 31* 32* 26*   CREATININE 2.7* 2.5* 1.8*     LIVER PROFILE:   Recent Labs     01/26/21  0403   AST 18   ALT 17   BILITOT 0.6   ALKPHOS 83     PT/INR:   Recent Labs     01/26/21  1222   PROTIME 11.9   INR 0.98     APTT:   Recent Labs     01/26/21  1222   APTT 37.6*     BNP:  No results for input(s): BNP in the last 72 hours.       Assessment:  Patient Active Problem List    Diagnosis Date Noted    Syncope and collapse 01/26/2021    Morbid obesity due to excess calories ([facility] 01/13/2021    Antral (maxillary) polyp     Morbid obesity with BMI of 40.0-44.9, adult ([facility] 12/11/2020    Morbid obesity with BMI of 45.0-49.9, adult ([address] 09/04/2020    ALAN (obstructive sleep apnea)     Chest pain 08/16/2020    Pneumonia due to infectious organism 04/27/2020    Essential hypertension 03/04/2019    Anxiety and depression 03/04/2019    Smoker 03/04/2019    GERD (gastroesophageal reflux disease)     Encounter for postoperative wound check 02/21/2019    Fracture of ankle, medial malleolus, left,

## 2021-01-27 NOTE — DISCHARGE SUMMARY
Discharge Summary    Name:  Kiya Carson /Age/Sex: 1975  (48 y.o. female)   MRN & CSN:  [id number] & [id number] Admission Date/Time: 2021  3:27 AM   Attending:  Carmen Katz MD Discharging Physician: Johnathon Mccurdy MD     Hospital Course:   Kiya Carson is a 48 y.o.  female  who presents with Syncope and collapse     #Syncope: r/o CVA  - CT with no acute findings  - tele, no significant events  - EKG with no heart blocks  - MRI: negative for acute infarct or other pathology  - cardiology consult: plan is for out patient holter monitor  - ECHO: 20: EF 50-55%, RVSP 20mmgH  - Cardiology on board: f/u out patient for holter monitor     #Lower back pain after fall  - CT lumbar spine  - pain control  - no alarm signs  -Neurochecks    #Hypertension controlled   -d/c lisinopril due to concerns for hypotension  -Continue aspirin       Chronic  - COPD- not in exacerbation; cont home regimen  - HFpEF- d/c home lasix; not in exacerbation; ECHO 50% EF last month  - HTN   - Obesity: patient counseled on diet and lifestyle and diet modifications      The patient expressed appropriate understanding of and agreement with the discharge recommendations, medications, and plan.      Consults this admission:  IP CONSULT TO HOSPITALIST  IP CONSULT TO NEPHROLOGY  IP CONSULT TO CARDIOLOGY    Discharge Instruction:   Follow up appointments:cardiology  Primary care physician:  within 2 weeks    Diet:  cardiac diet and low fat, low cholesterol diet   Activity: activity as tolerated  Disposition: Discharged to:   [x]Home, []C, []SNF, []Acute Rehab, []Hospice   Condition on discharge: Stable    Discharge Medications:      Marlyce Kilts \"Stacy\"   Home Medication Instructions [id number]    Printed on:21 1400   Medication Information                      acetaminophen (TYLENOL) 500 MG tablet  Take 1 tablet by mouth 4 times daily as needed for Pain             albuterol sulfate  (90 Base) MCG/ACT inhaler  Inhale 2 puffs into the lungs every 4 hours as needed for Wheezing             diclofenac 1 % CREA  Place 1 Tube onto the skin daily as needed for Pain             dicyclomine (BENTYL) 10 MG capsule  Take 1 capsule by mouth 3 times daily (before meals)             fluticasone (FLONASE) 50 MCG/ACT nasal spray  2 sprays by Each Nostril route daily             gabapentin (NEURONTIN) 100 MG capsule  Take 1 capsule by mouth 2 times daily for 30 days. hydrOXYzine (ATARAX) 10 MG tablet  Take 10 mg by mouth every 8 hours as needed for Itching             loratadine-pseudoephedrine (CLARITIN-D 24HR)  MG per extended release tablet  Take 1 tablet by mouth daily             Nutritional Supplements (ENSURE HIGH PROTEIN) LIQD  Take 1 Can by mouth daily Meal replacement. pantoprazole (PROTONIX) 40 MG tablet  Take 1 tablet by mouth 2 times daily             potassium chloride (KLOR-CON M) 10 MEQ extended release tablet  Take 1 tablet by mouth daily             SYMBICORT 160-4.5 MCG/ACT AERO  Inhale 2 puffs into the lungs 2 times daily                 Objective Findings at Discharge:   /71   Pulse 74   Temp 97.8 °F (36.6 °C) (Oral)   Resp 22   Ht 5' 1\" (1.549 m)   Wt 225 lb 1.6 oz (102.1 kg)   LMP 06/05/2012   SpO2 99%   BMI 42.53 kg/m²            PHYSICAL EXAM   GEN Awake female, sitting upright in bed in no apparent distress. Appears given age. EYES Pupils are equally round. No scleral erythema, discharge, or conjunctivitis. HENT Mucous membranes are moist. Oral pharynx without exudates, no evidence of thrush. NECK Supple, no apparent thyromegaly or masses. RESP Clear to auscultation, no wheezes, rales or rhonchi. Symmetric chest movement while on room air. CARDIO/VASC S1/S2 auscultated. Regular rate without appreciable murmurs, rubs, or gallops. No JVD or carotid bruits. Peripheral pulses equal bilaterally and palpable. No peripheral edema.   GI Abdomen is soft without significant tenderness, masses, or guarding. Bowel sounds are normoactive. Rectal exam deferred.  No costovertebral angle tenderness. Normal appearing external genitalia. Mckeon catheter is not present. HEME/LYMPH No palpable cervical lymphadenopathy and no hepatosplenomegaly. No petechiae or ecchymoses. MSK No gross joint deformities. SKIN Normal coloration, warm, dry. NEURO Cranial nerves appear grossly intact, normal speech, no lateralizing weakness. PSYCH Awake, alert, oriented x 4. Affect appropriate.     BMP/CBC  Recent Labs     01/26/21  0403 01/26/21  1222 01/27/21  0531 01/27/21  1112    136 137 134*   K 3.2* 3.7 4.1 4.5   CL 98* 100 104 103   CO2 20* 23 22 21   BUN 31* 32* 26* 23   CREATININE 2.7* 2.5* 1.8* 1.7*   WBC 8.9  --   --   --    HCT 44.6  --   --   --      --   --   --        IMAGING:    Discharge Time of 29 minutes    Electronically signed by Mark Witt MD on 1/27/2021 at 2:00 PM

## 2021-01-28 ENCOUNTER — CARE COORDINATION (OUTPATIENT)
Dept: CASE MANAGEMENT | Age: 46
End: 2021-01-28

## 2021-01-28 NOTE — CARE COORDINATION
[facility] Transitions Initial Follow Up Call    Call within 2 business days of discharge: Yes    Patient: Fili Loredo Patient : 1975   MRN: [id number]  Reason for Admission:  Syncope and collapse  Discharge Date: 21 RARS: Readmission Risk Score: 12      Last Discharge [facility]       Complaint Diagnosis Description Type Department Provider    21 Foot Pain; Loss of Consciousness Syncope and collapse . .. ED to Hosp-Admission (Discharged) (ADMITTED) [address] MD            Follow Up:  COVID-19 Risk Monitoring:    COVID-19:  Not detected    Attempted patient contact. No answer to phone. Message left with CTN contact information with return call requested.       Future Appointments   Date Time Provider Vidal Morocho   2/3/2021  3:45 PM Srinivas Gallardo MD [facility]   2021 11:00 AM Henrietta Billings, APRN - CNP [facility] PULM [facility]       Ethel España RN

## 2021-01-29 ENCOUNTER — CARE COORDINATION (OUTPATIENT)
Dept: CASE MANAGEMENT | Age: 46
End: 2021-01-29

## 2021-01-29 LAB
EKG ATRIAL RATE: 54 BPM
EKG DIAGNOSIS: NORMAL
EKG P AXIS: 32 DEGREES
EKG P-R INTERVAL: 164 MS
EKG Q-T INTERVAL: 442 MS
EKG QRS DURATION: 100 MS
EKG QTC CALCULATION (BAZETT): 419 MS
EKG R AXIS: 30 DEGREES
EKG T AXIS: 53 DEGREES
EKG VENTRICULAR RATE: 54 BPM

## 2021-01-29 NOTE — CARE COORDINATION
[facility] Transitions Initial Follow Up Call    Call within 2 business days of discharge: Yes    Patient: New Iberia Poplar Patient : 1975   MRN: [id number]  Reason for Admission:   Syncope and collapse  Discharge Date: 21 RARS: Readmission Risk Score: 12      Last Discharge [facility]       Complaint Diagnosis Description Type Department Provider    21 Foot Pain; Loss of Consciousness Syncope and collapse . .. ED to Hosp-Admission (Discharged) (ADMITTED) [address] MD           Follow Up:  COVID-19 Risk Monitoring:    Attempted to reach patient for Care Transition follow up. No answer to phone. Message left with CTN contact information and request for call back.    Future Appointments   Date Time Provider Vidal Morocho   2021  1:00 PM Marla Viveros APRN - MARK [facility]   2/3/2021  3:45 PM Hiram Mancuso MD [address] [facility]   2021 11:00 AM SHUKRI Saucedo - MARK [address] [facility]       Paulino Venegas RN

## 2021-02-02 ENCOUNTER — VIRTUAL VISIT (OUTPATIENT)
Dept: INTERNAL MEDICINE CLINIC | Age: 46
End: 2021-02-02
Payer: MEDICAID

## 2021-02-02 DIAGNOSIS — E87.6 HYPOKALEMIA: ICD-10-CM

## 2021-02-02 DIAGNOSIS — N17.9 AKI (ACUTE KIDNEY INJURY) (HCC): ICD-10-CM

## 2021-02-02 DIAGNOSIS — R55 SYNCOPE AND COLLAPSE: ICD-10-CM

## 2021-02-02 DIAGNOSIS — Z76.89 ENCOUNTER FOR SUPPORT AND COORDINATION OF TRANSITION OF CARE: Primary | ICD-10-CM

## 2021-02-02 PROCEDURE — 99214 OFFICE O/P EST MOD 30 MIN: CPT | Performed by: NURSE PRACTITIONER

## 2021-02-02 PROCEDURE — 1111F DSCHRG MED/CURRENT MED MERGE: CPT | Performed by: NURSE PRACTITIONER

## 2021-02-02 RX ORDER — AZITHROMYCIN 250 MG/1
250 TABLET, FILM COATED ORAL DAILY
COMMUNITY
End: 2021-05-18

## 2021-02-02 RX ORDER — FUROSEMIDE 20 MG/1
20 TABLET ORAL 2 TIMES DAILY
COMMUNITY
End: 2021-04-09

## 2021-02-02 RX ORDER — IBUPROFEN 600 MG/1
600 TABLET ORAL EVERY 6 HOURS PRN
COMMUNITY
End: 2021-10-15 | Stop reason: CLARIF

## 2021-02-02 NOTE — PROGRESS NOTES
Post-Discharge Transitional Care Management Services or Hospital Follow Up      Dallie Cockayne   YOB: 1975    Date of Office Visit:  2/2/2021  Date of Hospital Admission: 1/26/21  Date of Hospital Discharge: 1/27/21  Risk of hospital readmission (high >=14%. Medium >=10%) :Readmission Risk Score: 12      Care management risk score Rising risk (score 2-5) and Complex Care (Scores >=6): 10     Non face to face  following discharge, date last encounter closed (first attempt may have been earlier): 1/29/2021  2:50 PM    Call initiated 2 business days of discharge: Yes    In brief, Mrs Sha Ware presented to [facility] on 1/26/21 for c/o possible syncopal episode at home. States she was using her CPCP and when she got up to walk around and felt like she tripped and fell. Unsure if she lost consciousness of not, but believes she may have passed out and her significant other found her around 5 minutes later. Denies having a preceeding chest pain, SOB, dizziness, palpitations or other related c/o. This has never had this happen in the past. While inpatient she underwent MRI of the brain which was benign. Also evaluated by cardiology, Dr Kirstin Torres and EKG was also benign and echo without acute findings. Nephrology also on board for mild PRIYA and hypokalemia which was corrected and back to normal limit by discharge. She was supposed to have Holter study, however, there was no holters available at time so she completed this on Monday with Dr Kirstin Torres. She admits she has been under a lot of stress with her significant other and also admits she has been drinking more pop and little water. Since discharge she has not had any recurrent issues or similar symptoms. No CP, SOB, dizziness, or pre-syncopal episodes.      Patient Active Problem List   Diagnosis    Fracture of ankle, medial malleolus, left, open    Dog bite of ankle, left, initial encounter    Encounter for postoperative wound check  Essential hypertension    Anxiety and depression    GERD (gastroesophageal reflux disease)    Smoker    Pneumonia due to infectious organism    Chest pain    ALAN (obstructive sleep apnea)    Morbid obesity with BMI of 45.0-49.9, adult (HCC)    Morbid obesity with BMI of 40.0-44.9, adult (HCC)    Antral (maxillary) polyp    Morbid obesity due to excess calories ([facility] [address]    Syncope and collapse       Allergies   Allergen Reactions    Dye [Iodides] Hives    Morphine Hives       Medications listed as ordered at the time of discharge from hospital   Teresita Anabela \"Stacy\"   Home Medication Instructions EDMUND:    Printed on:02/02/21 6798   Medication Information                      acetaminophen (TYLENOL) 500 MG tablet  Take 1 tablet by mouth 4 times daily as needed for Pain             albuterol sulfate  (90 Base) MCG/ACT inhaler  Inhale 2 puffs into the lungs every 4 hours as needed for Wheezing             azithromycin (ZITHROMAX) 250 MG tablet  Take 250 mg by mouth daily             diclofenac 1 % CREA  Place 1 Tube onto the skin daily as needed for Pain             dicyclomine (BENTYL) 10 MG capsule  Take 1 capsule by mouth 3 times daily (before meals)             fluticasone (FLONASE) 50 MCG/ACT nasal spray  2 sprays by Each Nostril route daily             furosemide (LASIX) 20 MG tablet  Take 20 mg by mouth 2 times daily             gabapentin (NEURONTIN) 100 MG capsule  Take 1 capsule by mouth 2 times daily for 30 days. hydrOXYzine (ATARAX) 10 MG tablet  Take 10 mg by mouth every 8 hours as needed for Itching             ibuprofen (ADVIL;MOTRIN) 600 MG tablet  Take 600 mg by mouth every 6 hours as needed for Pain             loratadine-pseudoephedrine (CLARITIN-D 24HR)  MG per extended release tablet  Take 1 tablet by mouth daily             Nutritional Supplements (ENSURE HIGH PROTEIN) LIQD  Take 1 Can by mouth daily Meal replacement. pantoprazole (PROTONIX) 40 MG tablet  Take 1 tablet by mouth 2 times daily             potassium chloride (KLOR-CON M) 10 MEQ extended release tablet  Take 1 tablet by mouth daily             SYMBICORT 160-4.5 MCG/ACT AERO  Inhale 2 puffs into the lungs 2 times daily                   Medications marked \"taking\" at this time  Outpatient Medications Marked as Taking for the 2/2/21 encounter (Virtual Visit) with SHUKRI Huber - CNP   Medication Sig Dispense Refill    ibuprofen (ADVIL;MOTRIN) 600 MG tablet Take 600 mg by mouth every 6 hours as needed for Pain      azithromycin (ZITHROMAX) 250 MG tablet Take 250 mg by mouth daily      furosemide (LASIX) 20 MG tablet Take 20 mg by mouth 2 times daily      acetaminophen (TYLENOL) 500 MG tablet Take 1 tablet by mouth 4 times daily as needed for Pain 360 tablet 1    pantoprazole (PROTONIX) 40 MG tablet Take 1 tablet by mouth 2 times daily 60 tablet 3    hydrOXYzine (ATARAX) 10 MG tablet Take 10 mg by mouth every 8 hours as needed for Itching      diclofenac 1 % CREA Place 1 Tube onto the skin daily as needed for Pain 120 g 0    loratadine-pseudoephedrine (CLARITIN-D 24HR)  MG per extended release tablet Take 1 tablet by mouth daily 30 tablet 3    albuterol sulfate  (90 Base) MCG/ACT inhaler Inhale 2 puffs into the lungs every 4 hours as needed for Wheezing 8.5 g 2    fluticasone (FLONASE) 50 MCG/ACT nasal spray 2 sprays by Each Nostril route daily 1 Bottle 0    dicyclomine (BENTYL) 10 MG capsule Take 1 capsule by mouth 3 times daily (before meals) 90 capsule 5    SYMBICORT 160-4.5 MCG/ACT AERO Inhale 2 puffs into the lungs 2 times daily 1 Inhaler 5    potassium chloride (KLOR-CON M) 10 MEQ extended release tablet Take 1 tablet by mouth daily 30 tablet 2    Nutritional Supplements (ENSURE HIGH PROTEIN) LIQD Take 1 Can by mouth daily Meal replacement.  32 Can 2 Neurologic: reflexes normal and symmetric, no cranial nerve deficit, gait, coordination and speech normal    Assessment/Plan:  1. Encounter for support and coordination of transition of care- see each individual section as below. - [place] DISCHARGE MEDS RECONCILED W/ CURRENT OUTPATIENT MED LIST  - CBC Auto Differential; Future  - Comprehensive Metabolic Panel; Future    2. Syncope and collapse- overall, workup has been benign including MRI brain, echo, and EKG. Possibly stress induced vasovagal syncope. More likely 2/2 dehydration given PRIYA. - CBC Auto Differential; Future  - Comprehensive Metabolic Panel; Future    3. Hypokalemia- replaced and WNL by discharge; will recheck with labs today to assure stability.   - CBC Auto Differential; Future  - Comprehensive Metabolic Panel; Future    4. PRIYA (acute kidney injury) ([facility] [address]- likely 2/2 poor oral fluid intake; continue to increase water intake and will re-assess. Discussed avoiding renal toxic agents. If continues to trends normal we will follow; if no improvement or worsening, we will arrange follow up with Dr Osei Dumont.   - CBC Auto Differential; Future  - Comprehensive Metabolic Panel; Future      Course of treatment, including any medications, possible imaging, referrals, and follow ups discussed with patient. All risks and benefits and possible side effects discussed with patient who agrees to plan of care and verbalizes understanding. All labs and imaging reviewed.      Medical Decision Making: moderate complexity

## 2021-02-03 ENCOUNTER — OFFICE VISIT (OUTPATIENT)
Dept: BARIATRICS/WEIGHT MGMT | Age: 46
End: 2021-02-03
Payer: MEDICAID

## 2021-02-03 VITALS
HEART RATE: 70 BPM | TEMPERATURE: 98.3 F | HEIGHT: 61 IN | BODY MASS INDEX: 40.27 KG/M2 | WEIGHT: 213.3 LBS | RESPIRATION RATE: 16 BRPM | SYSTOLIC BLOOD PRESSURE: 158 MMHG | OXYGEN SATURATION: 98 % | DIASTOLIC BLOOD PRESSURE: 92 MMHG

## 2021-02-03 DIAGNOSIS — E66.01 MORBID OBESITY WITH BMI OF 40.0-44.9, ADULT (HCC): Primary | ICD-10-CM

## 2021-02-03 DIAGNOSIS — F41.9 ANXIETY AND DEPRESSION: ICD-10-CM

## 2021-02-03 DIAGNOSIS — K21.00 GASTROESOPHAGEAL REFLUX DISEASE WITH ESOPHAGITIS WITHOUT HEMORRHAGE: ICD-10-CM

## 2021-02-03 DIAGNOSIS — G47.33 OSA (OBSTRUCTIVE SLEEP APNEA): ICD-10-CM

## 2021-02-03 DIAGNOSIS — F32.A ANXIETY AND DEPRESSION: ICD-10-CM

## 2021-02-03 DIAGNOSIS — I10 ESSENTIAL HYPERTENSION: ICD-10-CM

## 2021-02-03 PROCEDURE — G8417 CALC BMI ABV UP PARAM F/U: HCPCS | Performed by: SURGERY

## 2021-02-03 PROCEDURE — 1036F TOBACCO NON-USER: CPT | Performed by: SURGERY

## 2021-02-03 PROCEDURE — G8428 CUR MEDS NOT DOCUMENT: HCPCS | Performed by: SURGERY

## 2021-02-03 PROCEDURE — 99213 OFFICE O/P EST LOW 20 MIN: CPT | Performed by: SURGERY

## 2021-02-03 PROCEDURE — G8484 FLU IMMUNIZE NO ADMIN: HCPCS | Performed by: SURGERY

## 2021-02-03 PROCEDURE — 1111F DSCHRG MED/CURRENT MED MERGE: CPT | Performed by: SURGERY

## 2021-02-03 ASSESSMENT — ENCOUNTER SYMPTOMS
COUGH: 0
ABDOMINAL PAIN: 1
CONSTIPATION: 0
SORE THROAT: 0
VOICE CHANGE: 0
BLOOD IN STOOL: 0
PHOTOPHOBIA: 0
VOMITING: 0
BACK PAIN: 1
DIARRHEA: 0
NAUSEA: 0
WHEEZING: 0
ABDOMINAL DISTENTION: 1
TROUBLE SWALLOWING: 0
SHORTNESS OF BREATH: 1
ANAL BLEEDING: 0
COLOR CHANGE: 0

## 2021-02-03 NOTE — PROGRESS NOTES
BARIATRIC SURGERY OFFICE NOTE    SUBJECTIVE:    Patient presenting today originally referred from SHUKRI Rose CNP, for No chief complaint on file. Kimmie Argueta HPI: Kathryn Martinez is a 48 y.o. female being seen for follow up for bariatric weight loss surgery. Diamond'slast month weight gain/loss was - 6.9 Lbs. Addressed the status of the following co-morbidities:   1. GERD  improving. 2. Depression  improving. 3. ALAN  improving. Today signed the consent for her Robotic sleeve gastrectomy, and counseled in length re her Slimfast diet, x 2 wks before surgery, and agreed to proceed. Thoroughly reviewed the patient's medical history, family history, social history and review of systems with the patient today in theoffice. Please see medical record for pertinent positives.       Past Medical History:   Diagnosis Date    Anxiety     Bladder tumor     Dr. Traci Schmitz Depression     Diverticulitis     Last episode: 7/1/2018    Emphysema of lung ([address]     Follows with Vida Ly    GERD (gastroesophageal reflux disease)     History of blood transfusion 2018    After surgery for dog attack    Hypertension     Dr. Jana Biard IBS (irritable bowel syndrome)     Kidney stone     Last stone: 2013    PTSD (post-traumatic stress disorder) 02/03/2019    from dog attack    Sleep apnea     Uses BiPap      Past Surgical History:   Procedure Laterality Date    ABDOMEN SURGERY      BLADDER SURGERY  2012    CHOLECYSTECTOMY  2001    COLONOSCOPY  03/26/2018    normal colonoscopy - Dr. Arlet Mlain's    Nose    [address], TOTAL ABDOMINAL  2012    INCISION AND DRAINAGE Left 2/4/2019    ANKLE INCISION AND DRAINAGE performed by Andie Sosa DO at [address] Right 2/4/2019    ARM INCISION AND DRAINAGE performed by Andie Sosa DO at [address] [facility]  2012    tumor removal    OVARIAN CYST REMOVAL  2012 [facility]    TUBAL LIGATION  07/2011    UPPER GASTROINTESTINAL ENDOSCOPY N/A 1/4/2021    EGD POLYP ABLATION/OTHER OF ANTRAL POLYP AND BX performed by Lucy Burch MD at [facility] ENDOSCOPY     Current Outpatient Medications   Medication Sig Dispense Refill    ibuprofen (ADVIL;MOTRIN) 600 MG tablet Take 600 mg by mouth every 6 hours as needed for Pain      azithromycin (ZITHROMAX) 250 MG tablet Take 250 mg by mouth daily      furosemide (LASIX) 20 MG tablet Take 20 mg by mouth 2 times daily      acetaminophen (TYLENOL) 500 MG tablet Take 1 tablet by mouth 4 times daily as needed for Pain 360 tablet 1    pantoprazole (PROTONIX) 40 MG tablet Take 1 tablet by mouth 2 times daily 60 tablet 3    hydrOXYzine (ATARAX) 10 MG tablet Take 10 mg by mouth every 8 hours as needed for Itching      diclofenac 1 % CREA Place 1 Tube onto the skin daily as needed for Pain 120 g 0    loratadine-pseudoephedrine (CLARITIN-D 24HR)  MG per extended release tablet Take 1 tablet by mouth daily 30 tablet 3    gabapentin (NEURONTIN) 100 MG capsule Take 1 capsule by mouth 2 times daily for 30 days. 60 capsule 2    albuterol sulfate  (90 Base) MCG/ACT inhaler Inhale 2 puffs into the lungs every 4 hours as needed for Wheezing 8.5 g 2    fluticasone (FLONASE) 50 MCG/ACT nasal spray 2 sprays by Each Nostril route daily 1 Bottle 0    dicyclomine (BENTYL) 10 MG capsule Take 1 capsule by mouth 3 times daily (before meals) 90 capsule 5    SYMBICORT 160-4.5 MCG/ACT AERO Inhale 2 puffs into the lungs 2 times daily 1 Inhaler 5    potassium chloride (KLOR-CON M) 10 MEQ extended release tablet Take 1 tablet by mouth daily 30 tablet 2    Nutritional Supplements (ENSURE HIGH PROTEIN) LIQD Take 1 Can by mouth daily Meal replacement. 32 Can 2     No current facility-administered medications for this visit.        Allergies   Allergen Reactions    Dye [Iodides] Hives    Morphine Hives           Review of Systems Constitutional: Positive for fatigue. Negative for activity change, chills, diaphoresis and fever. HENT: Negative for sore throat, trouble swallowing and voice change. Eyes: Negative for photophobia and visual disturbance. Respiratory: Positive for shortness of breath. Negative for cough and wheezing. Cardiovascular: Positive for leg swelling. Negative for chest pain and palpitations. Gastrointestinal: Positive for abdominal distention and abdominal pain. Negative for anal bleeding, blood in stool, constipation, diarrhea, nausea and vomiting. Endocrine: Positive for polyphagia. Negative for cold intolerance, heat intolerance, polydipsia and polyuria. Genitourinary: Positive for urgency. Negative for dysuria, frequency and hematuria. Musculoskeletal: Positive for arthralgias, back pain and gait problem. Negative for joint swelling, myalgias and neck stiffness. Skin: Negative for color change and rash. Neurological: Negative for seizures, speech difficulty, light-headedness and numbness. Hematological: Negative for adenopathy. Does not bruise/bleed easily. Psychiatric/Behavioral: Positive for sleep disturbance. The patient is nervous/anxious. OBJECTIVE:    There were no vitals filed for this visit. There is no height or weight on file to calculate BMI. Physical Exam  Vitals signs reviewed. Constitutional:       General: She is not in acute distress. Appearance: She is well-developed. She is not diaphoretic. HENT:      Head: Normocephalic and atraumatic. Eyes:      General: No scleral icterus. Conjunctiva/sclera: Conjunctivae normal.      Pupils: Pupils are equal, round, and reactive to light. Neck:      Musculoskeletal: Normal range of motion. Thyroid: No thyromegaly. Vascular: No JVD. Trachea: No tracheal deviation. Cardiovascular:      Rate and Rhythm: Normal rate and regular rhythm. Heart sounds: Normal heart sounds. No murmur. No friction rub. No gallop. Pulmonary:      Effort: Pulmonary effort is normal. No respiratory distress. Breath sounds: No stridor. No wheezing or rales. Chest:      Chest wall: No tenderness. Abdominal:      General: Bowel sounds are normal. There is no distension. Palpations: Abdomen is soft. There is no mass. Tenderness: There is no abdominal tenderness. There is no guarding or rebound. Musculoskeletal: Normal range of motion. General: No tenderness. Lymphadenopathy:      Cervical: No cervical adenopathy. Skin:     General: Skin is warm and dry. Coloration: Skin is not pale. Findings: No erythema or rash. Neurological:      Mental Status: She is alert and oriented to person, place, and time. Cranial Nerves: No cranial nerve deficit. Coordination: Coordination normal.   Psychiatric:         Behavior: Behavior normal.         Thought Content: Thought content normal.         Judgment: Judgment normal.       Final Pathologic Diagnosis:   A.  Stomach, 1.5 cm and 0.5 cm polyps in antrum:        -     Polypoid fragment of gastric mucosa showing small   aggregates/nodules of benign heterotopic pancreatic tissue. Overlying gastric mucosa shows chronic active inflammation,   mucosal erosion and reactive changes. Cautery effect is   prominent. -     H. pylori immunostain does not reveal definitive   Helicobacter-like microorganisms. Ethlyn Stephy; biopsy:        -     Fragment of gastric mucosa showing mild chronic   active inflammation and reactive changes. -     H. pylori immunostain does not reveal definitive   Helicobacter-like microorganisms.          -     The biopsy findings can be compatible with   reactive gastropathy.  Please correlate clinically.         Electronically Signed Out By Alesha Farias MD   Comment:   Specimen A.  The slides are reviewed in intradepartmental   consultation ([facility]). Specimens Received:   A: Antral polyps 1.5cm and 0.5cm   B: Gastric biopsy         ASSESSMENT & PLAN:    1. Morbid obesity with BMI of 40.0-44.9, adult ([address]    2. ALAN (obstructive sleep apnea)    3. Gastroesophageal reflux disease with esophagitis without hemorrhage    4. Essential hypertension    5. Anxiety and depression           Today informed about the consent for her Robotic sleeve gastrectomy, and counseled in length re her Slimfast diet, x 2 wks before surgery, and agreed to proceed. Patient was encouraged to journal all food intake. Keep calorie level atapproximately 4798-3195. Protein intake is to be a minimum of 60-80 grams per day. Water drinking was encouraged with a goal of 64oz-128oz daily. Beverages to be calorie free except for milk. Every other beverage should bewater. They are to avoid soda. Continue to increase level of physical activity. I counseled the patient regarding diet and exercise, in preparation for her planned Robotic Sleeve Gastrectomy. Counting calories, complying with the dietitian's recommendations, and complying with the preoperative workup including the dietitian counseling, exercise physiologist counseling,cardiologist evaluation and pre-operative optimization, pulmonologist evaluation and pre operative optimization, pre operative EGD evaluation. The patient expressed understanding and willingness to comply nicely; allquestions and concerns addressed in details. Patient counseled on the risks, benefits, and alternatives oftreatment plan at length while in the office today. Patient states an understanding and willingness to proceed with the plan. Ordered the preop Orders. Follow Up:  Return in about 3 weeks (around 2/24/2021) for Surgery.       Maria Esther Andrews MD, FACS, FICS  Member of the [id number] of [employer] and [employer]    [phone]    2/3/21

## 2021-02-09 DIAGNOSIS — K21.9 GASTROESOPHAGEAL REFLUX DISEASE WITHOUT ESOPHAGITIS: ICD-10-CM

## 2021-02-09 RX ORDER — OMEPRAZOLE 20 MG/1
CAPSULE, DELAYED RELEASE ORAL
Qty: 30 CAPSULE | Refills: 3 | Status: SHIPPED | OUTPATIENT
Start: 2021-02-09 | End: 2021-06-08 | Stop reason: SDUPTHER

## 2021-02-16 ENCOUNTER — VIRTUAL VISIT (OUTPATIENT)
Dept: PULMONOLOGY | Age: 46
End: 2021-02-16
Payer: MEDICAID

## 2021-02-16 DIAGNOSIS — Z99.89 OSA ON CPAP: Primary | ICD-10-CM

## 2021-02-16 DIAGNOSIS — Z00.00 HEALTHCARE MAINTENANCE: ICD-10-CM

## 2021-02-16 DIAGNOSIS — E66.01 MORBID OBESITY WITH BMI OF 40.0-44.9, ADULT (HCC): ICD-10-CM

## 2021-02-16 DIAGNOSIS — G47.33 OSA ON CPAP: Primary | ICD-10-CM

## 2021-02-16 PROCEDURE — 99214 OFFICE O/P EST MOD 30 MIN: CPT | Performed by: NURSE PRACTITIONER

## 2021-02-16 PROCEDURE — G8428 CUR MEDS NOT DOCUMENT: HCPCS | Performed by: NURSE PRACTITIONER

## 2021-02-16 PROCEDURE — 1111F DSCHRG MED/CURRENT MED MERGE: CPT | Performed by: NURSE PRACTITIONER

## 2021-02-16 NOTE — PROGRESS NOTES
*                          [address] [facility]   Iza Christian is a 48 y.o. female evaluated via my chart video on 2/16/2021. Consent:  She and/or health care decision maker is aware that that she may receive a bill for this telephone service, depending on her insurance coverage, and has provided verbal consent to proceed    Patient ID: Iza Christian is a 48 y.o. female, who presented via video from home for ALAN compliancy. Initial sleep study was completed on 8/5/2020  and demonstrated AHI 70 events/hour. AutoPAP was ordered with IPAP 15, EPAP 7, RR 12 with via full mask. Iza Christian has been wearing CPAP for average of 5 hours, 9 minutes, and is compliant with use. She states improved rest, less daytime fatigue and sleepiness. DME: Derek Ulrich is anticipating weight loss surgery soon, she is waiting on insurance approval. She states her weight this morning is 200#. Ginna Blair states they are practicing social distancing, wearing a mask when out in public, washing hands frequently or using hand . Denies any fever, chills, malaise, change in sensation of taste or smell, headache or lightheadedness. Denies any known contacts with persons with respiratory infection, positive for coronavirus or under investigation for possible coronavirus exposure. Influenza immunization: Up-to-date for 2021 season  Pneumococcal immunization: Has not received  COVID-19 immunization: Has not received  Smoking history:  Former smoker, quit 7/6/2020, 19-year pack history  PCP:  Serena Leonard CNP    History:      Social History     Socioeconomic History    Marital status: Single     Spouse name: Not on file    Number of children: Not on file    Years of education: Not on file    Highest education level: Not on file   Occupational History    Not on file Social Needs    Financial resource strain: Not on file    Food insecurity     Worry: Not on file     Inability: Not on file    Transportation needs     Medical: Not on file     Non-medical: Not on file   Tobacco Use    Smoking status: Former Smoker     Packs/day: 1.00     Years: 19.00     Pack years: 19.00     Types: Cigarettes     Start date: 2001     Quit date: 2020     Years since quittin.5    Smokeless tobacco: Never Used   Substance and Sexual Activity    Alcohol use: No    Drug use: Yes     Types: Marijuana     Comment: pt states last consumption was sometime within a month    Sexual activity: Yes     Partners: Male   Lifestyle    Physical activity     Days per week: Not on file     Minutes per session: Not on file    Stress: Not on file   Relationships    Social connections     Talks on phone: Not on file     Gets together: Not on file     Attends Sikh service: Not on file     Active member of club or organization: Not on file     Attends meetings of clubs or organizations: Not on file     Relationship status: Not on file    Intimate partner violence     Fear of current or ex partner: Not on file     Emotionally abused: Not on file     Physically abused: Not on file     Forced sexual activity: Not on file   Other Topics Concern    Not on file   Social History Narrative    Not on file       Prior to Admission medications    Medication Sig Start Date End Date Taking?  Authorizing Provider   omeprazole (PRILOSEC) 20 MG delayed release capsule take 1 capsule by mouth once daily 21   Zhang Bonner APRN - CNP   ibuprofen (ADVIL;MOTRIN) 600 MG tablet Take 600 mg by mouth every 6 hours as needed for Pain    Historical Provider, MD   azithromycin (ZITHROMAX) 250 MG tablet Take 250 mg by mouth daily    Historical Provider, MD   furosemide (LASIX) 20 MG tablet Take 20 mg by mouth 2 times daily    Historical Provider, MD   acetaminophen (TYLENOL) 500 MG tablet Take 1 tablet by mouth 4 times daily as needed for Pain 1/4/21   Mirtha Howard MD   pantoprazole (PROTONIX) 40 MG tablet Take 1 tablet by mouth 2 times daily 1/4/21   Mirtha Howard MD   hydrOXYzine (ATARAX) 10 MG tablet Take 10 mg by mouth every 8 hours as needed for Itching    Historical Provider, MD   diclofenac 1 % CREA Place 1 Tube onto the skin daily as needed for Pain 12/16/20   Jesús Mo, APRN - CNP   loratadine-pseudoephedrine (CLARITIN-D 24HR)  MG per extended release tablet Take 1 tablet by mouth daily 12/16/20   Jesús Mo, APRN - CNP   gabapentin (NEURONTIN) 100 MG capsule Take 1 capsule by mouth 2 times daily for 30 days. 12/9/20 1/26/21  Jesús Mo, APRN - CNP   albuterol sulfate  (90 Base) MCG/ACT inhaler Inhale 2 puffs into the lungs every 4 hours as needed for Wheezing 12/9/20   Jesús Mo, APRN - CNP   fluticasone [facility]) 50 MCG/ACT nasal spray 2 sprays by Each Nostril route daily 12/9/20   Jesús Mo, APRN - CNP   dicyclomine (BENTYL) 10 MG capsule Take 1 capsule by mouth 3 times daily (before meals) 12/9/20   Jesús Mo, APRN - CNP   SYMBICORT 160-4.5 MCG/ACT AERO Inhale 2 puffs into the lungs 2 times daily 12/9/20   Jesús Mo, APRN - CNP   potassium chloride (KLOR-CON M) 10 MEQ extended release tablet Take 1 tablet by mouth daily 12/9/20   Jesús Mo, APRN - CNP   Nutritional Supplements (ENSURE HIGH PROTEIN) LIQD Take 1 Can by mouth daily Meal replacement.  12/9/20   Jeúss Mo, APRN - CNP       Allergies as of 02/16/2021 - Review Complete 02/03/2021   Allergen Reaction Noted    Dye [iodides] Hives 12/31/2017    Morphine Hives 07/06/2017       Patient Active Problem List   Diagnosis    Fracture of ankle, medial malleolus, left, open    Dog bite of ankle, left, initial encounter    Encounter for postoperative wound check    Essential hypertension    Anxiety and depression    GERD (gastroesophageal reflux disease)    Smoker    Pneumonia due to infectious organism    Chest pain    ALAN (obstructive sleep apnea)    Morbid obesity with BMI of 45.0-49.9, adult (HCC)    Morbid obesity with BMI of 40.0-44.9, adult (HCC)    Antral (maxillary) polyp    Morbid obesity due to excess calories ([address]    Syncope and collapse       Past Medical History:   Diagnosis Date    Anxiety     Bladder tumor     Dr. Shreya Alaniz Depression     Diverticulitis     Last episode: 7/1/2018    Emphysema of lung ([address]     Follows with Wayne Stapleton    GERD (gastroesophageal reflux disease)     History of blood transfusion 2018    After surgery for dog attack    Hypertension     Dr. Baldev Salazar IBS (irritable bowel syndrome)     Kidney stone     Last stone: 2013    PTSD (post-traumatic stress disorder) 02/03/2019    from dog attack    Sleep apnea     Uses BiPap       Past Surgical History:   Procedure Laterality Date    ABDOMEN SURGERY      BLADDER SURGERY  2012    CHOLECYSTECTOMY  2001    COLONOSCOPY  03/26/2018    normal colonoscopy - Dr. Charissa Sharp  1980's    Nose    [address], TOTAL ABDOMINAL  2012    INCISION AND DRAINAGE Left 2/4/2019    ANKLE INCISION AND DRAINAGE performed by Aisha Cary DO at [address] Right 2/4/2019    ARM INCISION AND DRAINAGE performed by Aisha Cary DO at [address] [facility]  2012    tumor removal    OVARIAN CYST REMOVAL  2012    TONSILLECTOMY  1980    TUBAL LIGATION  07/2011    UPPER GASTROINTESTINAL ENDOSCOPY N/A 1/4/2021    EGD POLYP ABLATION/OTHER OF ANTRAL POLYP AND BX performed by Hiram Mancuso MD at [facility] ENDOSCOPY       Family History   Problem Relation Age of Onset    Alcohol Abuse Mother     Sleep Apnea Father          REVIEW OF SYSTEMS:    CONSTITUTIONAL:  negative for fevers, chills, diaphoresis, activity change, appetite change, night sweats and unexpected weight change.    HEENT:  negative for hearing loss,  sinus pressure, nasal congestion, instructions. Follow-Up:    Return in about 6 months (around 8/16/2021) for ALAN compliancy. I affirm this is a Patient initiated episode with an established patient who has not had a related appointment within my department in the past 7 days or scheduled within the next 24 hours. I have spent 26 minutes reviewing previous notes, test results and communicating with patient discussing the diagnosis and importance of treatment plan.     Note: not billable if this call serves to triage the patient into an appointment for the relevant concern    Electronically signed by SHUKRI Adam CNP on 2/18/2021 at 7:56 PM

## 2021-02-16 NOTE — PROGRESS NOTES
*                          [address] [facility]   Fili Loredo is a 48 y.o. female evaluated via my chart video on 2/16/2021. Consent:  She and/or health care decision maker is aware that that she may receive a bill for this telephone service, depending on her insurance coverage, and has provided verbal consent to nubiae    Patient ID: Fili Loredo is a 48 y.o. female, who presented via video from home for ALAN compliancy. Initial sleep study was completed on ***  and demonstrated AHI *** events/hour. *** CPAP was ordered with pressures ***  H2O with via *** mask. Fili Loredo has been wearing CPAP for average of *** hours, *** minutes, and *** compliant with use. *** states improved rest, less daytime fatigue and sleepiness. DME: ***    Vladimir Best states they are practicing social distancing, wearing a mask when out in public, washing hands frequently or using hand . Denies any fever, chills, malaise, change in sensation of taste or smell, headache or lightheadedness. Denies any known contacts with persons with respiratory infection, positive for coronavirus or under investigation for possible coronavirus exposure.     Influenza immunization: ***  Pneumococcal immunization: ***  COVID-19 immunization:  ***  Smoking history: ***  PCP:***    History:      Social History     Socioeconomic History    Marital status: Single     Spouse name: Not on file    Number of children: Not on file    Years of education: Not on file    Highest education level: Not on file   Occupational History    Not on file   Social Needs    Financial resource strain: Not on file    Food insecurity     Worry: Not on file     Inability: Not on file    Transportation needs     Medical: Not on file     Non-medical: Not on file   Tobacco Use    Smoking status: Former Smoker     Packs/day: 1.00 Years: 19.00     Pack years: 19.00     Types: Cigarettes     Start date: 2001     Quit date: 2020     Years since quittin.5    Smokeless tobacco: Never Used   Substance and Sexual Activity    Alcohol use: No    Drug use: Yes     Types: Marijuana     Comment: pt states last consumption was sometime within a month    Sexual activity: Yes     Partners: Male   Lifestyle    Physical activity     Days per week: Not on file     Minutes per session: Not on file    Stress: Not on file   Relationships    Social connections     Talks on phone: Not on file     Gets together: Not on file     Attends Mandaeism service: Not on file     Active member of club or organization: Not on file     Attends meetings of clubs or organizations: Not on file     Relationship status: Not on file    Intimate partner violence     Fear of current or ex partner: Not on file     Emotionally abused: Not on file     Physically abused: Not on file     Forced sexual activity: Not on file   Other Topics Concern    Not on file   Social History Narrative    Not on file       Prior to Admission medications    Medication Sig Start Date End Date Taking?  Authorizing Provider   omeprazole (PRILOSEC) 20 MG delayed release capsule take 1 capsule by mouth once daily 21   SHUKRI Stewart CNP   ibuprofen (ADVIL;MOTRIN) 600 MG tablet Take 600 mg by mouth every 6 hours as needed for Pain    Historical Provider, MD   azithromycin (ZITHROMAX) 250 MG tablet Take 250 mg by mouth daily    Historical Provider, MD   furosemide (LASIX) 20 MG tablet Take 20 mg by mouth 2 times daily    Historical Provider, MD   acetaminophen (TYLENOL) 500 MG tablet Take 1 tablet by mouth 4 times daily as needed for Pain 21   Nam Mixon MD   pantoprazole (PROTONIX) 40 MG tablet Take 1 tablet by mouth 2 times daily 21   Nam Mixon MD hydrOXYzine (ATARAX) 10 MG tablet Take 10 mg by mouth every 8 hours as needed for Itching    Historical Provider, MD   diclofenac 1 % CREA Place 1 Tube onto the skin daily as needed for Pain 12/16/20   Arline FlirtSHUKRI CNP   loratadine-pseudoephedrine (CLARITIN-D 24HR)  MG per extended release tablet Take 1 tablet by mouth daily 12/16/20   Arline FlirSHUKRI laughlin CNP   gabapentin (NEURONTIN) 100 MG capsule Take 1 capsule by mouth 2 times daily for 30 days. 12/9/20 1/26/21  Arline FlirtSHUKRI - CNP   albuterol sulfate  (90 Base) MCG/ACT inhaler Inhale 2 puffs into the lungs every 4 hours as needed for Wheezing 12/9/20   Arline Flirt, SHUKRI - MARK   fluticasone [facility]) 50 MCG/ACT nasal spray 2 sprays by Each Nostril route daily 12/9/20   Arline FlirtSHUKRI CNP   dicyclomine (BENTYL) 10 MG capsule Take 1 capsule by mouth 3 times daily (before meals) 12/9/20   Arline Flirt, SHUKRI - CNP   SYMBICORT 160-4.5 MCG/ACT AERO Inhale 2 puffs into the lungs 2 times daily 12/9/20   Arline Flirt, SHUKRI - CNP   potassium chloride (KLOR-CON M) 10 MEQ extended release tablet Take 1 tablet by mouth daily 12/9/20   Arline FlirtSHUKRI CNP   Nutritional Supplements (ENSURE HIGH PROTEIN) LIQD Take 1 Can by mouth daily Meal replacement.  12/9/20   Arline FlirSHUKRI laughlin CNP       Allergies as of 02/16/2021 - Review Complete 02/03/2021   Allergen Reaction Noted    Dye [iodides] Hives 12/31/2017    Morphine Hives 07/06/2017       Patient Active Problem List   Diagnosis    Fracture of ankle, medial malleolus, left, open    Dog bite of ankle, left, initial encounter    Encounter for postoperative wound check    Essential hypertension    Anxiety and depression    GERD (gastroesophageal reflux disease)    Smoker    Pneumonia due to infectious organism    Chest pain    ALAN (obstructive sleep apnea)    Morbid obesity with BMI of 45.0-49.9, adult ([facility]    Morbid obesity with BMI of 40.0-44.9, adult ([facility]  Antral (maxillary) polyp    Morbid obesity due to excess calories ([address]    Syncope and collapse       Past Medical History:   Diagnosis Date    Anxiety     Bladder tumor     Dr. Nallely Zuñiga Depression     Diverticulitis     Last episode: 7/1/2018    Emphysema of lung ([address]     Follows with Debbie Ashton    GERD (gastroesophageal reflux disease)     History of blood transfusion 2018    After surgery for dog attack    Hypertension     Dr. Yoly Ogden IBS (irritable bowel syndrome)     Kidney stone     Last stone: 2013    PTSD (post-traumatic stress disorder) 02/03/2019    from dog attack    Sleep apnea     Uses BiPap       Past Surgical History:   Procedure Laterality Date    ABDOMEN SURGERY      BLADDER SURGERY  2012    CHOLECYSTECTOMY  2001    COLONOSCOPY  03/26/2018    normal colonoscopy - Dr. Leigh Carrel  1980's    Nose    [address], TOTAL ABDOMINAL  2012    INCISION AND DRAINAGE Left 2/4/2019    ANKLE INCISION AND DRAINAGE performed by Sal Allison DO at [address] [place] [address] Right 2/4/2019    ARM INCISION AND DRAINAGE performed by Sal Allison DO at [address] [facility]  2012    tumor removal    OVARIAN CYST REMOVAL  2012    TONSILLECTOMY  1980    TUBAL LIGATION  07/2011    UPPER GASTROINTESTINAL ENDOSCOPY N/A 1/4/2021    EGD POLYP ABLATION/OTHER OF ANTRAL POLYP AND BX performed by Ephraim Tay MD at [facility] ENDOSCOPY       Family History   Problem Relation Age of Onset    Alcohol Abuse Mother     Sleep Apnea Father          REVIEW OF SYSTEMS:    CONSTITUTIONAL:  negative for fevers, chills, diaphoresis, activity change, appetite change, night sweats and unexpected weight change.    HEENT:  negative for hearing loss,  sinus pressure, nasal congestion, epistaxis and snoring  RESPIRATORY:  negative for SOB, cough, wheeze CARDIOVASCULAR:  negative for chest pain, palpitations, exertional chest pressure/discomfort, edema, syncope  GASTROINTESTINAL: negative for nausea, vomiting, diarrhea, constipation, blood in stool and abdominal pain  HEMATOLOGIC/LYMPHATIC:  negative for easy bruising, bleeding and lymphadenopathy  ALLERGIC/IMMUNOLOGIC:  negative for recurrent infections, angioedema, anaphylaxis and drug reaction  MUSCULOSKELETAL:  negative for  pain, joint swelling, decreased range of motion and muscle weakness  NEURO: negative for headache, AMS, decrease sensations  SKIN: Negative for rashes or lesions    Objective:     Gen: No distress. Eyes:  No sclera icterus. No conjunctival injection. ENT:  External appearance of ears and nose normal.  Neck: Trachea midline. No obvious mass seen. Resp: No respiratory distress noted, no cough present during Video exam.  Skin: Exposed areas appear in tact  M/S: No cyanosis. No clubbing. Psych: Oriented x 3. No anxiety. Intact judgement and insight. Assessment and Plan     1. ALAN on ***-   Patient is in compliance with use of ***. I have discussed the importance of cleaning mask and tubing, need for replacement of mask and tubing on a regular basis, will see back in the office for reevaluation and compliancy. 2. Health maintenance   We have discussed the need to maintain yearly flu immunization, pneumococcal vaccination. We have discussed Coronavirus precaution including: social distancing when needing to be in public, handwashing practice, wiping items touched in public such as gas pumps, door handles, shopping carts, etc. Self monitoring for infection - fever, chills, cough, SOB. Should they develop symptoms they should call office for further instructions. Follow-Up:    No follow-ups on file. I affirm this is a Patient initiated episode with an established patient who has not had a related appointment within my department in the past 7 days or scheduled within the next 24 hours. I have spent *** minutes reviewing previous notes, test results and communicating with patient discussing the diagnosis and importance of treatment plan. Note: not billable if this call serves to triage the patient into an appointment for the relevant concern    Electronically signed by SHUKRI Chauhan CNP on 2/16/2021 at 10:01 AM                                                                                                                      *                          [facility]   Jostin Wright is a 48 y.o. female evaluated via my chart video on 2/16/2021. Consent:  She and/or health care decision maker is aware that that she may receive a bill for this telephone service, depending on her insurance coverage, and has provided verbal consent to procee    Patient ID: Jostin Wright is a 48 y.o. female, who presented via video from home for ALAN compliancy. Initial sleep study was completed on ***  and demonstrated AHI *** events/hour. *** CPAP was ordered with pressures ***  H2O with via *** mask. Jostin Wright has been wearing CPAP for average of *** hours, *** minutes, and *** compliant with use. *** states improved rest, less daytime fatigue and sleepiness. DME: ***    Adam Scott states they are practicing social distancing, wearing a mask when out in public, washing hands frequently or using hand . Denies any fever, chills, malaise, change in sensation of taste or smell, headache or lightheadedness. Denies any known contacts with persons with respiratory infection, positive for coronavirus or under investigation for possible coronavirus exposure.     Influenza immunization: ***  Pneumococcal immunization: ***  COVID-19 immunization:  ***  Smoking history: ***  PCP:*** History:      Social History     Socioeconomic History    Marital status: Single     Spouse name: Not on file    Number of children: Not on file    Years of education: Not on file    Highest education level: Not on file   Occupational History    Not on file   Social Needs    Financial resource strain: Not on file    Food insecurity     Worry: Not on file     Inability: Not on file    Transportation needs     Medical: Not on file     Non-medical: Not on file   Tobacco Use    Smoking status: Former Smoker     Packs/day: 1.00     Years: 19.00     Pack years: 19.00     Types: Cigarettes     Start date: 2001     Quit date: 2020     Years since quittin.5    Smokeless tobacco: Never Used   Substance and Sexual Activity    Alcohol use: No    Drug use: Yes     Types: Marijuana     Comment: pt states last consumption was sometime within a month    Sexual activity: Yes     Partners: Male   Lifestyle    Physical activity     Days per week: Not on file     Minutes per session: Not on file    Stress: Not on file   Relationships    Social connections     Talks on phone: Not on file     Gets together: Not on file     Attends Rastafari service: Not on file     Active member of club or organization: Not on file     Attends meetings of clubs or organizations: Not on file     Relationship status: Not on file    Intimate partner violence     Fear of current or ex partner: Not on file     Emotionally abused: Not on file     Physically abused: Not on file     Forced sexual activity: Not on file   Other Topics Concern    Not on file   Social History Narrative    Not on file       Prior to Admission medications    Medication Sig Start Date End Date Taking?  Authorizing Provider   omeprazole (PRILOSEC) 20 MG delayed release capsule take 1 capsule by mouth once daily 21   SHUKRI Edward - CNP ibuprofen (ADVIL;MOTRIN) 600 MG tablet Take 600 mg by mouth every 6 hours as needed for Pain    Historical Provider, MD   azithromycin (ZITHROMAX) 250 MG tablet Take 250 mg by mouth daily    Historical Provider, MD   furosemide (LASIX) 20 MG tablet Take 20 mg by mouth 2 times daily    Historical Provider, MD   acetaminophen (TYLENOL) 500 MG tablet Take 1 tablet by mouth 4 times daily as needed for Pain 1/4/21   Maikol Borges MD   pantoprazole (PROTONIX) 40 MG tablet Take 1 tablet by mouth 2 times daily 1/4/21   Maikol Borges MD   hydrOXYzine (ATARAX) 10 MG tablet Take 10 mg by mouth every 8 hours as needed for Itching    Historical Provider, MD   diclofenac 1 % CREA Place 1 Tube onto the skin daily as needed for Pain 12/16/20   Dave Washington APRN - MARK   loratadine-pseudoephedrine (CLARITIN-D 24HR)  MG per extended release tablet Take 1 tablet by mouth daily 12/16/20   Dave Washington APRBRIAN - CNP   gabapentin (NEURONTIN) 100 MG capsule Take 1 capsule by mouth 2 times daily for 30 days. 12/9/20 1/26/21  Austenhempete Washington, APRN - CNP   albuterol sulfate  (90 Base) MCG/ACT inhaler Inhale 2 puffs into the lungs every 4 hours as needed for Wheezing 12/9/20   Dave Washington APRN - CNP   fluticasone [facility]) 50 MCG/ACT nasal spray 2 sprays by Each Nostril route daily 12/9/20   Dave Washington APRN - CNP   dicyclomine (BENTYL) 10 MG capsule Take 1 capsule by mouth 3 times daily (before meals) 12/9/20   Austenhempete Washington APRN - CNP   SYMBICORT 160-4.5 MCG/ACT AERO Inhale 2 puffs into the lungs 2 times daily 12/9/20   Dave Washington APRN - CNP   potassium chloride (KLOR-CON M) 10 MEQ extended release tablet Take 1 tablet by mouth daily 12/9/20   Austenhempete Washington APRN - CNP   Nutritional Supplements (ENSURE HIGH PROTEIN) LIQD Take 1 Can by mouth daily Meal replacement.  12/9/20   Dave Washington APRBRIAN - CNP       Allergies as of 02/16/2021 - Review Complete 02/03/2021   Allergen Reaction Noted  Dye [iodides] Hives 12/31/2017    Morphine Hives 07/06/2017       Patient Active Problem List   Diagnosis    Fracture of ankle, medial malleolus, left, open    Dog bite of ankle, left, initial encounter    Encounter for postoperative wound check    Essential hypertension    Anxiety and depression    GERD (gastroesophageal reflux disease)    Smoker    Pneumonia due to infectious organism    Chest pain    ALAN (obstructive sleep apnea)    Morbid obesity with BMI of 45.0-49.9, adult ([address]    Morbid obesity with BMI of 40.0-44.9, adult ([address]    Antral (maxillary) polyp    Morbid obesity due to excess calories ([address]    Syncope and collapse       Past Medical History:   Diagnosis Date    Anxiety     Bladder tumor     Dr. Ramesh Quinn    Depression     Diverticulitis     Last episode: 7/1/2018    Emphysema of lung ([address]     Follows with Lorri Sun    GERD (gastroesophageal reflux disease)     History of blood transfusion 2018    After surgery for dog attack    Hypertension     Dr. Ligia Krishna    IBS (irritable bowel syndrome)     Kidney stone     Last stone: 2013    PTSD (post-traumatic stress disorder) 02/03/2019    from dog attack    Sleep apnea     Uses BiPap       Past Surgical History:   Procedure Laterality Date    ABDOMEN SURGERY      BLADDER SURGERY  2012    CHOLECYSTECTOMY  2001    COLONOSCOPY  03/26/2018    normal colonoscopy - Dr. Scarlet Ogden  1980's    Nose     HYSTERECTOMY  2011    HYSTERECTOMY, TOTAL ABDOMINAL  2012    INCISION AND DRAINAGE Left 2/4/2019    ANKLE INCISION AND DRAINAGE performed by Vita Deshpande DO at [address] Right 2/4/2019    ARM INCISION AND DRAINAGE performed by Vita Deshpande DO at [address] [facility]  2012    tumor removal    OVARIAN CYST REMOVAL  2012    TONSILLECTOMY  1980    TUBAL LIGATION  07/2011    UPPER GASTROINTESTINAL ENDOSCOPY N/A 1/4/2021 We have discussed the need to maintain yearly flu immunization, pneumococcal vaccination. We have discussed Coronavirus precaution including: social distancing when needing to be in public, handwashing practice, wiping items touched in public such as gas pumps, door handles, shopping carts, etc. Self monitoring for infection - fever, chills, cough, SOB. Should they develop symptoms they should call office for further instructions. Follow-Up:    No follow-ups on file. I affirm this is a Patient initiated episode with an established patient who has not had a related appointment within my department in the past 7 days or scheduled within the next 24 hours. I have spent *** minutes reviewing previous notes, test results and communicating with patient discussing the diagnosis and importance of treatment plan.     Note: not billable if this call serves to triage the patient into an appointment for the relevant concern    Electronically signed by SHUKRI Bain CNP on 2/16/2021 at 10:01 AM

## 2021-02-16 NOTE — LETTER
[address] [facility] Pulmon[facility]  [address]  Phone: [phone]    SHUKRI Saleh CNP        February 18, 2021      [facility] you for allowing me to participate in the care of Sourav Triana, date of birth 1975. [facility] has completed her respiratory risk stratification for upcoming bariatric weight loss surgery. She was noted on an apnea link to have significant obstructive sleep apnea and underwent sleep study on 9/1/2020. She since then has been placed on CPAP and has been compliant with the wearing of her CPAP. She states she has improved rest less daytime fatigue and sleepiness. I feel at this time that [facility] will do very well with bariatric weight loss surgery. While she is continue to lose weight on her own I feel surgery will further benefit her overall health. I do plan on reassessing her sleep apnea after she has reached her goal weight to assess needs for ongoing CPAP. Please do not hesitate to reach out to me for any additional information you may need for Diamond's upcoming surgery.     Sincerely,        SHUKRI Saleh CNP

## 2021-02-18 ENCOUNTER — TELEPHONE (OUTPATIENT)
Dept: PULMONOLOGY | Age: 46
End: 2021-02-18

## 2021-02-18 NOTE — TELEPHONE ENCOUNTER
Patient called asking about her clearance letter for surgery. It needs sent to Dr. Yoni Busby office.

## 2021-03-03 ENCOUNTER — OFFICE VISIT (OUTPATIENT)
Dept: BARIATRICS/WEIGHT MGMT | Age: 46
End: 2021-03-03
Payer: MEDICAID

## 2021-03-03 VITALS
WEIGHT: 201.6 LBS | RESPIRATION RATE: 18 BRPM | HEIGHT: 61 IN | TEMPERATURE: 98.4 F | SYSTOLIC BLOOD PRESSURE: 116 MMHG | BODY MASS INDEX: 38.06 KG/M2 | HEART RATE: 74 BPM | DIASTOLIC BLOOD PRESSURE: 64 MMHG

## 2021-03-03 DIAGNOSIS — E66.01 MORBID OBESITY WITH BMI OF 40.0-44.9, ADULT (HCC): ICD-10-CM

## 2021-03-03 DIAGNOSIS — I10 ESSENTIAL HYPERTENSION: Primary | ICD-10-CM

## 2021-03-03 PROCEDURE — 99213 OFFICE O/P EST LOW 20 MIN: CPT | Performed by: NURSE PRACTITIONER

## 2021-03-03 PROCEDURE — G8417 CALC BMI ABV UP PARAM F/U: HCPCS | Performed by: NURSE PRACTITIONER

## 2021-03-03 PROCEDURE — 1036F TOBACCO NON-USER: CPT | Performed by: NURSE PRACTITIONER

## 2021-03-03 PROCEDURE — G8428 CUR MEDS NOT DOCUMENT: HCPCS | Performed by: NURSE PRACTITIONER

## 2021-03-03 PROCEDURE — G8484 FLU IMMUNIZE NO ADMIN: HCPCS | Performed by: NURSE PRACTITIONER

## 2021-03-03 ASSESSMENT — ENCOUNTER SYMPTOMS
DIARRHEA: 0
NAUSEA: 0
WHEEZING: 0
ABDOMINAL DISTENTION: 0
CHEST TIGHTNESS: 0
ABDOMINAL PAIN: 0
SHORTNESS OF BREATH: 0
EYE PAIN: 0
RHINORRHEA: 0
TROUBLE SWALLOWING: 0

## 2021-04-08 ENCOUNTER — TELEPHONE (OUTPATIENT)
Dept: PULMONOLOGY | Age: 46
End: 2021-04-08

## 2021-04-09 ENCOUNTER — OFFICE VISIT (OUTPATIENT)
Dept: BARIATRICS/WEIGHT MGMT | Age: 46
End: 2021-04-09
Payer: MEDICAID

## 2021-04-09 VITALS
WEIGHT: 190.8 LBS | SYSTOLIC BLOOD PRESSURE: 120 MMHG | HEIGHT: 61 IN | DIASTOLIC BLOOD PRESSURE: 80 MMHG | HEART RATE: 74 BPM | OXYGEN SATURATION: 99 % | BODY MASS INDEX: 36.02 KG/M2

## 2021-04-09 DIAGNOSIS — E66.01 MORBID OBESITY DUE TO EXCESS CALORIES (HCC): Primary | ICD-10-CM

## 2021-04-09 DIAGNOSIS — J01.01 ACUTE RECURRENT MAXILLARY SINUSITIS: Primary | ICD-10-CM

## 2021-04-09 PROCEDURE — 1036F TOBACCO NON-USER: CPT | Performed by: SURGERY

## 2021-04-09 PROCEDURE — 99213 OFFICE O/P EST LOW 20 MIN: CPT | Performed by: SURGERY

## 2021-04-09 PROCEDURE — G8427 DOCREV CUR MEDS BY ELIG CLIN: HCPCS | Performed by: SURGERY

## 2021-04-09 PROCEDURE — G8417 CALC BMI ABV UP PARAM F/U: HCPCS | Performed by: SURGERY

## 2021-04-09 RX ORDER — AMOXICILLIN AND CLAVULANATE POTASSIUM 875; 125 MG/1; MG/1
1 TABLET, FILM COATED ORAL 2 TIMES DAILY
Qty: 20 TABLET | Refills: 0 | Status: SHIPPED | OUTPATIENT
Start: 2021-04-09 | End: 2021-04-19

## 2021-04-09 RX ORDER — FUROSEMIDE 20 MG/1
TABLET ORAL
Qty: 30 TABLET | Refills: 2 | Status: SHIPPED | OUTPATIENT
Start: 2021-04-09 | End: 2021-06-08 | Stop reason: SDUPTHER

## 2021-04-09 ASSESSMENT — ENCOUNTER SYMPTOMS
COLOR CHANGE: 0
DIARRHEA: 0
TROUBLE SWALLOWING: 0
WHEEZING: 0
PHOTOPHOBIA: 0
NAUSEA: 0
CONSTIPATION: 0
SORE THROAT: 0
ANAL BLEEDING: 0
ABDOMINAL PAIN: 0
VOICE CHANGE: 0
VOMITING: 0
BLOOD IN STOOL: 0
SHORTNESS OF BREATH: 0
COUGH: 0

## 2021-04-09 NOTE — PROGRESS NOTES
Ashish Ana called pulmonary office left message that she was having a reoccurrence of her sinus infection. She states she is having large amount of green-yellow nasal drainage along with pressure over her maxillary and frontal sinus area. I will send a prescription for Augmentin which has worked for her in the past to her pharmacy.

## 2021-04-09 NOTE — PROGRESS NOTES
BARIATRIC SURGERY OFFICE NOTE    SUBJECTIVE:    Patient presenting today originally referred from SHUKRI Lambert CNP, for   Chief Complaint   Patient presents with    Follow-up     cardio clearance completed 8th  visit   . HPI: Carol Vallecillo is a 48 y.o. female being seen for follow up for bariatric weight loss surgery. Diamond'slast month weight gain/loss was - 10.8 Lbs. Needs nicotine test, QUIT smoking July of last yr, and UDS, will do, but doing very well, consented today, and will do tests. Thoroughly reviewed the patient's medical history, family history, social history and review of systems with the patient today in theoffice. Please see medical record for pertinent positives.       Past Medical History:   Diagnosis Date    Anxiety     Bladder tumor     Dr. Iván Hall Depression     Diverticulitis     Last episode: 7/1/2018    Emphysema of lung ([address]     Follows with Krishna Mixon    GERD (gastroesophageal reflux disease)     History of blood transfusion 2018    After surgery for dog attack    Hypertension     Dr. Urszula Saunders IBS (irritable bowel syndrome)     Kidney stone     Last stone: 2013    PTSD (post-traumatic stress disorder) 02/03/2019    from dog attack    Sleep apnea     Uses BiPap      Past Surgical History:   Procedure Laterality Date   [address] BLADDER SURGERY  2012    CHOLECYSTECTOMY  2001    COLONOSCOPY  03/26/2018    normal colonoscopy - Dr. Kary Bernstein  1980's    Nose     HYSTERECTOMY  2011    HYSTERECTOMY, TOTAL ABDOMINAL  2012    INCISION AND DRAINAGE Left 2/4/2019    ANKLE INCISION AND DRAINAGE performed by Eladio Manifold, DO at [address] Right 2/4/2019    ARM INCISION AND DRAINAGE performed by Eladio Manifold, DO at [address] [facility]  2012    tumor removal    OVARIAN CYST REMOVAL  2012   [facility]    TUBAL LIGATION  07/2011    UPPER GASTROINTESTINAL ENDOSCOPY N/A 1/4/2021    EGD POLYP ABLATION/OTHER OF ANTRAL POLYP AND BX performed by Marta Tenorio MD at [facility] ENDOSCOPY     Current Outpatient Medications   Medication Sig Dispense Refill    amoxicillin-clavulanate (AUGMENTIN) 875-125 MG per tablet Take 1 tablet by mouth 2 times daily for 10 days 20 tablet 0    omeprazole (PRILOSEC) 20 MG delayed release capsule take 1 capsule by mouth once daily 30 capsule 3    ibuprofen (ADVIL;MOTRIN) 600 MG tablet Take 600 mg by mouth every 6 hours as needed for Pain      azithromycin (ZITHROMAX) 250 MG tablet Take 250 mg by mouth daily      furosemide (LASIX) 20 MG tablet Take 20 mg by mouth 2 times daily      acetaminophen (TYLENOL) 500 MG tablet Take 1 tablet by mouth 4 times daily as needed for Pain 360 tablet 1    pantoprazole (PROTONIX) 40 MG tablet Take 1 tablet by mouth 2 times daily 60 tablet 3    hydrOXYzine (ATARAX) 10 MG tablet Take 10 mg by mouth every 8 hours as needed for Itching      diclofenac 1 % CREA Place 1 Tube onto the skin daily as needed for Pain 120 g 0    loratadine-pseudoephedrine (CLARITIN-D 24HR)  MG per extended release tablet Take 1 tablet by mouth daily 30 tablet 3    gabapentin (NEURONTIN) 100 MG capsule Take 1 capsule by mouth 2 times daily for 30 days. 60 capsule 2    albuterol sulfate  (90 Base) MCG/ACT inhaler Inhale 2 puffs into the lungs every 4 hours as needed for Wheezing 8.5 g 2    fluticasone (FLONASE) 50 MCG/ACT nasal spray 2 sprays by Each Nostril route daily 1 Bottle 0    dicyclomine (BENTYL) 10 MG capsule Take 1 capsule by mouth 3 times daily (before meals) 90 capsule 5    SYMBICORT 160-4.5 MCG/ACT AERO Inhale 2 puffs into the lungs 2 times daily 1 Inhaler 5    potassium chloride (KLOR-CON M) 10 MEQ extended release tablet Take 1 tablet by mouth daily 30 tablet 2    Nutritional Supplements (ENSURE HIGH PROTEIN) LIQD Take 1 Can by mouth daily Meal replacement.  32 Can 2     No current facility-administered medications for this visit. Allergies   Allergen Reactions    Dye [Iodides] Hives    Morphine Hives           Review of Systems   Constitutional: Negative for activity change, chills, diaphoresis and fever. HENT: Negative for sore throat, trouble swallowing and voice change. Eyes: Negative for photophobia and visual disturbance. Respiratory: Negative for cough, shortness of breath and wheezing. Cardiovascular: Negative for chest pain, palpitations and leg swelling. Gastrointestinal: Negative for abdominal pain, anal bleeding, blood in stool, constipation, diarrhea, nausea and vomiting. Endocrine: Negative for cold intolerance, heat intolerance, polydipsia and polyuria. Genitourinary: Negative for dysuria, frequency and hematuria. Musculoskeletal: Negative for joint swelling, myalgias and neck stiffness. Skin: Negative for color change and rash. Neurological: Negative for seizures, speech difficulty, light-headedness and numbness. Hematological: Negative for adenopathy. Does not bruise/bleed easily. OBJECTIVE:    Vitals:    04/09/21 1445   BP: 120/80   Pulse: 74   SpO2: 99%     Body mass index is 36.05 kg/m². Physical Exam  Vitals signs reviewed. Constitutional:       General: She is not in acute distress. Appearance: She is well-developed. She is not diaphoretic. HENT:      Head: Normocephalic and atraumatic. Eyes:      General: No scleral icterus. Conjunctiva/sclera: Conjunctivae normal.      Pupils: Pupils are equal, round, and reactive to light. Neck:      Musculoskeletal: Normal range of motion. Thyroid: No thyromegaly. Vascular: No JVD. Trachea: No tracheal deviation. Cardiovascular:      Rate and Rhythm: Normal rate and regular rhythm. Heart sounds: Normal heart sounds. No murmur. No friction rub. No gallop. Pulmonary:      Effort: Pulmonary effort is normal. No respiratory distress. Breath sounds: No stridor.  No wheezing or rales.   Chest:      Chest wall: No tenderness. Abdominal:      General: Bowel sounds are normal. There is no distension. Palpations: Abdomen is soft. There is no mass. Tenderness: There is no abdominal tenderness. There is no guarding or rebound. Musculoskeletal: Normal range of motion. General: No tenderness. Lymphadenopathy:      Cervical: No cervical adenopathy. Skin:     General: Skin is warm and dry. Coloration: Skin is not pale. Findings: No erythema or rash. Neurological:      Mental Status: She is alert and oriented to person, place, and time. Cranial Nerves: No cranial nerve deficit. Coordination: Coordination normal.   Psychiatric:         Behavior: Behavior normal.         Thought Content: Thought content normal.         Judgment: Judgment normal.           ASSESSMENT & PLAN:    1. Morbid obesity due to excess calories ([address]           Needs nicotine test, QUIT smoking July of last yr, and UDS, will do, but doing very well, consented today, and will do tests. Patient was encouraged to journal all food intake. Keep calorie level atapproximately 2330-8072. Protein intake is to be a minimum of 60-80 grams per day. Water drinking was encouraged with a goal of 64oz-128oz daily. Beverages to be calorie free except for milk. Every other beverage should bewater. They are to avoid soda. Continue to increase level of physical activity. I counseled the patient regarding diet and exercise, in preparation for her planned Robotic Sleeve Gastrectomy. Counting calories, complying with the dietitian's recommendations, and complying with the preoperative workup including the dietitian counseling, exercise physiologist counseling,cardiologist evaluation and pre-operative optimization, pulmonologist evaluation and pre operative optimization, pre operative EGD evaluation.   The patient expressed understanding and willingness to comply nicely; allquestions and concerns addressed in details. Patient counseled on the risks, benefits, and alternatives oftreatment plan at length while in the office today. Patient states an understanding and willingness to proceed with the plan. Orders Placed This Encounter   Procedures    NICOTINE AND METABOLITES Serum/Plasma    DRUG SCREEN Earle Prather URINE        Follow Up:  Return in about 2 weeks (around 4/23/2021) for Bariatric follow up: diet, exercise & weight loss, For imaging and tests results review.       Monica Chambers MD, FACS, FICS  Member of the [employer]    [phone]    4/9/21

## 2021-05-12 ENCOUNTER — TELEPHONE (OUTPATIENT)
Dept: BARIATRICS/WEIGHT MGMT | Age: 46
End: 2021-05-12

## 2021-05-12 ENCOUNTER — TELEPHONE (OUTPATIENT)
Dept: PULMONOLOGY | Age: 46
End: 2021-05-12

## 2021-05-12 DIAGNOSIS — R09.89 PULMONARY VASCULAR CONGESTION: ICD-10-CM

## 2021-05-12 DIAGNOSIS — J43.9 PULMONARY EMPHYSEMA, UNSPECIFIED EMPHYSEMA TYPE (HCC): ICD-10-CM

## 2021-05-12 RX ORDER — POTASSIUM CHLORIDE 750 MG/1
10 TABLET, EXTENDED RELEASE ORAL DAILY
Qty: 30 TABLET | Refills: 2 | Status: SHIPPED | OUTPATIENT
Start: 2021-05-12 | End: 2021-06-08 | Stop reason: SDUPTHER

## 2021-05-12 RX ORDER — BUDESONIDE AND FORMOTEROL FUMARATE DIHYDRATE 160; 4.5 UG/1; UG/1
2 AEROSOL RESPIRATORY (INHALATION) 2 TIMES DAILY
Qty: 1 INHALER | Refills: 5 | Status: SHIPPED | OUTPATIENT
Start: 2021-05-12 | End: 2022-01-12

## 2021-05-12 NOTE — TELEPHONE ENCOUNTER
Patient states that when she took her 8 townsend of antibiotic she started to feel a little better, but not 100%. She questions if she needs a different prescription. Please advise.

## 2021-05-18 ENCOUNTER — VIRTUAL VISIT (OUTPATIENT)
Dept: PULMONOLOGY | Age: 46
End: 2021-05-18
Payer: MEDICAID

## 2021-05-18 DIAGNOSIS — J01.01 ACUTE RECURRENT MAXILLARY SINUSITIS: Primary | ICD-10-CM

## 2021-05-18 PROCEDURE — G8427 DOCREV CUR MEDS BY ELIG CLIN: HCPCS | Performed by: NURSE PRACTITIONER

## 2021-05-18 PROCEDURE — 99214 OFFICE O/P EST MOD 30 MIN: CPT | Performed by: NURSE PRACTITIONER

## 2021-05-18 RX ORDER — AMOXICILLIN AND CLAVULANATE POTASSIUM 875; 125 MG/1; MG/1
1 TABLET, FILM COATED ORAL 2 TIMES DAILY
Qty: 20 TABLET | Refills: 0 | Status: SHIPPED | OUTPATIENT
Start: 2021-05-18 | End: 2021-05-28

## 2021-05-18 RX ORDER — AMOXICILLIN AND CLAVULANATE POTASSIUM 875; 125 MG/1; MG/1
1 TABLET, FILM COATED ORAL 2 TIMES DAILY
Qty: 20 TABLET | Refills: 0 | Status: SHIPPED | OUTPATIENT
Start: 2021-05-18 | End: 2021-05-18

## 2021-05-18 NOTE — PROGRESS NOTES
[address] Pulmonary   Virtual Visit Note      Gloria Hobbs is a 48 y.o. female evaluated via my chart video on 5/18/2021. Video via doxy. Consent:  Pursuant to emergency declaration under the [address] in the [employer], [employer]5 waiver authorization in the [facility] Act, this virtual visit was completed with patient's consent, to reduce the patient's risk of exposure to COVID-19 and provide necessary medical care. She and/or health care decision maker is aware that that she may receive a bill for this telephone service, depending on her insurance coverage, and has provided verbal consent to proceed. Patient is at his home and Provider [facility] [address]. Documentation:    Janyth Aschoff has requested a visit today for complaints of sinus congestion and drainage, with accompanied worsening cough over the past 2 weeks. She denies any fever or chills. States that she has thick foul tasting purulent nasal drainage, she feels is causing irritation to her throat. She states she has pressure above her eyebrows and underneath her eyes bilaterally. States she is using Claritin-D 24 hours daily, Flonase daily. She states she has been using Tylenol or Motrin for sinus headaches and pressure with only minimal relief. He was on Augmentin approximately 5 to 6 weeks ago for similar sinus infection. She has not seen ENT.     Past Medical History:   Diagnosis Date    Anxiety     Bladder tumor     Dr. Edison Corbin Depression     Diverticulitis     Last episode: 7/1/2018    Emphysema of lung ([address]     Follows with Sampson Montalvo    GERD (gastroesophageal reflux disease)     History of blood transfusion 2018    After surgery for dog attack    Hypertension     Dr. Elizabeth Angulo IBS (irritable bowel syndrome)     Kidney stone     Last stone: 2013    PTSD (post-traumatic stress disorder) 02/03/2019    from dog attack  Sleep apnea     Uses BiPap         Current Outpatient Medications:     amoxicillin-clavulanate (AUGMENTIN) 875-125 MG per tablet, Take 1 tablet by mouth 2 times daily for 10 days, Disp: 20 tablet, Rfl: 0    potassium chloride (KLOR-CON M) 10 MEQ extended release tablet, Take 1 tablet by mouth daily, Disp: 30 tablet, Rfl: 2    SYMBICORT 160-4.5 MCG/ACT AERO, Inhale 2 puffs into the lungs 2 times daily, Disp: 1 Inhaler, Rfl: 5    furosemide (LASIX) 20 MG tablet, take 1 tablet by mouth once daily, Disp: 30 tablet, Rfl: 2    omeprazole (PRILOSEC) 20 MG delayed release capsule, take 1 capsule by mouth once daily, Disp: 30 capsule, Rfl: 3    ibuprofen (ADVIL;MOTRIN) 600 MG tablet, Take 600 mg by mouth every 6 hours as needed for Pain, Disp: , Rfl:     acetaminophen (TYLENOL) 500 MG tablet, Take 1 tablet by mouth 4 times daily as needed for Pain, Disp: 360 tablet, Rfl: 1    pantoprazole (PROTONIX) 40 MG tablet, Take 1 tablet by mouth 2 times daily, Disp: 60 tablet, Rfl: 3    hydrOXYzine (ATARAX) 10 MG tablet, Take 10 mg by mouth every 8 hours as needed for Itching, Disp: , Rfl:     diclofenac 1 % CREA, Place 1 Tube onto the skin daily as needed for Pain, Disp: 120 g, Rfl: 0    loratadine-pseudoephedrine (CLARITIN-D 24HR)  MG per extended release tablet, Take 1 tablet by mouth daily, Disp: 30 tablet, Rfl: 3    gabapentin (NEURONTIN) 100 MG capsule, Take 1 capsule by mouth 2 times daily for 30 days. , Disp: 60 capsule, Rfl: 2    albuterol sulfate  (90 Base) MCG/ACT inhaler, Inhale 2 puffs into the lungs every 4 hours as needed for Wheezing, Disp: 8.5 g, Rfl: 2    fluticasone (FLONASE) 50 MCG/ACT nasal spray, 2 sprays by Each Nostril route daily, Disp: 1 Bottle, Rfl: 0    dicyclomine (BENTYL) 10 MG capsule, Take 1 capsule by mouth 3 times daily (before meals), Disp: 90 capsule, Rfl: 5    Nutritional Supplements (ENSURE HIGH PROTEIN) LIQD, Take 1 Can by mouth daily Meal replacement. , Disp: 32 vaccination. We have discussed Coronavirus precaution including: social distancing when needing to be in public, handwashing practice, wiping items touched in public such as gas pumps, door handles, shopping carts, etc. Self monitoring for infection - fever, chills, cough, SOB. Should they develop symptoms they should call office for further instructions. I affirm this is a Patient initiated episode with an established patient who has not had a related appointment within my department in the past 7 days or scheduled within the next 24 hours. I have spent 27 minutes reviewing previous notes, test results and communicating with patient discussing the diagnosis and importance of treatment plan.     Note: not billable if this call serves to triage the patient into an appointment for the relevant concern      Charissa Ochoa, APRN - CNP

## 2021-06-08 DIAGNOSIS — K21.9 GASTROESOPHAGEAL REFLUX DISEASE WITHOUT ESOPHAGITIS: ICD-10-CM

## 2021-06-08 DIAGNOSIS — R09.89 PULMONARY VASCULAR CONGESTION: ICD-10-CM

## 2021-06-08 RX ORDER — OMEPRAZOLE 20 MG/1
20 CAPSULE, DELAYED RELEASE ORAL DAILY
Qty: 30 CAPSULE | Refills: 5 | Status: SHIPPED | OUTPATIENT
Start: 2021-06-08 | End: 2021-08-23 | Stop reason: SDUPTHER

## 2021-06-08 RX ORDER — LORATADINE AND PSEUDOEPHEDRINE 10; 240 MG/1; MG/1
1 TABLET, EXTENDED RELEASE ORAL DAILY
Qty: 30 TABLET | Refills: 3 | Status: ON HOLD | OUTPATIENT
Start: 2021-06-08 | End: 2022-04-02 | Stop reason: HOSPADM

## 2021-06-08 RX ORDER — FUROSEMIDE 20 MG/1
20 TABLET ORAL DAILY
Qty: 30 TABLET | Refills: 2 | Status: SHIPPED | OUTPATIENT
Start: 2021-06-08 | End: 2021-09-20

## 2021-06-08 RX ORDER — POTASSIUM CHLORIDE 750 MG/1
10 TABLET, EXTENDED RELEASE ORAL DAILY
Qty: 30 TABLET | Refills: 2 | Status: SHIPPED | OUTPATIENT
Start: 2021-06-08 | End: 2021-10-15 | Stop reason: SDUPTHER

## 2021-06-17 ENCOUNTER — TELEPHONE (OUTPATIENT)
Dept: BARIATRICS/WEIGHT MGMT | Age: 46
End: 2021-06-17

## 2021-06-17 NOTE — TELEPHONE ENCOUNTER
Per call from patient's mother stating she understand patient has been non compliant in the program. And that she wants the fee she paid on her daughter behalf back. Advised unfortunately the program fee for the bariatric surgical process is a non refundable fee per contract signed by patient. She stated she paid this fee and her daughter never said it was non refundable. I advised I could send her a blank copy of the agreement. She said she wants her money back. Advised again this is non refundable she stated we'll see about that and disconnected the call.
Patient and house staff
daughter at bedside

## 2021-07-28 RX ORDER — GABAPENTIN 100 MG/1
CAPSULE ORAL
Qty: 60 CAPSULE | Refills: 2 | Status: SHIPPED | OUTPATIENT
Start: 2021-07-28 | End: 2021-10-19 | Stop reason: SDUPTHER

## 2021-08-02 ENCOUNTER — APPOINTMENT (OUTPATIENT)
Dept: GENERAL RADIOLOGY | Age: 46
End: 2021-08-02
Payer: MEDICAID

## 2021-08-02 ENCOUNTER — HOSPITAL ENCOUNTER (EMERGENCY)
Age: 46
Discharge: HOME | End: 2021-08-02
Payer: MEDICAID

## 2021-08-02 ENCOUNTER — APPOINTMENT (OUTPATIENT)
Dept: CT IMAGING | Age: 46
End: 2021-08-02
Payer: MEDICAID

## 2021-08-02 VITALS
WEIGHT: 175 LBS | HEIGHT: 61 IN | SYSTOLIC BLOOD PRESSURE: 145 MMHG | BODY MASS INDEX: 33.04 KG/M2 | OXYGEN SATURATION: 98 % | RESPIRATION RATE: 18 BRPM | DIASTOLIC BLOOD PRESSURE: 81 MMHG | HEART RATE: 64 BPM | TEMPERATURE: 97.6 F
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
## 2021-08-02 DIAGNOSIS — M54.9 UPPER BACK PAIN ON RIGHT SIDE: ICD-10-CM

## 2021-08-02 DIAGNOSIS — S09.90XA CLOSED HEAD INJURY, INITIAL ENCOUNTER: ICD-10-CM

## 2021-08-02 DIAGNOSIS — M25.511 ACUTE PAIN OF RIGHT SHOULDER: ICD-10-CM

## 2021-08-02 DIAGNOSIS — J32.9 CHRONIC SINUSITIS, UNSPECIFIED LOCATION: ICD-10-CM

## 2021-08-02 PROCEDURE — 71101 X-RAY EXAM UNILAT RIBS/CHEST: CPT

## 2021-08-02 PROCEDURE — 72072 X-RAY EXAM THORAC SPINE 3VWS: CPT

## 2021-08-02 PROCEDURE — 96372 THER/PROPH/DIAG INJ SC/IM: CPT

## 2021-08-02 PROCEDURE — 99284 EMERGENCY DEPT VISIT MOD MDM: CPT

## 2021-08-02 PROCEDURE — 73060 X-RAY EXAM OF HUMERUS: CPT

## 2021-08-02 PROCEDURE — 6360000002 HC RX W HCPCS: Performed by: PHYSICIAN ASSISTANT

## 2021-08-02 PROCEDURE — 70450 CT HEAD/BRAIN W/O DYE: CPT

## 2021-08-02 RX ORDER — MELOXICAM 7.5 MG/1
15 TABLET ORAL DAILY
Qty: 20 TABLET | Refills: 0 | Status: SHIPPED | OUTPATIENT
Start: 2021-08-02 | End: 2022-01-13 | Stop reason: CLARIF

## 2021-08-02 RX ORDER — DIPHENHYDRAMINE HCL 25 MG
1 CAPSULE ORAL EVERY 4 HOURS PRN
Qty: 1 BOTTLE | Refills: 0 | Status: SHIPPED | OUTPATIENT
Start: 2021-08-02 | End: 2022-04-05

## 2021-08-02 RX ORDER — KETOROLAC TROMETHAMINE 30 MG/ML
15 INJECTION, SOLUTION INTRAMUSCULAR; INTRAVENOUS ONCE
Status: COMPLETED | OUTPATIENT
Start: 2021-08-02 | End: 2021-08-02

## 2021-08-02 RX ORDER — ERYTHROMYCIN 5 MG/G
OINTMENT OPHTHALMIC
Qty: 1 G | Refills: 0 | Status: SHIPPED | OUTPATIENT
Start: 2021-08-02 | End: 2021-08-12

## 2021-08-02 RX ORDER — AMOXICILLIN 500 MG/1
500 CAPSULE ORAL 2 TIMES DAILY
Qty: 20 CAPSULE | Refills: 0 | Status: SHIPPED | OUTPATIENT
Start: 2021-08-02 | End: 2021-08-12

## 2021-08-02 RX ADMIN — KETOROLAC TROMETHAMINE 15 MG: 30 INJECTION, SOLUTION INTRAMUSCULAR; INTRAVENOUS at 12:40

## 2021-08-02 ASSESSMENT — PAIN SCALES - GENERAL: PAINLEVEL_OUTOF10: 8

## 2021-08-02 NOTE — ED PROVIDER NOTES
EMERGENCY DEPARTMENT ENCOUNTER      PCP: SHUKRI Mathew - CNP    CHIEF COMPLAINT    Chief Complaint   Patient presents with    Fall     saturday    Shoulder Pain     right    Back Pain     middle     This patient was not evaluated by the attending physician. I have independently evaluated this patient. HPI    Alvaro Cates is a 48 y.o. female who presents to the emergency department today after sustaining a fall 2 days ago. Patient states that she fell down 2 or 3 carpeted stairs 2 days ago early in the morning. She states she landed on her buttocks, states she did hit her head but did not lose consciousness. Was able to ambulate. Denies loss of consciousness,. Is presenting to the emergency department the chief complaint of right shoulder, upper back and rib pain. She did also states that she is been feeling congested and she woke up with right eye irritation, blurry vision which is nonspecific, no loss of visual field, she states that she wears a CPAP machine and has history of recurrent sinusitis. States that she may have a \"sinus infection\" that is causing her blurry vision. She denies being anticoagulated. She denies any preceding symptoms to the fall. No history of chest pain shortness of breath palpitations syncope or near syncope since the fall. REVIEW OF SYSTEMS    General: No Fever  ENT: See HPI. Cardiac: No Chest Pain, No syncope  Respiratory: No cough or difficulty breathing  GI: No vomiting. No Bloody Stool or Diarrhea  : No Dysuria or Hematuria  MSKTL:  See HPI. No neck or back pain.   Skin:  Denies rash  Neurologic:  Denies headache, focal weakness or sensory changes   Endocrine:  Denies polyuria or polydypsia   Lymphatic:  Denies swollen glands   See HPI and nursing notes for additional information     PAST MEDICAL & SURGICAL HISTORY    Past Medical History:   Diagnosis Date    Anxiety     Bladder tumor     Dr. Alayna Ochoa Depression     Diverticulitis Last episode: 7/1/2018    Emphysema of lung ([address]     Follows with Charli Graham    GERD (gastroesophageal reflux disease)     History of blood transfusion 2018    After surgery for dog attack    Hypertension     Dr. January Quinetro IBS (irritable bowel syndrome)     Kidney stone     Last stone: 2013    PTSD (post-traumatic stress disorder) 02/03/2019    from dog attack    Sleep apnea     Uses BiPap     Past Surgical History:   Procedure Laterality Date   [address] BLADDER SURGERY  2012    CHOLECYSTECTOMY  2001    COLONOSCOPY  03/26/2018    normal colonoscopy - Dr. Missy Hickey  1980's    Nose    [address], TOTAL ABDOMINAL  2012    INCISION AND DRAINAGE Left 2/4/2019    ANKLE INCISION AND DRAINAGE performed by Mian Cervantes DO at [address] Right 2/4/2019    ARM INCISION AND DRAINAGE performed by Mian Cervantes DO at [address] [facility]  2012    tumor removal    OVARIAN CYST REMOVAL  2012   [facility]    TUBAL LIGATION  07/2011    UPPER GASTROINTESTINAL ENDOSCOPY N/A 1/4/2021    EGD POLYP ABLATION/OTHER OF ANTRAL POLYP AND BX performed by Aliya Holden MD at [address] [place] [address]    Current Outpatient Rx   Medication Sig Dispense Refill    meloxicam (MOBIC) 7.5 MG tablet Take 2 tablets by mouth daily for 10 days 20 tablet 0    amoxicillin (AMOXIL) 500 MG capsule Take 1 capsule by mouth 2 times daily for 10 days 20 capsule 0    dextran 70-hypromellose (ARTIFICIAL TEARS) 0.1-0.3 % SOLN opthalmic solution Place 1 drop into both eyes every 4 hours as needed (Eye irritation) 1 Bottle 0    erythromycin (ROMYCIN) 5 MG/GM ophthalmic ointment Please 1 ribbon to the lower eyelid to the right eye every 6-8 hours while awake for 5-7 days.   0 refills 1 g 0    gabapentin (NEURONTIN) 100 MG capsule take 1 capsule by mouth twice a day 60 capsule 2    potassium chloride (KLOR-CON M) 10 MEQ extended release tablet Take 1 tablet by mouth daily 30 tablet 2    furosemide (LASIX) 20 MG tablet Take 1 tablet by mouth daily 30 tablet 2    omeprazole (PRILOSEC) 20 MG delayed release capsule Take 1 capsule by mouth Daily 30 capsule 5    loratadine-pseudoephedrine (CLARITIN-D 24HR)  MG per extended release tablet Take 1 tablet by mouth daily 30 tablet 3    SYMBICORT 160-4.5 MCG/ACT AERO Inhale 2 puffs into the lungs 2 times daily 1 Inhaler 5    ibuprofen (ADVIL;MOTRIN) 600 MG tablet Take 600 mg by mouth every 6 hours as needed for Pain      acetaminophen (TYLENOL) 500 MG tablet Take 1 tablet by mouth 4 times daily as needed for Pain 360 tablet 1    pantoprazole (PROTONIX) 40 MG tablet Take 1 tablet by mouth 2 times daily 60 tablet 3    hydrOXYzine (ATARAX) 10 MG tablet Take 10 mg by mouth every 8 hours as needed for Itching      diclofenac 1 % CREA Place 1 Tube onto the skin daily as needed for Pain 120 g 0    albuterol sulfate  (90 Base) MCG/ACT inhaler Inhale 2 puffs into the lungs every 4 hours as needed for Wheezing 8.5 g 2    fluticasone (FLONASE) 50 MCG/ACT nasal spray 2 sprays by Each Nostril route daily 1 Bottle 0    dicyclomine (BENTYL) 10 MG capsule Take 1 capsule by mouth 3 times daily (before meals) 90 capsule 5    Nutritional Supplements (ENSURE HIGH PROTEIN) LIQD Take 1 Can by mouth daily Meal replacement.  32 Can 2       ALLERGIES    Allergies   Allergen Reactions    Dye [Iodides] Hives    Morphine Hives       SOCIAL & FAMILY HISTORY    Social History     Socioeconomic History    Marital status: Single     Spouse name: None    Number of children: None    Years of education: None    Highest education level: None   Occupational History    None   Tobacco Use    Smoking status: Former Smoker     Packs/day: 1.00     Years: 19.00     Pack years: 19.00     Types: Cigarettes     Start date: 2001     Quit date: 2020     Years since quittin.0    Smokeless tobacco: Never Used   Vaping Use    Vaping Use: Never used   Substance and Sexual Activity    Alcohol use: No    Drug use: Yes     Types: Marijuana     Comment: pt states last consumption was sometime within a month    Sexual activity: Yes     Partners: Male   Other Topics Concern    None   Social History Narrative    None     Social Determinants of Health     Financial Resource Strain:     Difficulty of Paying Living Expenses:    Food Insecurity:     Worried About Running Out of Food in the Last Year:     Ran Out of Food in the Last Year:    Transportation Needs:     Lack of Transportation (Medical):  Lack of Transportation (Non-Medical):    Physical Activity:     Days of Exercise per Week:     Minutes of Exercise per Session:    Stress:     Feeling of Stress :    Social Connections:     Frequency of Communication with Friends and Family:     Frequency of Social Gatherings with Friends and Family:     Attends [employer] Services:     Active Member of Clubs or Organizations:     Attends Club or Organization Meetings:     Marital Status:    Intimate Partner Violence:     Fear of Current or Ex-Partner:     Emotionally Abused:     Physically Abused:     Sexually Abused:      Family History   Problem Relation Age of Onset    Alcohol Abuse Mother     Sleep Apnea Father        PHYSICAL EXAM    VITAL SIGNS: BP (!) 145/81   Pulse 64   Temp 97.6 °F (36.4 °C) (Oral)   Resp 18   Ht 5' 1\" (1.549 m)   Wt 175 lb (79.4 kg)   LMP 06/05/2012   SpO2 98%   BMI 33.07 kg/m²    Constitutional:  Well developed, well nourished, no acute distress   Eyes: EOMI. PERRL, sclera nonicteric. Anterior chambers clear. HENT:  Atraumatic, no trismus. Ears canals and TMs free of blood or clear fluid. Nasal passages and oropharynx free of blood or clear fluid. Has mild sinus tenderness to palpation on the right side. No circumferential periorbital ecchymosis or mastoid ecchymosis noted.   Neck/Lymphatics: supple, no JVD, no swollen nodes. No posterior neck tenderness. Range of motion without obvious pain or deficit. Respiratory:  Lungs Clear, no retractions   Cardiovascular:   normal rate, no murmurs  GI:  Soft, nontender, normal bowel sounds  Musculoskeletal:  No edema, on evaluation of the right shoulder/upper extremity there is no obvious defect or deformity, does have some mild bruising to the posterior aspect of the scapular area. No significant palpable tenderness into the proximal humerus, AC joint. BACK: There is not thoracic or lumbar midline tenderness to palpation or step-offs. Paraspinal tenderness to palpation is  present in the right thoaracic region. No overlying rashes. LE strength is 5/5. LE light touch is intact. LE DTR's are 2+ in the patellas and achilles. Straight leg test is negative on the RIGHT, negative on the LEFT. Integument:  Well hydrated, no petechiae   Neurologic:    - Alert & oriented person, place, time, and situation, no speech difficulties or slurring.  - No obvious gross motor deficits  - Cranial nerves 2-12 grossly intact  - Negative meningeal signs.  - Sensation intact to light touch  - Strength 5/5 in upper and lower extremities bilaterally  - Normal finger to nose test bilaterally  - Rapid alternating movements intact  - Normal heel-shin bilaterally  - No pronator drift. - Light touch sensation intact throughout. - Upper and lower extremity DTRs 2+ bilaterally.   - Gait steady and without difficulty  Psych: Pleasant affect, no hallucinations    RADIOLOGY   XR RIBS RIGHT INCLUDE CHEST (MIN 3 VIEWS)    Result Date: 8/2/2021  EXAMINATION: 7 XRAY VIEWS OF THE RIGHT RIBS WITH FRONTAL XRAY VIEW OF THE CHEST 8/2/2021 1:00 pm COMPARISON: Chest x-ray January 26, 2021 HISTORY: ORDERING SYSTEM PROVIDED HISTORY: right rib pain TECHNOLOGIST PROVIDED HISTORY: Reason for exam:->right rib pain Reason for Exam: right rib pain Acuity: Acute Type of Exam: Initial Mechanism of Injury: fell several days ago FINDINGS: Cardiac silhouette appears stable. No focal consolidation, pleural effusion, or pneumothorax identified. Osseous structures appear intact. Specifically, no acute displaced rib fracture identified within limits of radiograph. Postoperative changes of cholecystectomy noted. 1. No acute displaced rib fracture identified within limits of radiograph. 2. No acute cardiopulmonary process identified. XR THORACIC SPINE (3 VIEWS)    Result Date: 8/2/2021  EXAMINATION: THREE XRAY VIEWS OF THE THORACIC SPINE 8/2/2021 10:00 am COMPARISON: None. HISTORY: ORDERING SYSTEM PROVIDED HISTORY: mid back pain from fall TECHNOLOGIST PROVIDED HISTORY: Reason for exam:->mid back pain from fall Reason for Exam: pain Acuity: Acute Type of Exam: Initial Mechanism of Injury: fell several days ago0 FINDINGS: Thoracic spine is visualized from approximately T2 through T12. The cervicothoracic junction is obscured by overlying bony and soft tissue structures. It is grossly aligned on the swimmer's view. The visualized vertebral bodies are normal in height and alignment. On the AP examination, pedicles are maintained. Alignment is maintained. Multilevel spondylosis is again noted, similar when compared to the previous exam.     No acute abnormality identified. XR HUMERUS RIGHT (MIN 2 VIEWS)    Result Date: 8/2/2021  EXAMINATION: TWO XRAY VIEWS OF THE RIGHT HUMERUS 8/2/2021 10:00 am COMPARISON: None. HISTORY: ORDERING SYSTEM PROVIDED HISTORY: right humerus pain TECHNOLOGIST PROVIDED HISTORY: Reason for exam:->right humerus pain Reason for Exam: right humerus pain Acuity: Acute Type of Exam: Initial Mechanism of Injury: fell several days ago FINDINGS: No fractures are identified within the right humerus. No bone lesions. No soft tissue abnormality. No radiopaque foreign body. Unremarkable right shoulder radiograph series.      CT HEAD WO CONTRAST    Result Date: 8/2/2021  EXAMINATION: CT OF THE HEAD WITHOUT CONTRAST  8/2/2021 12:30 pm TECHNIQUE: CT of the head was performed without the administration of intravenous contrast. Dose modulation, iterative reconstruction, and/or weight based adjustment of the mA/kV was utilized to reduce the radiation dose to as low as reasonably achievable. COMPARISON: 01/26/2021 HISTORY: ORDERING SYSTEM PROVIDED HISTORY: Pain after fall TECHNOLOGIST PROVIDED HISTORY: Reason for exam:->fall Has a \"code stroke\" or \"stroke alert\" been called? ->No Decision Support Exception - unselect if not a suspected or confirmed emergency medical condition->Emergency Medical Condition (MA) Is the patient pregnant?->No Reason for Exam: fall Acuity: Acute Type of Exam: Initial Additional signs and symptoms: FALL ON SATURDAY,C/O BACK PAIN AND SHOULDER PAIN FINDINGS: BRAIN/VENTRICLES: There is no acute intracranial hemorrhage, mass effect or midline shift. No abnormal extra-axial fluid collection. The gray-white differentiation is maintained without evidence of an acute infarct. There is no evidence of hydrocephalus. ORBITS: The visualized portion of the orbits demonstrate no acute abnormality. SINUSES: Partial opacification of the right ethmoid air cells is seen. There is complete opacification of the visualized right maxillary sinus. Minimal mucoperiosteal thickening of the bilateral sphenoid sinuses is noted. SOFT TISSUES/SKULL:  No acute abnormality of the visualized skull or soft tissues. No acute intracranial abnormality. ED COURSE & MEDICAL DECISION MAKING      Patient presents as above. Emergent etiologies considered. Patient states she is standing mechanical fall 2 days ago when she fell on carpeted stairs, she states that she landed on buttocks, right shoulder pain, right-sided rib, thoracic back pain.   Also complaining of some blurry vision to the right side unsure if this is correlated with her fall but states that she has recurrent sinusitis and has had a history of this previously. Overall there are no obvious external signs of trauma. She is neurologically intact, no focal findings. She has tenderness into the scapular area and the proximal humerus, there is also general parathoracic tenderness to her spine and right-sided rib pain. She has some mild tenderness into the sinus area without signs of trauma. Eye is mildly injected. Work-up initiated, negative CT of the head secondary to her fall. No acute intracranial trauma. Negative x-ray imaging of the thoracic spine, negative chest x-ray, right rib series. Right shoulder x-ray also acutely negative. We will treat these injuries conservatively. Do feel like her visual disturbances most likely secondary to a possible conjunctivitis or associated with her sinusitis, she is on a CPAP machine, will give artificial tears erythromycin ointment. She will be given oral antibiotics for her developing sinusitis. She will be advised to follow-up with her primary care/ENT. She otherwise had a negative neurologic exam overall appears well vital signs remain normal, we will discharge home in stable condition. Return to emergency Department precautions were discussed in detail with patient and include worsening pain, new symptoms. All pertinent Lab data and radiographic results reviewed with patient at bedside. The patient and/or the family were informed of the results of any tests/labs/imaging, the treatment plan, and time was allotted to answer questions. Clinical  IMPRESSION    1. Fall down stairs, initial encounter    2. Closed head injury, initial encounter    3. Acute pain of right shoulder    4. Upper back pain on right side    5.  Chronic sinusitis, unspecified location      Comment: Please note this report has been produced using speech recognition software and may contain errors related to that system including errors in grammar, punctuation, and spelling, as well as words and phrases that may be inappropriate. If there are any questions or concerns please feel free to contact the dictating provider for clarification.       Diann Thurston 411, PA  08/02/21 1706

## 2021-08-16 RX ORDER — FLUTICASONE PROPIONATE 50 MCG
SPRAY, SUSPENSION (ML) NASAL
Qty: 16 G | Refills: 5 | Status: SHIPPED | OUTPATIENT
Start: 2021-08-16 | End: 2022-02-10

## 2021-08-23 DIAGNOSIS — K21.9 GASTROESOPHAGEAL REFLUX DISEASE WITHOUT ESOPHAGITIS: ICD-10-CM

## 2021-08-23 RX ORDER — OMEPRAZOLE 20 MG/1
20 CAPSULE, DELAYED RELEASE ORAL DAILY
Qty: 30 CAPSULE | Refills: 5 | Status: SHIPPED | OUTPATIENT
Start: 2021-08-23 | End: 2021-10-19 | Stop reason: SDUPTHER

## 2021-08-29 ENCOUNTER — APPOINTMENT (OUTPATIENT)
Dept: ULTRASOUND IMAGING | Age: 46
End: 2021-08-29
Payer: MEDICAID

## 2021-08-29 ENCOUNTER — HOSPITAL ENCOUNTER (EMERGENCY)
Age: 46
Discharge: HOME | End: 2021-08-29
Payer: MEDICAID

## 2021-08-29 VITALS
RESPIRATION RATE: 16 BRPM | SYSTOLIC BLOOD PRESSURE: 164 MMHG | TEMPERATURE: 98.3 F | HEART RATE: 64 BPM | DIASTOLIC BLOOD PRESSURE: 78 MMHG | OXYGEN SATURATION: 94 %

## 2021-08-29 DIAGNOSIS — S80.10XA CONTUSION OF LOWER LEG, UNSPECIFIED LATERALITY, INITIAL ENCOUNTER: Primary | ICD-10-CM

## 2021-08-29 PROCEDURE — 99283 EMERGENCY DEPT VISIT LOW MDM: CPT

## 2021-08-29 PROCEDURE — 93971 EXTREMITY STUDY: CPT

## 2021-08-29 ASSESSMENT — PAIN DESCRIPTION - DESCRIPTORS: DESCRIPTORS: DISCOMFORT

## 2021-08-29 ASSESSMENT — PAIN DESCRIPTION - ORIENTATION: ORIENTATION: LEFT

## 2021-08-29 ASSESSMENT — PAIN DESCRIPTION - PAIN TYPE: TYPE: ACUTE PAIN

## 2021-08-29 ASSESSMENT — PAIN DESCRIPTION - FREQUENCY: FREQUENCY: CONTINUOUS

## 2021-08-29 ASSESSMENT — PAIN DESCRIPTION - PROGRESSION: CLINICAL_PROGRESSION: NOT CHANGED

## 2021-08-29 ASSESSMENT — PAIN DESCRIPTION - LOCATION: LOCATION: ANKLE

## 2021-08-29 ASSESSMENT — PAIN DESCRIPTION - ONSET: ONSET: ON-GOING

## 2021-08-29 ASSESSMENT — PAIN SCALES - GENERAL: PAINLEVEL_OUTOF10: 6

## 2021-08-29 NOTE — ED NOTES
Bed: ED-01  Expected date: 8/29/21  Expected time:   Means of arrival:   Comments:  Triage [facility], [facility]  08/29/21 [id number]

## 2021-08-29 NOTE — ED PROVIDER NOTES
Triage Chief Complaint:   Wound Check (left ankle, had skin grafts reports they \"don't feel right\" )    [place]:  Today in the ED I had the pleasure of caring for Violetta Humphrey who is a 48 y.o. female that presents  Today to the ED for wound check. 2 years ago she was attacked by her dog, required debridement and wash out. 3 days ago she was walking and felt a pop on her leg. And had to call off work 2 days in a row due to pain.  Today she states its red, swollen and painful     ROS:  REVIEW OF SYSTEMS    At least 10 systems reviewed      All other review of systems are negative  See HPI and nursing notes for additional information       Past Medical History:   Diagnosis Date    Anxiety     Bladder tumor     Dr. Eleanor Ricketts    Depression     Diverticulitis     Last episode: 7/1/2018    Emphysema of lung ([address]     Follows with Charli Graham    GERD (gastroesophageal reflux disease)     History of blood transfusion 2018    After surgery for dog attack    Hypertension     Dr. January Quintero IBS (irritable bowel syndrome)     Kidney stone     Last stone: 2013    PTSD (post-traumatic stress disorder) 02/03/2019    from dog attack    Sleep apnea     Uses BiPap     Past Surgical History:   Procedure Laterality Date   [address] BLADDER SURGERY  2012    CHOLECYSTECTOMY  2001    COLONOSCOPY  03/26/2018    normal colonoscopy - Dr. Missy Hickey  1980's    Nose    [address], TOTAL ABDOMINAL  2012    INCISION AND DRAINAGE Left 2/4/2019    ANKLE INCISION AND DRAINAGE performed by Mian Cervantes DO at [address] Right 2/4/2019    ARM INCISION AND DRAINAGE performed by Mian Cervantes DO at [address] [facility]  2012    tumor removal    OVARIAN CYST REMOVAL  2012    TONSILLECTOMY  1980    TUBAL LIGATION  07/2011    UPPER GASTROINTESTINAL ENDOSCOPY N/A 1/4/2021    EGD POLYP ABLATION/OTHER OF ANTRAL POLYP AND BX performed by Jame Russo Jihan Schwartz MD at [facility] ENDOSCOPY     Family History   Problem Relation Age of Onset    Alcohol Abuse Mother     Sleep Apnea Father      Social History     Socioeconomic History    Marital status: Single     Spouse name: Not on file    Number of children: Not on file    Years of education: Not on file    Highest education level: Not on file   Occupational History    Not on file   Tobacco Use    Smoking status: Former Smoker     Packs/day: 1.00     Years: 19.00     Pack years: 19.00     Types: Cigarettes     Start date: 2001     Quit date: 2020     Years since quittin.1    Smokeless tobacco: Never Used   Vaping Use    Vaping Use: Never used   Substance and Sexual Activity    Alcohol use: No    Drug use: Yes     Types: Marijuana     Comment: pt states last consumption was sometime within a month    Sexual activity: Yes     Partners: Male   Other Topics Concern    Not on file   Social History Narrative    Not on file     Social Determinants of Health     Financial Resource Strain:     Difficulty of Paying Living Expenses:    Food Insecurity:     Worried About Running Out of Food in the Last Year:     Ran Out of Food in the Last Year:    Transportation Needs:     Lack of Transportation (Medical):  Lack of Transportation (Non-Medical):    Physical Activity:     Days of Exercise per Week:     Minutes of Exercise per Session:    Stress:     Feeling of Stress :    Social Connections:     Frequency of Communication with Friends and Family:     Frequency of Social Gatherings with Friends and Family:     Attends Alevism Services:     Active Member of Clubs or Organizations:     Attends Club or Organization Meetings:     Marital Status:    Intimate Partner Violence:     Fear of Current or Ex-Partner:     Emotionally Abused:     Physically Abused:     Sexually Abused:      No current facility-administered medications for this encounter.      Current Outpatient Medications [08/29/21 1754]   Enc Vitals Group      BP (!) 164/78      Pulse 64      Resp 16      Temp 98.3 °F (36.8 °C)      Temp Source Oral      SpO2 94 %      Weight       Height       Head Circumference       Peak Flow       Pain Score       Pain Loc       Pain Edu? Excl. in [address]? General :Patient is awake alert oriented person place and time no acute distress nontoxic appearing  HEENT: Pupils are equally round and reactive to light extraocular motors are intact conjunctivae clear sclerae white there is no injection no icterus. Nose without any rhinorrhea or epistaxis. Oral mucosa is moist no exudate buccal mucosa shows no ulcerations. Uvula is midline    Neck: Neck is supple full range of motion trachea midline thyroid nonpalpable  Cardiac: Heart regular rate rhythm no murmurs rubs clicks or gallops  Lungs: Lungs are clear to auscultation there is no wheezing rhonchi or rales. There is no use of accessory muscles no nasal flaring identified. Chest wall: There is no tenderness to palpation over the chest wall or over ribs  Abdomen: Abdomen is soft nontender nondistended. There is no firm or pulsatile masses no rebound rigidity or guarding negative Saundesr's negative McBurney, no peritoneal signs  Suprapubic:  there is no tenderness to palpation over the external bladder   Musculoskeletal: 5 out of 5 strength in all 4 extremities full flexion extension abduction and adduction supination pronation of all extremities and all digits. No obvious muscle atrophy is noted. No focal muscle deficits are appreciated. L lower leg just above ankle has multiple small bruises and some redness blanching circumferentially around the  leg. Dermatology: Skin is warm and dry there is no obvious abscesses lacerations or lesions noted  Psych: Mentation is grossly normal cognition is grossly normal. Affect is appropriate  Neuro:  Motor intact sensory intact cranial nerves II through XII are intact level of consciousness is normal cerebellar function is normal reflexes are grossly normal. No evidence of incontinence or loss of bowel or bladder no saddle anesthesia noted Lymphatic: There is no submandibular or cervical adenopathy appreciated. I have reviewed and interpreted all of the currently available lab results from this visit (if applicable):  No results found for this visit on 08/29/21. Radiographs (if obtained):  [] The following radiograph was interpreted by myself in the absence of a radiologist:   [] Radiologist's Report Reviewed:  VL DUP LOWER EXTREMITY VENOUS LEFT   Final Result   1. No evidence of DVT in the left lower extremity. 2. Simple fluid collection in the left calf. EKG (if obtained):   Please See Note of attending physician for EKG interpretation. Chart review shows recent radiograph(s):  XR RIBS RIGHT INCLUDE CHEST (MIN 3 VIEWS)    Result Date: 8/2/2021  EXAMINATION: 7 XRAY VIEWS OF THE RIGHT RIBS WITH FRONTAL XRAY VIEW OF THE CHEST 8/2/2021 1:00 pm COMPARISON: Chest x-ray January 26, 2021 HISTORY: ORDERING SYSTEM PROVIDED HISTORY: right rib pain TECHNOLOGIST PROVIDED HISTORY: Reason for exam:->right rib pain Reason for Exam: right rib pain Acuity: Acute Type of Exam: Initial Mechanism of Injury: fell several days ago FINDINGS: Cardiac silhouette appears stable. No focal consolidation, pleural effusion, or pneumothorax identified. Osseous structures appear intact. Specifically, no acute displaced rib fracture identified within limits of radiograph. Postoperative changes of cholecystectomy noted. 1. No acute displaced rib fracture identified within limits of radiograph. 2. No acute cardiopulmonary process identified. XR THORACIC SPINE (3 VIEWS)    Result Date: 8/2/2021  EXAMINATION: THREE XRAY VIEWS OF THE THORACIC SPINE 8/2/2021 10:00 am COMPARISON: None.  HISTORY: ORDERING SYSTEM PROVIDED HISTORY: mid back pain from fall TECHNOLOGIST PROVIDED HISTORY: Reason for exam:->mid back pain from fall Reason for Exam: pain Acuity: Acute Type of Exam: Initial Mechanism of Injury: fell several days ago0 FINDINGS: Thoracic spine is visualized from approximately T2 through T12. The cervicothoracic junction is obscured by overlying bony and soft tissue structures. It is grossly aligned on the swimmer's view. The visualized vertebral bodies are normal in height and alignment. On the AP examination, pedicles are maintained. Alignment is maintained. Multilevel spondylosis is again noted, similar when compared to the previous exam.     No acute abnormality identified. XR HUMERUS RIGHT (MIN 2 VIEWS)    Result Date: 8/2/2021  EXAMINATION: TWO XRAY VIEWS OF THE RIGHT HUMERUS 8/2/2021 10:00 am COMPARISON: None. HISTORY: ORDERING SYSTEM PROVIDED HISTORY: right humerus pain TECHNOLOGIST PROVIDED HISTORY: Reason for exam:->right humerus pain Reason for Exam: right humerus pain Acuity: Acute Type of Exam: Initial Mechanism of Injury: fell several days ago FINDINGS: No fractures are identified within the right humerus. No bone lesions. No soft tissue abnormality. No radiopaque foreign body. Unremarkable right shoulder radiograph series. CT HEAD WO CONTRAST    Result Date: 8/2/2021  EXAMINATION: CT OF THE HEAD WITHOUT CONTRAST  8/2/2021 12:30 pm TECHNIQUE: CT of the head was performed without the administration of intravenous contrast. Dose modulation, iterative reconstruction, and/or weight based adjustment of the mA/kV was utilized to reduce the radiation dose to as low as reasonably achievable. COMPARISON: 01/26/2021 HISTORY: ORDERING SYSTEM PROVIDED HISTORY: Pain after fall TECHNOLOGIST PROVIDED HISTORY: Reason for exam:->fall Has a \"code stroke\" or \"stroke alert\" been called? ->No Decision Support Exception - unselect if not a suspected or confirmed emergency medical condition->Emergency Medical Condition (MA) Is the patient pregnant?->No Reason for Exam: fall Acuity: Acute Type of Exam: Initial Additional signs and symptoms: FALL ON SATURDAY,C/O BACK PAIN AND SHOULDER PAIN FINDINGS: BRAIN/VENTRICLES: There is no acute intracranial hemorrhage, mass effect or midline shift. No abnormal extra-axial fluid collection. The gray-white differentiation is maintained without evidence of an acute infarct. There is no evidence of hydrocephalus. ORBITS: The visualized portion of the orbits demonstrate no acute abnormality. SINUSES: Partial opacification of the right ethmoid air cells is seen. There is complete opacification of the visualized right maxillary sinus. Minimal mucoperiosteal thickening of the bilateral sphenoid sinuses is noted. SOFT TISSUES/SKULL:  No acute abnormality of the visualized skull or soft tissues. No acute intracranial abnormality. MDM:     Interventions given this visit: No orders of the defined types were placed in this encounter. I estimate there is LOW risk for (including but not limited to) OPEN FRACTURE, COMPARTMENT SYNDROME, DEEP VENOUS THROMBOSIS, COMPLETE  TENDON RUPTURE, NECROTIZING FASCIITIS, or ACUTE ARTERIAL INJURY, thus I consider the discharge disposition reasonable. Tatiana Aman (or their surrogate) and I have discussed the diagnosis and risks, and we agree with discharging home with close follow-up. We also discussed returning to the Emergency Department immediately if new or worsening symptoms occur. We have discussed the symptoms which are most concerning that necessitate immediate return. I independently managed patient today in the ED    BP (!) 164/78   Pulse 64   Temp 98.3 °F (36.8 °C) (Oral)   Resp 16   LMP 06/05/2012   SpO2 94%       Clinical Impression:  1.  Contusion of lower leg, unspecified laterality, initial encounter        Disposition referral (if applicable):  SHUKRI Wynn - CNP  [address] [address]  [phone]    In 2 days      Disposition medications (if applicable):  New Prescriptions    No medications on file         Comment: Please note this report has been produced using speech recognition software and may contain errors related to that system including errors in grammar, punctuation, and spelling, as well as words and phrases that may be inappropriate. If there are any questions or concerns please feel free to contact the dictating provider for clarification.       BRO [place], [place]  08/29/21 1346

## 2021-08-30 NOTE — ED NOTES
Discharge instructions reviewed with pt. All questions answered at this time. Pt verbalized understanding.       Tal Hagen RN  08/29/21 4980

## 2021-09-14 ENCOUNTER — VIRTUAL VISIT (OUTPATIENT)
Dept: PULMONOLOGY | Age: 46
End: 2021-09-14
Payer: MEDICAID

## 2021-09-14 DIAGNOSIS — Z01.818 PREOP TESTING: ICD-10-CM

## 2021-09-14 DIAGNOSIS — Z00.00 HEALTHCARE MAINTENANCE: ICD-10-CM

## 2021-09-14 DIAGNOSIS — J01.01 ACUTE RECURRENT MAXILLARY SINUSITIS: Primary | ICD-10-CM

## 2021-09-14 DIAGNOSIS — J43.9 PULMONARY EMPHYSEMA, UNSPECIFIED EMPHYSEMA TYPE (HCC): ICD-10-CM

## 2021-09-14 DIAGNOSIS — J33.8 ANTRAL (MAXILLARY) POLYP: ICD-10-CM

## 2021-09-14 PROCEDURE — G8427 DOCREV CUR MEDS BY ELIG CLIN: HCPCS | Performed by: NURSE PRACTITIONER

## 2021-09-14 PROCEDURE — 99214 OFFICE O/P EST MOD 30 MIN: CPT | Performed by: NURSE PRACTITIONER

## 2021-09-14 RX ORDER — DICYCLOMINE HYDROCHLORIDE 10 MG/1
CAPSULE ORAL
Qty: 90 CAPSULE | Refills: 5 | Status: SHIPPED | OUTPATIENT
Start: 2021-09-14 | End: 2022-03-22

## 2021-09-15 PROBLEM — J43.9 PULMONARY EMPHYSEMA, UNSPECIFIED EMPHYSEMA TYPE (HCC): Status: ACTIVE | Noted: 2021-09-15

## 2021-09-15 NOTE — PROGRESS NOTES
[place] [address] Pulmonary   Telephone Visit Note      Skinny Cuellar is a 48 y.o. female evaluated via my chart telephone visit on 9/14/2021. Consent:  Pursuant to emergency declaration under the [employer] in the [employer], 1135 waiver authorization in the [facility] Act, this telephone visit was completed with patient's consent, to reduce the patient's risk of exposure to COVID-19 and provide necessary medical care. She and/or health care decision maker is aware that that she may receive a bill for this telephone service, depending on her insurance coverage, and has provided verbal consent to proceed. Patient is at his residency and Provider is currently in Pulmonary Clinic at [address]. Documentation:  Was supposed to have an office visit today however she states she is having car problems and therefore would request that we have a telephone visit. Kami Castañeda states he was seen in the emergency room on 8/2/2021 after falling down 2 carpeted steps 2 days prior. She also complained at that time of feelings of sinus infection and blurry vision in right eye. X-rays were negative for fractures she was diagnosed with closed head injury, chronic sinusitis. She was started on Amoxil for her sinus infection. She states she completed 10 days of Amoxil and still did not quite feel as if her sinus infection had totally resolved. 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 She has had several sinus infections this year and is completed Augmentin antibiotics for infections. She states she has pressure over her maxillary sinus area and pain radiates into her eye area. She denies visual changes at this time but states that she does have tearing of her eyes.   Symptoms are unilateral.    I have discussed with her I feel it is time that she follows up with ENT for sinus infections as I do not feel that continued treatment with antibiotics is appropriate at this time. I will place referral to [facility]. She is currently on Claritin and Flonase. She is also on Symbicort and albuterol rescue inhaler for pulmonary emphysema. She denies any refill need at this time. Her last pulmonary function test was 7/2020. She was scheduled for pulmonary function test in June but did not complete. I will replace order for PFT. She is aware she will need to do Covid testing prior to her pulmonary function test.    Audrey Dumont states they are practicing social distancing, wearing a mask when out in public, washing hands frequently or using hand . Denies any fever, chills, malaise, change in sensation of taste or smell, headache or lightheadedness. Denies any known contacts with persons with respiratory infection, positive for coronavirus or under investigation for possible coronavirus exposure. Past Medical History:   Diagnosis Date    Anxiety     Bladder tumor     Dr. Oliva Lange Depression     Diverticulitis     Last episode: 7/1/2018    Emphysema of lung ([address]     Follows with Sil Winston    GERD (gastroesophageal reflux disease)     History of blood transfusion 2018    After surgery for dog attack    Hypertension     Dr. Shalom Curtis IBS (irritable bowel syndrome)     Kidney stone     Last stone: 2013    PTSD (post-traumatic stress disorder) 02/03/2019    from dog attack    Sleep apnea     Uses BiPap       Medication and allergies have been reviewed    Social and family history are unchanged since last visit    ROS:   Constitutional: Denies fever, denies chills   Cardiovascular: Denies chest pain, denies palpitations.    Pulmonary: Denies cough, denies SOB, denies wheeze  ENT: Positive sinus pressure, positive sinus drainage, positive excessive tearing    Physical exam not complete due to telephone visit  Alert and oriented  No conversational dyspnea, no cough, no audible wheeze  Normal mentation Diagnosis:    ICD-10-CM    1. Acute recurrent maxillary sinusitis  J01.01 External Referral To ENT   2. Antral (maxillary) polyp  J33.8 External Referral To ENT   3. Pulmonary emphysema, unspecified emphysema type ([email or web address]  J43.9 Full PFT Study With Bronchodilator   4. Healthcare maintenance  Z00.00    5. Preop testing  Z01.818 Covid-19 Ambulatory          Plan:  --ENT referral, frequent sinus infection complaints  --Continue Flonase and Claritin, denies need for refills at this time  --Continue Symbicort and albuterol rescue inhaler  --Repeat PFT in next few week, last PFT 7/2020  --While she has not been vaccinated at this time for COVID-19 I strongly recommend that she continue Coronavirus precaution including: Wearing mask when in public and when in contact with persons who have not been immunized, social distancing when needing to be in public, handwashing practice, wiping items touched in public such as gas pumps, door handles, shopping carts, etc.  --Recommend yearly flu vaccination  --Recommend Covid 19 booster when available  --Recommend Pneumovax vaccination  --Have discussed signs and symptoms of copd exacerbation and why it is important to monitor for bronchial infections. To call office should she develop signs of copd exacerbation/bronchial infection  --I will continue to follow in pulmonary clinic  .

## 2021-09-20 RX ORDER — FUROSEMIDE 20 MG/1
TABLET ORAL
Qty: 90 TABLET | Refills: 0 | Status: SHIPPED | OUTPATIENT
Start: 2021-09-20 | End: 2022-01-10

## 2021-10-04 ENCOUNTER — HOSPITAL ENCOUNTER (EMERGENCY)
Age: 46
Discharge: LEFT BEFORE DISCHARGE | End: 2021-10-04
Payer: MEDICAID

## 2021-10-04 ENCOUNTER — TELEPHONE (OUTPATIENT)
Dept: PULMONOLOGY | Age: 46
End: 2021-10-04

## 2021-10-04 VITALS
RESPIRATION RATE: 16 BRPM | TEMPERATURE: 97.9 F | HEART RATE: 70 BPM | BODY MASS INDEX: 31.15 KG/M2 | HEIGHT: 61 IN | DIASTOLIC BLOOD PRESSURE: 96 MMHG | SYSTOLIC BLOOD PRESSURE: 143 MMHG | OXYGEN SATURATION: 98 % | WEIGHT: 165 LBS

## 2021-10-04 ASSESSMENT — PAIN DESCRIPTION - FREQUENCY: FREQUENCY: CONTINUOUS

## 2021-10-04 ASSESSMENT — PAIN DESCRIPTION - PAIN TYPE: TYPE: ACUTE PAIN

## 2021-10-04 ASSESSMENT — PAIN SCALES - GENERAL: PAINLEVEL_OUTOF10: 8

## 2021-10-04 ASSESSMENT — PAIN DESCRIPTION - LOCATION: LOCATION: FACE;HEAD

## 2021-10-04 NOTE — ED TRIAGE NOTES
Patient complains of pain and swelling in face and head due to infection in sinus cavities. Reports a procedure is planned for 10/20/21 but needs antibiotics for the infection at this time.

## 2021-10-04 NOTE — TELEPHONE ENCOUNTER
Pt called in stating she was at the ER due to a swollen face, and breathing issues. Stated she did not want to wait around at the ER. She was wanting abx but I let her know Manatan Reina was not in until Thursday. She suggested going to an urgent care instead. I did advise her to do so.

## 2021-10-15 ENCOUNTER — OFFICE VISIT (OUTPATIENT)
Dept: INTERNAL MEDICINE CLINIC | Age: 46
End: 2021-10-15
Payer: MEDICAID

## 2021-10-15 ENCOUNTER — HOSPITAL ENCOUNTER (OUTPATIENT)
Dept: LAB | Age: 46
Discharge: HOME | End: 2021-10-15
Payer: MEDICAID

## 2021-10-15 VITALS
SYSTOLIC BLOOD PRESSURE: 132 MMHG | BODY MASS INDEX: 32.66 KG/M2 | OXYGEN SATURATION: 98 % | HEART RATE: 78 BPM | HEIGHT: 61 IN | WEIGHT: 173 LBS | RESPIRATION RATE: 18 BRPM | DIASTOLIC BLOOD PRESSURE: 72 MMHG

## 2021-10-15 DIAGNOSIS — R35.0 URINARY FREQUENCY: Primary | ICD-10-CM

## 2021-10-15 LAB
BILIRUBIN, POC: NORMAL
BLOOD URINE, POC: NORMAL
CLARITY, POC: CLEAR
COLOR, POC: YELLOW
GLUCOSE URINE, POC: NORMAL
KETONES, POC: NORMAL
LEUKOCYTE EST, POC: NORMAL
NITRITE, POC: NORMAL
PH, POC: 5.5
PROTEIN, POC: NORMAL
SARS-COV-2: NOT DETECTED
SOURCE: NORMAL
SPECIFIC GRAVITY, POC: 1.03
UROBILINOGEN, POC: 0.2

## 2021-10-15 PROCEDURE — G8417 CALC BMI ABV UP PARAM F/U: HCPCS | Performed by: NURSE PRACTITIONER

## 2021-10-15 PROCEDURE — U0005 INFEC AGEN DETEC AMPLI PROBE: HCPCS

## 2021-10-15 PROCEDURE — 99213 OFFICE O/P EST LOW 20 MIN: CPT | Performed by: NURSE PRACTITIONER

## 2021-10-15 PROCEDURE — 36415 COLL VENOUS BLD VENIPUNCTURE: CPT

## 2021-10-15 PROCEDURE — 81002 URINALYSIS NONAUTO W/O SCOPE: CPT | Performed by: NURSE PRACTITIONER

## 2021-10-15 PROCEDURE — U0003 INFECTIOUS AGENT DETECTION BY NUCLEIC ACID (DNA OR RNA); SEVERE ACUTE RESPIRATORY SYNDROME CORONAVIRUS 2 (SARS-COV-2) (CORONAVIRUS DISEASE [COVID-19]), AMPLIFIED PROBE TECHNIQUE, MAKING USE OF HIGH THROUGHPUT TECHNOLOGIES AS DESCRIBED BY CMS-2020-01-R: HCPCS

## 2021-10-15 PROCEDURE — 1036F TOBACCO NON-USER: CPT | Performed by: NURSE PRACTITIONER

## 2021-10-15 PROCEDURE — G8427 DOCREV CUR MEDS BY ELIG CLIN: HCPCS | Performed by: NURSE PRACTITIONER

## 2021-10-15 PROCEDURE — G8484 FLU IMMUNIZE NO ADMIN: HCPCS | Performed by: NURSE PRACTITIONER

## 2021-10-15 RX ORDER — FLUCONAZOLE 100 MG/1
100 TABLET ORAL DAILY
Qty: 3 TABLET | Refills: 0 | Status: SHIPPED | OUTPATIENT
Start: 2021-10-15 | End: 2021-10-18

## 2021-10-15 RX ORDER — PHENAZOPYRIDINE HYDROCHLORIDE 100 MG/1
100 TABLET, FILM COATED ORAL 3 TIMES DAILY PRN
Qty: 30 TABLET | Refills: 0 | Status: SHIPPED | OUTPATIENT
Start: 2021-10-15 | End: 2022-01-10

## 2021-10-15 RX ORDER — ACETAMINOPHEN 500 MG
500 TABLET ORAL 3 TIMES DAILY PRN
Qty: 90 TABLET | Refills: 0 | Status: SHIPPED | OUTPATIENT
Start: 2021-10-15 | End: 2022-01-10

## 2021-10-15 RX ORDER — POTASSIUM CHLORIDE 750 MG/1
10 TABLET, EXTENDED RELEASE ORAL DAILY
Qty: 30 TABLET | Refills: 2 | Status: SHIPPED | OUTPATIENT
Start: 2021-10-15 | End: 2022-01-12

## 2021-10-15 RX ORDER — NITROFURANTOIN 25; 75 MG/1; MG/1
100 CAPSULE ORAL 2 TIMES DAILY
Qty: 14 CAPSULE | Refills: 0 | Status: SHIPPED | OUTPATIENT
Start: 2021-10-15 | End: 2022-01-10

## 2021-10-15 ASSESSMENT — ENCOUNTER SYMPTOMS
CHEST TIGHTNESS: 0
VOMITING: 0
SINUS PRESSURE: 0
SINUS PAIN: 0
COUGH: 0
SHORTNESS OF BREATH: 0
COLOR CHANGE: 0
NAUSEA: 0
ABDOMINAL PAIN: 0
DIARRHEA: 0
APNEA: 0

## 2021-10-15 ASSESSMENT — PATIENT HEALTH QUESTIONNAIRE - PHQ9
SUM OF ALL RESPONSES TO PHQ QUESTIONS 1-9: 0
SUM OF ALL RESPONSES TO PHQ9 QUESTIONS 1 & 2: 0
1. LITTLE INTEREST OR PLEASURE IN DOING THINGS: 0
2. FEELING DOWN, DEPRESSED OR HOPELESS: 0

## 2021-10-15 NOTE — PROGRESS NOTES
[place]  1975  10/15/21    Chief Complaint   Patient presents with    Dysuria     abdominal pain, vaginal itching sx started 2 weeks ago        SUBJECTIVE:      Princess Dave presents to the office this morning for c/o recent issues with dysuria, vaginal itching, suprapubic pressure over the last 1-2 weeks. Denies any fevers, chills, flank pain, hematuria. Has been having increased urinary frequency and feeling of incomplete emptiness. Review of Systems   Constitutional: Negative for activity change, appetite change, fatigue and fever. HENT: Negative for congestion, nosebleeds, sinus pressure and sinus pain. Respiratory: Negative for apnea, cough, chest tightness and shortness of breath. Cardiovascular: Negative for chest pain and palpitations. Gastrointestinal: Negative for abdominal pain, diarrhea, nausea and vomiting. Genitourinary: Positive for dysuria, frequency and urgency. Negative for difficulty urinating, flank pain and hematuria. Musculoskeletal: Negative for arthralgias, joint swelling and myalgias. Skin: Negative for color change and rash. Neurological: Negative for dizziness, light-headedness and headaches. Psychiatric/Behavioral: Negative. Negative for behavioral problems. OBJECTIVE:    /72   Pulse 78   Resp 18   Ht 5' 1\" (1.549 m)   Wt 173 lb (78.5 kg)   LMP 06/05/2012   SpO2 98%   BMI 32.69 kg/m²     Physical Exam  Constitutional:       General: She is not in acute distress. Appearance: She is well-developed. She is not diaphoretic. HENT:      Head: Normocephalic and atraumatic. Cardiovascular:      Rate and Rhythm: Normal rate and regular rhythm. Heart sounds: Normal heart sounds. Pulmonary:      Effort: Pulmonary effort is normal. No respiratory distress. Breath sounds: Normal breath sounds. Abdominal:      General: Bowel sounds are normal.      Palpations: Abdomen is soft. Tenderness:  There is no abdominal tenderness. There is no right CVA tenderness or left CVA tenderness. Musculoskeletal:         General: Normal range of motion. Cervical back: Normal range of motion and neck supple. No edema or erythema. No muscular tenderness. Skin:     General: Skin is warm and dry. Capillary Refill: Capillary refill takes less than 2 seconds. Findings: No erythema. Neurological:      Mental Status: She is alert and oriented to person, place, and time. Coordination: Coordination normal.   Psychiatric:         Thought Content: Thought content normal.         ASSESSMENT:    1. Urinary frequency        PLAN:    Cynthia Boyer was seen today for dysuria. Diagnoses and all orders for this visit:    Urinary frequency- UA today mildly convincing for infection, but symptoms seems more c/w likely yeast infection. Start [address] as below and increase water intake. Urine sent for culture. If vaginal symptoms do not subside, start Diflucan. -     POCT Urinalysis no Micro  -     nitrofurantoin, macrocrystal-monohydrate, (MACROBID) 100 MG capsule; Take 1 capsule by mouth 2 times daily for 7 days  -     phenazopyridine (PYRIDIUM) 100 MG tablet; Take 1 tablet by mouth 3 times daily as needed for Pain  -     fluconazole (DIFLUCAN) 100 MG tablet; Take 1 tablet by mouth daily for 3 days      No flowsheet data found. Return if symptoms worsen or fail to improve. Matti note this reports has been produced speech recognition software and may contain errors related to that system including errors in grammar, punctuation, and spelling, as well as words and phrases that may be appropriate. If there are any questions or concerns please feel free to contact the dictating provider for clarification.

## 2021-10-17 LAB — URINE CULTURE, ROUTINE: NORMAL

## 2021-10-19 DIAGNOSIS — S82.52XS: ICD-10-CM

## 2021-10-19 DIAGNOSIS — K21.9 GASTROESOPHAGEAL REFLUX DISEASE WITHOUT ESOPHAGITIS: ICD-10-CM

## 2021-10-20 ENCOUNTER — HOSPITAL ENCOUNTER (OUTPATIENT)
Dept: PULMONOLOGY | Age: 46
Discharge: HOME | End: 2021-10-20
Payer: MEDICAID

## 2021-10-20 DIAGNOSIS — J43.9 PULMONARY EMPHYSEMA, UNSPECIFIED EMPHYSEMA TYPE (HCC): ICD-10-CM

## 2021-10-20 LAB
DLCO %PRED: 117 %
DLCO PRED: NORMAL
DLCO/VA %PRED: NORMAL
DLCO/VA PRED: NORMAL
DLCO/VA: NORMAL
DLCO: NORMAL
EXPIRATORY TIME-POST: NORMAL
EXPIRATORY TIME: NORMAL
FEF 25-75% %CHNG: NORMAL
FEF 25-75% %PRED-POST: NORMAL
FEF 25-75% %PRED-PRE: NORMAL
FEF 25-75% PRED: NORMAL
FEF 25-75%-POST: NORMAL
FEF 25-75%-PRE: NORMAL
FEV1 %PRED-POST: 109 %
FEV1 %PRED-PRE: 104 %
FEV1 PRED: NORMAL
FEV1-POST: NORMAL
FEV1-PRE: NORMAL
FEV1/FVC %PRED-POST: NORMAL
FEV1/FVC %PRED-PRE: NORMAL
FEV1/FVC PRED: NORMAL
FEV1/FVC-POST: 111 %
FEV1/FVC-PRE: 107 %
FVC %PRED-POST: NORMAL
FVC %PRED-PRE: NORMAL
FVC PRED: NORMAL
FVC-POST: NORMAL
FVC-PRE: NORMAL
GAW %PRED: NORMAL
GAW PRED: NORMAL
GAW: NORMAL
IC %PRED: NORMAL
IC PRED: NORMAL
IC: NORMAL
MEP: NORMAL
MIP: NORMAL
MVV %PRED-PRE: NORMAL
MVV PRED: NORMAL
MVV-PRE: NORMAL
PEF %PRED-POST: NORMAL
PEF %PRED-PRE: NORMAL
PEF PRED: NORMAL
PEF%CHNG: NORMAL
PEF-POST: NORMAL
PEF-PRE: NORMAL
RAW %PRED: NORMAL
RAW PRED: NORMAL
RAW: NORMAL
RV %PRED: NORMAL
RV PRED: NORMAL
RV: NORMAL
SVC %PRED: NORMAL
SVC PRED: NORMAL
SVC: NORMAL
TLC %PRED: 100 %
TLC PRED: NORMAL
TLC: NORMAL
VA %PRED: NORMAL
VA PRED: NORMAL
VA: NORMAL
VTG %PRED: NORMAL
VTG PRED: NORMAL
VTG: NORMAL

## 2021-10-20 PROCEDURE — 94729 DIFFUSING CAPACITY: CPT

## 2021-10-20 PROCEDURE — 94727 GAS DIL/WSHOT DETER LNG VOL: CPT

## 2021-10-20 PROCEDURE — 94060 EVALUATION OF WHEEZING: CPT

## 2021-10-20 RX ORDER — GABAPENTIN 100 MG/1
100 CAPSULE ORAL 2 TIMES DAILY
Qty: 60 CAPSULE | Refills: 2 | Status: SHIPPED | OUTPATIENT
Start: 2021-10-20 | End: 2022-02-23 | Stop reason: SDUPTHER

## 2021-10-20 RX ORDER — OMEPRAZOLE 20 MG/1
20 CAPSULE, DELAYED RELEASE ORAL DAILY
Qty: 30 CAPSULE | Refills: 5 | Status: SHIPPED | OUTPATIENT
Start: 2021-10-20 | End: 2022-04-05

## 2021-10-20 ASSESSMENT — PULMONARY FUNCTION TESTS
FEV1_PERCENT_PREDICTED_POST: 109
FEV1/FVC_POST: 111
FEV1_PERCENT_PREDICTED_PRE: 104
FEV1/FVC_PRE: 107

## 2021-10-25 ENCOUNTER — TELEPHONE (OUTPATIENT)
Dept: INTERNAL MEDICINE CLINIC | Age: 46
End: 2021-10-25

## 2021-10-25 RX ORDER — FLUCONAZOLE 100 MG/1
100 TABLET ORAL DAILY
Qty: 3 TABLET | Refills: 0 | Status: SHIPPED | OUTPATIENT
Start: 2021-10-25 | End: 2022-01-10

## 2021-10-25 NOTE — TELEPHONE ENCOUNTER
I've completed all my medicine and the redness and itching is gone but still have white discharge! What should I do next? Help me please! Thank you!   Please advise

## 2021-11-01 ENCOUNTER — HOSPITAL ENCOUNTER (EMERGENCY)
Age: 46
Discharge: LEFT BEFORE DISCHARGE | End: 2021-11-01
Payer: MEDICAID

## 2021-11-01 ENCOUNTER — OFFICE VISIT (OUTPATIENT)
Dept: FAMILY MEDICINE CLINIC | Age: 46
End: 2021-11-01
Payer: MEDICAID

## 2021-11-01 ENCOUNTER — TELEPHONE (OUTPATIENT)
Dept: INTERNAL MEDICINE CLINIC | Age: 46
End: 2021-11-01

## 2021-11-01 VITALS
HEART RATE: 65 BPM | BODY MASS INDEX: 33.99 KG/M2 | SYSTOLIC BLOOD PRESSURE: 138 MMHG | WEIGHT: 180 LBS | OXYGEN SATURATION: 98 % | HEIGHT: 61 IN | DIASTOLIC BLOOD PRESSURE: 94 MMHG | TEMPERATURE: 98.4 F

## 2021-11-01 VITALS
OXYGEN SATURATION: 98 % | DIASTOLIC BLOOD PRESSURE: 90 MMHG | TEMPERATURE: 97.6 F | HEART RATE: 63 BPM | RESPIRATION RATE: 15 BRPM | SYSTOLIC BLOOD PRESSURE: 163 MMHG

## 2021-11-01 DIAGNOSIS — R10.12 LEFT UPPER QUADRANT ABDOMINAL PAIN: Primary | ICD-10-CM

## 2021-11-01 DIAGNOSIS — R10.32 LEFT LOWER QUADRANT ABDOMINAL PAIN: ICD-10-CM

## 2021-11-01 PROCEDURE — 80053 COMPREHEN METABOLIC PANEL: CPT

## 2021-11-01 PROCEDURE — G8427 DOCREV CUR MEDS BY ELIG CLIN: HCPCS | Performed by: NURSE PRACTITIONER

## 2021-11-01 PROCEDURE — 99213 OFFICE O/P EST LOW 20 MIN: CPT | Performed by: NURSE PRACTITIONER

## 2021-11-01 PROCEDURE — G8484 FLU IMMUNIZE NO ADMIN: HCPCS | Performed by: NURSE PRACTITIONER

## 2021-11-01 PROCEDURE — 1036F TOBACCO NON-USER: CPT | Performed by: NURSE PRACTITIONER

## 2021-11-01 PROCEDURE — G8417 CALC BMI ABV UP PARAM F/U: HCPCS | Performed by: NURSE PRACTITIONER

## 2021-11-01 ASSESSMENT — ENCOUNTER SYMPTOMS
SINUS PAIN: 0
HEMATOCHEZIA: 0
DIARRHEA: 0
SINUS PRESSURE: 0
ABDOMINAL PAIN: 1
COUGH: 0
NAUSEA: 1
SHORTNESS OF BREATH: 0
CONSTIPATION: 1
BELCHING: 0
RHINORRHEA: 0
WHEEZING: 0
SORE THROAT: 0
FLATUS: 0
VOMITING: 0
CHEST TIGHTNESS: 0

## 2021-11-01 ASSESSMENT — PAIN DESCRIPTION - LOCATION: LOCATION: ABDOMEN

## 2021-11-01 ASSESSMENT — PAIN DESCRIPTION - FREQUENCY: FREQUENCY: CONTINUOUS

## 2021-11-01 ASSESSMENT — PAIN SCALES - GENERAL: PAINLEVEL_OUTOF10: 8

## 2021-11-01 ASSESSMENT — PAIN DESCRIPTION - PAIN TYPE: TYPE: ACUTE PAIN

## 2021-11-01 ASSESSMENT — CROHNS DISEASE ACTIVITY INDEX (CDAI): CDAI SCORE: 0

## 2021-11-01 NOTE — TELEPHONE ENCOUNTER
Spoke with pt about getting on the schedule to be evaluated. Pt stated that she would go to urgent care.

## 2021-11-01 NOTE — PROGRESS NOTES
Justo Castillo   48 y.o.  female  [id number]      Chief Complaint   Patient presents with    Abdominal Pain     Pt c/o abdominal pain x few weeks, last few days has been having worse abdominal pain especially on lower left side, pt has had multiple abdominal issues and surgeries. Pt c/o redness around bellybutton with drainage. Pt states she gained 10 lbs in a day. Subjective:  48 y. o.female is here for a follow up. She has the following chronic/acute medical problems:  Patient Active Problem List   Diagnosis    Fracture of ankle, medial malleolus, left, open    Dog bite of ankle, left, initial encounter    Encounter for postoperative wound check    Essential hypertension    Anxiety and depression    GERD (gastroesophageal reflux disease)    Smoker    Pneumonia due to infectious organism    Chest pain    ALAN on CPAP    Morbid obesity with BMI of 45.0-49.9, adult ([address]    Morbid obesity with BMI of 40.0-44.9, adult ([address]    Antral (maxillary) polyp    Morbid obesity due to excess calories ([address]    Syncope and collapse    Pulmonary emphysema, unspecified emphysema type ([address]       Patient is here with complaints of abdominal pain on and off for two weeks. Patient states over the last couple of days the pain has become worse. Patient states her belly button area is red and is seeping fluid x 2 days. Patient states she has gained 10 lbs in 1 day. Abdominal Pain  This is a new problem. The current episode started 1 to 4 weeks ago (2 weeks ). The onset quality is sudden. The problem occurs intermittently. The problem has been unchanged. The pain is located in the LLQ. The pain is at a severity of 8/10. The quality of the pain is aching. The abdominal pain does not radiate. Associated symptoms include constipation (states has not had a BM in two days) and nausea.  Pertinent negatives include no anorexia, arthralgias, belching, diarrhea, dysuria, fever, flatus, frequency, headaches, hematochezia, hematuria, melena, myalgias, vomiting or weight loss. The pain is aggravated by movement. The pain is relieved by nothing. She has tried acetaminophen for the symptoms. The treatment provided no relief. Her past medical history is significant for abdominal surgery, GERD and irritable bowel syndrome. There is no history of colon cancer, Crohn's disease, gallstones, pancreatitis, PUD or ulcerative colitis. Review of Systems   Constitutional: Positive for appetite change (decreased). Negative for chills, fatigue, fever and weight loss. HENT: Negative for congestion, ear pain, postnasal drip, rhinorrhea, sinus pressure, sinus pain, sneezing and sore throat. Respiratory: Negative for cough, chest tightness, shortness of breath and wheezing. Cardiovascular: Negative for chest pain and palpitations. Gastrointestinal: Positive for abdominal pain, constipation (states has not had a BM in two days) and nausea. Negative for anorexia, diarrhea, flatus, hematochezia, melena and vomiting. Genitourinary: Negative for dysuria, frequency and hematuria. Musculoskeletal: Negative for arthralgias and myalgias. Skin: Negative for rash. Neurological: Negative for dizziness, light-headedness and headaches. Current Outpatient Medications   Medication Sig Dispense Refill    diclofenac 1 % CREA Place 1 Tube onto the skin daily as needed for Pain 120 g 0    gabapentin (NEURONTIN) 100 MG capsule Take 1 capsule by mouth 2 times daily for 30 days.  60 capsule 2    omeprazole (PRILOSEC) 20 MG delayed release capsule Take 1 capsule by mouth Daily 30 capsule 5    acetaminophen (TYLENOL) 500 MG tablet Take 1 tablet by mouth 3 times daily as needed for Pain 90 tablet 0    potassium chloride (KLOR-CON M) 10 MEQ extended release tablet Take 1 tablet by mouth daily 30 tablet 2    furosemide (LASIX) 20 MG tablet take 1 tablet by mouth once daily 90 tablet 0    dicyclomine (BENTYL) 10 MG capsule take 1 capsule by mouth three times a day BEFORE A MEAL 90 capsule 5    fluticasone (FLONASE) 50 MCG/ACT nasal spray instill 2 sprays into each nostril once daily 16 g 5    dextran 70-hypromellose (ARTIFICIAL TEARS) 0.1-0.3 % SOLN opthalmic solution Place 1 drop into both eyes every 4 hours as needed (Eye irritation) 1 Bottle 0    loratadine-pseudoephedrine (CLARITIN-D 24HR)  MG per extended release tablet Take 1 tablet by mouth daily 30 tablet 3    SYMBICORT 160-4.5 MCG/ACT AERO Inhale 2 puffs into the lungs 2 times daily 1 Inhaler 5    albuterol sulfate  (90 Base) MCG/ACT inhaler Inhale 2 puffs into the lungs every 4 hours as needed for Wheezing 8.5 g 2    meloxicam (MOBIC) 7.5 MG tablet Take 2 tablets by mouth daily for 10 days (Patient not taking: Reported on 9/14/2021) 20 tablet 0    pantoprazole (PROTONIX) 40 MG tablet Take 1 tablet by mouth 2 times daily (Patient not taking: Reported on 11/1/2021) 60 tablet 3    hydrOXYzine (ATARAX) 10 MG tablet Take 10 mg by mouth every 8 hours as needed for Itching  (Patient not taking: Reported on 11/1/2021)      Nutritional Supplements (ENSURE HIGH PROTEIN) LIQD Take 1 Can by mouth daily Meal replacement. (Patient not taking: Reported on 11/1/2021) 32 Can 2     No current facility-administered medications for this visit. Past medical, family,surgical history reviewed today. Objective:  BP (!) 138/94   Pulse 65   Temp 98.4 °F (36.9 °C)   Ht 5' 1\" (1.549 m)   Wt 180 lb (81.6 kg)   LMP 06/05/2012   SpO2 98%   Breastfeeding No   BMI 34.01 kg/m²   BP Readings from Last 3 Encounters:   11/01/21 (!) 138/94   10/15/21 132/72   10/04/21 (!) 143/96     Wt Readings from Last 3 Encounters:   11/01/21 180 lb (81.6 kg)   10/15/21 173 lb (78.5 kg)   10/04/21 165 lb (74.8 kg)         Physical Exam  Constitutional:       Appearance: Normal appearance. HENT:      Head: Normocephalic. Cardiovascular:      Rate and Rhythm: Normal rate and regular rhythm. Heart sounds: Normal heart sounds. Pulmonary:      Effort: Pulmonary effort is normal.      Breath sounds: Normal breath sounds. Abdominal:      General: Bowel sounds are normal.      Palpations: Abdomen is soft. Tenderness: There is abdominal tenderness in the left upper quadrant and left lower quadrant. Musculoskeletal:      Cervical back: Neck supple. Neurological:      Mental Status: She is alert and oriented to person, place, and time. Psychiatric:         Mood and Affect: Mood normal.         Behavior: Behavior normal.         Lab Results   Component Value Date    WBC 8.9 01/26/2021    HGB 15.3 01/26/2021    HCT 44.6 01/26/2021    MCV 82.4 01/26/2021     01/26/2021     Lab Results   Component Value Date     (L) 01/27/2021    K 4.5 01/27/2021     01/27/2021    CO2 21 01/27/2021    BUN 23 01/27/2021    CREATININE 1.7 (H) 01/27/2021    GLUCOSE 82 01/27/2021    CALCIUM 8.9 01/27/2021    PROT 7.9 01/26/2021    LABALBU 4.7 01/26/2021    BILITOT 0.6 01/26/2021    ALKPHOS 83 01/26/2021    AST 18 01/26/2021    ALT 17 01/26/2021    LABGLOM 32 (L) 01/27/2021    GFRAA 39 (L) 01/27/2021     No results found for: CHOL  No results found for: TRIG  No results found for: HDL  No results found for: LDLCALC, LDLCHOLESTEROL  No results found for: LABA1C  Lab Results   Component Value Date    TSHHS 0.681 01/26/2021         ASSESSMENT/PLAN:      1. Left upper quadrant abdominal pain  Advised patient to go to the ER regarding tenderness, increased pain, a 10 lb weight gain in 1 day, and erythema around the belly button with oozing drainage. Patient states she will go to the ER. 2. Left lower quadrant abdominal pain  See above. No orders of the defined types were placed in this encounter. There are no discontinued medications. No orders of the defined types were placed in this encounter. Care discussed with patient. Questions answered.  Patient verbalizes understanding and agrees with plan. After visit summary provided. Advised to call for any problems, questions, or concerns. Return if symptoms worsen or fail to improve.                                            Signed:  SHUKRI Naranjo CNP  11/01/21  5:41 PM

## 2021-11-02 ENCOUNTER — OFFICE VISIT (OUTPATIENT)
Dept: INTERNAL MEDICINE CLINIC | Age: 46
End: 2021-11-02
Payer: MEDICAID

## 2021-11-02 ENCOUNTER — HOSPITAL ENCOUNTER (OUTPATIENT)
Dept: CT IMAGING | Age: 46
Discharge: HOME | End: 2021-11-02
Payer: MEDICAID

## 2021-11-02 VITALS
OXYGEN SATURATION: 99 % | HEIGHT: 61 IN | HEART RATE: 69 BPM | RESPIRATION RATE: 22 BRPM | WEIGHT: 170 LBS | BODY MASS INDEX: 32.1 KG/M2 | SYSTOLIC BLOOD PRESSURE: 131 MMHG | DIASTOLIC BLOOD PRESSURE: 78 MMHG

## 2021-11-02 DIAGNOSIS — R10.32 LLQ PAIN: ICD-10-CM

## 2021-11-02 DIAGNOSIS — R10.32 LLQ PAIN: Primary | ICD-10-CM

## 2021-11-02 DIAGNOSIS — K59.00 CONSTIPATION, UNSPECIFIED CONSTIPATION TYPE: ICD-10-CM

## 2021-11-02 DIAGNOSIS — R19.8 UMBILICAL DISCHARGE: ICD-10-CM

## 2021-11-02 PROCEDURE — 99214 OFFICE O/P EST MOD 30 MIN: CPT | Performed by: NURSE PRACTITIONER

## 2021-11-02 PROCEDURE — 1036F TOBACCO NON-USER: CPT | Performed by: NURSE PRACTITIONER

## 2021-11-02 PROCEDURE — 74176 CT ABD & PELVIS W/O CONTRAST: CPT

## 2021-11-02 PROCEDURE — G8417 CALC BMI ABV UP PARAM F/U: HCPCS | Performed by: NURSE PRACTITIONER

## 2021-11-02 PROCEDURE — G8427 DOCREV CUR MEDS BY ELIG CLIN: HCPCS | Performed by: NURSE PRACTITIONER

## 2021-11-02 PROCEDURE — G8484 FLU IMMUNIZE NO ADMIN: HCPCS | Performed by: NURSE PRACTITIONER

## 2021-11-02 RX ORDER — CLOTRIMAZOLE 1 %
CREAM (GRAM) TOPICAL
Qty: 1 EACH | Refills: 0 | Status: SHIPPED | OUTPATIENT
Start: 2021-11-02 | End: 2022-01-10

## 2021-11-02 RX ORDER — DOCUSATE SODIUM 100 MG/1
100 CAPSULE, LIQUID FILLED ORAL 2 TIMES DAILY PRN
Qty: 30 CAPSULE | Refills: 0 | Status: SHIPPED | OUTPATIENT
Start: 2021-11-02 | End: 2022-01-10

## 2021-11-02 ASSESSMENT — ENCOUNTER SYMPTOMS
COUGH: 0
CHEST TIGHTNESS: 0
APNEA: 0
NAUSEA: 0
ABDOMINAL PAIN: 1
DIARRHEA: 0
SINUS PAIN: 0
SINUS PRESSURE: 0
COLOR CHANGE: 0
SHORTNESS OF BREATH: 0
VOMITING: 0

## 2021-11-02 ASSESSMENT — PATIENT HEALTH QUESTIONNAIRE - PHQ9
SUM OF ALL RESPONSES TO PHQ9 QUESTIONS 1 & 2: 0
2. FEELING DOWN, DEPRESSED OR HOPELESS: 0
1. LITTLE INTEREST OR PLEASURE IN DOING THINGS: 0
SUM OF ALL RESPONSES TO PHQ QUESTIONS 1-9: 0

## 2021-11-02 NOTE — PROGRESS NOTES
/78   Pulse 69   Resp 22   Ht 5' 1\" (1.549 m)   Wt 170 lb (77.1 kg)   LMP 06/05/2012   SpO2 99%   BMI 32.12 kg/m²     Physical Exam    ASSESSMENT:    1. LLQ pain    2. Constipation, unspecified constipation type    3. Umbilical discharge        PLAN:    Endy was seen today for abdominal pain, other and constipation. Diagnoses and all orders for this visit:    LLQ pain- etiology concerning for acute diverticulitis vs possible bowel obstruction. Check CT as below today. Unfortunately, cannot use contrast d/t allergy. If diverticulitis, will start appropriate antibiotic regiment. Could simply be severe constipation related, for which treatment was started as below. -     CT ABDOMEN PELVIS WO CONTRAST Additional Contrast? None; Future    Constipation, unspecified constipation type  -     magnesium citrate solution; Take 296 mLs by mouth once for 1 dose  -     docusate sodium (COLACE) 100 MG capsule; Take 1 capsule by mouth 2 times daily as needed for Constipation    Umbilical discharge- exam consistent with candida infection. Topical azole ordered as below to use BID. If not resolved by end of week, call or RTO. Consider bacterial source/will order Mupirocin. -     clotrimazole (LOTRIMIN AF) 1 % cream; Apply topically 2 times daily. Course of treatment, including any medications, possible imaging, referrals, and follow ups discussed with patient. All risks and benefits and possible side effects discussed with patient who agrees to plan of care and verbalizes understanding. All labs and imaging reviewed. No flowsheet data found. Return if symptoms worsen or fail to improve. Matti note this reports has been produced speech recognition software and may contain errors related to that system including errors in grammar, punctuation, and spelling, as well as words and phrases that may be appropriate.  If there are any questions or concerns please feel free to contact the dictating provider for clarification.

## 2022-01-08 DIAGNOSIS — S82.52XS: ICD-10-CM

## 2022-01-08 DIAGNOSIS — R19.8 UMBILICAL DISCHARGE: ICD-10-CM

## 2022-01-08 DIAGNOSIS — K59.00 CONSTIPATION, UNSPECIFIED CONSTIPATION TYPE: ICD-10-CM

## 2022-01-08 DIAGNOSIS — R35.0 URINARY FREQUENCY: ICD-10-CM

## 2022-01-10 RX ORDER — ALBUTEROL SULFATE 90 UG/1
AEROSOL, METERED RESPIRATORY (INHALATION)
Qty: 18 G | Refills: 2 | Status: ON HOLD | OUTPATIENT
Start: 2022-01-10 | End: 2022-03-30

## 2022-01-10 RX ORDER — MAGNESIUM CITRATE 1.75 G/29.6ML
LIQUID ORAL
Qty: 296 ML | Refills: 0 | Status: SHIPPED | OUTPATIENT
Start: 2022-01-10 | End: 2022-02-08

## 2022-01-10 RX ORDER — FLUCONAZOLE 100 MG/1
TABLET ORAL
Qty: 3 TABLET | Refills: 0 | Status: SHIPPED | OUTPATIENT
Start: 2022-01-10 | End: 2022-01-13 | Stop reason: CLARIF

## 2022-01-10 RX ORDER — NITROFURANTOIN 25; 75 MG/1; MG/1
CAPSULE ORAL
Qty: 14 CAPSULE | Refills: 0 | Status: SHIPPED | OUTPATIENT
Start: 2022-01-10 | End: 2022-01-13 | Stop reason: CLARIF

## 2022-01-10 RX ORDER — CLOTRIMAZOLE 1 %
CREAM (GRAM) TOPICAL
Qty: 30 G | Refills: 2 | Status: SHIPPED | OUTPATIENT
Start: 2022-01-10 | End: 2022-04-05

## 2022-01-10 RX ORDER — PSEUDOEPHED/ACETAMINOPH/DIPHEN 30MG-500MG
TABLET ORAL
Qty: 90 TABLET | Refills: 0 | Status: SHIPPED | OUTPATIENT
Start: 2022-01-10 | End: 2022-02-08

## 2022-01-10 RX ORDER — DOCUSATE SODIUM 100 MG/1
CAPSULE, LIQUID FILLED ORAL
Qty: 30 CAPSULE | Refills: 0 | Status: SHIPPED | OUTPATIENT
Start: 2022-01-10 | End: 2022-02-08

## 2022-01-10 RX ORDER — FUROSEMIDE 20 MG/1
TABLET ORAL
Qty: 90 TABLET | Refills: 0 | Status: SHIPPED | OUTPATIENT
Start: 2022-01-10 | End: 2022-04-26

## 2022-01-10 RX ORDER — PHENAZOPYRIDINE HYDROCHLORIDE 100 MG/1
TABLET, FILM COATED ORAL
Qty: 30 TABLET | Refills: 0 | Status: SHIPPED | OUTPATIENT
Start: 2022-01-10 | End: 2022-01-13 | Stop reason: CLARIF

## 2022-01-12 DIAGNOSIS — R05.9 COUGH: Primary | ICD-10-CM

## 2022-01-12 DIAGNOSIS — R35.0 URINARY FREQUENCY: ICD-10-CM

## 2022-01-12 DIAGNOSIS — J43.9 PULMONARY EMPHYSEMA, UNSPECIFIED EMPHYSEMA TYPE (HCC): ICD-10-CM

## 2022-01-12 RX ORDER — BUDESONIDE AND FORMOTEROL FUMARATE DIHYDRATE 160; 4.5 UG/1; UG/1
AEROSOL RESPIRATORY (INHALATION)
Qty: 10.2 G | Refills: 2 | Status: SHIPPED | OUTPATIENT
Start: 2022-01-12 | End: 2022-04-18

## 2022-01-12 RX ORDER — POTASSIUM CHLORIDE 750 MG/1
TABLET, EXTENDED RELEASE ORAL
Qty: 30 TABLET | Refills: 2 | Status: SHIPPED | OUTPATIENT
Start: 2022-01-12 | End: 2022-04-18

## 2022-01-13 ENCOUNTER — VIRTUAL VISIT (OUTPATIENT)
Dept: INTERNAL MEDICINE CLINIC | Age: 47
End: 2022-01-13
Payer: MEDICAID

## 2022-01-13 ENCOUNTER — HOSPITAL ENCOUNTER (OUTPATIENT)
Age: 47
Setting detail: SPECIMEN
Discharge: HOME | End: 2022-01-13
Payer: MEDICAID

## 2022-01-13 DIAGNOSIS — J01.00 ACUTE NON-RECURRENT MAXILLARY SINUSITIS: Primary | ICD-10-CM

## 2022-01-13 PROCEDURE — G8427 DOCREV CUR MEDS BY ELIG CLIN: HCPCS | Performed by: NURSE PRACTITIONER

## 2022-01-13 PROCEDURE — 99213 OFFICE O/P EST LOW 20 MIN: CPT | Performed by: NURSE PRACTITIONER

## 2022-01-13 PROCEDURE — U0003 INFECTIOUS AGENT DETECTION BY NUCLEIC ACID (DNA OR RNA); SEVERE ACUTE RESPIRATORY SYNDROME CORONAVIRUS 2 (SARS-COV-2) (CORONAVIRUS DISEASE [COVID-19]), AMPLIFIED PROBE TECHNIQUE, MAKING USE OF HIGH THROUGHPUT TECHNOLOGIES AS DESCRIBED BY CMS-2020-01-R: HCPCS

## 2022-01-13 PROCEDURE — U0005 INFEC AGEN DETEC AMPLI PROBE: HCPCS

## 2022-01-13 RX ORDER — AMOXICILLIN AND CLAVULANATE POTASSIUM 875; 125 MG/1; MG/1
1 TABLET, FILM COATED ORAL 2 TIMES DAILY
Qty: 14 TABLET | Refills: 0 | Status: SHIPPED | OUTPATIENT
Start: 2022-01-13 | End: 2022-01-20

## 2022-01-13 RX ORDER — FLUCONAZOLE 100 MG/1
100 TABLET ORAL DAILY
Qty: 3 TABLET | Refills: 0 | Status: SHIPPED | OUTPATIENT
Start: 2022-01-13 | End: 2022-01-14 | Stop reason: ALTCHOICE

## 2022-01-13 RX ORDER — PREDNISONE 10 MG/1
TABLET ORAL
Qty: 20 TABLET | Refills: 0 | Status: SHIPPED | OUTPATIENT
Start: 2022-01-13 | End: 2022-02-08 | Stop reason: CLARIF

## 2022-01-13 ASSESSMENT — ENCOUNTER SYMPTOMS
SHORTNESS OF BREATH: 1
CHEST TIGHTNESS: 0
DIARRHEA: 0
COLOR CHANGE: 0
SINUS PRESSURE: 1
APNEA: 0
ABDOMINAL PAIN: 0
SINUS PAIN: 1
VOMITING: 0
WHEEZING: 1
NAUSEA: 0
COUGH: 1

## 2022-01-13 NOTE — PROGRESS NOTES
2022    TELEHEALTH EVALUATION -- Audio/Visual (During OEEII-15 public health emergency)    HPI:    Naseem Forbes (:  1975) has requested an audio/video evaluation for the following concern(s):    Mrs Carlos Castellon presents via video visit this morning for c/o ongoing issues with sinus pain/pressure, nasal congestion, PND, chest congestion and mild wheezing over the last 2-3 weeks. Has been using cough drops, tylenol and her rescue inhaler with minimal benefit. Did complete her COVID test this morning which is still pending. Reports blowing thick green mucus from her nose and also when coughing over the last week. Review of Systems   Constitutional: Negative for activity change, appetite change, fatigue and fever. HENT: Positive for sinus pressure and sinus pain. Negative for congestion and nosebleeds. Respiratory: Positive for cough, shortness of breath and wheezing. Negative for apnea and chest tightness. Cardiovascular: Negative for chest pain and palpitations. Gastrointestinal: Negative for abdominal pain, diarrhea, nausea and vomiting. Genitourinary: Negative for difficulty urinating, flank pain and hematuria. Musculoskeletal: Negative for arthralgias, joint swelling and myalgias. Skin: Negative for color change and rash. Neurological: Negative for dizziness, light-headedness and headaches. Psychiatric/Behavioral: Negative. Negative for behavioral problems. Prior to Visit Medications    Medication Sig Taking?  Authorizing Provider   amoxicillin-clavulanate (AUGMENTIN) 875-125 MG per tablet Take 1 tablet by mouth 2 times daily for 7 days Yes SHUKRI Pfeiffer CNP   predniSONE (DELTASONE) 10 MG tablet Take 4 tablets daily for 2 days, then 3 tablets for 2 days, 2 tablets for 2 days, then 1 tablet for 2 days Yes SHUKRI Pfeiffer CNP   fluconazole (DIFLUCAN) 100 MG tablet Take 1 tablet by mouth daily for 3 days Yes SHUKRI Pfeiffer CNP   SYMBICORT 160-4.5 MCG/ACT AERO inhale 2 puffs by mouth twice a day Yes SHUKRI Means CNP   potassium chloride (KLOR-CON M) 10 MEQ extended release tablet take 1 tablet by mouth once daily Yes SHUKRI Means CNP   furosemide (LASIX) 20 MG tablet take 1 tablet by mouth once daily Yes SHUKRI Means CNP   docusate sodium (COLACE) 100 MG capsule take 1 capsule by mouth twice a day if needed for constipation Yes SHUKRI Means CNP   clotrimazole (LOTRIMIN) 1 % cream apply to affected area twice a day Yes SHUKRI Means CNP   V-R MAGNESIUM CITRATE 1.745 GM/30ML solution DRINK 296 MLS ONCE AS ONE TIME DOSE Yes SHUKRI Means CNP   ACETAMINOPHEN EXTRA STRENGTH 500 MG tablet take 1 tablet by mouth three times a day AS NEEDED FOR PAIN Yes SHUKRI Means CNP   albuterol sulfate  (90 Base) MCG/ACT inhaler inhale 2 puffs by mouth INTO THE LUNGS every 4 hours if needed for shortness of breath or wheezing Yes SHUKRI Means CNP   diclofenac 1 % CREA Place 1 Tube onto the skin daily as needed for Pain Yes SHUKRI Means CNP   omeprazole (PRILOSEC) 20 MG delayed release capsule Take 1 capsule by mouth Daily Yes SHUKRI Means CNP   dicyclomine (BENTYL) 10 MG capsule take 1 capsule by mouth three times a day BEFORE A MEAL Yes SHUKRI Means CNP   fluticasone (FLONASE) 50 MCG/ACT nasal spray instill 2 sprays into each nostril once daily Yes SHUKRI Means CNP   dextran 70-hypromellose (ARTIFICIAL TEARS) 0.1-0.3 % SOLN opthalmic solution Place 1 drop into both eyes every 4 hours as needed (Eye irritation) Yes ROSEANNA Dave   loratadine-pseudoephedrine (CLARITIN-D 24HR)  MG per extended release tablet Take 1 tablet by mouth daily Yes SHUKRI Means CNP   pantoprazole (PROTONIX) 40 MG tablet Take 1 tablet by mouth 2 times daily Yes Bret Melton MD   hydrOXYzine (ATARAX) 10 MG tablet Take 10 mg by mouth every 8 hours as needed for Itching  Yes Historical Provider, MD   Nutritional Supplements (ENSURE HIGH PROTEIN) LIQD Take 1 Can by mouth daily Meal replacement. Yes SHUKRI Carson CNP   gabapentin (NEURONTIN) 100 MG capsule Take 1 capsule by mouth 2 times daily for 30 days.   SHUKRI Carson CNP       Social History     Tobacco Use    Smoking status: Former Smoker     Packs/day: 1.00     Years: 19.00     Pack years: 19.00     Types: Cigarettes     Start date: 2001     Quit date: 2020     Years since quittin.4    Smokeless tobacco: Never Used   Vaping Use    Vaping Use: Never used   Substance Use Topics    Alcohol use: No    Drug use: Yes     Types: Marijuana (Weed)     Comment: pt states last consumption was sometime within a month        Allergies   Allergen Reactions    Dye [Iodides] Hives    Morphine Hives   ,   Past Medical History:   Diagnosis Date    Anxiety     Bladder tumor     Dr. Darshan Gordon Depression     Diverticulitis     Last episode: 2018    Emphysema of lung ([address]     Follows with Cande Rico    GERD (gastroesophageal reflux disease)     History of blood transfusion 2018    After surgery for dog attack    Hypertension     Dr. Rosa Danielson IBS (irritable bowel syndrome)     Kidney stone     Last stone:     PTSD (post-traumatic stress disorder) 2019    from dog attack    Sleep apnea     Uses BiPap   ,   Past Surgical History:   Procedure Laterality Date    ABDOMEN SURGERY      BLADDER SURGERY      CHOLECYSTECTOMY      COLONOSCOPY  2018    normal colonoscopy - Dr. Kevin Farley  1980's    Nose     HYSTERECTOMY      HYSTERECTOMY, TOTAL ABDOMINAL  2012    INCISION AND DRAINAGE Left 2019    ANKLE INCISION AND DRAINAGE performed by Dilshad Cornejo DO at [address] 2019    ARM INCISION AND DRAINAGE performed by Dilshad Cornejo DO at [address] [facility]      tumor removal    OVARIAN CYST REMOVAL      TONSILLECTOMY  1980    TUBAL LIGATION  2011    UPPER GASTROINTESTINAL ENDOSCOPY N/A 2021    EGD POLYP ABLATION/OTHER OF ANTRAL POLYP AND BX performed by Carlos Mariscal MD at [facility] ENDOSCOPY   ,   Social History     Tobacco Use    Smoking status: Former Smoker     Packs/day: 1.00     Years: 19.00     Pack years: 19.00     Types: Cigarettes     Start date: 2001     Quit date: 2020     Years since quittin.4    Smokeless tobacco: Never Used   Vaping Use    Vaping Use: Never used   Substance Use Topics    Alcohol use: No    Drug use: Yes     Types: Marijuana (Weed)     Comment: pt states last consumption was sometime within a month       PHYSICAL EXAMINATION:  [ INSTRUCTIONS:  \"[x]\" Indicates a positive item  \"[]\" Indicates a negative item  -- DELETE ALL ITEMS NOT EXAMINED]  Vital Signs: (As obtained by patient/caregiver or practitioner observation)    Blood pressure-  Heart rate-    Respiratory rate-    Temperature-  Pulse oximetry-     Constitutional: [x] Appears well-developed and well-nourished [x] No apparent distress      [] Abnormal-   Mental status  [x] Alert and awake  [x] Oriented to person/place/time [x]Able to follow commands      Eyes:  EOM    [x]  Normal  [] Abnormal-  Sclera  [x]  Normal  [] Abnormal -         Discharge [x]  None visible  [] Abnormal -    HENT:   [x] Normocephalic, atraumatic.   [] Abnormal   [x] Mouth/Throat: Mucous membranes are moist.     External Ears [x] Normal  [] Abnormal-     Neck: [x] No visualized mass     Pulmonary/Chest: [x] Respiratory effort normal.  [x] No visualized signs of difficulty breathing or respiratory distress        [] Abnormal-      Musculoskeletal:   [x] Normal gait with no signs of ataxia         [x] Normal range of motion of neck        [] Abnormal-       Neurological:        [x] No Facial Asymmetry (Cranial nerve 7 motor function) (limited exam to video visit)          [x] No gaze palsy        [] Abnormal-         Skin:        [x] No significant exanthematous lesions or discoloration noted on facial skin         [] Abnormal-            Psychiatric:       [x] Normal Affect [x] No Hallucinations        [] Abnormal-     Other pertinent observable physical exam findings-     ASSESSMENT/PLAN:  1. Acute non-recurrent maxillary sinusitis- COVID testing pending; however, suspicion for underlying AMS vs COPD exacerbation; start augmentin/pred taper. Diflucan provided in event if yeast infection. - amoxicillin-clavulanate (AUGMENTIN) 875-125 MG per tablet; Take 1 tablet by mouth 2 times daily for 7 days  Dispense: 14 tablet; Refill: 0  - predniSONE (DELTASONE) 10 MG tablet; Take 4 tablets daily for 2 days, then 3 tablets for 2 days, 2 tablets for 2 days, then 1 tablet for 2 days  Dispense: 20 tablet; Refill: 0  - fluconazole (DIFLUCAN) 100 MG tablet; Take 1 tablet by mouth daily for 3 days  Dispense: 3 tablet; Refill: 0      Return if symptoms worsen or fail to improve. Alex Chu, was evaluated through a synchronous (real-time) audio-video encounter. The patient (or guardian if applicable) is aware that this is a billable service. Verbal consent to proceed has been obtained within the past 12 months. The visit was conducted pursuant to the emergency declaration under the [address] [place] [address], [address] [place] [address] authority and the [employer] General Act. Patient identification was verified, and a caregiver was present when appropriate. The patient was located in a state where the provider was credentialed to provide care. Total time spent on this encounter: 15 minutes    --SHUKRI Carson CNP on 1/13/2022 at 1:11 PM    An electronic signature was used to authenticate this note.

## 2022-01-14 ENCOUNTER — VIRTUAL VISIT (OUTPATIENT)
Dept: INTERNAL MEDICINE CLINIC | Age: 47
End: 2022-01-14
Payer: MEDICAID

## 2022-01-14 ENCOUNTER — TELEPHONE (OUTPATIENT)
Dept: INTERNAL MEDICINE CLINIC | Age: 47
End: 2022-01-14

## 2022-01-14 DIAGNOSIS — U07.1 COVID-19: Primary | ICD-10-CM

## 2022-01-14 LAB
SARS-COV-2: DETECTED
SOURCE: ABNORMAL

## 2022-01-14 PROCEDURE — G8427 DOCREV CUR MEDS BY ELIG CLIN: HCPCS | Performed by: INTERNAL MEDICINE

## 2022-01-14 PROCEDURE — 99214 OFFICE O/P EST MOD 30 MIN: CPT | Performed by: INTERNAL MEDICINE

## 2022-01-14 RX ORDER — BENZONATATE 100 MG/1
100 CAPSULE ORAL 3 TIMES DAILY PRN
Qty: 30 CAPSULE | Refills: 0 | Status: SHIPPED | OUTPATIENT
Start: 2022-01-14 | End: 2022-01-21

## 2022-01-14 RX ORDER — ONDANSETRON 4 MG/1
4 TABLET, FILM COATED ORAL 3 TIMES DAILY PRN
Qty: 30 TABLET | Refills: 0 | Status: SHIPPED | OUTPATIENT
Start: 2022-01-14 | End: 2022-02-08

## 2022-01-14 NOTE — TELEPHONE ENCOUNTER
Pt called in wanting to know what she should do. She tested positive for covid yesterday . Pt's symptoms are sinus problems, wheezing, headache, back pain, sob, body aches, fever , chills, and food taste funny to her . Please advise !

## 2022-01-14 NOTE — PROGRESS NOTES
2022    TELEHEALTH EVALUATION -- Audio/Visual (During IAOQS-06 public health emergency)    HPI:    Kenneth Bill (:  1975) has requested an audio/video evaluation for the following concern(s):    Pt with thick green rhinorrhea, nasal congestion,     pt developed change in sense of taste and smell, HA, cough productive of green mucous, wheezing, SOB, fatigue, nausea without vomiting, subjective fevers, sweats. She tested (+) for covid today. She is not vaccinated for covid. She is on albuterol, symbicort. Review of Systems    Prior to Visit Medications    Medication Sig Taking? Authorizing Provider   SYMBICORT 160-4.5 MCG/ACT AERO inhale 2 puffs by mouth twice a day Yes SHUKRI Roche CNP   potassium chloride (KLOR-CON M) 10 MEQ extended release tablet take 1 tablet by mouth once daily Yes SHUKRI Roche CNP   furosemide (LASIX) 20 MG tablet take 1 tablet by mouth once daily Yes SHUKRI Roche CNP   docusate sodium (COLACE) 100 MG capsule take 1 capsule by mouth twice a day if needed for constipation Yes SHUKRI Roche CNP   clotrimazole (LOTRIMIN) 1 % cream apply to affected area twice a day Yes SHUKRI Roche CNP   V-R MAGNESIUM CITRATE 1.745 GM/30ML solution DRINK 296 MLS ONCE AS ONE TIME DOSE Yes SHUKRI Roche CNP   ACETAMINOPHEN EXTRA STRENGTH 500 MG tablet take 1 tablet by mouth three times a day AS NEEDED FOR PAIN Yes SHUKRI Roche CNP   albuterol sulfate  (90 Base) MCG/ACT inhaler inhale 2 puffs by mouth INTO THE LUNGS every 4 hours if needed for shortness of breath or wheezing Yes SHUKRI Roche CNP   diclofenac 1 % CREA Place 1 Tube onto the skin daily as needed for Pain Yes SHUKRI Roche CNP   gabapentin (NEURONTIN) 100 MG capsule Take 1 capsule by mouth 2 times daily for 30 days.  Yes SHUKRI Roche CNP   omeprazole (PRILOSEC) 20 MG delayed release capsule Take 1 capsule by mouth Daily Yes Grover Rolls, APRN - CNP   dicyclomine (BENTYL) 10 MG capsule take 1 capsule by mouth three times a day BEFORE A MEAL Yes Grover Rolls, APRN - CNP   fluticasone (FLONASE) 50 MCG/ACT nasal spray instill 2 sprays into each nostril once daily Yes Grover Rolls, APRN - CNP   dextran 70-hypromellose (ARTIFICIAL TEARS) 0.1-0.3 % SOLN opthalmic solution Place 1 drop into both eyes every 4 hours as needed (Eye irritation) Yes ROSEANNA Parker   loratadine-pseudoephedrine (CLARITIN-D 24HR)  MG per extended release tablet Take 1 tablet by mouth daily Yes Grover Rolls, APRN - CNP   amoxicillin-clavulanate (AUGMENTIN) 875-125 MG per tablet Take 1 tablet by mouth 2 times daily for 7 days  Patient not taking: Reported on 2022  Grover Bond, APRN - CNP   predniSONE (DELTASONE) 10 MG tablet Take 4 tablets daily for 2 days, then 3 tablets for 2 days, 2 tablets for 2 days, then 1 tablet for 2 days  Patient not taking: Reported on 2022  Grover Varun APRN - CNP       Social History     Tobacco Use    Smoking status: Former Smoker     Packs/day: 1.00     Years: 19.00     Pack years: 19.00     Types: Cigarettes     Start date: 2001     Quit date: 2020     Years since quittin.4    Smokeless tobacco: Never Used   Vaping Use    Vaping Use: Never used   Substance Use Topics    Alcohol use: No    Drug use: Yes     Types: Marijuana (Weed)     Comment: pt states last consumption was sometime within a month          PHYSICAL EXAMINATION:    Constitutional: [x] Appears well-developed and well-nourished [x] No apparent distress      [] Abnormal-   Mental status  [x] Alert and awake  [x] Oriented to person/place/time [x]Able to follow commands             Psychiatric:       [x] Normal Affect [x] No Hallucinations      ASSESSMENT/PLAN:  1. COVID-19 - will try to get sotrovimab, but she may be out of the window. She will take the augmentin and prednisone, use albuterol.  Also will send in cough med and zofran. goto ER if breathing significantly worsens    Isolate until 1/20    No follow-ups on file. Coralee Milks, was evaluated through a synchronous (real-time) audio-video encounter. The patient (or guardian if applicable) is aware that this is a billable service. Verbal consent to proceed has been obtained within the past 12 months. The visit was conducted pursuant to the emergency declaration under the [address] [place] [address] and the [employer] and [employer] Act. Patient identification was verified, and a caregiver was present when appropriate. The patient was located in a state where the provider was credentialed to provide care. Total time spent on this encounter: Not billed by time    --Catalina Schofield MD on 1/14/2022 at 11:55 AM    An electronic signature was used to authenticate this note.

## 2022-01-17 ENCOUNTER — TELEPHONE (OUTPATIENT)
Dept: INTERNAL MEDICINE CLINIC | Age: 47
End: 2022-01-17

## 2022-01-17 ENCOUNTER — TELEPHONE (OUTPATIENT)
Dept: PHARMACY | Age: 47
End: 2022-01-17

## 2022-01-17 DIAGNOSIS — R05.9 COUGH: ICD-10-CM

## 2022-01-17 DIAGNOSIS — U07.1 COVID: Primary | ICD-10-CM

## 2022-01-17 NOTE — TELEPHONE ENCOUNTER
Will attempt  molnupiravir and this was sent to hospital, but she may be out of time frame window. Order sent.

## 2022-01-17 NOTE — TELEPHONE ENCOUNTER
Pt called back and stated that she is unable to receive the medication that has been called in due to surpassing the 5 day limit. Pt would like to know what she should do from here with her cough and chest congestion. Pt does have a mild concern for pneumonia. Please advise.

## 2022-01-17 NOTE — TELEPHONE ENCOUNTER
[facility] recieved Rx for Mitch for COVID-19. Patient stated that her sx began on 1/9/2022 and got worse on 1/10/2022. Patient is NOT eligible for Molnupiravir due to being >5days since sx onset.

## 2022-01-17 NOTE — TELEPHONE ENCOUNTER
There is nothing else to do at this time. Continue the Augmentin, prednisone, and Tessalon. We can get a CXR if concerns for pneumonia, but the augmentin will help this as well. If desired, please place order.  CHEST XR two view

## 2022-01-17 NOTE — TELEPHONE ENCOUNTER
Called infusion center and they stated that they do not have the staffing or medical capacity to do infusion. They did however suggest an oral medication the pt can . Please advise.

## 2022-01-24 ENCOUNTER — TELEPHONE (OUTPATIENT)
Dept: FAMILY MEDICINE CLINIC | Age: 47
End: 2022-01-24

## 2022-01-24 NOTE — TELEPHONE ENCOUNTER
----- Message from Georgiajoshwalker Jacob sent at 1/24/2022  1:03 PM EST -----  Subject: Message to Provider    QUESTIONS  Information for Provider? Has been off work from 1/10 - present for [employer]. Patient is now mostly symptom free, occasional cough and some nasal   drainage. Would like a not to return to work.   ---------------------------------------------------------------------------  --------------  [place]  What is the best way for the office to contact you? OK to leave message on   voicemail  Preferred Call Back Phone Number? [phone]  ---------------------------------------------------------------------------  --------------  SCRIPT ANSWERS  Relationship to Patient?  Self

## 2022-02-01 ENCOUNTER — APPOINTMENT (OUTPATIENT)
Dept: GENERAL RADIOLOGY | Age: 47
End: 2022-02-01
Payer: MEDICAID

## 2022-02-01 ENCOUNTER — HOSPITAL ENCOUNTER (EMERGENCY)
Age: 47
Discharge: HOME | End: 2022-02-02
Attending: EMERGENCY MEDICINE
Payer: MEDICAID

## 2022-02-01 DIAGNOSIS — R06.00 DYSPNEA AND RESPIRATORY ABNORMALITIES: Primary | ICD-10-CM

## 2022-02-01 DIAGNOSIS — R06.89 DYSPNEA AND RESPIRATORY ABNORMALITIES: Primary | ICD-10-CM

## 2022-02-01 PROCEDURE — 71045 X-RAY EXAM CHEST 1 VIEW: CPT

## 2022-02-01 PROCEDURE — 70360 X-RAY EXAM OF NECK: CPT

## 2022-02-01 PROCEDURE — 99283 EMERGENCY DEPT VISIT LOW MDM: CPT

## 2022-02-01 RX ORDER — LORAZEPAM 2 MG/ML
1 INJECTION INTRAMUSCULAR ONCE
Status: DISCONTINUED | OUTPATIENT
Start: 2022-02-01 | End: 2022-02-02 | Stop reason: HOSPADM

## 2022-02-02 VITALS
HEART RATE: 75 BPM | DIASTOLIC BLOOD PRESSURE: 89 MMHG | RESPIRATION RATE: 20 BRPM | SYSTOLIC BLOOD PRESSURE: 173 MMHG | TEMPERATURE: 97.7 F | OXYGEN SATURATION: 100 %

## 2022-02-02 LAB
ALBUMIN SERPL-MCNC: 3.7 GM/DL (ref 3.4–5)
ALP BLD-CCNC: 109 IU/L (ref 40–129)
ALT SERPL-CCNC: 13 U/L (ref 10–40)
ANION GAP SERPL CALCULATED.3IONS-SCNC: 15 MMOL/L (ref 4–16)
AST SERPL-CCNC: 18 IU/L (ref 15–37)
BASOPHILS ABSOLUTE: 0 K/CU MM
BASOPHILS RELATIVE PERCENT: 0.3 % (ref 0–1)
BILIRUB SERPL-MCNC: 0.3 MG/DL (ref 0–1)
BUN BLDV-MCNC: 20 MG/DL (ref 6–23)
CALCIUM SERPL-MCNC: 8.6 MG/DL (ref 8.3–10.6)
CHLORIDE BLD-SCNC: 103 MMOL/L (ref 99–110)
CO2: 24 MMOL/L (ref 21–32)
CREAT SERPL-MCNC: 0.9 MG/DL (ref 0.6–1.1)
DIFFERENTIAL TYPE: ABNORMAL
EKG ATRIAL RATE: 74 BPM
EKG DIAGNOSIS: NORMAL
EKG P AXIS: 66 DEGREES
EKG P-R INTERVAL: 166 MS
EKG Q-T INTERVAL: 398 MS
EKG QRS DURATION: 94 MS
EKG QTC CALCULATION (BAZETT): 441 MS
EKG R AXIS: 23 DEGREES
EKG T AXIS: 28 DEGREES
EKG VENTRICULAR RATE: 74 BPM
EOSINOPHILS ABSOLUTE: 0.2 K/CU MM
EOSINOPHILS RELATIVE PERCENT: 2.4 % (ref 0–3)
GFR AFRICAN AMERICAN: >60 ML/MIN/1.73M2
GFR NON-AFRICAN AMERICAN: >60 ML/MIN/1.73M2
GLUCOSE BLD-MCNC: 104 MG/DL (ref 70–99)
HCT VFR BLD CALC: 39 % (ref 37–47)
HEMOGLOBIN: 12.9 GM/DL (ref 12.5–16)
IMMATURE NEUTROPHIL %: 0.3 % (ref 0–0.43)
LYMPHOCYTES ABSOLUTE: 1.6 K/CU MM
LYMPHOCYTES RELATIVE PERCENT: 23.8 % (ref 24–44)
MAGNESIUM: 1.6 MG/DL (ref 1.8–2.4)
MCH RBC QN AUTO: 28.3 PG (ref 27–31)
MCHC RBC AUTO-ENTMCNC: 33.1 % (ref 32–36)
MCV RBC AUTO: 85.5 FL (ref 78–100)
MONOCYTES ABSOLUTE: 0.7 K/CU MM
MONOCYTES RELATIVE PERCENT: 9.8 % (ref 0–4)
NUCLEATED RBC %: 0 %
PDW BLD-RTO: 12.8 % (ref 11.7–14.9)
PLATELET # BLD: 163 K/CU MM (ref 140–440)
PMV BLD AUTO: 11.5 FL (ref 7.5–11.1)
POTASSIUM SERPL-SCNC: 3.5 MMOL/L (ref 3.5–5.1)
PRO-BNP: 180.1 PG/ML
RBC # BLD: 4.56 M/CU MM (ref 4.2–5.4)
SEGMENTED NEUTROPHILS ABSOLUTE COUNT: 4.2 K/CU MM
SEGMENTED NEUTROPHILS RELATIVE PERCENT: 63.4 % (ref 36–66)
SODIUM BLD-SCNC: 142 MMOL/L (ref 135–145)
TOTAL IMMATURE NEUTOROPHIL: 0.02 K/CU MM
TOTAL NUCLEATED RBC: 0 K/CU MM
TOTAL PROTEIN: 6 GM/DL (ref 6.4–8.2)
TROPONIN T: <0.01 NG/ML
WBC # BLD: 6.6 K/CU MM (ref 4–10.5)

## 2022-02-02 PROCEDURE — 84484 ASSAY OF TROPONIN QUANT: CPT

## 2022-02-02 PROCEDURE — 93005 ELECTROCARDIOGRAM TRACING: CPT | Performed by: EMERGENCY MEDICINE

## 2022-02-02 PROCEDURE — 85025 COMPLETE CBC W/AUTO DIFF WBC: CPT

## 2022-02-02 PROCEDURE — 83735 ASSAY OF MAGNESIUM: CPT

## 2022-02-02 PROCEDURE — 83880 ASSAY OF NATRIURETIC PEPTIDE: CPT

## 2022-02-02 PROCEDURE — 93010 ELECTROCARDIOGRAM REPORT: CPT | Performed by: INTERNAL MEDICINE

## 2022-02-02 PROCEDURE — 80053 COMPREHEN METABOLIC PANEL: CPT

## 2022-02-02 NOTE — ED TRIAGE NOTES
Pt brought into the ED by squad due to shortness of breath and felt like her throat was closing. Also complaining of dizziness.

## 2022-02-02 NOTE — ED NOTES
Bed: ED-17  Expected date:   Expected time:   Means of arrival:   Comments:  ems     Priscila Miranda RN  02/01/22 [id number]

## 2022-02-08 ENCOUNTER — TELEMEDICINE (OUTPATIENT)
Dept: INTERNAL MEDICINE CLINIC | Age: 47
End: 2022-02-08
Payer: MEDICAID

## 2022-02-08 DIAGNOSIS — Z13.29 SCREENING FOR THYROID DISORDER: ICD-10-CM

## 2022-02-08 DIAGNOSIS — K21.00 GASTROESOPHAGEAL REFLUX DISEASE WITH ESOPHAGITIS WITHOUT HEMORRHAGE: ICD-10-CM

## 2022-02-08 DIAGNOSIS — I10 ESSENTIAL HYPERTENSION: Primary | ICD-10-CM

## 2022-02-08 DIAGNOSIS — J43.9 PULMONARY EMPHYSEMA, UNSPECIFIED EMPHYSEMA TYPE (HCC): ICD-10-CM

## 2022-02-08 DIAGNOSIS — Z13.1 SCREENING FOR DIABETES MELLITUS (DM): ICD-10-CM

## 2022-02-08 DIAGNOSIS — K58.2 IRRITABLE BOWEL SYNDROME WITH BOTH CONSTIPATION AND DIARRHEA: ICD-10-CM

## 2022-02-08 PROBLEM — Z48.89 ENCOUNTER FOR POSTOPERATIVE WOUND CHECK: Status: RESOLVED | Noted: 2019-02-21 | Resolved: 2022-02-08

## 2022-02-08 PROBLEM — S91.052A DOG BITE OF ANKLE, LEFT, INITIAL ENCOUNTER: Status: RESOLVED | Noted: 2019-02-03 | Resolved: 2022-02-08

## 2022-02-08 PROBLEM — E66.01 MORBID OBESITY WITH BMI OF 45.0-49.9, ADULT (HCC): Status: RESOLVED | Noted: 2020-09-04 | Resolved: 2022-02-08
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
## 2022-02-08 PROBLEM — R07.9 CHEST PAIN: Status: RESOLVED | Noted: 2020-08-16 | Resolved: 2022-02-08

## 2022-02-08 PROBLEM — R55 SYNCOPE AND COLLAPSE: Status: RESOLVED | Noted: 2021-01-26 | Resolved: 2022-02-08

## 2022-02-08 PROBLEM — J18.9 PNEUMONIA DUE TO INFECTIOUS ORGANISM: Status: RESOLVED | Noted: 2020-04-27 | Resolved: 2022-02-08

## 2022-02-08 PROCEDURE — 99214 OFFICE O/P EST MOD 30 MIN: CPT | Performed by: NURSE PRACTITIONER

## 2022-02-08 PROCEDURE — G8427 DOCREV CUR MEDS BY ELIG CLIN: HCPCS | Performed by: NURSE PRACTITIONER

## 2022-02-08 ASSESSMENT — ENCOUNTER SYMPTOMS
SINUS PAIN: 0
NAUSEA: 0
SHORTNESS OF BREATH: 0
APNEA: 0
VOMITING: 0
DIARRHEA: 1
CONSTIPATION: 1
CHEST TIGHTNESS: 0
COUGH: 0
ABDOMINAL PAIN: 0
SINUS PRESSURE: 0
COLOR CHANGE: 0

## 2022-02-08 NOTE — PROGRESS NOTES
2022    TELEHEALTH EVALUATION -- Audio/Visual (During SQQAR-44 public health emergency)    HPI:    Yennifer Puga (:  1975) has requested an audio/video evaluation for the following concern(s):    3 month follow up: The patient is currently taking all hypertensive medications compliantly without c/o of any side effects. Denies chest pain, dyspnea, edema, palpiations. Blood pressure has been averaging  120/80 over the last several months on current Lasix 20 mg daily. Patient's reflux well controlled on current medications. Patient denies any blood in stool black stool, heartburn, reflux. Denies issues with frequent coughing or reflux while sleeping. Able to eat and drink appropriately without complications. Continues on Omeprazole 20 mg daily. Patient's COPD well controled on current medications. Patient denies any shortness of breath, wheezing. Able to execute activities of daily living without breathing complications. IBS- continues taking Bentyl daily; but continues to have persistent alternation between frequent constipation and diarrhea. Denies any blood in stool, or black tarry stools. -due for DM sceen    Review of Systems   Constitutional: Negative for activity change, appetite change, fatigue and fever. HENT: Negative for congestion, nosebleeds, sinus pressure and sinus pain. Respiratory: Negative for apnea, cough, chest tightness and shortness of breath. Cardiovascular: Negative for chest pain and palpitations. Gastrointestinal: Positive for constipation and diarrhea. Negative for abdominal pain, nausea and vomiting. Genitourinary: Negative for difficulty urinating, flank pain and hematuria. Musculoskeletal: Negative for arthralgias, joint swelling and myalgias. Skin: Negative for color change and rash. Neurological: Negative for dizziness, light-headedness and headaches. Psychiatric/Behavioral: Negative. Negative for behavioral problems.        Prior to Visit Medications    Medication Sig Taking? Authorizing Provider   SYMBICORT 160-4.5 MCG/ACT AERO inhale 2 puffs by mouth twice a day Yes SHUKRI Carrasco CNP   potassium chloride (KLOR-CON M) 10 MEQ extended release tablet take 1 tablet by mouth once daily Yes SHUKRI Carrasco CNP   furosemide (LASIX) 20 MG tablet take 1 tablet by mouth once daily Yes SHUKRI Carrasco CNP   clotrimazole (LOTRIMIN) 1 % cream apply to affected area twice a day Yes SHUKRI Carrasco CNP   albuterol sulfate  (90 Base) MCG/ACT inhaler inhale 2 puffs by mouth INTO THE LUNGS every 4 hours if needed for shortness of breath or wheezing Yes SHUKRI Carrasco CNP   gabapentin (NEURONTIN) 100 MG capsule Take 1 capsule by mouth 2 times daily for 30 days.  Yes SHUKRI Carrasco CNP   omeprazole (PRILOSEC) 20 MG delayed release capsule Take 1 capsule by mouth Daily Yes SHUKRI Carrasco CNP   dicyclomine (BENTYL) 10 MG capsule take 1 capsule by mouth three times a day BEFORE A MEAL Yes SHUKRI Carrasco CNP   fluticasone (FLONASE) 50 MCG/ACT nasal spray instill 2 sprays into each nostril once daily Yes SHUKRI Carrasco CNP   dextran 70-hypromellose (ARTIFICIAL TEARS) 0.1-0.3 % SOLN opthalmic solution Place 1 drop into both eyes every 4 hours as needed (Eye irritation) Yes ROSEANNA Bajwa   loratadine-pseudoephedrine (CLARITIN-D 24HR)  MG per extended release tablet Take 1 tablet by mouth daily Yes SHUKRI Carrasco CNP       Social History     Tobacco Use    Smoking status: Former Smoker     Packs/day: 1.00     Years: 19.00     Pack years: 19.00     Types: Cigarettes     Start date: 2001     Quit date: 2020     Years since quittin.5    Smokeless tobacco: Never Used   Vaping Use    Vaping Use: Never used   Substance Use Topics    Alcohol use: No    Drug use: Yes     Types: Marijuana (Weed)     Comment: pt states last consumption was sometime within a month Allergies   Allergen Reactions    Dye [Iodides] Hives    Morphine Hives   ,   Past Medical History:   Diagnosis Date    Anxiety     Bladder tumor     Dr. Moriah Corral Depression     Diverticulitis     Last episode: 2018    Emphysema of lung ([address]     Follows with Fer Ibarra    GERD (gastroesophageal reflux disease)     History of blood transfusion     After surgery for dog attack    Hypertension     Dr. Rivera Severe IBS (irritable bowel syndrome)     Kidney stone     Last stone:     PTSD (post-traumatic stress disorder) 2019    from dog attack    Sleep apnea     Uses BiPap   ,   Past Surgical History:   Procedure Laterality Date    ABDOMEN SURGERY      BLADDER SURGERY      CHOLECYSTECTOMY      COLONOSCOPY  2018    normal colonoscopy - Dr. Yeny Dutta  1980's    Nose    [address], TOTAL ABDOMINAL      INCISION AND DRAINAGE Left 2019    ANKLE INCISION AND DRAINAGE performed by Avila Chase DO at [address] Right 2019    ARM INCISION AND DRAINAGE performed by Avila Cahse DO at [address] [facility]      tumor removal    OVARIAN CYST REMOVAL      TONSILLECTOMY  1980    TUBAL LIGATION  2011    UPPER GASTROINTESTINAL ENDOSCOPY N/A 2021    EGD POLYP ABLATION/OTHER OF ANTRAL POLYP AND BX performed by Vicki Rodriguez MD at [facility] ENDOSCOPY   ,   Social History     Tobacco Use    Smoking status: Former Smoker     Packs/day: 1.00     Years: 19.00     Pack years: 19.00     Types: Cigarettes     Start date: 2001     Quit date: 2020     Years since quittin.5    Smokeless tobacco: Never Used   Vaping Use    Vaping Use: Never used   Substance Use Topics    Alcohol use: No    Drug use: Yes     Types: Marijuana (Weed)     Comment: pt states last consumption was sometime within a month       PHYSICAL EXAMINATION:  [ INSTRUCTIONS:  \"[x]\" Indicates a positive item  \"[]\" Indicates a negative item  -- DELETE ALL ITEMS NOT EXAMINED]  Vital Signs: (As obtained by patient/caregiver or practitioner observation)    Blood pressure-  Heart rate-    Respiratory rate-    Temperature-  Pulse oximetry-     Constitutional: [x] Appears well-developed and well-nourished [x] No apparent distress      [] Abnormal-   Mental status  [x] Alert and awake  [x] Oriented to person/place/time []Able to follow commands      Eyes:  EOM    [x]  Normal  [] Abnormal-  Sclera  [x]  Normal  [] Abnormal -         Discharge [x]  None visible  [] Abnormal -    HENT:   [x] Normocephalic, atraumatic. [] Abnormal   [x] Mouth/Throat: Mucous membranes are moist.     External Ears [x] Normal  [] Abnormal-     Neck: [x] No visualized mass     Pulmonary/Chest: [x] Respiratory effort normal.  [x] No visualized signs of difficulty breathing or respiratory distress        [] Abnormal-      Musculoskeletal:   [x] Normal gait with no signs of ataxia         [x] Normal range of motion of neck        [] Abnormal-       Neurological:        [x] No Facial Asymmetry (Cranial nerve 7 motor function) (limited exam to video visit)          [x] No gaze palsy        [] Abnormal-         Skin:        [x] No significant exanthematous lesions or discoloration noted on facial skin         [] Abnormal-            Psychiatric:       [x] Normal Affect [x] No Hallucinations        [] Abnormal-     Other pertinent observable physical exam findings-     ASSESSMENT/PLAN:  1. Essential hypertension- well controlled based off of home readings; leg swelling remains minimal to none on current lasix regiment; on KCl replacement. Check renal function/electrolyte status today. - CBC Auto Differential; Future  - Comprehensive Metabolic Panel; Future  - Lipid, Fasting; Future  - Hemoglobin A1C; Future    2. Gastroesophageal reflux disease with esophagitis without hemorrhage - well controlled; no changes in management at this time. Continue current PPI therapy, omeprazole 20 mg daily. 3. Pulmonary emphysema, unspecified emphysema type ([facility] [address]- will make no changes to current bronchodilator therapy at this time (symbicort 160 mg). Minimal KALIE demand. Completed PFT 10/2021, but never followed back up with pulmonology. 4. Irritable bowel syndrome with both constipation and diarrhea- bently helping minimally; likely due for repeat c-scope; will refer back to Dr Anjel Rosenthal to evaluate further.   - Karla Nicole MD, Gastroenterology, [address]    5. Screening for diabetes mellitus (DM)  - Comprehensive Metabolic Panel; Future  - Hemoglobin A1C; Future    6. Screening for thyroid disorder  - TSH with Reflex; Future      Return in about 4 months (around 6/8/2022). Kartikglendy Vasquezo, was evaluated through a synchronous (real-time) audio-video encounter. The patient (or guardian if applicable) is aware that this is a billable service, which includes applicable co-pays. This Virtual Visit was conducted with patient's (and/or legal guardian's) consent. The visit was conducted pursuant to the emergency declaration under the [address] [place] [address], [address] [place] [address] authority and the [employer] and [employer] General Act. Patient identification was verified, and a caregiver was present when appropriate. The patient was located at home in a state where the provider was licensed to provide care. Total time spent on this encounter: 25 minutes    --SHUKRI Mckinney CNP on 2/8/2022 at 12:07 PM    An electronic signature was used to authenticate this note.

## 2022-02-09 ENCOUNTER — TELEPHONE (OUTPATIENT)
Dept: GASTROENTEROLOGY | Age: 47
End: 2022-02-09

## 2022-02-09 NOTE — TELEPHONE ENCOUNTER
Called pt. In regards to a referral for IBS w/ constipation and diarrhea. LM for pt to call back and make an appt.

## 2022-02-10 RX ORDER — FLUTICASONE PROPIONATE 50 MCG
SPRAY, SUSPENSION (ML) NASAL
Qty: 16 G | Refills: 5 | Status: SHIPPED | OUTPATIENT
Start: 2022-02-10 | End: 2022-08-15

## 2022-02-16 ENCOUNTER — TELEPHONE (OUTPATIENT)
Dept: GASTROENTEROLOGY | Age: 47
End: 2022-02-16

## 2022-02-16 NOTE — TELEPHONE ENCOUNTER
Called pt. In regards to a referral for constipation and diarrhea.  LM for pt to call back and make an appt

## 2022-02-20 PROCEDURE — 12001 RPR S/N/AX/GEN/TRNK 2.5CM/<: CPT

## 2022-02-20 PROCEDURE — 99283 EMERGENCY DEPT VISIT LOW MDM: CPT

## 2022-02-21 ENCOUNTER — HOSPITAL ENCOUNTER (EMERGENCY)
Age: 47
Discharge: HOME | End: 2022-02-21
Payer: MEDICAID

## 2022-02-21 VITALS
TEMPERATURE: 98.6 F | RESPIRATION RATE: 18 BRPM | BODY MASS INDEX: 32.1 KG/M2 | DIASTOLIC BLOOD PRESSURE: 91 MMHG | SYSTOLIC BLOOD PRESSURE: 162 MMHG | WEIGHT: 170 LBS | HEIGHT: 61 IN | HEART RATE: 89 BPM | OXYGEN SATURATION: 95 %

## 2022-02-21 DIAGNOSIS — S61.411A LACERATION OF RIGHT HAND, FOREIGN BODY PRESENCE UNSPECIFIED, INITIAL ENCOUNTER: Primary | ICD-10-CM

## 2022-02-21 PROCEDURE — 2500000003 HC RX 250 WO HCPCS: Performed by: PHYSICIAN ASSISTANT

## 2022-02-21 RX ORDER — LIDOCAINE/RACEPINEP/TETRACAINE 4-0.05-0.5
SOLUTION WITH PREFILLED APPLICATOR (ML) TOPICAL ONCE
Status: COMPLETED | OUTPATIENT
Start: 2022-02-21 | End: 2022-02-21

## 2022-02-21 RX ORDER — LIDOCAINE HYDROCHLORIDE 20 MG/ML
10 INJECTION, SOLUTION INFILTRATION; PERINEURAL ONCE
Status: COMPLETED | OUTPATIENT
Start: 2022-02-21 | End: 2022-02-21

## 2022-02-21 RX ADMIN — LIDOCAINE HYDROCHLORIDE 10 ML: 20 INJECTION, SOLUTION INFILTRATION; PERINEURAL at 02:08

## 2022-02-21 RX ADMIN — Medication: at 00:54

## 2022-02-21 ASSESSMENT — PAIN SCALES - GENERAL: PAINLEVEL_OUTOF10: 0

## 2022-02-21 NOTE — ED PROVIDER NOTES
Triage Chief Complaint:   Hand Injury (laceration to palm of R hand)    [place]:  Jose Miller is a 50 y.o. female that presents today for laceration. Context is pt cut hand on screen door just pta. Pain is ranked  04 /10  Onset was just prior to arrival  No gross blood loss. No loss of consciousness no confusion or dizziness no lightheadedness no headache. Immunizations including tetanus shot are up-to-date  ROS:  At least   10  systems reviewed and otherwise negative except as in the [address]     Past Medical History:   Diagnosis Date    Anxiety     Bladder tumor     Dr. Rebecca Rosenthal Depression     Diverticulitis     Last episode: 7/1/2018    Emphysema of lung ([address]     Follows with Gloria Bernstein    GERD (gastroesophageal reflux disease)     History of blood transfusion 2018    After surgery for dog attack    Hypertension     Dr. Ja Weston IBS (irritable bowel syndrome)     Kidney stone     Last stone: 2013    PTSD (post-traumatic stress disorder) 02/03/2019    from dog attack    Sleep apnea     Uses BiPap     Past Surgical History:   Procedure Laterality Date    ABDOMEN SURGERY      BLADDER SURGERY  2012    CHOLECYSTECTOMY  2001    COLONOSCOPY  03/26/2018    normal colonoscopy - Dr. Cory Llanes  1980's    Nose    [address] TOTAL ABDOMINAL  2012    INCISION AND DRAINAGE Left 2/4/2019    ANKLE INCISION AND DRAINAGE performed by Parul Tesfaye DO at [address] Right 2/4/2019    ARM INCISION AND DRAINAGE performed by Parul Tesfaye DO at [address] [facility]  2012    tumor removal    OVARIAN CYST REMOVAL  2012    TONSILLECTOMY  1980    TUBAL LIGATION  07/2011    UPPER GASTROINTESTINAL ENDOSCOPY N/A 1/4/2021    EGD POLYP ABLATION/OTHER OF ANTRAL POLYP AND BX performed by Jo-Ann Leach MD at [facility] ENDOSCOPY     Family History   Problem Relation Age of Onset    Alcohol Abuse Mother     Sleep Apnea Father      Social in the Last Year: Not on file    Number of Places Lived in the Last Year: Not on file    Unstable Housing in the Last Year: Not on file     Current Facility-Administered Medications   Medication Dose Route Frequency Provider Last Rate Last Admin    lidocaine 2 % injection 10 mL  10 mL IntraDERmal Once Vivian Valdez PA-C         Current Outpatient Medications   Medication Sig Dispense Refill    fluticasone (FLONASE) 50 MCG/ACT nasal spray instill 2 sprays into each nostril once daily 16 g 5    SYMBICORT 160-4.5 MCG/ACT AERO inhale 2 puffs by mouth twice a day 10.2 g 2    potassium chloride (KLOR-CON M) 10 MEQ extended release tablet take 1 tablet by mouth once daily 30 tablet 2    furosemide (LASIX) 20 MG tablet take 1 tablet by mouth once daily 90 tablet 0    clotrimazole (LOTRIMIN) 1 % cream apply to affected area twice a day 30 g 2    albuterol sulfate  (90 Base) MCG/ACT inhaler inhale 2 puffs by mouth INTO THE LUNGS every 4 hours if needed for shortness of breath or wheezing 18 g 2    gabapentin (NEURONTIN) 100 MG capsule Take 1 capsule by mouth 2 times daily for 30 days.  60 capsule 2    omeprazole (PRILOSEC) 20 MG delayed release capsule Take 1 capsule by mouth Daily 30 capsule 5    dicyclomine (BENTYL) 10 MG capsule take 1 capsule by mouth three times a day BEFORE A MEAL 90 capsule 5    dextran 70-hypromellose (ARTIFICIAL TEARS) 0.1-0.3 % SOLN opthalmic solution Place 1 drop into both eyes every 4 hours as needed (Eye irritation) 1 Bottle 0    loratadine-pseudoephedrine (CLARITIN-D 24HR)  MG per extended release tablet Take 1 tablet by mouth daily 30 tablet 3     Allergies   Allergen Reactions    Dye [Iodides] Hives    Morphine Hives       Nursing Notes Reviewed    Physical Exam:  ED Triage Vitals [02/20/22 2243]   Enc Vitals Group      BP (!) 183/103      Pulse 84      Resp 18      Temp 98.4 °F (36.9 °C)      Temp Source Oral      SpO2 95 %      Weight 170 lb (77.1 kg) Height 5' 1\" (1.549 m)      Head Circumference       Peak Flow       Pain Score       Pain Loc       Pain Edu? Excl. in [address]? GENERAL APPEARANCE: Awake and alert. Cooperative. No acute distress. HEAD: Normocephalic. Atraumatic. EYES: EOM's grossly intact. Sclera anicteric. PERRLA. Conjunctiva non injected. No discharge  ENT: Mucous membranes are moist. No trismus. Posterior Pharynx non injected, no tongue swelling, airway patent, no tonsillar edema. No exudates noted. No abscess. No discharge. Middle ear spaces clear. Tympanic membrane non injected. Auditory canal clear. ABDOMEN: Soft. Non-tender. No guarding or rebound. No organomegaly. No palpable masses  MUSCULOSKELETAL: No acute deformities. MUSCULOSKELETAL: HAND/WRIST:  Bones of the hand and wrist are not tender to palpation. Anatomic snuffbox is not tender to palpation. No pain with axial loading of scaphoid. Joints exhibit active range of motion without ligamentous laxity or instability. All tendons tested actively and against resistance without laxity. Subungual hematomas and nail injuries are absent. Radial pulse is  present. Capillary refill is  less than 2 seconds. Sensation is  intact in the median, ulnar and radial nerve distributions. Strenght is  intact in the median, ulnar and radial nerve distributions. Cyanosis, erythema, and pallor are absent. There is no tenderness palpation over any of patient's carpal bones metacarpal bones. All of patient's phalanges are non tender. Distal sensation is intact in all digits. Flexion and extension lateral motion actively against resistance is present in all directions. In all digits. Including flexion extension abduction and adduction and opposition of the thumb    SKIN: Warm and dry. No rash, No erythema, No edema. No ecchymoses. Laceration:   2 x 2 centimeter L-shaped skin tear laceration noted on the palmar surface of patient's right hand.   Right above the distal fifth metacarpal region. There is no gaping no foreign bodies no active bleeding. No bony arterial or tenderness exposure/damage noted  NEUROLOGICAL: No gross facial drooping. Moves all 4 extremities spontaneously. PSYCHIATRIC: Normal mood. Procedure Note - Laceration repair:  Questions were sought and answered and verbal consent was given by patient for the procedure. The area was prepped and draped in standard bedside fashion. The wound area was anesthetized with 2ml of LET without added sodium bicarbonate. The wound was explored with No foreign bodies found. The wound was repaired with 4-0 Vicryl; several simple external sutures were used. The patient tolerated the procedure well without complications and my repeat neurovascular exam post-procedure is unchanged. I have reviewed and interpreted all of the currently available lab results from this visit (if applicable):  No results found for this visit on 02/21/22. Radiographs (if obtained):  [] The following radiograph was interpreted by myself in the absence of a radiologist:   [] Radiologist's Report Reviewed:  No orders to display       EKG (if obtained):   Please See Note of attending physician for EKG interpretation. Chart review shows recent radiograph(s):  XR NECK SOFT TISSUE    Result Date: 2/1/2022  EXAMINATION: TWO XRAY VIEWS OF THE NECK SOFT TISSUES 2/1/2022 10:50 pm COMPARISON: None. HISTORY: ORDERING SYSTEM PROVIDED HISTORY: dyspnea TECHNOLOGIST PROVIDED HISTORY: Reason for exam:->dyspnea Reason for Exam: dyspnea FINDINGS: No prevertebral soft tissue swelling. The airway is midline. No evidence of epiglottic or adenoid prominence. The upper airways are patent. No focal abnormality of the soft tissues of the neck.      XR CHEST PORTABLE    Result Date: 2/1/2022  EXAMINATION: ONE XRAY VIEW OF THE CHEST 2/1/2022 10:50 pm COMPARISON: 08/02/2021 HISTORY: ORDERING SYSTEM PROVIDED HISTORY: dyspnea TECHNOLOGIST PROVIDED HISTORY: Reason for exam:->dyspnea Reason for Exam: dyspnea FINDINGS: The lungs are without acute focal process. There is no effusion or pneumothorax. The cardiomediastinal silhouette is stable. The osseous structures are stable. No acute process. MDM:   Patient presents today with laceration. Laceration repair performed by myself without complications. Patient did tolerate procedure well. Wound care discussed with patient/family. As well as return precautions, suture staple/removal.    Wound care and scar minimization education was provided. Instructions were given to return for increasing pain, redness, streaking, discharge, or any other worsening or worrisome concerns. I'm very pleased with the results of the wound repair. Pt is to be discharged home. Pt is  to return immediately to the emergency department if he has any new, worrisome or worsening symptoms. Pt is to follow up with PCP/DOD within 2 days. Patient/Surrogate vocalizes agreement and understanding with this plan and he has no questions upon disposition. Pt is comfortable upon disposition home. Patient is stable, Patients vital signs are stable. Vital signs and nursing notes reviewed during ED course. I independently managed patient today in the ED. All pertinent Lab data and radiographic results reviewed with patient at bedside. The patient and/or the family were informed of the results of any tests/labs/imaging, the treatment plan, and time was allotted to answer questions. See chart for details of medications given during the ED stay. BP (!) 183/103   Pulse 84   Temp 98.4 °F (36.9 °C) (Oral)   Resp 18   Ht 5' 1\" (1.549 m)   Wt 170 lb (77.1 kg)   LMP 06/05/2012   SpO2 95%   BMI 32.12 kg/m²       Clinical Impression:  1.  Laceration of right hand, foreign body presence unspecified, initial encounter        Disposition referral (if applicable):  Evgeny Kaur, APRN - CNP  [facility] [address]  [place] [address]  [phone]    In 2 days  For wound re-check    Disposition medications (if applicable):  New Prescriptions    No medications on file         Comment: Please note this report has been produced using speech recognition software and may contain errors related to that system including errors in grammar, punctuation, and spelling, as well as words and phrases that may be inappropriate. If there are any questions or concerns please feel free to contact the dictating provider for clarification.       Joann Piedra, [address], [place]  02/21/22 1625

## 2022-02-23 ENCOUNTER — OFFICE VISIT (OUTPATIENT)
Dept: INTERNAL MEDICINE CLINIC | Age: 47
End: 2022-02-23
Payer: MEDICAID

## 2022-02-23 VITALS
BODY MASS INDEX: 34.8 KG/M2 | WEIGHT: 184.2 LBS | HEART RATE: 73 BPM | DIASTOLIC BLOOD PRESSURE: 80 MMHG | OXYGEN SATURATION: 98 % | SYSTOLIC BLOOD PRESSURE: 130 MMHG

## 2022-02-23 DIAGNOSIS — S61.411D LACERATION OF RIGHT HAND WITHOUT FOREIGN BODY, SUBSEQUENT ENCOUNTER: Primary | ICD-10-CM

## 2022-02-23 PROCEDURE — G8417 CALC BMI ABV UP PARAM F/U: HCPCS | Performed by: NURSE PRACTITIONER

## 2022-02-23 PROCEDURE — G8484 FLU IMMUNIZE NO ADMIN: HCPCS | Performed by: NURSE PRACTITIONER

## 2022-02-23 PROCEDURE — G8427 DOCREV CUR MEDS BY ELIG CLIN: HCPCS | Performed by: NURSE PRACTITIONER

## 2022-02-23 PROCEDURE — 99213 OFFICE O/P EST LOW 20 MIN: CPT | Performed by: NURSE PRACTITIONER

## 2022-02-23 PROCEDURE — 1036F TOBACCO NON-USER: CPT | Performed by: NURSE PRACTITIONER

## 2022-02-23 RX ORDER — TRAMADOL HYDROCHLORIDE 50 MG/1
50 TABLET ORAL EVERY 6 HOURS PRN
Qty: 12 TABLET | Refills: 0 | Status: SHIPPED | OUTPATIENT
Start: 2022-02-23 | End: 2022-02-26

## 2022-02-23 RX ORDER — GABAPENTIN 100 MG/1
100 CAPSULE ORAL 2 TIMES DAILY
Qty: 60 CAPSULE | Refills: 2 | Status: SHIPPED | OUTPATIENT
Start: 2022-02-23 | End: 2022-05-23

## 2022-02-23 ASSESSMENT — ENCOUNTER SYMPTOMS
CHEST TIGHTNESS: 0
NAUSEA: 0
VOMITING: 0
SINUS PAIN: 0
COLOR CHANGE: 0
ABDOMINAL PAIN: 0
APNEA: 0
SINUS PRESSURE: 0
COUGH: 0
SHORTNESS OF BREATH: 0
DIARRHEA: 0

## 2022-02-23 NOTE — LETTER
Cheyanne DAMON [facility] INTERNAL MEDICINE  [address] [address] [address]  Phone: [phone]  Fax: [phone]    SHUKRI Grimes CNP        February 23, 2022     Patient: Irene Issa   YOB: 1975   Date of Visit: 2/23/2022       To Whom it May Concern:    Shoaib Sandy was seen in my clinic on 2/23/2022. She may return to work on 3/1/2022. If you have any questions or concerns, please don't hesitate to call.     Sincerely,         SHUKRI Grimes CNP

## 2022-02-23 NOTE — PROGRESS NOTES
Irene Issa  1975  02/23/22    Chief Complaint   Patient presents with    Follow-Up from Hospital     cut hand        SUBJECTIVE:      Patient states she went to open the screen door x 3 days ago and when doing so, part of the handle broke off causing her to sustain a laceration to her right hand. There was no gross blood loss. TDap is up to date as of 2019. She did go to [facility] ER and  Received several simple external sutures were placed. Sutures remain in place and pt has no drainage from the area. Requesting medication for pain control and also a few days off work as she has to wear Kevlar gloves and works in a [employer]. \"     Review of Systems   Constitutional: Negative for activity change, appetite change, fatigue and fever. HENT: Negative for congestion, nosebleeds, sinus pressure and sinus pain. Respiratory: Negative for apnea, cough, chest tightness and shortness of breath. Cardiovascular: Negative for chest pain and palpitations. Gastrointestinal: Negative for abdominal pain, diarrhea, nausea and vomiting. Genitourinary: Negative for difficulty urinating, flank pain and hematuria. Musculoskeletal: Negative for arthralgias, joint swelling and myalgias. Skin: Positive for wound. Negative for color change and rash. Neurological: Negative for dizziness, light-headedness and headaches. Psychiatric/Behavioral: Negative. Negative for behavioral problems. OBJECTIVE:    /80 (Site: Left Upper Arm, Position: Sitting, Cuff Size: Large Adult)   Pulse 73   Wt 184 lb 3.2 oz (83.6 kg)   LMP 06/05/2012   SpO2 98%   BMI 34.80 kg/m²     Physical Exam  Constitutional:       General: She is not in acute distress. Appearance: She is well-developed. She is not diaphoretic. HENT:      Head: Normocephalic and atraumatic. Cardiovascular:      Rate and Rhythm: Normal rate and regular rhythm. Heart sounds: Normal heart sounds.    Pulmonary:      Effort: Pulmonary effort is normal. No respiratory distress. Breath sounds: Normal breath sounds. Abdominal:      General: Bowel sounds are normal.      Palpations: Abdomen is soft. Tenderness: There is no abdominal tenderness. Musculoskeletal:         General: Normal range of motion. Cervical back: Normal range of motion and neck supple. No edema or erythema. No muscular tenderness. Skin:     General: Skin is warm and dry. Capillary Refill: Capillary refill takes less than 2 seconds. Findings: Lesion present. No erythema or rash. Neurological:      Mental Status: She is alert and oriented to person, place, and time. Coordination: Coordination normal.   Psychiatric:         Behavior: Behavior normal.         Thought Content: Thought content normal.         ASSESSMENT:    1. Laceration of right hand without foreign body, subsequent encounter        PLAN:    Darian Oakley was seen today for follow-up from hospital.    Diagnoses and all orders for this visit:    Laceration of right hand without foreign body, subsequent encounter- wound appears well approximated/healing without complications. Will have pt return in 1 week for suture removal. Advised pt she may have short course of Tramadol for 3 days, but nothing beyond this. Work note provided. -     traMADol (ULTRAM) 50 MG tablet; Take 1 tablet by mouth every 6 hours as needed for Pain for up to 3 days. Intended supply: 5 days. Take lowest dose possible to manage pain    Course of treatment, including any medications, possible imaging, referrals, and follow ups discussed with patient. All risks and benefits and possible side effects discussed with patient who agrees to plan of care and verbalizes understanding. All labs and imaging reviewed. No flowsheet data found. Return in about 1 week (around 3/2/2022).     Matti note this reports has been produced speech recognition software and may contain errors related to that system including errors in grammar, punctuation, and spelling, as well as words and phrases that may be appropriate. If there are any questions or concerns please feel free to contact the dictating provider for clarification.

## 2022-02-24 ENCOUNTER — TELEPHONE (OUTPATIENT)
Dept: GASTROENTEROLOGY | Age: 47
End: 2022-02-24

## 2022-02-24 NOTE — TELEPHONE ENCOUNTER
Called pt. In regards to a referral for IBS w/ constipation and diarrhea.  Made appt for pt to see Nilson Olmedo on 2/25/22 @10:45am

## 2022-03-01 ENCOUNTER — OFFICE VISIT (OUTPATIENT)
Dept: INTERNAL MEDICINE CLINIC | Age: 47
End: 2022-03-01
Payer: MEDICAID

## 2022-03-01 VITALS
DIASTOLIC BLOOD PRESSURE: 82 MMHG | OXYGEN SATURATION: 97 % | HEIGHT: 61 IN | RESPIRATION RATE: 18 BRPM | WEIGHT: 176.6 LBS | SYSTOLIC BLOOD PRESSURE: 138 MMHG | BODY MASS INDEX: 33.34 KG/M2 | HEART RATE: 91 BPM

## 2022-03-01 DIAGNOSIS — B07.0 PLANTAR WARTS: ICD-10-CM

## 2022-03-01 DIAGNOSIS — Z48.02 ENCOUNTER FOR REMOVAL OF SUTURES: Primary | ICD-10-CM

## 2022-03-01 PROCEDURE — G8484 FLU IMMUNIZE NO ADMIN: HCPCS | Performed by: NURSE PRACTITIONER

## 2022-03-01 PROCEDURE — G8427 DOCREV CUR MEDS BY ELIG CLIN: HCPCS | Performed by: NURSE PRACTITIONER

## 2022-03-01 PROCEDURE — 99214 OFFICE O/P EST MOD 30 MIN: CPT | Performed by: NURSE PRACTITIONER

## 2022-03-01 PROCEDURE — 1036F TOBACCO NON-USER: CPT | Performed by: NURSE PRACTITIONER

## 2022-03-01 PROCEDURE — G8417 CALC BMI ABV UP PARAM F/U: HCPCS | Performed by: NURSE PRACTITIONER

## 2022-03-01 ASSESSMENT — ENCOUNTER SYMPTOMS
COLOR CHANGE: 0
ABDOMINAL PAIN: 0
COUGH: 0
VOMITING: 0
SINUS PAIN: 0
APNEA: 0
CHEST TIGHTNESS: 0
NAUSEA: 0
SHORTNESS OF BREATH: 0
DIARRHEA: 0
SINUS PRESSURE: 0

## 2022-03-01 NOTE — PROGRESS NOTES
Alejo Bethea  1975  03/01/22    Chief Complaint   Patient presents with    Suture / Staple Removal     right hand suture removal sx started last sunday. Pt went to grab katie of her moms screen door handle and it snapped off in her hand        SUBJECTIVE:      Darian Oakley presents to the office this afternoon for suture removal.  In brief, on February 21 she proceeded to [facility] ER after accidentally cutting her hand on a broken section of her screen door. She had 4 sutures placed to the inner hands and accidentally removed 1 herself when taken off the bandage. Denies any drainage, redness, swelling, fevers or chills    Also with complaints of 2 clear bumps noted to her medial right index finger over the last week. Areas are nonpainful and nonpruritic. No discharge or other issues reported. She has not tried any over-the-counter remedies for this issue to date. Review of Systems   Constitutional: Negative for activity change, appetite change, fatigue and fever. HENT: Negative for congestion, nosebleeds, sinus pressure and sinus pain. Respiratory: Negative for apnea, cough, chest tightness and shortness of breath. Cardiovascular: Negative for chest pain and palpitations. Gastrointestinal: Negative for abdominal pain, diarrhea, nausea and vomiting. Genitourinary: Negative for difficulty urinating, flank pain and hematuria. Musculoskeletal: Negative for arthralgias, joint swelling and myalgias. Skin: Positive for wound. Negative for color change and rash. Neurological: Negative for dizziness, light-headedness and headaches. Psychiatric/Behavioral: Negative. Negative for behavioral problems. OBJECTIVE:    /82   Pulse 91   Resp 18   Ht 5' 1\" (1.549 m)   Wt 176 lb 9.6 oz (80.1 kg)   LMP 06/05/2012   SpO2 97%   BMI 33.37 kg/m²     Physical Exam  Constitutional:       General: She is not in acute distress. Appearance: She is well-developed. She is not diaphoretic. HENT:      Head: Normocephalic and atraumatic. Cardiovascular:      Rate and Rhythm: Normal rate and regular rhythm. Pulmonary:      Effort: Pulmonary effort is normal. No respiratory distress. Breath sounds: Normal breath sounds. Abdominal:      General: Bowel sounds are normal.      Palpations: Abdomen is soft. Tenderness: There is no abdominal tenderness. Musculoskeletal:         General: Normal range of motion. Cervical back: Normal range of motion and neck supple. No edema or erythema. No muscular tenderness. Skin:     General: Skin is warm and dry. Capillary Refill: Capillary refill takes less than 2 seconds. Findings: Lesion present. No erythema or rash. Neurological:      Mental Status: She is alert and oriented to person, place, and time. Coordination: Coordination normal.   Psychiatric:         Thought Content: Thought content normal.         ASSESSMENT:    1. Encounter for removal of sutures    2. Plantar warts        PLAN:    Jelani Bateman was seen today for suture / staple removal.    Diagnoses and all orders for this visit:    Encounter for removal of sutures-3 simple interrupted sutures removed from repair laceration. Wound is well approximated with clean edges and no signs of infection or other concerns at this time. Patient tolerated procedure without complication.  -      - MO REMOVAL OF SUTURES    Plantar warts-exam most consistent with diagnosis. Patient requesting Compound W to be ordered for her prescription based. Discussed with patient that insurance may not cover this but will attempt to order today. -     Dimethyl Ether (COMPOUND W COMPLETE) KIT; Apply 1 each topically once for 1 dose    Course of treatment, including any medications, possible imaging, referrals, and follow ups discussed with patient. All risks and benefits and possible side effects discussed with patient who agrees to plan of care and verbalizes understanding.  All labs and imaging reviewed. Please note this reports has been produced speech recognition software and may contain errors related to that system including errors in grammar, punctuation, and spelling, as well as words and phrases that may be appropriate. If there are any questions or concerns please feel free to contact the dictating provider for clarification. No flowsheet data found. No follow-ups on file. Pleas note this reports has been produced speech recognition software and may contain errors related to that system including errors in grammar, punctuation, and spelling, as well as words and phrases that may be appropriate. If there are any questions or concerns please feel free to contact the dictating provider for clarification.

## 2022-03-22 RX ORDER — DICYCLOMINE HYDROCHLORIDE 10 MG/1
CAPSULE ORAL
Qty: 90 CAPSULE | Refills: 5 | Status: SHIPPED | OUTPATIENT
Start: 2022-03-22 | End: 2022-09-02

## 2022-03-29 ENCOUNTER — HOSPITAL ENCOUNTER (INPATIENT)
Age: 47
LOS: 4 days | Discharge: HOME | DRG: 254 | End: 2022-04-02
Attending: INTERNAL MEDICINE | Admitting: INTERNAL MEDICINE
Payer: MEDICAID

## 2022-03-29 ENCOUNTER — APPOINTMENT (OUTPATIENT)
Dept: GENERAL RADIOLOGY | Age: 47
DRG: 254 | End: 2022-03-29
Payer: MEDICAID

## 2022-03-29 ENCOUNTER — APPOINTMENT (OUTPATIENT)
Dept: CT IMAGING | Age: 47
DRG: 254 | End: 2022-03-29
Payer: MEDICAID

## 2022-03-29 DIAGNOSIS — K59.00 CONSTIPATION, UNSPECIFIED CONSTIPATION TYPE: ICD-10-CM

## 2022-03-29 DIAGNOSIS — R11.2 INTRACTABLE NAUSEA AND VOMITING: Primary | ICD-10-CM

## 2022-03-29 DIAGNOSIS — K85.90 ACUTE PANCREATITIS WITHOUT INFECTION OR NECROSIS, UNSPECIFIED PANCREATITIS TYPE: ICD-10-CM

## 2022-03-29 LAB
ALBUMIN SERPL-MCNC: 3.9 GM/DL (ref 3.4–5)
ALP BLD-CCNC: 95 IU/L (ref 40–129)
ALT SERPL-CCNC: 13 U/L (ref 10–40)
ANION GAP SERPL CALCULATED.3IONS-SCNC: 9 MMOL/L (ref 4–16)
AST SERPL-CCNC: 14 IU/L (ref 15–37)
BACTERIA: ABNORMAL /HPF
BASOPHILS ABSOLUTE: 0 K/CU MM
BASOPHILS RELATIVE PERCENT: 0.5 % (ref 0–1)
BILIRUB SERPL-MCNC: 0.2 MG/DL (ref 0–1)
BILIRUBIN DIRECT: 0.2 MG/DL (ref 0–0.3)
BILIRUBIN URINE: NEGATIVE MG/DL
BILIRUBIN, INDIRECT: 0 MG/DL (ref 0–0.7)
BLOOD, URINE: NEGATIVE
BUN BLDV-MCNC: 19 MG/DL (ref 6–23)
CALCIUM SERPL-MCNC: 9.1 MG/DL (ref 8.3–10.6)
CHLORIDE BLD-SCNC: 101 MMOL/L (ref 99–110)
CLARITY: CLEAR
CO2: 27 MMOL/L (ref 21–32)
COLOR: YELLOW
CREAT SERPL-MCNC: 0.8 MG/DL (ref 0.6–1.1)
DIFFERENTIAL TYPE: ABNORMAL
EOSINOPHILS ABSOLUTE: 0.1 K/CU MM
EOSINOPHILS RELATIVE PERCENT: 2.2 % (ref 0–3)
GFR AFRICAN AMERICAN: >60 ML/MIN/1.73M2
GFR NON-AFRICAN AMERICAN: >60 ML/MIN/1.73M2
GLUCOSE BLD-MCNC: 78 MG/DL (ref 70–99)
GLUCOSE, URINE: NEGATIVE MG/DL
HCT VFR BLD CALC: 43.6 % (ref 37–47)
HEMOGLOBIN: 14.6 GM/DL (ref 12.5–16)
IMMATURE NEUTROPHIL %: 0.3 % (ref 0–0.43)
KETONES, URINE: NEGATIVE MG/DL
LEUKOCYTE ESTERASE, URINE: NEGATIVE
LIPASE: 105 IU/L (ref 13–60)
LYMPHOCYTES ABSOLUTE: 1.3 K/CU MM
LYMPHOCYTES RELATIVE PERCENT: 20.9 % (ref 24–44)
MCH RBC QN AUTO: 28.4 PG (ref 27–31)
MCHC RBC AUTO-ENTMCNC: 33.5 % (ref 32–36)
MCV RBC AUTO: 84.8 FL (ref 78–100)
MONOCYTES ABSOLUTE: 0.5 K/CU MM
MONOCYTES RELATIVE PERCENT: 8.4 % (ref 0–4)
NITRITE URINE, QUANTITATIVE: NEGATIVE
NUCLEATED RBC %: 0 %
PDW BLD-RTO: 12.6 % (ref 11.7–14.9)
PH, URINE: 7 (ref 5–8)
PLATELET # BLD: 180 K/CU MM (ref 140–440)
PMV BLD AUTO: 11 FL (ref 7.5–11.1)
POTASSIUM SERPL-SCNC: 4.2 MMOL/L (ref 3.5–5.1)
PROTEIN UA: NEGATIVE MG/DL
RBC # BLD: 5.14 M/CU MM (ref 4.2–5.4)
RBC URINE: <1 /HPF (ref 0–6)
SEGMENTED NEUTROPHILS ABSOLUTE COUNT: 4.4 K/CU MM
SEGMENTED NEUTROPHILS RELATIVE PERCENT: 67.7 % (ref 36–66)
SODIUM BLD-SCNC: 137 MMOL/L (ref 135–145)
SPECIFIC GRAVITY UA: 1.01 (ref 1–1.03)
SQUAMOUS EPITHELIAL: 3 /HPF
TOTAL IMMATURE NEUTOROPHIL: 0.02 K/CU MM
TOTAL NUCLEATED RBC: 0 K/CU MM
TOTAL PROTEIN: 6.7 GM/DL (ref 6.4–8.2)
TRANSITIONAL EPITHELIAL: <1 /HPF
TRICHOMONAS: ABNORMAL /HPF
UROBILINOGEN, URINE: 0.2 MG/DL (ref 0.2–1)
WBC # BLD: 6.4 K/CU MM (ref 4–10.5)
WBC UA: <1 /HPF (ref 0–5)

## 2022-03-29 PROCEDURE — 2580000003 HC RX 258: Performed by: PHYSICIAN ASSISTANT

## 2022-03-29 PROCEDURE — 81001 URINALYSIS AUTO W/SCOPE: CPT

## 2022-03-29 PROCEDURE — 6370000000 HC RX 637 (ALT 250 FOR IP): Performed by: PHYSICIAN ASSISTANT

## 2022-03-29 PROCEDURE — 80053 COMPREHEN METABOLIC PANEL: CPT

## 2022-03-29 PROCEDURE — G0378 HOSPITAL OBSERVATION PER HR: HCPCS

## 2022-03-29 PROCEDURE — 74018 RADEX ABDOMEN 1 VIEW: CPT

## 2022-03-29 PROCEDURE — 2140000000 HC CCU INTERMEDIATE R&B

## 2022-03-29 PROCEDURE — 96374 THER/PROPH/DIAG INJ IV PUSH: CPT

## 2022-03-29 PROCEDURE — 96375 TX/PRO/DX INJ NEW DRUG ADDON: CPT

## 2022-03-29 PROCEDURE — 96376 TX/PRO/DX INJ SAME DRUG ADON: CPT

## 2022-03-29 PROCEDURE — 83690 ASSAY OF LIPASE: CPT

## 2022-03-29 PROCEDURE — 82248 BILIRUBIN DIRECT: CPT

## 2022-03-29 PROCEDURE — 74176 CT ABD & PELVIS W/O CONTRAST: CPT

## 2022-03-29 PROCEDURE — 2500000003 HC RX 250 WO HCPCS: Performed by: PHYSICIAN ASSISTANT

## 2022-03-29 PROCEDURE — 99285 EMERGENCY DEPT VISIT HI MDM: CPT

## 2022-03-29 PROCEDURE — 2500000003 HC RX 250 WO HCPCS: Performed by: INTERNAL MEDICINE

## 2022-03-29 PROCEDURE — 6360000002 HC RX W HCPCS: Performed by: PHYSICIAN ASSISTANT

## 2022-03-29 PROCEDURE — 85025 COMPLETE CBC W/AUTO DIFF WBC: CPT

## 2022-03-29 RX ORDER — DIPHENHYDRAMINE HYDROCHLORIDE, ZINC ACETATE 2; .1 G/100G; G/100G
CREAM TOPICAL 3 TIMES DAILY PRN
Status: DISCONTINUED | OUTPATIENT
Start: 2022-03-29 | End: 2022-04-02 | Stop reason: HOSPADM

## 2022-03-29 RX ORDER — METOCLOPRAMIDE HYDROCHLORIDE 5 MG/ML
10 INJECTION INTRAMUSCULAR; INTRAVENOUS ONCE
Status: COMPLETED | OUTPATIENT
Start: 2022-03-29 | End: 2022-03-30

## 2022-03-29 RX ORDER — ONDANSETRON 2 MG/ML
4 INJECTION INTRAMUSCULAR; INTRAVENOUS ONCE
Status: COMPLETED | OUTPATIENT
Start: 2022-03-29 | End: 2022-03-29

## 2022-03-29 RX ORDER — DIPHENHYDRAMINE HYDROCHLORIDE 50 MG/ML
12.5 INJECTION INTRAMUSCULAR; INTRAVENOUS ONCE
Status: COMPLETED | OUTPATIENT
Start: 2022-03-29 | End: 2022-03-29

## 2022-03-29 RX ORDER — METHYLPREDNISOLONE SODIUM SUCCINATE 125 MG/2ML
125 INJECTION, POWDER, LYOPHILIZED, FOR SOLUTION INTRAMUSCULAR; INTRAVENOUS ONCE
Status: COMPLETED | OUTPATIENT
Start: 2022-03-29 | End: 2022-03-29

## 2022-03-29 RX ORDER — 0.9 % SODIUM CHLORIDE 0.9 %
1000 INTRAVENOUS SOLUTION INTRAVENOUS ONCE
Status: COMPLETED | OUTPATIENT
Start: 2022-03-29 | End: 2022-03-29

## 2022-03-29 RX ORDER — HYDROCODONE BITARTRATE AND ACETAMINOPHEN 5; 325 MG/1; MG/1
1 TABLET ORAL EVERY 4 HOURS PRN
Status: DISCONTINUED | OUTPATIENT
Start: 2022-03-29 | End: 2022-04-02 | Stop reason: HOSPADM

## 2022-03-29 RX ORDER — HYDROCODONE BITARTRATE AND ACETAMINOPHEN 5; 325 MG/1; MG/1
1 TABLET ORAL ONCE
Status: COMPLETED | OUTPATIENT
Start: 2022-03-29 | End: 2022-03-29

## 2022-03-29 RX ORDER — DIPHENHYDRAMINE HYDROCHLORIDE 50 MG/ML
50 INJECTION INTRAMUSCULAR; INTRAVENOUS ONCE
Status: COMPLETED | OUTPATIENT
Start: 2022-03-29 | End: 2022-03-29

## 2022-03-29 RX ORDER — KETOROLAC TROMETHAMINE 30 MG/ML
30 INJECTION, SOLUTION INTRAMUSCULAR; INTRAVENOUS EVERY 6 HOURS PRN
Status: DISCONTINUED | OUTPATIENT
Start: 2022-03-29 | End: 2022-03-30

## 2022-03-29 RX ORDER — DIPHENHYDRAMINE HYDROCHLORIDE 50 MG/ML
50 INJECTION INTRAMUSCULAR; INTRAVENOUS EVERY 6 HOURS
Status: DISCONTINUED | OUTPATIENT
Start: 2022-03-29 | End: 2022-03-30

## 2022-03-29 RX ORDER — METHYLPREDNISOLONE SODIUM SUCCINATE 125 MG/2ML
60 INJECTION, POWDER, LYOPHILIZED, FOR SOLUTION INTRAMUSCULAR; INTRAVENOUS EVERY 6 HOURS
Status: DISCONTINUED | OUTPATIENT
Start: 2022-03-29 | End: 2022-04-02 | Stop reason: HOSPADM

## 2022-03-29 RX ADMIN — DIPHENHYDRAMINE HYDROCHLORIDE 12.5 MG: 50 INJECTION, SOLUTION INTRAMUSCULAR; INTRAVENOUS at 15:10

## 2022-03-29 RX ADMIN — ONDANSETRON 4 MG: 2 INJECTION INTRAMUSCULAR; INTRAVENOUS at 17:08

## 2022-03-29 RX ADMIN — FAMOTIDINE 40 MG: 10 INJECTION INTRAVENOUS at 22:27

## 2022-03-29 RX ADMIN — ONDANSETRON 4 MG: 2 INJECTION INTRAMUSCULAR; INTRAVENOUS at 15:08

## 2022-03-29 RX ADMIN — DIPHENHYDRAMINE HYDROCHLORIDE 50 MG: 50 INJECTION INTRAMUSCULAR; INTRAVENOUS at 18:28

## 2022-03-29 RX ADMIN — SODIUM CHLORIDE 1000 ML: 9 INJECTION, SOLUTION INTRAVENOUS at 18:56

## 2022-03-29 RX ADMIN — HYDROMORPHONE HYDROCHLORIDE 0.5 MG: 1 INJECTION, SOLUTION INTRAMUSCULAR; INTRAVENOUS; SUBCUTANEOUS at 15:21

## 2022-03-29 RX ADMIN — METHYLPREDNISOLONE SODIUM SUCCINATE 125 MG: 125 INJECTION, POWDER, LYOPHILIZED, FOR SOLUTION INTRAMUSCULAR; INTRAVENOUS at 18:36

## 2022-03-29 RX ADMIN — FAMOTIDINE 40 MG: 10 INJECTION, SOLUTION INTRAVENOUS at 18:28

## 2022-03-29 RX ADMIN — HYDROCODONE BITARTRATE AND ACETAMINOPHEN 1 TABLET: 5; 325 TABLET ORAL at 12:55

## 2022-03-29 ASSESSMENT — PAIN SCALES - GENERAL
PAINLEVEL_OUTOF10: 10
PAINLEVEL_OUTOF10: 8
PAINLEVEL_OUTOF10: 5
PAINLEVEL_OUTOF10: 10

## 2022-03-29 ASSESSMENT — PAIN DESCRIPTION - ONSET: ONSET: ON-GOING

## 2022-03-29 ASSESSMENT — PAIN DESCRIPTION - PROGRESSION: CLINICAL_PROGRESSION: NOT CHANGED

## 2022-03-29 ASSESSMENT — PAIN DESCRIPTION - PAIN TYPE: TYPE: ACUTE PAIN

## 2022-03-29 ASSESSMENT — PAIN DESCRIPTION - DESCRIPTORS: DESCRIPTORS: TIGHTNESS;STABBING;SHARP

## 2022-03-29 ASSESSMENT — PAIN DESCRIPTION - LOCATION: LOCATION: BACK;ABDOMEN

## 2022-03-29 ASSESSMENT — PAIN DESCRIPTION - FREQUENCY: FREQUENCY: CONTINUOUS

## 2022-03-29 ASSESSMENT — PAIN - FUNCTIONAL ASSESSMENT: PAIN_FUNCTIONAL_ASSESSMENT: 0-10

## 2022-03-29 NOTE — ED NOTES
Pt ambulated to bathroom independently. Once in there for about 5min called out for help. Pt emesis and large amount BM. Pt diaphoretic. Hyperventilating. Stated felt like was going to pass out. Other RN came with wheelchair and pt assisted back to room. Pt given bedside commode. Ice pack to cool down and cold wet washcloths. Medicated with antiemetics.         Bryce Qiu RN  03/29/22 207

## 2022-03-29 NOTE — ED PROVIDER NOTES
[address]        Pt Name: Alissa Warner  MRN: [id number]  [place] 1975  Date of evaluation: 3/29/2022  Provider: Bakari Jordan PA-C  PCP: SHUKRI Osei CNP  Note Started: 1:16 PM EDT       I have evaluated this patient and with consultation availability with attending physician. [place], [facility]       Chief Complaint   Patient presents with    Abdominal Pain    Back Pain    Emesis       HISTORY OF PRESENT ILLNESS   (Location, Timing/Onset, Context/Setting, Quality, Duration, Modifying Factors, Severity, Associated Signs and Symptoms)  Note limiting factors. Chief Complaint: Nausea, vomiting, constipation, abdominal pain, back pain    Alissa Warner is a 50 y.o. female who presents with the above complaint. States last BM occurring about 5 days ago. She has abdominal bloating and abdominal pain progressive over the past 5 days. States is increased over the past 24-36 hours. She has had  episodes of nausea with emesis. She indicates no stool passage. She is passing gas. She reports no fevers, chills, chest pain or shortness of breath. Patient does have history of IBS mixed. She is seen GI Dr. Yolanda Kaye. Previous abdominal surgeries include laparoscopy, a posterior bladder tumor, hysterectomy with bilateral nephrectomy, cholecystectomy. Patient has not had prior pancreatitis. She did have a slight elevation in lipase 2017. It appears at that time diagnosed colitis. Not admitted with pancreatitis. Nursing Notes were all reviewed and agreed with or any disagreements were addressed in the HPI. REVIEW OF SYSTEMS    (2-9 systems for level 4, 10 or more for level 5)     Review of Systems    Positives and Pertinent negatives as per HPI. Except as noted above in the ROS, all other systems were reviewed and negative.        PAST MEDICAL HISTORY     Past Medical History:   Diagnosis Date    Anxiety     Bladder tumor     Dr. Melo Michaud Depression     Diverticulitis     Last episode: 7/1/2018    Emphysema of lung ([address]     Follows with Shani Cruz    GERD (gastroesophageal reflux disease)     History of blood transfusion 2018    After surgery for dog attack    Hypertension     Dr. Farzana Fraga IBS (irritable bowel syndrome)     Kidney stone     Last stone: 2013    PTSD (post-traumatic stress disorder) 02/03/2019    from dog attack    Sleep apnea     Uses BiPap         SURGICAL HISTORY     Past Surgical History:   Procedure Laterality Date    ABDOMEN SURGERY      BLADDER SURGERY  2012    CHOLECYSTECTOMY  2001    COLONOSCOPY  03/26/2018    normal colonoscopy - Dr. Ford Singh  1980's    Nose    [address], TOTAL ABDOMINAL  2012    INCISION AND DRAINAGE Left 2/4/2019    ANKLE INCISION AND DRAINAGE performed by Nighat Rosario DO at [address] Right 2/4/2019    ARM INCISION AND DRAINAGE performed by Nighat Rosario DO at [address] [facility]  2012    tumor removal    OVARIAN CYST REMOVAL  2012   [facility]    TUBAL LIGATION  07/2011    UPPER GASTROINTESTINAL ENDOSCOPY N/A 1/4/2021    EGD POLYP ABLATION/OTHER OF ANTRAL POLYP AND BX performed by Camden Culp MD at [address]       Current Discharge Medication List      CONTINUE these medications which have NOT CHANGED    Details   dicyclomine (BENTYL) 10 MG capsule take 1 capsule by mouth three times a day BEFORE A MEAL  Qty: 90 capsule, Refills: 5      gabapentin (NEURONTIN) 100 MG capsule Take 1 capsule by mouth 2 times daily for 30 days.   Qty: 60 capsule, Refills: 2      fluticasone (FLONASE) 50 MCG/ACT nasal spray instill 2 sprays into each nostril once daily  Qty: 16 g, Refills: 5      SYMBICORT 160-4.5 MCG/ACT AERO inhale 2 puffs by mouth twice a day  Qty: 10.2 g, Refills: 2    Associated Diagnoses: Pulmonary emphysema, unspecified emphysema type ([address]      potassium chloride (KLOR-CON M) 10 MEQ extended release tablet take 1 tablet by mouth once daily  Qty: 30 tablet, Refills: 2    Associated Diagnoses: Urinary frequency      furosemide (LASIX) 20 MG tablet take 1 tablet by mouth once daily  Qty: 90 tablet, Refills: 0      clotrimazole (LOTRIMIN) 1 % cream apply to affected area twice a day  Qty: 30 g, Refills: 2    Associated Diagnoses: Umbilical discharge      albuterol sulfate  (90 Base) MCG/ACT inhaler inhale 2 puffs by mouth INTO THE LUNGS every 4 hours if needed for shortness of breath or wheezing  Qty: 18 g, Refills: 2      omeprazole (PRILOSEC) 20 MG delayed release capsule Take 1 capsule by mouth Daily  Qty: 30 capsule, Refills: 5    Associated Diagnoses: Gastroesophageal reflux disease without esophagitis      dextran 70-hypromellose (ARTIFICIAL TEARS) 0.1-0.3 % SOLN opthalmic solution Place 1 drop into both eyes every 4 hours as needed (Eye irritation)  Qty: 1 Bottle, Refills: 0      loratadine-pseudoephedrine (CLARITIN-D 24HR)  MG per extended release tablet Take 1 tablet by mouth daily  Qty: 30 tablet, Refills: 3               ALLERGIES     Dye [iodides], Morphine, Dilaudid [hydromorphone hcl], and Fentanyl    FAMILYHISTORY       Family History   Problem Relation Age of Onset    Alcohol Abuse Mother     Sleep Apnea Father           SOCIAL HISTORY       Social History     Tobacco Use    Smoking status: Current Every Day Smoker     Packs/day: 1.00     Years: 19.00     Pack years: 19.00     Types: Cigarettes     Start date: 2/11/2001    Smokeless tobacco: Never Used   Vaping Use    Vaping Use: Never used   Substance Use Topics    Alcohol use: No    Drug use: Yes     Types: Marijuana (Weed)       SCREENINGS    [place] Coma Scale  Eye Opening: Spontaneous  Best Verbal Response: Oriented  Best Motor Response: Obeys commands  [place] Coma Scale Score: 15        PHYSICAL EXAM    (up to 7 for level 4, 8 or more for level 5)     ED Triage Vitals [03/29/22 1210]   BP Temp Temp Source Pulse Resp SpO2 Height Weight   (!) 164/106 98.2 °F (36.8 °C) Oral 71 20 100 % 5' 1\" (1.549 m) 172 lb (78 kg)       Physical Exam  Vitals and nursing note reviewed. Constitutional:       Appearance: Normal appearance. She is well-developed. She is obese. HENT:      Head: Normocephalic and atraumatic. Right Ear: External ear normal.      Left Ear: External ear normal.   Eyes:      General: No scleral icterus. Right eye: No discharge. Left eye: No discharge. Conjunctiva/sclera: Conjunctivae normal.   Cardiovascular:      Rate and Rhythm: Normal rate and regular rhythm. Heart sounds: Normal heart sounds. Pulmonary:      Effort: Pulmonary effort is normal.      Breath sounds: Normal breath sounds. Abdominal:      General: Abdomen is flat. Bowel sounds are normal.      Palpations: Abdomen is soft. Tenderness: There is abdominal tenderness. There is no right CVA tenderness or left CVA tenderness. Comments: Patient is not distended. She does have diffuse tenderness. No localization. She does have tenderness over the paralumbar musculature. She has no CVA tenderness. She does perceive discomfort in the abdomen when I test the right CVA versus left. I find no evidence of guarding or rebound. Musculoskeletal:         General: Normal range of motion. Cervical back: Normal range of motion and neck supple. Right lower leg: No edema. Left lower leg: No edema. Skin:     General: Skin is warm and dry. Neurological:      General: No focal deficit present. Mental Status: She is alert and oriented to person, place, and time. Mental status is at baseline. Psychiatric:         Mood and Affect: Mood normal.         Behavior: Behavior normal.         Thought Content:  Thought content normal.         Judgment: Judgment normal.         DIAGNOSTIC RESULTS   LABS:    Labs Reviewed   CBC WITH AUTO DIFFERENTIAL - Abnormal; Notable for the following components:       Result Value    Segs Relative 67.7 (*)     Lymphocytes % 20.9 (*)     Monocytes % 8.4 (*)     All other components within normal limits   LIPASE - Abnormal; Notable for the following components:    Lipase 105 (*)     All other components within normal limits   HEPATIC FUNCTION PANEL - Abnormal; Notable for the following components:    AST 14 (*)     All other components within normal limits   URINALYSIS WITH REFLEX TO CULTURE - Abnormal; Notable for the following components:    Color, UA YELLOW (*)     Bacteria, UA OCCASIONAL (*)     All other components within normal limits   BASIC METABOLIC PANEL W/ REFLEX TO MG FOR LOW K       When ordered only abnormal lab results are displayed. All other labs were within normal range or not returned as of this dictation. EKG: When ordered, EKG's are interpreted by the Emergency Department Physician in the absence of a cardiologist.  Please see their note for interpretation of EKG. RADIOLOGY:   Non-plain film images such as CT, Ultrasound and MRI are read by the radiologist. Plain radiographic images are visualized and preliminarily interpreted by the ED Provider with the below findings:        Interpretation per the Radiologist below, if available at the time of this note:    XR ABDOMEN (KUB) (SINGLE AP VIEW)   Final Result   1. Nonspecific and nonobstructive bowel gas pattern. 2. No radiopaque urinary tract calcifications seen. CT ABDOMEN PELVIS WO CONTRAST Additional Contrast? None   Final Result   No acute intra-abdominal or intrapelvic abnormality identified. Moderate-to-large colonic stool. No results found. PROCEDURES   Unless otherwise noted below, none     Procedures    CRITICAL CARE TIME     Critical Care  There was a high probability of life-threatening clinical deterioration in the patient's condition requiring my urgent intervention.   Total critical care time with the patient was 45 minutes excluding separately reportable procedures. Critical care required due to patients allergic reaction to Dilaudid. The patient did inform me she Has had Dilaudid without adverse effect. She does have history of hives with both morphine and fentanyl. I did pretreat with Benadryl 12.5 mg IV. After 3 hours she did exhibit the hive reaction. At that time she was emergently given Solu-Medrol 125 mg, Pepcid 40 mg and Benadryl 50 mg all IV. Symptoms improved. In addition, she was given NaCl 1 L. Time spent reassessing the patient and discussing case with admitting physician.       CONSULTS:  IP CONSULT TO HOSPITALIST  IP CONSULT TO GI      EMERGENCY DEPARTMENT COURSE and DIFFERENTIAL DIAGNOSIS/MDM:   Vitals:    Vitals:    03/29/22 1839 03/29/22 1847 03/29/22 1849 03/29/22 2045   BP: (!) 87/43 (!) 104/56 (!) 120/57 (!) 129/94   Pulse:    (!) 46   Resp:    13   Temp:    98.6 °F (37 °C)   TempSrc:    Axillary   SpO2: 98% 100% 100%    Weight:       Height:           Patient was given the following medications:  Medications   metoclopramide (REGLAN) injection 10 mg (has no administration in time range)   diphenhydrAMINE (BENADRYL) injection 50 mg (has no administration in time range)   famotidine (PEPCID) injection 40 mg (has no administration in time range)   methylPREDNISolone sodium (SOLU-MEDROL) injection 60 mg (has no administration in time range)   diphenhydrAMINE-zinc acetate cream (has no administration in time range)   HYDROcodone-acetaminophen (NORCO) 5-325 MG per tablet 1 tablet (1 tablet Oral Given 3/29/22 1255)   ondansetron (ZOFRAN) injection 4 mg (4 mg IntraVENous Given 3/29/22 1508)   HYDROmorphone (DILAUDID) injection 0.5 mg (0.5 mg IntraVENous Given 3/29/22 1521)   diphenhydrAMINE (BENADRYL) injection 12.5 mg (12.5 mg IntraVENous Given 3/29/22 1510)   ondansetron (ZOFRAN) injection 4 mg (4 mg IntraVENous Given 3/29/22 1708)   diphenhydrAMINE (BENADRYL) injection 50 mg (50 mg IntraVENous Given 3/29/22 1828)   famotidine (PEPCID) injection 40 mg (40 mg IntraVENous Given 3/29/22 1828)   methylPREDNISolone sodium (SOLU-MEDROL) injection 125 mg (125 mg IntraVENous Given 3/29/22 1836)   0.9 % sodium chloride bolus (1,000 mLs IntraVENous New Bag 3/29/22 1856)         At 2:40 PM. patient continues in pain. Lipase 105, KUB x-ray unremarkable however, CT scan showing moderate to large constipation without pancreatic stranding. I placed order for Zofran 4 mg IV, Benadryl 12.5 mg IV and Dilaudid 0.5 mg IV. I did have social bearing marijuana use. She states rarely and not a daily user. Patient felt ill and needed to use bathroom. She had large stool production. Patient also had emesis. Medications were given and she is resting comfortably at this time. She has been moved from ED bed 1 to ED bed 14. At 6 PM reassessing patient. Patient very sleepy, complaining of continued abdominal pain and nausea. I do not believe the patient, who lives alone, will do well going home tonight. Patient will need continued hydration and management for nausea and vomiting. She may also need additional pain control with abdominal pain complaint and a slight elevation in lipase at 105. At 6:15 PM nursing staff notifies me patient having outbreak of hives. Her CT scan was without IV contrast.  Medications given prior to the outbreak of hives include hydrocodone, Benadryl, Dilaudid, Zofran. No prior reaction to these medications. Hive reaction unclear etiology at this time. At 6:25 PM I did speak with hospitalist regarding admission for intractable nausea and vomiting as well as abdominal pain. I did relate to hospitalist at that time patient having hives and that Solu-Medrol 125 mg, Pepcid 40 mg Benadryl 50 mg given. Patient was still nauseous and given Reglan 10 mg. All medications IV. At 6:35 PM I discussed case with attending physician who will evaluate the patient.   The patient did not receive IV contrast with CT scan. Patient does have high reaction to both morphine and fentanyl. I did asked the patient if she is ever had Dilaudid. She indicates she has had it without difficulty. The patient did receive Dilaudid 0.5 mg at 3 PM.  The hive reaction began approximately 6 PM with 3 hours post administration of the Dilaudid. I did give patient Benadryl 12.5 mg at the same time in the event that there would be a cross reaction to the Dilaudid. At 6:50 PM I did update patient's allergy profile indicating Dilaudid resulting in hives, similar to fentanyl and morphine. FINAL IMPRESSION      1. Intractable nausea and vomiting    2. Constipation, unspecified constipation type    3. Acute pancreatitis without infection or necrosis, unspecified pancreatitis type          DISPOSITION/PLAN   DISPOSITION        PATIENT REFERRED TO:  No follow-up provider specified. DISCHARGE MEDICATIONS:  Current Discharge Medication List          DISCONTINUED MEDICATIONS:  Current Discharge Medication List                 (Please note that portions of this note were completed with a voice recognition program.  Efforts were made to edit the dictations but occasionally words are mis-transcribed. )    Judi Rolon PA-C (electronically signed)           Judi Rolon PA-C  03/29/22 2022       Judi Rolon PA-C  03/29/22 2129

## 2022-03-29 NOTE — H&P
History and Physical      Name:  Jesús De Jesus /Age/Sex: 1975  (50 y.o. female)   MRN & CSN:  [id number] & [id number] Encounter Date/Time: 3/29/2022 6:27 PM EDT   Location:  ED14/ED-14 PCP: Farzaneh Starr [address] Day: 1    Assessment and Plan:   Jesús De Jesus is a 50 y.o. female who presents with     Acute Pancreatitis?  - elevated lipase  - TG  - daily lipase  - IV hydration  - RUQ ultrasound  - GI consult      Hives in setting of Dilaudid?  -Although patient states that Dilaudid is not causing her to have hives, it is similar in close to morphine, will avoid at this time, will give Toradol  - c/w pepcid, benadryl and steriods  - continue to monitor rash from hives, call provider if worseing    COPD  - c/w home meds      D/w ED provider  Code status Full  DVT PPx Lovenox       History of Present Illness:     Jesús De Jesus is a 50 y.o. female with abdominal pain, constipation, nausea and vomiting. Patient states that she has had epigastric pain and bloating for about 5 days, states that is been worsening over the last day. Associated nonbloody nonbilious emesis. Patient states that she had difficulty passing stool, however had a large bowel movement ED. Patient found no relief from that, continue to have abdominal pain. Patient is known to Dr. Kaleb Abernathy, as per GI. Denies any history of pancreatitis. Denies EtOH use. Review of Systems: Need 10 Elements       Pt otherwise has no complaints of CP, SOB, dizziness, dysuria, joint pains, rash/boils, cough or fevers.        Objective:   No intake or output data in the 24 hours ending 22 1827   Vitals:   Vitals:    22 1210 22 1359 22 1704   BP: (!) 164/106 (!) 173/94 (!) 142/65   Pulse: 71 60 50   Resp: 20 17 18   Temp: 98.2 °F (36.8 °C)     TempSrc: Oral     SpO2: 100% 100% 100%   Weight: 172 lb (78 kg)     Height: 5' 1\" (1.549 m)         Medications Prior to Admission     Prior to Admission medications    Medication Sig Start Date End Date Taking? Authorizing Provider   dicyclomine (BENTYL) 10 MG capsule take 1 capsule by mouth three times a day BEFORE A MEAL 3/22/22   SHUKRI Nunze CNP   gabapentin (NEURONTIN) 100 MG capsule Take 1 capsule by mouth 2 times daily for 30 days. 2/23/22 3/25/22  SHUKRI Nunez CNP   fluticasone Marda Gasman) 50 MCG/ACT nasal spray instill 2 sprays into each nostril once daily 2/10/22   SHUKRI Nunez CNP   SYMBICORT 160-4.5 MCG/ACT AERO inhale 2 puffs by mouth twice a day 1/12/22   SHUKRI Nunez CNP   potassium chloride (KLOR-CON M) 10 MEQ extended release tablet take 1 tablet by mouth once daily 1/12/22   SHUKRI Nunez CNP   furosemide (LASIX) 20 MG tablet take 1 tablet by mouth once daily 1/10/22   SHUKRI Nunez CNP   clotrimazole (LOTRIMIN) 1 % cream apply to affected area twice a day 1/10/22   SHUKRI Nunez CNP   albuterol sulfate  (90 Base) MCG/ACT inhaler inhale 2 puffs by mouth INTO THE LUNGS every 4 hours if needed for shortness of breath or wheezing 1/10/22   SHUKRI Nunez CNP   omeprazole (PRILOSEC) 20 MG delayed release capsule Take 1 capsule by mouth Daily 10/20/21   SHUKRI Nunez CNP   dextran 70-hypromellose (ARTIFICIAL TEARS) 0.1-0.3 % SOLN opthalmic solution Place 1 drop into both eyes every 4 hours as needed (Eye irritation) 8/2/21   ROSEANNA Lawrence   loratadine-pseudoephedrine (CLARITIN-D 24HR)  MG per extended release tablet Take 1 tablet by mouth daily 6/8/21   SHUKRI Nunez CNP       Physical Exam: Need 8 Elements   General: NAD   Eyes: EOMI  ENT: neck supple  Cardiovascular: Regular rate. NO edema  Respiratory: Symmetrical expansion,   Gastrointestinal: Soft, TTP epigastrium   Genitourinary: no suprapubic tenderness  Skin: warm, dry  Neuro: Alert. Oriented  Psych: Mood appropriate.        Past Medical History:   PMHx   Past Medical History:   Diagnosis Date  Anxiety     Bladder tumor     Dr. Rayshawn Garay    Depression     Diverticulitis     Last episode: 7/1/2018    Emphysema of lung ([address]     Follows with Pepe Peterson    GERD (gastroesophageal reflux disease)     History of blood transfusion 2018    After surgery for dog attack    Hypertension     Dr. Ulysses Silva IBS (irritable bowel syndrome)     Kidney stone     Last stone: 2013    PTSD (post-traumatic stress disorder) 02/03/2019    from dog attack    Sleep apnea     Uses BiPap     PSHX:  has a past surgical history that includes Tubal ligation (07/2011); Abdomen surgery; Kidney surgery (2012); ovarian cyst removal (2012); Bladder surgery (2012); incision and drainage (Left, 2/4/2019); incision and drainage (Right, 2/4/2019); Cholecystectomy (2001); Colonoscopy (03/26/2018); Hysterectomy (2011); Hysterectomy, total abdominal (2012); fracture surgery (1980's); Tonsillectomy (1980); and Upper gastrointestinal endoscopy (N/A, 1/4/2021). Allergies: Allergies   Allergen Reactions    Dye [Iodides] Hives    Morphine Hives    Fentanyl      Fam HX:  family history includes Alcohol Abuse in her mother; Sleep Apnea in her father.   Soc HX:   Social History     Socioeconomic History    Marital status: Single     Spouse name: None    Number of children: None    Years of education: None    Highest education level: None   Occupational History    None   Tobacco Use    Smoking status: Current Every Day Smoker     Packs/day: 1.00     Years: 19.00     Pack years: 19.00     Types: Cigarettes     Start date: 2/11/2001    Smokeless tobacco: Never Used   Vaping Use    Vaping Use: Never used   Substance and Sexual Activity    Alcohol use: No    Drug use: Yes     Types: Marijuana Veldon Breath)    Sexual activity: Yes     Partners: Male   Other Topics Concern    None   Social History Narrative    None     Social Determinants of Health     Financial Resource Strain:     Difficulty of Paying Living Expenses: Not on file   Food Insecurity:     Worried About Running Out of Food in the Last Year: Not on file    Sydnie of Food in the Last Year: Not on file   Transportation Needs:     Lack of Transportation (Medical): Not on file    Lack of Transportation (Non-Medical):  Not on file   Physical Activity:     Days of Exercise per Week: Not on file    Minutes of Exercise per Session: Not on file   Stress:     Feeling of Stress : Not on file   Social Connections:     Frequency of Communication with Friends and Family: Not on file    Frequency of Social Gatherings with Friends and Family: Not on file    Attends Sikh Services: Not on file    Active Member of [address] [place] [address] [employer] or Organizations: Not on file    Attends Club or Organization Meetings: Not on file    Marital Status: Not on file   Intimate Partner Violence:     Fear of Current or Ex-Partner: Not on file    Emotionally Abused: Not on file    Physically Abused: Not on file    Sexually Abused: Not on file   Housing Stability:     Unable to Pay for Housing in the Last Year: Not on file    Number of [place] in the Last Year: Not on file    Unstable Housing in the Last Year: Not on file       Medications:   Medications:    diphenhydrAMINE  50 mg IntraVENous Once    famotidine (PEPCID) injection  40 mg IntraVENous Once    methylPREDNISolone  125 mg IntraVENous Once      Infusions:   PRN Meds:      Labs      CBC:   Recent Labs     03/29/22  1300   WBC 6.4   HGB 14.6        BMP:    Recent Labs     03/29/22  1300      K 4.2      CO2 27   BUN 19   CREATININE 0.8   GLUCOSE 78     Hepatic:   Recent Labs     03/29/22  1300   AST 14*   ALT 13   BILITOT 0.2   ALKPHOS 95     Lipids: No results found for: CHOL, HDL, TRIG  Hemoglobin A1C: No results found for: LABA1C  TSH: No results found for: TSH  Troponin:   Lab Results   Component Value Date    TROPONINT <0.010 02/02/2022    TROPONINT <0.010 01/26/2021    TROPONINT <0.010 08/17/2020     Lactic Acid: No results for input(s): LACTA in the last 72 hours. BNP: No results for input(s): PROBNP in the last 72 hours. UA:  Lab Results   Component Value Date    NITRU NEGATIVE 03/29/2022    COLORU YELLOW 03/29/2022    PHUR 5.5 10/15/2021    WBCUA <1 03/29/2022    RBCUA <1 03/29/2022    MUCUS OCCASIONAL 01/26/2021    TRICHOMONAS NONE SEEN 03/29/2022    BACTERIA OCCASIONAL 03/29/2022    CLARITYU CLEAR 03/29/2022    SPECGRAV 1.015 03/29/2022    LEUKOCYTESUR NEGATIVE 03/29/2022    UROBILINOGEN 0.2 03/29/2022    BILIRUBINUR NEGATIVE 03/29/2022    BILIRUBINUR small 10/15/2021    BLOODU NEGATIVE 03/29/2022    GLUCOSEU neg 10/15/2021    KETUA NEGATIVE 03/29/2022     Urine Cultures:   Lab Results   Component Value Date    LABURIN  10/15/2021     <10,000 CFU/ml mixed skin/urogenital yohannes. No further workup     Blood Cultures: No results found for: BC  No results found for: BLOODCULT2  Organism: No results found for: ORG    Imaging/Diagnostics Last 24 Hours   CT ABDOMEN PELVIS WO CONTRAST Additional Contrast? None    Result Date: 3/29/2022  EXAMINATION: CT OF THE ABDOMEN AND PELVIS WITHOUT CONTRAST 3/29/2022 1:08 pm TECHNIQUE: CT of the abdomen and pelvis was performed without the administration of intravenous contrast. Multiplanar reformatted images are provided for review. Dose modulation, iterative reconstruction, and/or weight based adjustment of the mA/kV was utilized to reduce the radiation dose to as low as reasonably achievable.  COMPARISON: 11/02/2021 HISTORY: ORDERING SYSTEM PROVIDED HISTORY: Abdominal pain, nausea, vomiting, constipation TECHNOLOGIST PROVIDED HISTORY: Reason for exam:->Abdominal pain, nausea, vomiting, constipation Additional Contrast?->None Decision Support Exception - unselect if not a suspected or confirmed emergency medical condition->Emergency Medical Condition (MA) Is the patient pregnant?->No Reason for Exam: Abdominal pain, nausea, vomiting, constipation Additional signs and symptoms: NONE Relevant pain, nausea, vomiting, constipation Additional signs and symptoms: Abdominal pain, nausea, vomiting, constipation Relevant Medical/Surgical History: Abdominal pain, nausea, vomiting, constipation FINDINGS: The bowel gas pattern is nonspecific and nonobstructive. No abnormally dilated loops of bowel are seen. No abnormal densities are seen overlying the renal shadows or within the pelvis. There are clips from a cholecystectomy. 1. Nonspecific and nonobstructive bowel gas pattern. 2. No radiopaque urinary tract calcifications seen.            Electronically signed by Kirt Lundborg, MD on 3/29/2022 at 6:27 PM

## 2022-03-29 NOTE — CARE COORDINATION
CM in to see patient and ask about completing a medical POA. Patient appeared confused and unable to answer at this time.

## 2022-03-29 NOTE — ED NOTES
Pt crying out in pain [facility] notified.       Tristian Nash RN  03/29/22 [id number]

## 2022-03-29 NOTE — ED NOTES
Pt changed into gown and assisted back into bed. Call light within reach.      Keshav Mccarty RN  03/29/22 [id number]

## 2022-03-29 NOTE — ED NOTES
Report called at this time for bed 3111 to nurse Delaney. All questions answered.      Anthony Polo RN  03/29/22 [id number]

## 2022-03-30 ENCOUNTER — APPOINTMENT (OUTPATIENT)
Dept: ULTRASOUND IMAGING | Age: 47
DRG: 254 | End: 2022-03-30
Payer: MEDICAID

## 2022-03-30 LAB — TRIGL SERPL-MCNC: 142 MG/DL

## 2022-03-30 PROCEDURE — 6370000000 HC RX 637 (ALT 250 FOR IP): Performed by: INTERNAL MEDICINE

## 2022-03-30 PROCEDURE — 6370000000 HC RX 637 (ALT 250 FOR IP): Performed by: SPECIALIST

## 2022-03-30 PROCEDURE — 6360000002 HC RX W HCPCS: Performed by: NURSE PRACTITIONER

## 2022-03-30 PROCEDURE — 2580000003 HC RX 258: Performed by: INTERNAL MEDICINE

## 2022-03-30 PROCEDURE — 6360000002 HC RX W HCPCS: Performed by: INTERNAL MEDICINE

## 2022-03-30 PROCEDURE — 94640 AIRWAY INHALATION TREATMENT: CPT

## 2022-03-30 PROCEDURE — 96376 TX/PRO/DX INJ SAME DRUG ADON: CPT

## 2022-03-30 PROCEDURE — 96375 TX/PRO/DX INJ NEW DRUG ADDON: CPT

## 2022-03-30 PROCEDURE — 2500000003 HC RX 250 WO HCPCS: Performed by: INTERNAL MEDICINE

## 2022-03-30 PROCEDURE — 76705 ECHO EXAM OF ABDOMEN: CPT

## 2022-03-30 PROCEDURE — 94664 DEMO&/EVAL PT USE INHALER: CPT

## 2022-03-30 PROCEDURE — 36415 COLL VENOUS BLD VENIPUNCTURE: CPT

## 2022-03-30 PROCEDURE — 94761 N-INVAS EAR/PLS OXIMETRY MLT: CPT

## 2022-03-30 PROCEDURE — 6360000002 HC RX W HCPCS: Performed by: PHYSICIAN ASSISTANT

## 2022-03-30 PROCEDURE — 2140000000 HC CCU INTERMEDIATE R&B

## 2022-03-30 PROCEDURE — G0378 HOSPITAL OBSERVATION PER HR: HCPCS

## 2022-03-30 PROCEDURE — 6370000000 HC RX 637 (ALT 250 FOR IP): Performed by: NURSE PRACTITIONER

## 2022-03-30 PROCEDURE — 2580000003 HC RX 258: Performed by: SPECIALIST

## 2022-03-30 PROCEDURE — 84478 ASSAY OF TRIGLYCERIDES: CPT

## 2022-03-30 PROCEDURE — 96372 THER/PROPH/DIAG INJ SC/IM: CPT

## 2022-03-30 RX ORDER — SODIUM CHLORIDE, SODIUM LACTATE, POTASSIUM CHLORIDE, CALCIUM CHLORIDE 600; 310; 30; 20 MG/100ML; MG/100ML; MG/100ML; MG/100ML
INJECTION, SOLUTION INTRAVENOUS CONTINUOUS
Status: DISCONTINUED | OUTPATIENT
Start: 2022-03-30 | End: 2022-03-30

## 2022-03-30 RX ORDER — GABAPENTIN 100 MG/1
100 CAPSULE ORAL 2 TIMES DAILY
Status: DISCONTINUED | OUTPATIENT
Start: 2022-03-30 | End: 2022-04-02 | Stop reason: HOSPADM

## 2022-03-30 RX ORDER — SODIUM CHLORIDE 9 MG/ML
INJECTION, SOLUTION INTRAVENOUS CONTINUOUS
Status: DISPENSED | OUTPATIENT
Start: 2022-03-30 | End: 2022-03-31

## 2022-03-30 RX ORDER — LUBIPROSTONE 24 UG/1
24 CAPSULE, GELATIN COATED ORAL 2 TIMES DAILY WITH MEALS
Status: DISCONTINUED | OUTPATIENT
Start: 2022-03-30 | End: 2022-04-02 | Stop reason: HOSPADM

## 2022-03-30 RX ORDER — ONDANSETRON 4 MG/1
4 TABLET, ORALLY DISINTEGRATING ORAL EVERY 8 HOURS PRN
Status: DISCONTINUED | OUTPATIENT
Start: 2022-03-30 | End: 2022-04-02 | Stop reason: HOSPADM

## 2022-03-30 RX ORDER — SODIUM CHLORIDE 0.9 % (FLUSH) 0.9 %
5-40 SYRINGE (ML) INJECTION EVERY 12 HOURS SCHEDULED
Status: DISCONTINUED | OUTPATIENT
Start: 2022-03-30 | End: 2022-04-02 | Stop reason: HOSPADM

## 2022-03-30 RX ORDER — ACETAMINOPHEN 325 MG/1
650 TABLET ORAL EVERY 4 HOURS PRN
Status: DISCONTINUED | OUTPATIENT
Start: 2022-03-30 | End: 2022-04-02 | Stop reason: HOSPADM

## 2022-03-30 RX ORDER — DICYCLOMINE HYDROCHLORIDE 10 MG/1
10 CAPSULE ORAL 4 TIMES DAILY PRN
Status: DISCONTINUED | OUTPATIENT
Start: 2022-03-30 | End: 2022-04-02 | Stop reason: HOSPADM

## 2022-03-30 RX ORDER — SODIUM CHLORIDE 9 MG/ML
INJECTION, SOLUTION INTRAVENOUS PRN
Status: DISCONTINUED | OUTPATIENT
Start: 2022-03-30 | End: 2022-04-02 | Stop reason: HOSPADM

## 2022-03-30 RX ORDER — HYDROCODONE BITARTRATE AND ACETAMINOPHEN 5; 325 MG/1; MG/1
2 TABLET ORAL EVERY 4 HOURS PRN
Status: DISCONTINUED | OUTPATIENT
Start: 2022-03-30 | End: 2022-04-02 | Stop reason: HOSPADM

## 2022-03-30 RX ORDER — FLUTICASONE PROPIONATE 50 MCG
1 SPRAY, SUSPENSION (ML) NASAL DAILY
Status: DISCONTINUED | OUTPATIENT
Start: 2022-03-30 | End: 2022-04-02 | Stop reason: HOSPADM

## 2022-03-30 RX ORDER — SODIUM CHLORIDE 0.9 % (FLUSH) 0.9 %
5-40 SYRINGE (ML) INJECTION PRN
Status: DISCONTINUED | OUTPATIENT
Start: 2022-03-30 | End: 2022-04-02 | Stop reason: HOSPADM

## 2022-03-30 RX ORDER — HYDROCODONE BITARTRATE AND ACETAMINOPHEN 5; 325 MG/1; MG/1
1 TABLET ORAL EVERY 6 HOURS PRN
Status: DISCONTINUED | OUTPATIENT
Start: 2022-03-30 | End: 2022-03-30 | Stop reason: SDUPTHER

## 2022-03-30 RX ORDER — BUDESONIDE AND FORMOTEROL FUMARATE DIHYDRATE 160; 4.5 UG/1; UG/1
1 AEROSOL RESPIRATORY (INHALATION) 2 TIMES DAILY
Status: DISCONTINUED | OUTPATIENT
Start: 2022-03-30 | End: 2022-04-02 | Stop reason: HOSPADM

## 2022-03-30 RX ORDER — PANTOPRAZOLE SODIUM 40 MG/1
40 TABLET, DELAYED RELEASE ORAL
Status: DISCONTINUED | OUTPATIENT
Start: 2022-03-30 | End: 2022-04-02 | Stop reason: HOSPADM

## 2022-03-30 RX ORDER — ALBUTEROL SULFATE 90 UG/1
1 AEROSOL, METERED RESPIRATORY (INHALATION) EVERY 6 HOURS PRN
Status: DISCONTINUED | OUTPATIENT
Start: 2022-03-30 | End: 2022-04-02 | Stop reason: HOSPADM

## 2022-03-30 RX ORDER — DIPHENHYDRAMINE HYDROCHLORIDE 50 MG/ML
25 INJECTION INTRAMUSCULAR; INTRAVENOUS EVERY 6 HOURS PRN
Status: DISCONTINUED | OUTPATIENT
Start: 2022-03-30 | End: 2022-04-02 | Stop reason: HOSPADM

## 2022-03-30 RX ORDER — ONDANSETRON 2 MG/ML
4 INJECTION INTRAMUSCULAR; INTRAVENOUS EVERY 6 HOURS PRN
Status: DISCONTINUED | OUTPATIENT
Start: 2022-03-30 | End: 2022-04-02 | Stop reason: HOSPADM

## 2022-03-30 RX ORDER — CETIRIZINE HYDROCHLORIDE 5 MG/1
10 TABLET ORAL DAILY
Status: DISCONTINUED | OUTPATIENT
Start: 2022-03-30 | End: 2022-04-02 | Stop reason: HOSPADM

## 2022-03-30 RX ORDER — PROMETHAZINE HYDROCHLORIDE 25 MG/ML
25 INJECTION, SOLUTION INTRAMUSCULAR; INTRAVENOUS ONCE
Status: COMPLETED | OUTPATIENT
Start: 2022-03-30 | End: 2022-03-30

## 2022-03-30 RX ADMIN — METHYLPREDNISOLONE SODIUM SUCCINATE 60 MG: 125 INJECTION, POWDER, LYOPHILIZED, FOR SOLUTION INTRAMUSCULAR; INTRAVENOUS at 17:00

## 2022-03-30 RX ADMIN — HYDROCODONE BITARTRATE AND ACETAMINOPHEN 2 TABLET: 5; 325 TABLET ORAL at 06:08

## 2022-03-30 RX ADMIN — PROMETHAZINE HYDROCHLORIDE 25 MG: 25 INJECTION INTRAMUSCULAR; INTRAVENOUS at 01:21

## 2022-03-30 RX ADMIN — HYDROCODONE BITARTRATE AND ACETAMINOPHEN 2 TABLET: 5; 325 TABLET ORAL at 01:22

## 2022-03-30 RX ADMIN — DIPHENHYDRAMINE HYDROCHLORIDE 50 MG: 50 INJECTION INTRAMUSCULAR; INTRAVENOUS at 00:06

## 2022-03-30 RX ADMIN — METHYLPREDNISOLONE SODIUM SUCCINATE 60 MG: 125 INJECTION, POWDER, LYOPHILIZED, FOR SOLUTION INTRAMUSCULAR; INTRAVENOUS at 00:06

## 2022-03-30 RX ADMIN — HYDROCODONE BITARTRATE AND ACETAMINOPHEN 2 TABLET: 5; 325 TABLET ORAL at 23:12

## 2022-03-30 RX ADMIN — METOCLOPRAMIDE 10 MG: 5 INJECTION, SOLUTION INTRAMUSCULAR; INTRAVENOUS at 13:34

## 2022-03-30 RX ADMIN — GABAPENTIN 100 MG: 100 CAPSULE ORAL at 08:13

## 2022-03-30 RX ADMIN — DIPHENHYDRAMINE HYDROCHLORIDE, ZINC ACETATE: 2; .1 CREAM TOPICAL at 00:04

## 2022-03-30 RX ADMIN — HYDROCODONE BITARTRATE AND ACETAMINOPHEN 2 TABLET: 5; 325 TABLET ORAL at 13:19

## 2022-03-30 RX ADMIN — PANTOPRAZOLE SODIUM 40 MG: 40 TABLET, DELAYED RELEASE ORAL at 06:20

## 2022-03-30 RX ADMIN — DIPHENHYDRAMINE HYDROCHLORIDE 50 MG: 50 INJECTION INTRAMUSCULAR; INTRAVENOUS at 06:08

## 2022-03-30 RX ADMIN — FLUTICASONE PROPIONATE 1 SPRAY: 50 SPRAY, METERED NASAL at 06:09

## 2022-03-30 RX ADMIN — SODIUM CHLORIDE, PRESERVATIVE FREE 10 ML: 5 INJECTION INTRAVENOUS at 08:14

## 2022-03-30 RX ADMIN — LUBIPROSTONE 24 MCG: 24 CAPSULE, GELATIN COATED ORAL at 08:13

## 2022-03-30 RX ADMIN — CETIRIZINE HYDROCHLORIDE 10 MG: 5 TABLET ORAL at 09:19

## 2022-03-30 RX ADMIN — DIPHENHYDRAMINE HYDROCHLORIDE 25 MG: 50 INJECTION, SOLUTION INTRAMUSCULAR; INTRAVENOUS at 21:45

## 2022-03-30 RX ADMIN — FAMOTIDINE 40 MG: 10 INJECTION INTRAVENOUS at 08:14

## 2022-03-30 RX ADMIN — METHYLPREDNISOLONE SODIUM SUCCINATE 60 MG: 125 INJECTION, POWDER, LYOPHILIZED, FOR SOLUTION INTRAMUSCULAR; INTRAVENOUS at 13:19

## 2022-03-30 RX ADMIN — BUDESONIDE AND FORMOTEROL FUMARATE DIHYDRATE 1 PUFF: 160; 4.5 AEROSOL RESPIRATORY (INHALATION) at 19:29

## 2022-03-30 RX ADMIN — SODIUM CHLORIDE: 9 INJECTION, SOLUTION INTRAVENOUS at 11:34

## 2022-03-30 RX ADMIN — DIPHENHYDRAMINE HYDROCHLORIDE 25 MG: 50 INJECTION, SOLUTION INTRAMUSCULAR; INTRAVENOUS at 13:31

## 2022-03-30 RX ADMIN — ENOXAPARIN SODIUM 40 MG: 40 INJECTION SUBCUTANEOUS at 08:14

## 2022-03-30 RX ADMIN — GABAPENTIN 100 MG: 100 CAPSULE ORAL at 21:45

## 2022-03-30 RX ADMIN — FAMOTIDINE 40 MG: 10 INJECTION INTRAVENOUS at 21:46

## 2022-03-30 RX ADMIN — ONDANSETRON 4 MG: 2 INJECTION INTRAMUSCULAR; INTRAVENOUS at 13:31

## 2022-03-30 RX ADMIN — SODIUM CHLORIDE, POTASSIUM CHLORIDE, SODIUM LACTATE AND CALCIUM CHLORIDE: 600; 310; 30; 20 INJECTION, SOLUTION INTRAVENOUS at 06:18

## 2022-03-30 RX ADMIN — METHYLPREDNISOLONE SODIUM SUCCINATE 60 MG: 125 INJECTION, POWDER, LYOPHILIZED, FOR SOLUTION INTRAMUSCULAR; INTRAVENOUS at 06:07

## 2022-03-30 RX ADMIN — LUBIPROSTONE 24 MCG: 24 CAPSULE, GELATIN COATED ORAL at 16:27

## 2022-03-30 RX ADMIN — DICYCLOMINE HYDROCHLORIDE 10 MG: 10 CAPSULE ORAL at 08:13

## 2022-03-30 RX ADMIN — LACTULOSE: 10 SOLUTION ORAL at 16:03

## 2022-03-30 RX ADMIN — HYDROCODONE BITARTRATE AND ACETAMINOPHEN 2 TABLET: 5; 325 TABLET ORAL at 19:00

## 2022-03-30 RX ADMIN — BUDESONIDE AND FORMOTEROL FUMARATE DIHYDRATE 1 PUFF: 160; 4.5 AEROSOL RESPIRATORY (INHALATION) at 08:30

## 2022-03-30 RX ADMIN — SODIUM CHLORIDE, PRESERVATIVE FREE 10 ML: 5 INJECTION INTRAVENOUS at 21:49

## 2022-03-30 ASSESSMENT — PAIN SCALES - GENERAL
PAINLEVEL_OUTOF10: 9
PAINLEVEL_OUTOF10: 0
PAINLEVEL_OUTOF10: 8
PAINLEVEL_OUTOF10: 4
PAINLEVEL_OUTOF10: 0
PAINLEVEL_OUTOF10: 7
PAINLEVEL_OUTOF10: 0
PAINLEVEL_OUTOF10: 6

## 2022-03-30 NOTE — PROGRESS NOTES
Consult received, patient interviewed and examined-- full note to follow  Impression:    1) does not meet diagnostic criteria for pancreatitis as yet    2) initially feel this is more due to constipation, IBS, etc        Plan:   1) begin Amitiza 24 mcg BID   2) start clears   3) give lactulose enema

## 2022-03-30 NOTE — PROGRESS NOTES
2045: Patient arrived to unit from the ED. Ambulated to bed with no difficulty. Alert and oriented x4, on room air., C/o generalized  And abdominal pain of 5/10. Skin assessment completed by Ijeoma Garcia RN and Concepción Judd RN. Skin is intact. An old scar noted on left ankle. Patient oriented to room, call light and TV. Now resting comfortably. 2345: Patient had small clear emesis. NP notified. Phenergan ordered. Pt also itching and rash red rash forming on patients abdomen, shoulders, and thighs. She states that she might be allergic to the laundry detergent used in hospital laundry. Hospital gown taken out. Benadryl IV given and benadryl cream applied to rash. 0130: Assessed patient to see if rash is worsening. All rash is gone, skin intact now. Will continue to monitor.

## 2022-03-30 NOTE — CARE COORDINATION
.CM has reviewed pt's chart for needs. CM screening shows that pt has PCP, insurance and is independent PTA. If any d/c needs arise please contact LORE.   TE

## 2022-03-30 NOTE — PROGRESS NOTES
INTERNAL MED PROGRESS NOTE           Subjective: Report runny nose, no fever or chills. Abdominal pain is better she stated.       Assessment/Plan:       --Abdominal pain: Likely due to IBS with constipation. GI consulted, appreciate assistance. Patient is getting a dose of lactulose enema now. Continue Amitiza     --? Acute pancreatitis: Doubt acute pancreatitis, lipase only minimally elevated, no radiographic evidence of pancreatitis. CT without concerning gallbladder/biliary findings. LFTs within normal limits.     --Rhinitis: Placed on Claritin, continue outpatient Flonase     --COPD: stable w/o any acute s/s of exacerbation. Placed on prn duonebs         DVT prophylaxis: Heparin/Lovenox        Bello Alexandre MD  3/30/2022 @ 2:01 PM           Objective: Intake/Output Summary (Last 24 hours) at 3/30/2022 1401  Last data filed at 3/30/2022 0630  Gross per 24 hour   Intake --   Output 250 ml   Net -250 ml        Vitals:   Vitals:    03/30/22 1330   BP: (!) 153/69   Pulse: 79   Resp: 13   Temp:    SpO2: 100%       Physical Exam:        Constitutional: Well developed, Well nourished, NAD  Neurologic: Alert & oriented x 3, No focal deficits noted. CNs II-XII grossly intact and symmetrical  Psychiatric: Affect normal, Mood normal.  HENT: Normocephalic, Atraumatic,  Eyes: PERRLA, EOMI, No pallor/scleral icterus. Neck: Normal range of motion, No tenderness, Supple, no bruit  Lymphatic: No cervical lymphadenopathy noted. Cardiovascular: Regular rate and rhythm, normal S1-S2, No murmurs, gallops or rubs. Thorax & Lungs: CTA bilaterally, No respiratory distress, No wheezing   Abdomen: Soft, BS +ve, no tenderness, no rebound or guarding  Skin: Warm, Dry, No erythema, No rash. Back: No tenderness, No CVA tenderness. Extremities: No edema, No tenderness  Musculoskeletal:. No major deformities noted.                Labs:   Reviewed, as follows:  Recent Results (from the past 24 hour(s))   Triglyceride    Collection Time: 03/30/22  8:30 AM   Result Value Ref Range    Triglycerides 142 <150 MG/DL          Body mass index is 32.5 kg/m².  Patient will follow up with PCP for further management as indicated unless otherwise specifically noted above        Yamile Nguyen MD  3/30/2022 @ 2:01 PM

## 2022-03-31 PROCEDURE — 2580000003 HC RX 258: Performed by: INTERNAL MEDICINE

## 2022-03-31 PROCEDURE — 99222 1ST HOSP IP/OBS MODERATE 55: CPT | Performed by: SPECIALIST

## 2022-03-31 PROCEDURE — 6370000000 HC RX 637 (ALT 250 FOR IP): Performed by: INTERNAL MEDICINE

## 2022-03-31 PROCEDURE — 6360000002 HC RX W HCPCS: Performed by: INTERNAL MEDICINE

## 2022-03-31 PROCEDURE — G0378 HOSPITAL OBSERVATION PER HR: HCPCS

## 2022-03-31 PROCEDURE — 6370000000 HC RX 637 (ALT 250 FOR IP): Performed by: NURSE PRACTITIONER

## 2022-03-31 PROCEDURE — 96372 THER/PROPH/DIAG INJ SC/IM: CPT

## 2022-03-31 PROCEDURE — 94640 AIRWAY INHALATION TREATMENT: CPT

## 2022-03-31 PROCEDURE — 6370000000 HC RX 637 (ALT 250 FOR IP): Performed by: SPECIALIST

## 2022-03-31 PROCEDURE — 96376 TX/PRO/DX INJ SAME DRUG ADON: CPT

## 2022-03-31 PROCEDURE — 94761 N-INVAS EAR/PLS OXIMETRY MLT: CPT

## 2022-03-31 PROCEDURE — 2140000000 HC CCU INTERMEDIATE R&B

## 2022-03-31 RX ORDER — AMITRIPTYLINE HYDROCHLORIDE 10 MG/1
10 TABLET, FILM COATED ORAL NIGHTLY
Status: DISCONTINUED | OUTPATIENT
Start: 2022-03-31 | End: 2022-04-02 | Stop reason: HOSPADM

## 2022-03-31 RX ORDER — LACTULOSE 10 G/15ML
20 SOLUTION ORAL 2 TIMES DAILY
Status: DISCONTINUED | OUTPATIENT
Start: 2022-03-31 | End: 2022-04-02 | Stop reason: HOSPADM

## 2022-03-31 RX ORDER — POLYETHYLENE GLYCOL 3350 17 G/17G
17 POWDER, FOR SOLUTION ORAL DAILY
Status: DISCONTINUED | OUTPATIENT
Start: 2022-03-31 | End: 2022-04-02 | Stop reason: HOSPADM

## 2022-03-31 RX ORDER — LACTULOSE 10 G/15ML
20 SOLUTION ORAL 2 TIMES DAILY
Status: DISCONTINUED | OUTPATIENT
Start: 2022-03-31 | End: 2022-03-31

## 2022-03-31 RX ADMIN — HYDROCODONE BITARTRATE AND ACETAMINOPHEN 2 TABLET: 5; 325 TABLET ORAL at 14:02

## 2022-03-31 RX ADMIN — GABAPENTIN 100 MG: 100 CAPSULE ORAL at 21:05

## 2022-03-31 RX ADMIN — DIPHENHYDRAMINE HYDROCHLORIDE 25 MG: 50 INJECTION, SOLUTION INTRAMUSCULAR; INTRAVENOUS at 09:31

## 2022-03-31 RX ADMIN — HYDROCODONE BITARTRATE AND ACETAMINOPHEN 2 TABLET: 5; 325 TABLET ORAL at 09:39

## 2022-03-31 RX ADMIN — LUBIPROSTONE 24 MCG: 24 CAPSULE, GELATIN COATED ORAL at 09:35

## 2022-03-31 RX ADMIN — METHYLPREDNISOLONE SODIUM SUCCINATE 60 MG: 125 INJECTION, POWDER, LYOPHILIZED, FOR SOLUTION INTRAMUSCULAR; INTRAVENOUS at 00:18

## 2022-03-31 RX ADMIN — AMITRIPTYLINE HYDROCHLORIDE 10 MG: 10 TABLET, FILM COATED ORAL at 21:05

## 2022-03-31 RX ADMIN — SODIUM CHLORIDE, PRESERVATIVE FREE 10 ML: 5 INJECTION INTRAVENOUS at 21:05

## 2022-03-31 RX ADMIN — SODIUM CHLORIDE, PRESERVATIVE FREE 10 ML: 5 INJECTION INTRAVENOUS at 09:31

## 2022-03-31 RX ADMIN — DIPHENHYDRAMINE HYDROCHLORIDE 25 MG: 50 INJECTION, SOLUTION INTRAMUSCULAR; INTRAVENOUS at 19:34

## 2022-03-31 RX ADMIN — LACTULOSE 20 G: 10 SOLUTION ORAL at 17:36

## 2022-03-31 RX ADMIN — ENOXAPARIN SODIUM 40 MG: 40 INJECTION SUBCUTANEOUS at 09:30

## 2022-03-31 RX ADMIN — CETIRIZINE HYDROCHLORIDE 10 MG: 5 TABLET ORAL at 09:29

## 2022-03-31 RX ADMIN — METHYLPREDNISOLONE SODIUM SUCCINATE 60 MG: 125 INJECTION, POWDER, LYOPHILIZED, FOR SOLUTION INTRAMUSCULAR; INTRAVENOUS at 06:00

## 2022-03-31 RX ADMIN — HYDROCODONE BITARTRATE AND ACETAMINOPHEN 2 TABLET: 5; 325 TABLET ORAL at 22:22

## 2022-03-31 RX ADMIN — GABAPENTIN 100 MG: 100 CAPSULE ORAL at 09:29

## 2022-03-31 RX ADMIN — BUDESONIDE AND FORMOTEROL FUMARATE DIHYDRATE 1 PUFF: 160; 4.5 AEROSOL RESPIRATORY (INHALATION) at 19:57

## 2022-03-31 RX ADMIN — METHYLPREDNISOLONE SODIUM SUCCINATE 60 MG: 125 INJECTION, POWDER, LYOPHILIZED, FOR SOLUTION INTRAMUSCULAR; INTRAVENOUS at 17:36

## 2022-03-31 RX ADMIN — SODIUM CHLORIDE: 9 INJECTION, SOLUTION INTRAVENOUS at 06:41

## 2022-03-31 RX ADMIN — FLUTICASONE PROPIONATE 1 SPRAY: 50 SPRAY, METERED NASAL at 09:45

## 2022-03-31 RX ADMIN — ONDANSETRON 4 MG: 2 INJECTION INTRAMUSCULAR; INTRAVENOUS at 10:09

## 2022-03-31 RX ADMIN — METHYLPREDNISOLONE SODIUM SUCCINATE 60 MG: 125 INJECTION, POWDER, LYOPHILIZED, FOR SOLUTION INTRAMUSCULAR; INTRAVENOUS at 11:22

## 2022-03-31 RX ADMIN — HYDROCODONE BITARTRATE AND ACETAMINOPHEN 2 TABLET: 5; 325 TABLET ORAL at 18:01

## 2022-03-31 RX ADMIN — DICYCLOMINE HYDROCHLORIDE 10 MG: 10 CAPSULE ORAL at 09:44

## 2022-03-31 RX ADMIN — PANTOPRAZOLE SODIUM 40 MG: 40 TABLET, DELAYED RELEASE ORAL at 06:00

## 2022-03-31 RX ADMIN — LUBIPROSTONE 24 MCG: 24 CAPSULE, GELATIN COATED ORAL at 17:37

## 2022-03-31 RX ADMIN — BUDESONIDE AND FORMOTEROL FUMARATE DIHYDRATE 1 PUFF: 160; 4.5 AEROSOL RESPIRATORY (INHALATION) at 07:43

## 2022-03-31 RX ADMIN — HYDROCODONE BITARTRATE AND ACETAMINOPHEN 2 TABLET: 5; 325 TABLET ORAL at 05:16

## 2022-03-31 ASSESSMENT — PAIN SCALES - GENERAL
PAINLEVEL_OUTOF10: 4
PAINLEVEL_OUTOF10: 4
PAINLEVEL_OUTOF10: 9
PAINLEVEL_OUTOF10: 9
PAINLEVEL_OUTOF10: 8
PAINLEVEL_OUTOF10: 7
PAINLEVEL_OUTOF10: 4
PAINLEVEL_OUTOF10: 0
PAINLEVEL_OUTOF10: 9
PAINLEVEL_OUTOF10: 7

## 2022-03-31 ASSESSMENT — PAIN DESCRIPTION - DESCRIPTORS: DESCRIPTORS: STABBING

## 2022-03-31 ASSESSMENT — PAIN SCALES - WONG BAKER: WONGBAKER_NUMERICALRESPONSE: 0

## 2022-03-31 ASSESSMENT — PAIN DESCRIPTION - PROGRESSION: CLINICAL_PROGRESSION: NOT CHANGED

## 2022-03-31 ASSESSMENT — PAIN DESCRIPTION - LOCATION: LOCATION: ABDOMEN;BACK

## 2022-03-31 ASSESSMENT — PAIN DESCRIPTION - PAIN TYPE: TYPE: ACUTE PAIN

## 2022-03-31 NOTE — PLAN OF CARE
Nutrition Problem #1: Inadequate oral intake (in acute on chronic setting)  Intervention: Food and/or Nutrient Delivery: Continue Current Diet (Advance to low fat diet with no concentrated sweets once medically able)  Nutritional Goals: Pt will tolerate greater than half of meals without N/V

## 2022-03-31 NOTE — CONSULTS
Department of Internal Medicine  Gastroenterology Consult Note  Elham Wilson. Ella ZAFAR      Reason for Consult:  abd pain and constipation    Primary Care Physician:  SHUKRI Minaya CNP    History Obtained From:  patient    CC: abd pain    HISTORY OF PRESENT ILLNESS:              The patient is a 50 y.o.  female known to me from a colonoscopy 4 years ago that was normal. My impression at that time was IBS-C and she has not been seen by me since. She was admitted now with a hostory of no BM for at least a week, along with upper abd pain, nausea and non-bloody vomiting. On admission her lipase was 105 (N=13-60) and CT showed only an increased fecal burden. US abd was normal as were CBC and LFT's. She was given a lactulose enema and started on Amitiza yesterday with good results. She still has some left-sided abd pain but it is better. This pain and the constipation are chronic and intermittent for years.  She has had no further vomiting  Past Medical History:        Diagnosis Date    Anxiety     Bladder tumor     Dr. Sudheer Wang Depression     Diverticulitis     Last episode: 7/1/2018    Emphysema of lung ([address]     Follows with Dorenda Galla    GERD (gastroesophageal reflux disease)     History of blood transfusion 2018    After surgery for dog attack    Hypertension     Dr. Phill Reid IBS (irritable bowel syndrome)     Kidney stone     Last stone: 2013    PTSD (post-traumatic stress disorder) 02/03/2019    from dog attack    Sleep apnea     Uses BiPap       Past Surgical History:        Procedure Laterality Date   [address] BLADDER SURGERY  2012    CHOLECYSTECTOMY  2001    COLONOSCOPY  03/26/2018    normal colonoscopy - Dr. De Garcia  1980's    Nose     HYSTERECTOMY  2011    HYSTERECTOMY, TOTAL ABDOMINAL  2012    INCISION AND DRAINAGE Left 2/4/2019    ANKLE INCISION AND DRAINAGE performed by Gena Joseph DO at [address] Right 2/4/2019    ARM INCISION AND DRAINAGE performed by Jammie Ritchie DO at [address] [facility]  2012    tumor removal    OVARIAN CYST REMOVAL  2012   [facility]    TUBAL LIGATION  07/2011    UPPER GASTROINTESTINAL ENDOSCOPY N/A 1/4/2021    EGD POLYP ABLATION/OTHER OF ANTRAL POLYP AND BX performed by Dola Galeazzi, MD at [facility] ENDOSCOPY       Medications Prior to Admission:    Prior to Admission medications    Medication Sig Start Date End Date Taking? Authorizing Provider   VENTOLIN  (90 Base) MCG/ACT inhaler inhale 2 puffs by mouth INTO THE LUNGS every 4 hours if needed for shortness of breath or wheezing 3/30/22   Venecia Rubalcava, APRN - CNP   dicyclomine (BENTYL) 10 MG capsule take 1 capsule by mouth three times a day BEFORE A MEAL 3/22/22   Venecia Rubalcava, APRN - CNP   gabapentin (NEURONTIN) 100 MG capsule Take 1 capsule by mouth 2 times daily for 30 days.  2/23/22 3/25/22  Venecia Rubalcava APRN - CNP   fluticasone Evelyn Living) 50 MCG/ACT nasal spray instill 2 sprays into each nostril once daily 2/10/22   Venecia Rubalcava APRN - CNP   SYMBICORT 160-4.5 MCG/ACT AERO inhale 2 puffs by mouth twice a day 1/12/22   Venecia Rubalcava APRN - CNP   potassium chloride (KLOR-CON M) 10 MEQ extended release tablet take 1 tablet by mouth once daily 1/12/22   Venecia Rubalcava APRN - CNP   furosemide (LASIX) 20 MG tablet take 1 tablet by mouth once daily 1/10/22   Venecia Rubalcava APRN - CNP   clotrimazole (LOTRIMIN) 1 % cream apply to affected area twice a day 1/10/22   Venecia Rubalcava APRN - CNP   omeprazole (PRILOSEC) 20 MG delayed release capsule Take 1 capsule by mouth Daily 10/20/21   Venecia Rubalcava APRN - CNP   dextran 70-hypromellose (ARTIFICIAL TEARS) 0.1-0.3 % SOLN opthalmic solution Place 1 drop into both eyes every 4 hours as needed (Eye irritation) 8/2/21   ROSEANNA Alas   loratadine-pseudoephedrine (CLARITIN-D 24HR)  MG per extended release tablet Take 1 tablet by mouth daily 6/8/21   Venecia Rubalcava APRN - CNP       Allergies: Allergies   Allergen Reactions    Dye [Iodides] Hives    Morphine Hives    Dilaudid [Hydromorphone Hcl]      Hives    Fentanyl    . Social History:    TOBACCO:  Yes  ETOH:  No    Family History: reviewed and positives included in HPI- all other pertinent GI family history negative      REVIEW OF SYSTEMS: see HPI for positives and pertinent negatives. All other systems reviewed and are negative    PHYSICAL EXAM:    Vitals:  /60   Pulse 85   Temp 98.1 °F (36.7 °C) (Oral)   Resp 26   Ht 5' 1\" (1.549 m)   Wt 172 lb (78 kg)   LMP 06/05/2012   SpO2 95%   BMI 32.50 kg/m²   CONSTITUTIONAL: alert, cooperative, no apparent distress,   EYES:  pupils equal, round and reactive to light and sclera clear  ENT:  normocepalic, without obvious abnormality  NECK:  supple, symmetrical, trachea midline  HEMATOLOGIC/LYMPHATICS:  no cervical lymphadenopathy and no supraclavicular lymphadenopathy  LUNGS:  clear to auscultation  CARDIOVASCULAR:  regular rate and rhythm and no murmur noted  ABDOMEN:  Soft, non-tender with normal bowel sounds. No organomegaly or masses  NEUROLOGIC: no focal deficit detected  SKIN:  no lesions  EXTREMITIES: no clubbing, cyanosis, or edema    IMPRESSION:  1) doubt pancreatitis- as yet does not meet the criteria for diagnosis (repeat lipase pending)  2) IBS-C      RECOMMENDATIONS:  1) continue Amitiza 24 mcg BID and adjust dose as needed  2) start Elavil 10 mg qhs  3) add Miralax and Bentyl as needed  4) repeat colonoscopy not planned at this point        [facility].  Pepe Hamilton M.D

## 2022-03-31 NOTE — PLAN OF CARE
Problem: Infection:  Goal: Will remain free from infection  Description: Will remain free from infection  3/31/2022 1831 by Asha Quintero RN  Outcome: Met This Shift  3/31/2022 1830 by Asha Quintero RN  Outcome: Met This Shift  Pt remains free of s/s of infection     Problem: Safety:  Goal: Free from accidental physical injury  Description: Free from accidental physical injury  3/31/2022 1831 by Asha Quintero RN  Outcome: Met This Shift  3/31/2022 1830 by Asha Quintero RN  Outcome: Met This Shift  Pt has had bed alarm on and uses call light for assistance     Problem: Safety:  Goal: Free from intentional harm  Description: Free from intentional harm  3/31/2022 1831 by Asha Quintero RN  Outcome: Met This Shift  3/31/2022 1830 by Asha Quintero RN  Outcome: Met This Shift     Problem: Daily Care:  Goal: Daily care needs are met  Description: Daily care needs are met  3/31/2022 1831 by Asha Quintero RN  Outcome: Met This Shift  3/31/2022 1830 by Asha Quintero RN  Outcome: Met This Shift     Problem: Skin Integrity:  Goal: Skin integrity will stabilize  Description: Skin integrity will stabilize  Outcome: Met This Shift     Problem: Discharge Planning:  Goal: Patients continuum of care needs are met  Description: Patients continuum of care needs are met  3/31/2022 1831 by Asha Quintero RN  Outcome: Met This Shift  3/31/2022 1830 by Asha Quintero RN  Outcome: Ongoing     Problem: Nutrition  Goal: Optimal nutrition therapy  3/31/2022 1831 by Asha Quintero RN  Outcome: Met This Shift  3/31/2022 1830 by Asha Quintero RN  Outcome: Met This Shift  Pt has had diet increased from clear liq to full liq without C/O nausea or vomiting      Problem: Bowel/Gastric:  Goal: Control of bowel function will improve  Description: Control of bowel function will improve  Outcome: Met This Shift  Pt had large amount of BM yesterday, given lactulose PO today for preventative care     Problem:  Bowel/Gastric:  Goal: Ability to achieve a regular elimination pattern will improve  Description: Ability to achieve a regular elimination pattern will improve  Outcome: Met This Shift

## 2022-04-01 LAB
ERYTHROCYTE SEDIMENTATION RATE: 4 MM/HR (ref 0–20)
HIGH SENSITIVE C-REACTIVE PROTEIN: 2.7 MG/L
LIPASE: 210 IU/L (ref 13–60)

## 2022-04-01 PROCEDURE — 6370000000 HC RX 637 (ALT 250 FOR IP): Performed by: INTERNAL MEDICINE

## 2022-04-01 PROCEDURE — 6360000002 HC RX W HCPCS: Performed by: INTERNAL MEDICINE

## 2022-04-01 PROCEDURE — 6370000000 HC RX 637 (ALT 250 FOR IP): Performed by: SPECIALIST

## 2022-04-01 PROCEDURE — G0378 HOSPITAL OBSERVATION PER HR: HCPCS

## 2022-04-01 PROCEDURE — 83690 ASSAY OF LIPASE: CPT

## 2022-04-01 PROCEDURE — 6370000000 HC RX 637 (ALT 250 FOR IP): Performed by: NURSE PRACTITIONER

## 2022-04-01 PROCEDURE — 85652 RBC SED RATE AUTOMATED: CPT

## 2022-04-01 PROCEDURE — 86038 ANTINUCLEAR ANTIBODIES: CPT

## 2022-04-01 PROCEDURE — 2580000003 HC RX 258: Performed by: INTERNAL MEDICINE

## 2022-04-01 PROCEDURE — 99226 PR SBSQ OBSERVATION CARE/DAY 35 MINUTES: CPT | Performed by: NURSE PRACTITIONER

## 2022-04-01 PROCEDURE — 2140000000 HC CCU INTERMEDIATE R&B

## 2022-04-01 PROCEDURE — 82787 IGG 1 2 3 OR 4 EACH: CPT

## 2022-04-01 PROCEDURE — 94640 AIRWAY INHALATION TREATMENT: CPT

## 2022-04-01 PROCEDURE — 96376 TX/PRO/DX INJ SAME DRUG ADON: CPT

## 2022-04-01 PROCEDURE — 86141 C-REACTIVE PROTEIN HS: CPT

## 2022-04-01 PROCEDURE — 94761 N-INVAS EAR/PLS OXIMETRY MLT: CPT

## 2022-04-01 PROCEDURE — 96372 THER/PROPH/DIAG INJ SC/IM: CPT

## 2022-04-01 PROCEDURE — 36415 COLL VENOUS BLD VENIPUNCTURE: CPT

## 2022-04-01 PROCEDURE — 86255 FLUORESCENT ANTIBODY SCREEN: CPT

## 2022-04-01 RX ORDER — LANOLIN ALCOHOL/MO/W.PET/CERES
6 CREAM (GRAM) TOPICAL NIGHTLY PRN
Status: DISCONTINUED | OUTPATIENT
Start: 2022-04-01 | End: 2022-04-02 | Stop reason: HOSPADM

## 2022-04-01 RX ADMIN — HYDROCODONE BITARTRATE AND ACETAMINOPHEN 2 TABLET: 5; 325 TABLET ORAL at 18:13

## 2022-04-01 RX ADMIN — HYDROCODONE BITARTRATE AND ACETAMINOPHEN 2 TABLET: 5; 325 TABLET ORAL at 12:31

## 2022-04-01 RX ADMIN — BUDESONIDE AND FORMOTEROL FUMARATE DIHYDRATE 1 PUFF: 160; 4.5 AEROSOL RESPIRATORY (INHALATION) at 22:07

## 2022-04-01 RX ADMIN — METHYLPREDNISOLONE SODIUM SUCCINATE 60 MG: 125 INJECTION, POWDER, LYOPHILIZED, FOR SOLUTION INTRAMUSCULAR; INTRAVENOUS at 12:23

## 2022-04-01 RX ADMIN — GABAPENTIN 100 MG: 100 CAPSULE ORAL at 08:44

## 2022-04-01 RX ADMIN — POLYETHYLENE GLYCOL (3350) 17 G: 17 POWDER, FOR SOLUTION ORAL at 08:42

## 2022-04-01 RX ADMIN — FLUTICASONE PROPIONATE 1 SPRAY: 50 SPRAY, METERED NASAL at 08:54

## 2022-04-01 RX ADMIN — POLYETHYLENE GLYCOL (3350) 17 G: 17 POWDER, FOR SOLUTION ORAL at 00:18

## 2022-04-01 RX ADMIN — CETIRIZINE HYDROCHLORIDE 10 MG: 5 TABLET ORAL at 08:43

## 2022-04-01 RX ADMIN — LUBIPROSTONE 24 MCG: 24 CAPSULE, GELATIN COATED ORAL at 08:44

## 2022-04-01 RX ADMIN — AMITRIPTYLINE HYDROCHLORIDE 10 MG: 10 TABLET, FILM COATED ORAL at 20:29

## 2022-04-01 RX ADMIN — SODIUM CHLORIDE, PRESERVATIVE FREE 10 ML: 5 INJECTION INTRAVENOUS at 20:30

## 2022-04-01 RX ADMIN — HYDROCODONE BITARTRATE AND ACETAMINOPHEN 2 TABLET: 5; 325 TABLET ORAL at 03:05

## 2022-04-01 RX ADMIN — DIPHENHYDRAMINE HYDROCHLORIDE 25 MG: 50 INJECTION, SOLUTION INTRAMUSCULAR; INTRAVENOUS at 22:13

## 2022-04-01 RX ADMIN — HYDROCODONE BITARTRATE AND ACETAMINOPHEN 2 TABLET: 5; 325 TABLET ORAL at 08:43

## 2022-04-01 RX ADMIN — METHYLPREDNISOLONE SODIUM SUCCINATE 60 MG: 125 INJECTION, POWDER, LYOPHILIZED, FOR SOLUTION INTRAMUSCULAR; INTRAVENOUS at 00:12

## 2022-04-01 RX ADMIN — ENOXAPARIN SODIUM 40 MG: 40 INJECTION SUBCUTANEOUS at 08:42

## 2022-04-01 RX ADMIN — METHYLPREDNISOLONE SODIUM SUCCINATE 60 MG: 125 INJECTION, POWDER, LYOPHILIZED, FOR SOLUTION INTRAMUSCULAR; INTRAVENOUS at 20:29

## 2022-04-01 RX ADMIN — DIPHENHYDRAMINE HYDROCHLORIDE 25 MG: 50 INJECTION, SOLUTION INTRAMUSCULAR; INTRAVENOUS at 03:06

## 2022-04-01 RX ADMIN — LUBIPROSTONE 24 MCG: 24 CAPSULE, GELATIN COATED ORAL at 18:12

## 2022-04-01 RX ADMIN — LACTULOSE 20 G: 10 SOLUTION ORAL at 20:32

## 2022-04-01 RX ADMIN — LACTULOSE 20 G: 10 SOLUTION ORAL at 08:42

## 2022-04-01 RX ADMIN — DICYCLOMINE HYDROCHLORIDE 10 MG: 10 CAPSULE ORAL at 18:12

## 2022-04-01 RX ADMIN — PANTOPRAZOLE SODIUM 40 MG: 40 TABLET, DELAYED RELEASE ORAL at 05:56

## 2022-04-01 RX ADMIN — DIPHENHYDRAMINE HYDROCHLORIDE 25 MG: 50 INJECTION, SOLUTION INTRAMUSCULAR; INTRAVENOUS at 08:43

## 2022-04-01 RX ADMIN — MELATONIN 6 MG: at 23:24

## 2022-04-01 RX ADMIN — BUDESONIDE AND FORMOTEROL FUMARATE DIHYDRATE 1 PUFF: 160; 4.5 AEROSOL RESPIRATORY (INHALATION) at 07:34

## 2022-04-01 RX ADMIN — DICYCLOMINE HYDROCHLORIDE 10 MG: 10 CAPSULE ORAL at 08:44

## 2022-04-01 RX ADMIN — HYDROCODONE BITARTRATE AND ACETAMINOPHEN 2 TABLET: 5; 325 TABLET ORAL at 22:14

## 2022-04-01 RX ADMIN — SODIUM CHLORIDE, PRESERVATIVE FREE 10 ML: 5 INJECTION INTRAVENOUS at 12:26

## 2022-04-01 RX ADMIN — METHYLPREDNISOLONE SODIUM SUCCINATE 60 MG: 125 INJECTION, POWDER, LYOPHILIZED, FOR SOLUTION INTRAMUSCULAR; INTRAVENOUS at 05:56

## 2022-04-01 RX ADMIN — GABAPENTIN 100 MG: 100 CAPSULE ORAL at 20:29

## 2022-04-01 ASSESSMENT — PAIN SCALES - GENERAL
PAINLEVEL_OUTOF10: 7
PAINLEVEL_OUTOF10: 10
PAINLEVEL_OUTOF10: 9
PAINLEVEL_OUTOF10: 9
PAINLEVEL_OUTOF10: 10
PAINLEVEL_OUTOF10: 9
PAINLEVEL_OUTOF10: 6

## 2022-04-01 ASSESSMENT — PAIN DESCRIPTION - PAIN TYPE: TYPE: ACUTE PAIN;CHRONIC PAIN

## 2022-04-01 ASSESSMENT — PAIN SCALES - WONG BAKER
WONGBAKER_NUMERICALRESPONSE: 0

## 2022-04-01 ASSESSMENT — PAIN DESCRIPTION - LOCATION
LOCATION: ABDOMEN;BACK
LOCATION: ABDOMEN;BACK

## 2022-04-01 ASSESSMENT — PAIN DESCRIPTION - ORIENTATION: ORIENTATION: LEFT

## 2022-04-01 ASSESSMENT — PAIN DESCRIPTION - PROGRESSION
CLINICAL_PROGRESSION: NOT CHANGED

## 2022-04-01 ASSESSMENT — PAIN - FUNCTIONAL ASSESSMENT: PAIN_FUNCTIONAL_ASSESSMENT: ACTIVITIES ARE NOT PREVENTED

## 2022-04-01 ASSESSMENT — PAIN DESCRIPTION - DESCRIPTORS: DESCRIPTORS: TIGHTNESS;PRESSURE

## 2022-04-01 ASSESSMENT — PAIN DESCRIPTION - FREQUENCY: FREQUENCY: CONTINUOUS

## 2022-04-01 ASSESSMENT — PAIN DESCRIPTION - ONSET: ONSET: ON-GOING

## 2022-04-01 NOTE — PROGRESS NOTES
tenderness, No CVA tenderness. Extremities: No edema, No tenderness  Musculoskeletal:. No major deformities noted. Labs:   Reviewed, as follows:  Recent Results (from the past 24 hour(s))   Lipase    Collection Time: 04/01/22  8:28 AM   Result Value Ref Range    Lipase 210 (H) 13 - 60 IU/L   Sedimentation Rate    Collection Time: 04/01/22  1:13 PM   Result Value Ref Range    Sed Rate 4 0 - 20 MM/HR   C-Reactive Protein    Collection Time: 04/01/22  1:13 PM   Result Value Ref Range    CRP, High Sensitivity 2.7 mg/L          Body mass index is 32.5 kg/m².  Patient will follow up with PCP for further management as indicated unless otherwise specifically noted above        Luciano Corona MD  4/1/2022 @ 3:07 PM

## 2022-04-01 NOTE — PROGRESS NOTES
[facility]  Gastroenterology Progress Note    Violetta Humphrey is a 50 y.o. female patient. Principal Problem:    Pancreatitis, unspecified pancreatitis type  Active Problems:    Irritable bowel syndrome with constipation  Resolved Problems:    * No resolved hospital problems. *      SUBJECTIVE:  The patient was seen and examined this morning. She reports feeling better. Her last colonoscopy was done on 3- showing normal exam.  She denies NSAID use and rare alcohol use only on special occasions. Her Triglycerides 142. Her Lipase was elevated on admission at 105. Her CT of the abdomen/pelvis done on 3- showed hepatomegaly, diminutive liver cysts, no acute intra-abdominal or pelvic abnormalities and moderate to large colonic stool. Her appetite is good and she is tolerating soft diet. No current nausea or vomiting. She has mild upper abdominal discomfort; more of fullness sensation then pain. She does endorse left sided back discomfort that is non-worsening. No heartburn or acid reflux. No excess belching or flatulence. Her last bowel movement was yesterday morning with small amount of brown stool. She had good results from Lactulose enema and is taking Amitiza with improvement in her constipation. She has history of irritable bowel syndrome with constipation. Prior to admission she had not had a bowel movement in over one week. Miralax was not helping. She did get some relief from taking Milk of Magnesia. No blood in her stools or melena.         Physical    VITALS:  /80   Pulse 72   Temp 97.1 °F (36.2 °C) (Oral)   Resp 14   Ht 5' 1\" (1.549 m)   Wt 172 lb (78 kg)   LMP 06/05/2012   SpO2 97%   BMI 32.50 kg/m²   CONSTITUTIONAL: alert, oriented x 3, cooperative in no acute distress   EYES:  CONRADO  NECK:  Supple, non-tender  LUNGS:  Clear to auscultation, even and unlabored  CARDIOVASCULAR:  Regular rate and rhythm  ABDOMEN:  Soft, mild upper abdomen tenderness, bowel sounds active  NEUROLOGIC: no focal deficit detected  SKIN:  Warm, dry and intact  EXTREMITIES: no clubbing, cyanosis, or edema      Data    Data Review:    Recent Labs     03/29/22  1300   WBC 6.4   HGB 14.6   HCT 43.6   MCV 84.8        Recent Labs     03/29/22  1300      K 4.2      CO2 27   BUN 19   CREATININE 0.8     Recent Labs     03/29/22  1300   AST 14*   ALT 13   BILIDIR 0.2   BILITOT 0.2   ALKPHOS 95     Recent Labs     03/29/22  1300   LIPASE 105*     No results for input(s): PROTIME, INR in the last 72 hours. No results for input(s): PTT in the last 72 hours. IMPRESSION:  1. Abdominal pain- that is improving- doubt pancreatitis-CT showed normal pancreas-pending repeat Lipase this was not completed yesterday  2. IBS-C- CT showed moderate to large colonic stool    PLAN:    1. Continue Amitiza 24 mcg bid and adjust dose as needed  2. Continue with Elavil 10 mg qhs  3. Add Miralax and Bentyl as needed  4.   Follow-up in GI clinic and repeat colonoscopy- outpatient    Electronically signed by SHUKRI Shankar CNP on 4/1/2022 at 7:22 AM

## 2022-04-01 NOTE — CARE COORDINATION
.CM has reviewed pt's chart again today for needs. CM screening shows that pt has PCP, insurance and is independent PTA. If needs arise please contact LORE.   TE

## 2022-04-01 NOTE — PROGRESS NOTES
INTERNAL MED PROGRESS NOTE           Subjective: Report some abdominal pain, but not nearly as when she came in      Assessment/Plan:       --Abdominal pain: Likely due to IBS with constipation. Responded some to lactulose enema yesterday, appreciate GI's assistance. Continue Amitiza    --? Acute pancreatitis: Doubt acute pancreatitis, lipase only minimally elevated, no radiographic evidence of pancreatitis. CT without concerning gallbladder/biliary findings. LFTs within normal limits. --Nausea: No vomiting since arrival, currently on clears, plan to advance as tolerated. --Rhinitis: Continue Zyrtec, continue outpatient Flonase    --COPD: stable w/o any acute s/s of exacerbation. Placed on prn duonebs      DVT prophylaxis: Heparin/Lovenox      Disposition: Likely discharge tomorrow if patient is able to tolerate solid food. Jemima Barr MD  4/1/2022 @ 3:03 PM           Objective: Intake/Output Summary (Last 24 hours) at 4/1/2022 1503  Last data filed at 4/1/2022 0840  Gross per 24 hour   Intake 360 ml   Output --   Net 360 ml        Vitals:   Vitals:    04/01/22 1216   BP: (!) 155/83   Pulse: 55   Resp: 12   Temp: 98.5 °F (36.9 °C)   SpO2:        Physical Exam:        Constitutional: Well developed, Well nourished, NAD  Neurologic: Alert & oriented x 3, No focal deficits noted. CNs II-XII grossly intact and symmetrical  Psychiatric: Affect normal, Mood normal.  HENT: Normocephalic, Atraumatic,  Eyes: PERRLA, EOMI, No pallor/scleral icterus. Neck: Normal range of motion, No tenderness, Supple, no bruit  Lymphatic: No cervical lymphadenopathy noted. Cardiovascular: Regular rate and rhythm, normal S1-S2, No murmurs, gallops or rubs. Thorax & Lungs: CTA bilaterally, No respiratory distress, No wheezing   Abdomen: Soft, BS +ve, no tenderness, no rebound or guarding  Skin: Warm, Dry, No erythema, No rash. Back: No tenderness, No CVA tenderness. Extremities: No edema, No tenderness  Musculoskeletal:. No major deformities noted. Labs:   Reviewed, as follows:  Recent Results (from the past 24 hour(s))   Lipase    Collection Time: 04/01/22  8:28 AM   Result Value Ref Range    Lipase 210 (H) 13 - 60 IU/L   Sedimentation Rate    Collection Time: 04/01/22  1:13 PM   Result Value Ref Range    Sed Rate 4 0 - 20 MM/HR   C-Reactive Protein    Collection Time: 04/01/22  1:13 PM   Result Value Ref Range    CRP, High Sensitivity 2.7 mg/L          Body mass index is 32.5 kg/m².  Patient will follow up with PCP for further management as indicated unless otherwise specifically noted above        Eva Saha MD  4/1/2022 @ 3:03 PM

## 2022-04-02 VITALS
RESPIRATION RATE: 16 BRPM | OXYGEN SATURATION: 98 % | BODY MASS INDEX: 32.47 KG/M2 | HEART RATE: 56 BPM | DIASTOLIC BLOOD PRESSURE: 67 MMHG | SYSTOLIC BLOOD PRESSURE: 156 MMHG | TEMPERATURE: 98 F | WEIGHT: 172 LBS | HEIGHT: 61 IN

## 2022-04-02 LAB — LIPASE: 18 IU/L (ref 13–60)

## 2022-04-02 PROCEDURE — 6370000000 HC RX 637 (ALT 250 FOR IP): Performed by: INTERNAL MEDICINE

## 2022-04-02 PROCEDURE — 2580000003 HC RX 258: Performed by: INTERNAL MEDICINE

## 2022-04-02 PROCEDURE — 94640 AIRWAY INHALATION TREATMENT: CPT

## 2022-04-02 PROCEDURE — 99231 SBSQ HOSP IP/OBS SF/LOW 25: CPT | Performed by: SPECIALIST

## 2022-04-02 PROCEDURE — 36415 COLL VENOUS BLD VENIPUNCTURE: CPT

## 2022-04-02 PROCEDURE — G0378 HOSPITAL OBSERVATION PER HR: HCPCS

## 2022-04-02 PROCEDURE — 6370000000 HC RX 637 (ALT 250 FOR IP): Performed by: SPECIALIST

## 2022-04-02 PROCEDURE — 94761 N-INVAS EAR/PLS OXIMETRY MLT: CPT

## 2022-04-02 PROCEDURE — 6360000002 HC RX W HCPCS: Performed by: INTERNAL MEDICINE

## 2022-04-02 PROCEDURE — 96376 TX/PRO/DX INJ SAME DRUG ADON: CPT

## 2022-04-02 PROCEDURE — 96372 THER/PROPH/DIAG INJ SC/IM: CPT

## 2022-04-02 PROCEDURE — 6370000000 HC RX 637 (ALT 250 FOR IP): Performed by: NURSE PRACTITIONER

## 2022-04-02 PROCEDURE — 83690 ASSAY OF LIPASE: CPT

## 2022-04-02 RX ORDER — LACTULOSE 10 G/15ML
20 SOLUTION ORAL 2 TIMES DAILY
Qty: 473 ML | Refills: 0 | Status: SHIPPED | OUTPATIENT
Start: 2022-04-02 | End: 2022-05-10 | Stop reason: SDUPTHER

## 2022-04-02 RX ORDER — AMITRIPTYLINE HYDROCHLORIDE 10 MG/1
10 TABLET, FILM COATED ORAL NIGHTLY
Qty: 30 TABLET | Refills: 3 | Status: SHIPPED | OUTPATIENT
Start: 2022-04-02

## 2022-04-02 RX ORDER — HYDROCHLOROTHIAZIDE 25 MG/1
25 TABLET ORAL DAILY
Status: DISCONTINUED | OUTPATIENT
Start: 2022-04-02 | End: 2022-04-02 | Stop reason: HOSPADM

## 2022-04-02 RX ORDER — PANTOPRAZOLE SODIUM 40 MG/1
40 TABLET, DELAYED RELEASE ORAL
Qty: 30 TABLET | Refills: 3 | Status: SHIPPED | OUTPATIENT
Start: 2022-04-03

## 2022-04-02 RX ORDER — DIPHENHYDRAMINE HYDROCHLORIDE, ZINC ACETATE 2; .1 G/100G; G/100G
CREAM TOPICAL
Qty: 28 G | Refills: 0 | Status: SHIPPED | OUTPATIENT
Start: 2022-04-02 | End: 2022-04-05

## 2022-04-02 RX ORDER — POLYETHYLENE GLYCOL 3350 17 G/17G
17 POWDER, FOR SOLUTION ORAL DAILY
Qty: 527 G | Refills: 1 | Status: SHIPPED | OUTPATIENT
Start: 2022-04-02 | End: 2022-05-02

## 2022-04-02 RX ORDER — LOSARTAN POTASSIUM 50 MG/1
50 TABLET ORAL DAILY
Qty: 30 TABLET | Refills: 3 | Status: SHIPPED | OUTPATIENT
Start: 2022-04-02

## 2022-04-02 RX ORDER — DIPHENHYDRAMINE HCL 25 MG
25 TABLET ORAL EVERY 6 HOURS PRN
Qty: 40 TABLET | Refills: 0 | Status: SHIPPED | OUTPATIENT
Start: 2022-04-02 | End: 2022-04-05

## 2022-04-02 RX ORDER — DICYCLOMINE HYDROCHLORIDE 10 MG/1
10 CAPSULE ORAL
Qty: 120 CAPSULE | Refills: 3 | Status: SHIPPED | OUTPATIENT
Start: 2022-04-02

## 2022-04-02 RX ORDER — HYDROCHLOROTHIAZIDE 25 MG/1
25 TABLET ORAL DAILY
Qty: 30 TABLET | Refills: 3 | Status: SHIPPED | OUTPATIENT
Start: 2022-04-02

## 2022-04-02 RX ORDER — LOSARTAN POTASSIUM 25 MG/1
50 TABLET ORAL DAILY
Status: DISCONTINUED | OUTPATIENT
Start: 2022-04-02 | End: 2022-04-02 | Stop reason: HOSPADM

## 2022-04-02 RX ORDER — ONDANSETRON 4 MG/1
4 TABLET, ORALLY DISINTEGRATING ORAL EVERY 8 HOURS PRN
Qty: 20 TABLET | Refills: 0 | Status: SHIPPED | OUTPATIENT
Start: 2022-04-02 | End: 2022-04-05

## 2022-04-02 RX ORDER — LUBIPROSTONE 24 UG/1
24 CAPSULE, GELATIN COATED ORAL 2 TIMES DAILY WITH MEALS
Qty: 60 CAPSULE | Refills: 3 | Status: SHIPPED | OUTPATIENT
Start: 2022-04-02

## 2022-04-02 RX ORDER — LABETALOL HYDROCHLORIDE 5 MG/ML
10 INJECTION, SOLUTION INTRAVENOUS ONCE
Status: DISCONTINUED | OUTPATIENT
Start: 2022-04-02 | End: 2022-04-02 | Stop reason: ALTCHOICE

## 2022-04-02 RX ADMIN — FLUTICASONE PROPIONATE 1 SPRAY: 50 SPRAY, METERED NASAL at 09:24

## 2022-04-02 RX ADMIN — POLYETHYLENE GLYCOL (3350) 17 G: 17 POWDER, FOR SOLUTION ORAL at 09:14

## 2022-04-02 RX ADMIN — LOSARTAN POTASSIUM 50 MG: 25 TABLET, FILM COATED ORAL at 09:24

## 2022-04-02 RX ADMIN — BUDESONIDE AND FORMOTEROL FUMARATE DIHYDRATE 1 PUFF: 160; 4.5 AEROSOL RESPIRATORY (INHALATION) at 09:30

## 2022-04-02 RX ADMIN — ENOXAPARIN SODIUM 40 MG: 40 INJECTION SUBCUTANEOUS at 09:14

## 2022-04-02 RX ADMIN — HYDROCODONE BITARTRATE AND ACETAMINOPHEN 2 TABLET: 5; 325 TABLET ORAL at 04:42

## 2022-04-02 RX ADMIN — HYDROCHLOROTHIAZIDE 25 MG: 25 TABLET ORAL at 09:24

## 2022-04-02 RX ADMIN — CETIRIZINE HYDROCHLORIDE 10 MG: 5 TABLET ORAL at 09:14

## 2022-04-02 RX ADMIN — METHYLPREDNISOLONE SODIUM SUCCINATE 60 MG: 125 INJECTION, POWDER, LYOPHILIZED, FOR SOLUTION INTRAMUSCULAR; INTRAVENOUS at 06:24

## 2022-04-02 RX ADMIN — SODIUM CHLORIDE, PRESERVATIVE FREE 10 ML: 5 INJECTION INTRAVENOUS at 09:27

## 2022-04-02 RX ADMIN — PANTOPRAZOLE SODIUM 40 MG: 40 TABLET, DELAYED RELEASE ORAL at 04:42

## 2022-04-02 RX ADMIN — LACTULOSE 20 G: 10 SOLUTION ORAL at 09:14

## 2022-04-02 RX ADMIN — LUBIPROSTONE 24 MCG: 24 CAPSULE, GELATIN COATED ORAL at 09:15

## 2022-04-02 RX ADMIN — GABAPENTIN 100 MG: 100 CAPSULE ORAL at 09:15

## 2022-04-02 ASSESSMENT — PAIN SCALES - WONG BAKER
WONGBAKER_NUMERICALRESPONSE: 0

## 2022-04-02 ASSESSMENT — PAIN DESCRIPTION - PROGRESSION
CLINICAL_PROGRESSION: NOT CHANGED

## 2022-04-02 ASSESSMENT — PAIN SCALES - GENERAL
PAINLEVEL_OUTOF10: 9
PAINLEVEL_OUTOF10: 9

## 2022-04-02 NOTE — PLAN OF CARE
Problem: Pain:  Goal: Pain level will decrease  Description: Pain level will decrease  Outcome: Ongoing  Goal: Control of acute pain  Description: Control of acute pain  Outcome: Ongoing  Goal: Control of chronic pain  Description: Control of chronic pain  Outcome: Ongoing  Goal: Patient's pain/discomfort is manageable  Description: Patient's pain/discomfort is manageable  Outcome: Ongoing     Problem: Infection:  Goal: Will remain free from infection  Description: Will remain free from infection  Outcome: Ongoing     Problem: Safety:  Goal: Free from accidental physical injury  Description: Free from accidental physical injury  Outcome: Ongoing  Goal: Free from intentional harm  Description: Free from intentional harm  Outcome: Ongoing     Problem: Safety:  Goal: Free from accidental physical injury  Description: Free from accidental physical injury  Outcome: Ongoing  Goal: Free from intentional harm  Description: Free from intentional harm  Outcome: Ongoing     Problem: Daily Care:  Goal: Daily care needs are met  Description: Daily care needs are met  Outcome: Ongoing     Problem: Skin Integrity:  Goal: Skin integrity will stabilize  Description: Skin integrity will stabilize  Outcome: Ongoing     Problem: Discharge Planning:  Goal: Patients continuum of care needs are met  Description: Patients continuum of care needs are met  Outcome: Ongoing     Problem: Nutrition  Goal: Optimal nutrition therapy  Outcome: Ongoing     Problem:  Bowel/Gastric:  Goal: Control of bowel function will improve  Description: Control of bowel function will improve  Outcome: Ongoing  Goal: Ability to achieve a regular elimination pattern will improve  Description: Ability to achieve a regular elimination pattern will improve  Outcome: Ongoing       Electronically signed by Lawrence Ga RN on 4/1/22 at 11:31 PM EDT

## 2022-04-02 NOTE — DISCHARGE SUMMARY
V2.0  Discharge Summary    Name:  Brittney Banegas /Age/Sex: 1975 (69 y.o. female)   Admit Date: 3/29/2022  Discharge Date: 22    MRN & CSN:  [id number] & [id number] Encounter Date and Time 22 8:32 AM EDT    Attending:  Moriah Naik MD Discharging Provider: Moriah Naik MD       Hospital Course:     Brief HPI: Brittney Banegas is a 50 y.o. female who presented with with c/o of abdominal pain. Pt has hx of IBS with constipation and follows with GI outpt. She was dx with ?pancreatitis in ER on admission for lipase of 105. But CT scan was negative for evidence of pancreatitis. Her clinical s/s did not correlate with acute pancreatitis. Abdominal pain was determined to be caused by her IBS, CT showed severe constipation which was treated here with both po lactulose as with time dose of lactulose enema with improvement         Brief Problem Based Course:   --Abdominal pain: Likely due to IBS with constipation. Responded some to lactulose enema 2 days ago. On lactulose now will go home on oral lactulose. And Amitiza. Oupt f/u with GI    --Constipation: Related to IBS. Continue oral lactulose and Amitiza.    --? Acute pancreatitis: Doubt pancreatitis pt never had correlating clinical symptoms. Lipase was around 100 on admission, it is 18 this am. Clinical symptoms not highly correlated. CT without concerning gallbladder/biliary findings. LFTs within normal limits. --Nausea: resolved, tolerating oral well. No vomiting since arrival.    --Rhinitis: Continue Zyrtec, continue outpatient Flonase    --COPD: stable w/o any acute s/s of exacerbation. Placed on prn duonebs          The patient expressed appropriate understanding of, and agreement with the discharge recommendations, medications, and plan.      Consults this admission:  IP CONSULT TO HOSPITALIST  IP CONSULT TO GI  IP CONSULT TO IV TEAM    Discharge Diagnosis:   Pancreatitis, unspecified pancreatitis type        Discharge Instruction: Follow up appointments: GI  Primary care physician: SHUKRI Aguilar CNP within 2 weeks  Diet: regular diet   Activity: activity as tolerated  Disposition: Discharged to:   [x]Home, []C, []SNF, []Acute Rehab, []Hospice   Condition on discharge: Stable  Labs and Tests to be Followed up as an outpatient by PCP or Specialist:     Discharge Medications:        Medication List      START taking these medications    amitriptyline 10 MG tablet  Commonly known as: ELAVIL  Take 1 tablet by mouth nightly     hydroCHLOROthiazide 25 MG tablet  Commonly known as: HYDRODIURIL  Take 1 tablet by mouth daily     lactulose 10 GM/15ML solution  Commonly known as: CHRONULAC  Take 30 mLs by mouth 2 times daily     losartan 50 MG tablet  Commonly known as: COZAAR  Take 1 tablet by mouth daily     lubiprostone 24 MCG capsule  Commonly known as: AMITIZA  Take 1 capsule by mouth 2 times daily (with meals)     ondansetron 4 MG disintegrating tablet  Commonly known as: ZOFRAN-ODT  Take 1 tablet by mouth every 8 hours as needed for Nausea or Vomiting     pantoprazole 40 MG tablet  Commonly known as: PROTONIX  Take 1 tablet by mouth every morning (before breakfast)  Start taking on: April 3, 2022     polyethylene glycol 17 g packet  Commonly known as: GLYCOLAX  Take 17 g by mouth daily        CHANGE how you take these medications    * dicyclomine 10 MG capsule  Commonly known as: BENTYL  take 1 capsule by mouth three times a day BEFORE A MEAL  What changed: Another medication with the same name was added. Make sure you understand how and when to take each. * dicyclomine 10 MG capsule  Commonly known as: BENTYL  Take 1 capsule by mouth 4 times daily (before meals and nightly)  What changed: You were already taking a medication with the same name, and this prescription was added. Make sure you understand how and when to take each. * This list has 2 medication(s) that are the same as other medications prescribed for you.  Read the directions carefully, and ask your doctor or other care provider to review them with you. CONTINUE taking these medications    artificial tears 0.1-0.3 % Soln opthalmic solution  Generic drug: dextran 70-hypromellose  Place 1 drop into both eyes every 4 hours as needed (Eye irritation)     clotrimazole 1 % cream  Commonly known as: LOTRIMIN  apply to affected area twice a day     fluticasone 50 MCG/ACT nasal spray  Commonly known as: FLONASE  instill 2 sprays into each nostril once daily     furosemide 20 MG tablet  Commonly known as: LASIX  take 1 tablet by mouth once daily     gabapentin 100 MG capsule  Commonly known as: NEURONTIN  Take 1 capsule by mouth 2 times daily for 30 days.      omeprazole 20 MG delayed release capsule  Commonly known as: PRILOSEC  Take 1 capsule by mouth Daily     potassium chloride 10 MEQ extended release tablet  Commonly known as: KLOR-CON M  take 1 tablet by mouth once daily     Symbicort 160-4.5 MCG/ACT Aero  Generic drug: budesonide-formoterol  inhale 2 puffs by mouth twice a day     Ventolin  (90 Base) MCG/ACT inhaler  Generic drug: albuterol sulfate HFA  inhale 2 puffs by mouth INTO THE LUNGS every 4 hours if needed for shortness of breath or wheezing        STOP taking these medications    loratadine-pseudoephedrine  MG per extended release tablet  Commonly known as: CLARITIN-D 24HR           Where to Get Your Medications      These medications were sent to [facility] [place] [facility] [address], [address] [facility] [address]    Phone: [phone]   · amitriptyline 10 MG tablet  · dicyclomine 10 MG capsule  · hydroCHLOROthiazide 25 MG tablet  · lactulose 10 GM/15ML solution  · losartan 50 MG tablet  · lubiprostone 24 MCG capsule  · ondansetron 4 MG disintegrating tablet  · pantoprazole 40 MG tablet  · polyethylene glycol 17 g packet  · Ventolin  (90 Base) MCG/ACT inhaler        Objective Findings at Discharge:   BP (!) 198/91   Pulse 53   Temp 98.2 °F (36.8 °C) (Oral)   Resp 12   Ht 5' 1\" (1.549 m)   Wt 172 lb (78 kg)   LMP 06/05/2012   SpO2 98%   BMI 32.50 kg/m²       Physical Exam:   General: NAD  Eyes: EOMI  ENT: neck supple  Cardiovascular: Regular rate. Respiratory: Clear to auscultation  Gastrointestinal: Soft, non tender  Genitourinary: no suprapubic tenderness  Musculoskeletal: No edema  Skin: warm, dry  Neuro: Alert. Psych: Mood appropriate. Labs and Imaging   CT ABDOMEN PELVIS WO CONTRAST Additional Contrast? None    Result Date: 3/29/2022  EXAMINATION: CT OF THE ABDOMEN AND PELVIS WITHOUT CONTRAST 3/29/2022 1:08 pm TECHNIQUE: CT of the abdomen and pelvis was performed without the administration of intravenous contrast. Multiplanar reformatted images are provided for review. Dose modulation, iterative reconstruction, and/or weight based adjustment of the mA/kV was utilized to reduce the radiation dose to as low as reasonably achievable. COMPARISON: 11/02/2021 HISTORY: ORDERING SYSTEM PROVIDED HISTORY: Abdominal pain, nausea, vomiting, constipation TECHNOLOGIST PROVIDED HISTORY: Reason for exam:->Abdominal pain, nausea, vomiting, constipation Additional Contrast?->None Decision Support Exception - unselect if not a suspected or confirmed emergency medical condition->Emergency Medical Condition (MA) Is the patient pregnant?->No Reason for Exam: Abdominal pain, nausea, vomiting, constipation Additional signs and symptoms: NONE Relevant Medical/Surgical History: HYSTER FINDINGS: Evaluation for neoplasm, infection, focal lesions, and trauma is limited without the use of IV contrast. Lower Chest:  Visualized portion of the lower chest demonstrates no acute abnormality. Organs: Prior cholecystectomy. There are scattered subcentimeter hypodensities throughout the liver which are too small to characterize, however, statistically favor cysts. Similar hepatomegaly. No intrahepatic ductal dilatation. Pancreas, spleen, and adrenal glands are normal.  Similar 5 mm right-sided renal calculus, nonobstructive. Bilateral pelviectasis without obstructing calculus identified. Pelvic phleboliths appear unchanged from prior. GI/Bowel: Small sliding-type hiatal hernia. The stomach is mostly under distended, which limits evaluation. No small bowel obstruction. Air-filled appendix without inflammatory stranding. Moderate-to-large colonic stool. Diverticulosis of the distal large bowel without acute diverticulitis. Pelvis: Prior hysterectomy. Under-distended urinary bladder. No significant free intrapelvic fluid. Peritoneum/Retroperitoneum: Atherosclerosis without aneurysmal dilatation of the aorta. No discrete adenopathy by size criteria. Limited evaluation for lymphadenopathy secondary to lack of IV contrast.  No discrete free intraperitoneal air or large volume ascites. Bones/Soft Tissues:  Age related degenerative changes of the visualized osseous structures without focal destructive lesion. No acute intra-abdominal or intrapelvic abnormality identified. Moderate-to-large colonic stool. XR ABDOMEN (KUB) (SINGLE AP VIEW)    Result Date: 3/29/2022  EXAMINATION: ONE SUPINE XRAY VIEW(S) OF THE ABDOMEN 3/29/2022 12:51 pm COMPARISON: None. HISTORY: ORDERING SYSTEM PROVIDED HISTORY: Abdominal pain, nausea, vomiting, constipation TECHNOLOGIST PROVIDED HISTORY: Reason for exam:->Abdominal pain, nausea, vomiting, constipation Reason for Exam: Abdominal pain, nausea, vomiting, constipation Additional signs and symptoms: Abdominal pain, nausea, vomiting, constipation Relevant Medical/Surgical History: Abdominal pain, nausea, vomiting, constipation FINDINGS: The bowel gas pattern is nonspecific and nonobstructive. No abnormally dilated loops of bowel are seen. No abnormal densities are seen overlying the renal shadows or within the pelvis.   There are clips from a cholecystectomy. 1. Nonspecific and nonobstructive bowel gas pattern. 2. No radiopaque urinary tract calcifications seen. US ABDOMEN LIMITED    Result Date: 3/30/2022  EXAMINATION: RIGHT UPPER QUADRANT ULTRASOUND 3/30/2022 9:53 am COMPARISON: 03/29/2022 HISTORY: ORDERING SYSTEM PROVIDED HISTORY: abd pain, mimimally elevated lipase TECHNOLOGIST PROVIDED HISTORY: Reason for exam:->abd pain, mimimally elevated lipase Reason for Exam: Epigastric pain, N&V, constipation, abnormal labs FINDINGS: LIVER:  The liver demonstrates normal echogenicity without evidence of intrahepatic biliary ductal dilatation. BILIARY SYSTEM:  Status post cholecystectomy. Common bile duct is mildly dilated measuring 8 mm, unchanged since 2020 and likely a post cholecystectomy reservoir effect. RIGHT KIDNEY: The right kidney is grossly unremarkable without evidence of hydronephrosis. PANCREAS:  Visualized portions of the pancreas are unremarkable. OTHER: No evidence of right upper quadrant ascites. Unremarkable right upper quadrant ultrasound. CBC: No results for input(s): WBC, HGB, PLT in the last 72 hours. BMP:  No results for input(s): NA, K, CL, CO2, BUN, CREATININE, GLUCOSE in the last 72 hours. Hepatic: No results for input(s): AST, ALT, ALB, BILITOT, ALKPHOS in the last 72 hours. Lipids:   Lab Results   Component Value Date    TRIG 142 03/30/2022     Hemoglobin A1C: No results found for: LABA1C  TSH: No results found for: TSH  Troponin:   Lab Results   Component Value Date    TROPONINT <0.010 02/02/2022    TROPONINT <0.010 01/26/2021    TROPONINT <0.010 08/17/2020     Lactic Acid: No results for input(s): LACTA in the last 72 hours. BNP: No results for input(s): PROBNP in the last 72 hours.   UA:  Lab Results   Component Value Date    NITRU NEGATIVE 03/29/2022    COLORU YELLOW 03/29/2022    PHUR 5.5 10/15/2021    WBCUA <1 03/29/2022    RBCUA <1 03/29/2022    MUCUS OCCASIONAL 01/26/2021    TRICHOMONAS NONE SEEN 03/29/2022    BACTERIA OCCASIONAL 03/29/2022    CLARITYU CLEAR 03/29/2022    SPECGRAV 1.015 03/29/2022    LEUKOCYTESUR NEGATIVE 03/29/2022    UROBILINOGEN 0.2 03/29/2022    BILIRUBINUR NEGATIVE 03/29/2022    BILIRUBINUR small 10/15/2021    BLOODU NEGATIVE 03/29/2022    GLUCOSEU neg 10/15/2021    KETUA NEGATIVE 03/29/2022     Urine Cultures:   Lab Results   Component Value Date    LABURIN  10/15/2021     <10,000 CFU/ml mixed skin/urogenital yohannes.  No further workup     Blood Cultures: No results found for: BC  No results found for: BLOODCULT2  Organism: No results found for: ORG    Time Spent Discharging patient 30 minutes    Electronically signed by Demetrius Meza MD on 4/2/2022 at 8:32 AM

## 2022-04-02 NOTE — PROGRESS NOTES
Doing better  Lipase went up to 210 yesterday but 18 today and no epigastric pain   Abdomen soft, non-tender, non-distended  Impression:    1) IBS-C          Plan:   1) OK to discharge   2) continue:Amitiza, Elavil, chronulac (or Miralax) and stool softener    3) Bentyl prn cramps   4) follow up with me in 3 months  .

## 2022-04-03 LAB
ANTI-NUCLEAR ANTIBODY (ANA): NORMAL
IGG 1: 336 MG/DL (ref 240–1118)
IGG 2: 292 MG/DL (ref 124–549)
IGG 3: 25 MG/DL (ref 21–134)
IGG 4: 24 MG/DL (ref 1–123)

## 2022-04-04 LAB
ANCA IFA PATTERN: NORMAL
NEUTROPHIL CYTOPLASMIC AB IGG: NORMAL

## 2022-04-05 ENCOUNTER — OFFICE VISIT (OUTPATIENT)
Dept: INTERNAL MEDICINE CLINIC | Age: 47
End: 2022-04-05
Payer: MEDICAID

## 2022-04-05 VITALS
OXYGEN SATURATION: 98 % | HEIGHT: 63 IN | DIASTOLIC BLOOD PRESSURE: 87 MMHG | RESPIRATION RATE: 18 BRPM | SYSTOLIC BLOOD PRESSURE: 139 MMHG | WEIGHT: 187.3 LBS | BODY MASS INDEX: 33.19 KG/M2 | HEART RATE: 78 BPM

## 2022-04-05 DIAGNOSIS — I10 ESSENTIAL HYPERTENSION: ICD-10-CM

## 2022-04-05 DIAGNOSIS — Z76.89 ENCOUNTER FOR SUPPORT AND COORDINATION OF TRANSITION OF CARE: Primary | ICD-10-CM

## 2022-04-05 DIAGNOSIS — K58.1 IRRITABLE BOWEL SYNDROME WITH CONSTIPATION: ICD-10-CM

## 2022-04-05 PROCEDURE — 1111F DSCHRG MED/CURRENT MED MERGE: CPT | Performed by: NURSE PRACTITIONER

## 2022-04-05 PROCEDURE — 99214 OFFICE O/P EST MOD 30 MIN: CPT | Performed by: NURSE PRACTITIONER

## 2022-04-05 SDOH — ECONOMIC STABILITY: FOOD INSECURITY: WITHIN THE PAST 12 MONTHS, THE FOOD YOU BOUGHT JUST DIDN'T LAST AND YOU DIDN'T HAVE MONEY TO GET MORE.: NEVER TRUE

## 2022-04-05 SDOH — ECONOMIC STABILITY: FOOD INSECURITY: WITHIN THE PAST 12 MONTHS, YOU WORRIED THAT YOUR FOOD WOULD RUN OUT BEFORE YOU GOT MONEY TO BUY MORE.: NEVER TRUE

## 2022-04-05 ASSESSMENT — SOCIAL DETERMINANTS OF HEALTH (SDOH): HOW HARD IS IT FOR YOU TO PAY FOR THE VERY BASICS LIKE FOOD, HOUSING, MEDICAL CARE, AND HEATING?: NOT HARD AT ALL

## 2022-04-05 NOTE — PATIENT INSTRUCTIONS
For IBS: start Amitiza twice daily AND amitriptyline daily. Lactulose as needed for constipation. We Will refer to Dr Gilbert Mccrathy today for you. High Blood Pressure: Continue Lasix. Start Losartan. DO NOT START HCTZ (hydrochlorothiazide).

## 2022-04-05 NOTE — LETTER
[facility] INTERNAL MEDICINE  [address] [address]  Phone: [phone]  Fax: [phone]    SHUKRI Harvey CNP        April 5, 2022     Patient: Ting Portillo   YOB: 1975   Date of Visit: 4/5/2022       To Whom it May Concern:    Ting Portillo was seen in my clinic on 4/5/2022. She may return to work on 4/12/2022. Was hospitalized recently at [facility] from 3/29/22 to 4/2/2022. .    If you have any questions or concerns, please don't hesitate to call.     Sincerely,         SHUKRI Harvey CNP

## 2022-04-05 NOTE — PROGRESS NOTES
Post-Discharge Transitional Care  Follow Up      Kelly Poole   YOB: 1975    Date of Office Visit:  4/5/2022  Date of Hospital Admission: 3/29/22  Date of Hospital Discharge: 4/2/22  Risk of hospital readmission (high >=14%. Medium >=10%) :Readmission Risk Score: 9.7 ( )      Care management risk score Rising risk (score 2-5) and Complex Care (Scores >=6): 6     Non face to face  following discharge, date last encounter closed (first attempt may have been earlier): *No documented post hospital discharge outreach found in the last 14 days    Call initiated 2 business days of discharge: *No response recorded in the last 14 days    ASSESSMENT/PLAN:   Encounter for support and coordination of transition of care-overall, I had discussion at length in regards to the patient's testing during the hospital and her diagnoses. I do not see any exam findings consistent with acute pancreatitis and I did discuss that her lipase elevation is very mild. Discussed that her pain is likely largely due to her bowel burden and IBSC. I do agree with the Amitiza twice daily with amitriptyline for pain control and as needed lactulose. She is agreeable to start these today and also work on increasing oral water and fiber intake. She defers to follow-up with Dr. Dinah Anguiano, so referral was sent to Dr. Pepper Talbot today accordingly. In terms of her blood pressure I do not think it is appropriate to be on 2 diuretics therefore will not recommend her to start hydrochlorothiazide, however I do agree with the addition of losartan 50 mg daily. We will reassess this at her follow-up in 1 to 2 months, sooner if needed. -     AR DISCHARGE MEDS RECONCILED W/ CURRENT OUTPATIENT MED LIST  Irritable bowel syndrome with constipation  -     Amb External Referral To Gastroenterology  Essential hypertension      Medical Decision Making: moderate complexity  Return in 3 months (on 7/5/2022).     On this date 4/5/2022 I have spent 45 minutes reviewing previous notes, test results and face to face with the patient discussing the diagnosis and importance of compliance with the treatment plan as well as documenting on the day of the visit. Ezekiel Lopez is a 39-year-old female who presents to the office this afternoon for hospital follow-up. In brief she was admitted at [facility] from March 2019 through April 2 for complaints of worsening abdominal pain and constipation with lack of bowel movement x5 days. She had failed over-the-counter laxatives and stool softeners. She was initially admitted because there was a mild elevation in her lipase and she was diagnosed with acute pancreatitis however her CT of the abdomen pelvis showed no concerns consistent with acute pancreatitis? She was given lactulose enema and sent home with oral lactulose, Amitiza twice daily and daily amitriptyline for pain control as per recommendations of GI, Dr. Mandy Corral. Patient's bowel habits have been more regular, however patient states that she is upset because \"nothing was explained to me and they basically acted like I was crazy, and Dr. Mandy Corral more left call me fat. \"  She has not yet started medications as given her hospital discharge and has multiple questions about her diagnosis today. She refuses to see this provider at follow-up with GI. Her blood pressure was also found to be very elevated and in addition to her Lasix regiment, she was also added on hydrochlorothiazide and losartan. She has not yet started these medications. Subjective:   HPI:  Follow up of Hospital problems/diagnosis(es): IBSC, abdominal pain, essential hypertension    Inpatient course: Discharge summary reviewed- see chart.     Interval history/Current status: Stable/improved    Patient Active Problem List   Diagnosis    Fracture of ankle, medial malleolus, left, open    Essential hypertension    Anxiety and depression    GERD (gastroesophageal reflux disease)    Smoker    ALAN on CPAP    Morbid obesity with BMI of 40.0-44.9, adult (HCC)    Antral (maxillary) polyp    Morbid obesity due to excess calories ([address]    Pulmonary emphysema, unspecified emphysema type ([facility]    Pancreatitis, unspecified pancreatitis type    Irritable bowel syndrome with constipation       Medications listed as ordered at the time of discharge from hospital     Medication List          Accurate as of April 5, 2022  3:43 PM. If you have any questions, ask your nurse or doctor. CONTINUE taking these medications    amitriptyline 10 MG tablet  Commonly known as: ELAVIL  Take 1 tablet by mouth nightly     * dicyclomine 10 MG capsule  Commonly known as: BENTYL  take 1 capsule by mouth three times a day BEFORE A MEAL     * dicyclomine 10 MG capsule  Commonly known as: BENTYL  Take 1 capsule by mouth 4 times daily (before meals and nightly)     fluticasone 50 MCG/ACT nasal spray  Commonly known as: FLONASE  instill 2 sprays into each nostril once daily     furosemide 20 MG tablet  Commonly known as: LASIX  take 1 tablet by mouth once daily     gabapentin 100 MG capsule  Commonly known as: NEURONTIN  Take 1 capsule by mouth 2 times daily for 30 days.      hydroCHLOROthiazide 25 MG tablet  Commonly known as: HYDRODIURIL  Take 1 tablet by mouth daily     lactulose 10 GM/15ML solution  Commonly known as: CHRONULAC  Take 30 mLs by mouth 2 times daily     losartan 50 MG tablet  Commonly known as: COZAAR  Take 1 tablet by mouth daily     lubiprostone 24 MCG capsule  Commonly known as: AMITIZA  Take 1 capsule by mouth 2 times daily (with meals)     pantoprazole 40 MG tablet  Commonly known as: PROTONIX  Take 1 tablet by mouth every morning (before breakfast)     polyethylene glycol 17 g packet  Commonly known as: GLYCOLAX  Take 17 g by mouth daily     potassium chloride 10 MEQ extended release tablet  Commonly known as: KLOR-CON M  take 1 tablet by mouth once daily     Symbicort 160-4.5 MCG/ACT Aero  Generic drug: budesonide-formoterol  inhale 2 puffs by mouth twice a day     Ventolin  (90 Base) MCG/ACT inhaler  Generic drug: albuterol sulfate HFA  inhale 2 puffs by mouth INTO THE LUNGS every 4 hours if needed for shortness of breath or wheezing         * This list has 2 medication(s) that are the same as other medications prescribed for you. Read the directions carefully, and ask your doctor or other care provider to review them with you.                   Medications marked \"taking\" at this time  Outpatient Medications Marked as Taking for the 4/5/22 encounter (Office Visit) with SHUKRI Parker - CNP   Medication Sig Dispense Refill    hydroCHLOROthiazide (HYDRODIURIL) 25 MG tablet Take 1 tablet by mouth daily 30 tablet 3    lactulose (CHRONULAC) 10 GM/15ML solution Take 30 mLs by mouth 2 times daily 473 mL 0    polyethylene glycol (GLYCOLAX) 17 g packet Take 17 g by mouth daily 527 g 1    lubiprostone (AMITIZA) 24 MCG capsule Take 1 capsule by mouth 2 times daily (with meals) 60 capsule 3    dicyclomine (BENTYL) 10 MG capsule Take 1 capsule by mouth 4 times daily (before meals and nightly) 120 capsule 3    pantoprazole (PROTONIX) 40 MG tablet Take 1 tablet by mouth every morning (before breakfast) 30 tablet 3    amitriptyline (ELAVIL) 10 MG tablet Take 1 tablet by mouth nightly 30 tablet 3    losartan (COZAAR) 50 MG tablet Take 1 tablet by mouth daily 30 tablet 3    VENTOLIN  (90 Base) MCG/ACT inhaler inhale 2 puffs by mouth INTO THE LUNGS every 4 hours if needed for shortness of breath or wheezing 18 g 2    dicyclomine (BENTYL) 10 MG capsule take 1 capsule by mouth three times a day BEFORE A MEAL 90 capsule 5    fluticasone (FLONASE) 50 MCG/ACT nasal spray instill 2 sprays into each nostril once daily 16 g 5    SYMBICORT 160-4.5 MCG/ACT AERO inhale 2 puffs by mouth twice a day 10.2 g 2    potassium chloride (KLOR-CON M) 10 MEQ extended release tablet take 1 tablet by mouth once daily 30 tablet 2    furosemide (LASIX) 20 MG tablet take 1 tablet by mouth once daily 90 tablet 0        Medications patient taking as of now reconciled against medications ordered at time of hospital discharge: Yes    A comprehensive review of systems was negative except for what was noted in the HPI. Objective:    /87   Pulse 78   Resp 18   Ht 5' 3\" (1.6 m)   Wt 187 lb 4.8 oz (85 kg)   LMP 06/05/2012   SpO2 98%   BMI 33.18 kg/m²   General Appearance: alert and oriented to person, place and time, well developed and well- nourished, in no acute distress  Skin: warm and dry, no rash or erythema  Head: normocephalic and atraumatic  Eyes: pupils equal, round, and reactive to light, extraocular eye movements intact, conjunctivae normal  ENT: tympanic membrane, external ear and ear canal normal bilaterally, nose without deformity, nasal mucosa and turbinates normal without polyps  Neck: supple and non-tender without mass, no thyromegaly or thyroid nodules, no cervical lymphadenopathy  Pulmonary/Chest: clear to auscultation bilaterally- no wheezes, rales or rhonchi, normal air movement, no respiratory distress  Cardiovascular: normal rate, regular rhythm, normal S1 and S2, no murmurs, rubs, clicks, or gallops, distal pulses intact, no carotid bruits  Abdomen: soft, non-tender, non-distended, normal bowel sounds, no masses or organomegaly  Extremities: no cyanosis, clubbing or edema  Musculoskeletal: normal range of motion, no joint swelling, deformity or tenderness  Neurologic: reflexes normal and symmetric, no cranial nerve deficit, gait, coordination and speech normal      An electronic signature was used to authenticate this note.   --Colleen Knight, SHUKRI - CNP

## 2022-04-18 DIAGNOSIS — R35.0 URINARY FREQUENCY: ICD-10-CM

## 2022-04-18 DIAGNOSIS — J43.9 PULMONARY EMPHYSEMA, UNSPECIFIED EMPHYSEMA TYPE (HCC): ICD-10-CM

## 2022-04-18 RX ORDER — BUDESONIDE AND FORMOTEROL FUMARATE DIHYDRATE 160; 4.5 UG/1; UG/1
AEROSOL RESPIRATORY (INHALATION)
Qty: 10.2 G | Refills: 2 | Status: SHIPPED | OUTPATIENT
Start: 2022-04-18 | End: 2022-07-20

## 2022-04-18 RX ORDER — POTASSIUM CHLORIDE 750 MG/1
TABLET, EXTENDED RELEASE ORAL
Qty: 30 TABLET | Refills: 2 | Status: SHIPPED | OUTPATIENT
Start: 2022-04-18 | End: 2022-07-18

## 2022-04-26 RX ORDER — FUROSEMIDE 20 MG/1
TABLET ORAL
Qty: 90 TABLET | Refills: 0 | Status: SHIPPED | OUTPATIENT
Start: 2022-04-26

## 2022-05-10 ENCOUNTER — TELEPHONE (OUTPATIENT)
Dept: INTERNAL MEDICINE CLINIC | Age: 47
End: 2022-05-10

## 2022-05-10 RX ORDER — LACTULOSE 10 G/15ML
20 SOLUTION ORAL 2 TIMES DAILY
Qty: 473 ML | Refills: 1 | Status: SHIPPED | OUTPATIENT
Start: 2022-05-10

## 2022-05-10 NOTE — TELEPHONE ENCOUNTER
Refill of lactulose provided and she can also add a 1 TIME dose of mag citrate which was ordered. She was also referred to Dr Luis Alfredo Downing over a month ago, has she not seen them yet as recommended?

## 2022-05-10 NOTE — TELEPHONE ENCOUNTER
Name of Medication? lactulose (CHRONULAC) 10 GM/15ML solution   Patient-reported dosage and instructions? the measured amount in the   little cup   What question or problem do you have with the medication? Patient says   that this does not work. Neither does the polyethylene glycol. She has not   had a bowel movement since Friday. She does not want to end up in the   hospital again. She says that she is getting to where she doesn't want to   drink water as well. Preferred Pharmacy? [address] [place] [address]   Pharmacy phone number (if available)? [phone]   Additional Information for Provider? She wants to know if there is   something stronger. She cannot bend over because it hurts. Won't be able   to work if she doesn't get something to work. She is miserable. She said   she never had any refills for this medication either. But needs something   stronger. Has tried foods to help her. Does not know how to open voicemail   on phone. Can call land if no answer use [employer] to message. ---------------------------------------------------------------------------   --------------   Luisa Meo INFO   What is the best way for the office to contact you? OK to respond with   electronic message via [employer] portal (only for patients who have   registered [employer] account)   Preferred Call Back Phone Number? [phone]   ---------------------------------------------------------------------------   --------------   SCRIPT ANSWERS   Relationship to Patient?  Self   Please advise

## 2022-05-23 RX ORDER — GABAPENTIN 100 MG/1
CAPSULE ORAL
Qty: 60 CAPSULE | Refills: 2 | Status: SHIPPED | OUTPATIENT
Start: 2022-05-23 | End: 2022-08-05

## 2022-07-18 DIAGNOSIS — R35.0 URINARY FREQUENCY: ICD-10-CM

## 2022-07-18 RX ORDER — POTASSIUM CHLORIDE 750 MG/1
TABLET, EXTENDED RELEASE ORAL
Qty: 30 TABLET | Refills: 2 | Status: SHIPPED | OUTPATIENT
Start: 2022-07-18

## 2022-07-19 RX ORDER — PSEUDOEPHED/ACETAMINOPH/DIPHEN 30MG-500MG
TABLET ORAL
Qty: 90 TABLET | Refills: 0 | OUTPATIENT
Start: 2022-07-19

## 2022-07-20 DIAGNOSIS — J43.9 PULMONARY EMPHYSEMA, UNSPECIFIED EMPHYSEMA TYPE (HCC): ICD-10-CM

## 2022-07-20 RX ORDER — BUDESONIDE AND FORMOTEROL FUMARATE DIHYDRATE 160; 4.5 UG/1; UG/1
AEROSOL RESPIRATORY (INHALATION)
Qty: 10.2 G | Refills: 2 | Status: SHIPPED | OUTPATIENT
Start: 2022-07-20 | End: 2022-10-18

## 2022-07-25 RX ORDER — PSEUDOEPHED/ACETAMINOPH/DIPHEN 30MG-500MG
TABLET ORAL
Qty: 90 TABLET | Refills: 0 | Status: SHIPPED | OUTPATIENT
Start: 2022-07-25

## 2022-08-05 RX ORDER — GABAPENTIN 100 MG/1
CAPSULE ORAL
Qty: 60 CAPSULE | Refills: 2 | Status: SHIPPED | OUTPATIENT
Start: 2022-08-05 | End: 2022-09-04

## 2022-08-15 RX ORDER — FLUTICASONE PROPIONATE 50 MCG
SPRAY, SUSPENSION (ML) NASAL
Qty: 16 G | Refills: 5 | Status: SHIPPED | OUTPATIENT
Start: 2022-08-15

## 2022-09-02 ENCOUNTER — APPOINTMENT (OUTPATIENT)
Dept: CT IMAGING | Age: 47
End: 2022-09-02
Payer: MEDICAID

## 2022-09-02 ENCOUNTER — HOSPITAL ENCOUNTER (EMERGENCY)
Age: 47
Discharge: HOME | End: 2022-09-02
Attending: EMERGENCY MEDICINE
Payer: MEDICAID

## 2022-09-02 VITALS
WEIGHT: 180 LBS | RESPIRATION RATE: 18 BRPM | BODY MASS INDEX: 31.89 KG/M2 | TEMPERATURE: 98.1 F | HEART RATE: 69 BPM | SYSTOLIC BLOOD PRESSURE: 153 MMHG | DIASTOLIC BLOOD PRESSURE: 78 MMHG | OXYGEN SATURATION: 96 %

## 2022-09-02 DIAGNOSIS — N30.01 ACUTE CYSTITIS WITH HEMATURIA: Primary | ICD-10-CM

## 2022-09-02 LAB
ALBUMIN SERPL-MCNC: 4.2 GM/DL (ref 3.4–5)
ALP BLD-CCNC: 96 IU/L (ref 40–129)
ALT SERPL-CCNC: 12 U/L (ref 10–40)
ANION GAP SERPL CALCULATED.3IONS-SCNC: 10 MMOL/L (ref 4–16)
AST SERPL-CCNC: 15 IU/L (ref 15–37)
BACTERIA: NEGATIVE /HPF
BASOPHILS ABSOLUTE: 0 K/CU MM
BASOPHILS RELATIVE PERCENT: 0.4 % (ref 0–1)
BILIRUB SERPL-MCNC: 0.3 MG/DL (ref 0–1)
BILIRUBIN URINE: NEGATIVE MG/DL
BLOOD, URINE: ABNORMAL
BUN BLDV-MCNC: 21 MG/DL (ref 6–23)
CALCIUM SERPL-MCNC: 9.4 MG/DL (ref 8.3–10.6)
CHLORIDE BLD-SCNC: 104 MMOL/L (ref 99–110)
CLARITY: ABNORMAL
CO2: 26 MMOL/L (ref 21–32)
COLOR: YELLOW
CREAT SERPL-MCNC: 0.9 MG/DL (ref 0.6–1.1)
DIFFERENTIAL TYPE: ABNORMAL
EOSINOPHILS ABSOLUTE: 0.2 K/CU MM
EOSINOPHILS RELATIVE PERCENT: 2.3 % (ref 0–3)
GFR AFRICAN AMERICAN: >60 ML/MIN/1.73M2
GFR NON-AFRICAN AMERICAN: >60 ML/MIN/1.73M2
GLUCOSE BLD-MCNC: 121 MG/DL (ref 70–99)
GLUCOSE, URINE: NEGATIVE MG/DL
HCT VFR BLD CALC: 43.8 % (ref 37–47)
HEMOGLOBIN: 14.6 GM/DL (ref 12.5–16)
IMMATURE NEUTROPHIL %: 0.2 % (ref 0–0.43)
KETONES, URINE: NEGATIVE MG/DL
LEUKOCYTE ESTERASE, URINE: ABNORMAL
LYMPHOCYTES ABSOLUTE: 1.5 K/CU MM
LYMPHOCYTES RELATIVE PERCENT: 18.1 % (ref 24–44)
MCH RBC QN AUTO: 28.1 PG (ref 27–31)
MCHC RBC AUTO-ENTMCNC: 33.3 % (ref 32–36)
MCV RBC AUTO: 84.4 FL (ref 78–100)
MONOCYTES ABSOLUTE: 0.7 K/CU MM
MONOCYTES RELATIVE PERCENT: 8.6 % (ref 0–4)
NITRITE URINE, QUANTITATIVE: NEGATIVE
NUCLEATED RBC %: 0 %
PDW BLD-RTO: 13.1 % (ref 11.7–14.9)
PH, URINE: 6 (ref 5–8)
PLATELET # BLD: 172 K/CU MM (ref 140–440)
PMV BLD AUTO: 11.2 FL (ref 7.5–11.1)
POTASSIUM SERPL-SCNC: 4.1 MMOL/L (ref 3.5–5.1)
PROTEIN UA: 100 MG/DL
RBC # BLD: 5.19 M/CU MM (ref 4.2–5.4)
RBC URINE: 115 /HPF (ref 0–6)
SEGMENTED NEUTROPHILS ABSOLUTE COUNT: 5.8 K/CU MM
SEGMENTED NEUTROPHILS RELATIVE PERCENT: 70.4 % (ref 36–66)
SODIUM BLD-SCNC: 140 MMOL/L (ref 135–145)
SPECIFIC GRAVITY UA: >1.03 (ref 1–1.03)
SQUAMOUS EPITHELIAL: 10 /HPF
TOTAL IMMATURE NEUTOROPHIL: 0.02 K/CU MM
TOTAL NUCLEATED RBC: 0 K/CU MM
TOTAL PROTEIN: 6.8 GM/DL (ref 6.4–8.2)
TRICHOMONAS: ABNORMAL /HPF
UROBILINOGEN, URINE: 0.2 MG/DL (ref 0.2–1)
WBC # BLD: 8.2 K/CU MM (ref 4–10.5)
WBC CLUMP: ABNORMAL /HPF
WBC UA: 801 /HPF (ref 0–5)

## 2022-09-02 PROCEDURE — 85025 COMPLETE CBC W/AUTO DIFF WBC: CPT

## 2022-09-02 PROCEDURE — 51798 US URINE CAPACITY MEASURE: CPT

## 2022-09-02 PROCEDURE — 74176 CT ABD & PELVIS W/O CONTRAST: CPT

## 2022-09-02 PROCEDURE — 81001 URINALYSIS AUTO W/SCOPE: CPT

## 2022-09-02 PROCEDURE — 96374 THER/PROPH/DIAG INJ IV PUSH: CPT

## 2022-09-02 PROCEDURE — 99284 EMERGENCY DEPT VISIT MOD MDM: CPT

## 2022-09-02 PROCEDURE — 6360000002 HC RX W HCPCS: Performed by: EMERGENCY MEDICINE

## 2022-09-02 PROCEDURE — 80053 COMPREHEN METABOLIC PANEL: CPT

## 2022-09-02 PROCEDURE — 6370000000 HC RX 637 (ALT 250 FOR IP): Performed by: EMERGENCY MEDICINE

## 2022-09-02 RX ORDER — KETOROLAC TROMETHAMINE 30 MG/ML
30 INJECTION, SOLUTION INTRAMUSCULAR; INTRAVENOUS ONCE
Status: COMPLETED | OUTPATIENT
Start: 2022-09-02 | End: 2022-09-02

## 2022-09-02 RX ORDER — PHENAZOPYRIDINE HYDROCHLORIDE 100 MG/1
100 TABLET, FILM COATED ORAL 3 TIMES DAILY PRN
Qty: 10 TABLET | Refills: 0 | Status: SHIPPED | OUTPATIENT
Start: 2022-09-02 | End: 2022-09-05

## 2022-09-02 RX ORDER — PHENAZOPYRIDINE HYDROCHLORIDE 100 MG/1
200 TABLET, FILM COATED ORAL ONCE
Status: COMPLETED | OUTPATIENT
Start: 2022-09-02 | End: 2022-09-02

## 2022-09-02 RX ORDER — NITROFURANTOIN 25; 75 MG/1; MG/1
100 CAPSULE ORAL ONCE
Status: COMPLETED | OUTPATIENT
Start: 2022-09-02 | End: 2022-09-02

## 2022-09-02 RX ORDER — NITROFURANTOIN 25; 75 MG/1; MG/1
100 CAPSULE ORAL 2 TIMES DAILY
Qty: 10 CAPSULE | Refills: 0 | Status: SHIPPED | OUTPATIENT
Start: 2022-09-02 | End: 2022-09-07

## 2022-09-02 RX ORDER — HYDROCODONE BITARTRATE AND ACETAMINOPHEN 5; 325 MG/1; MG/1
1 TABLET ORAL ONCE
Status: COMPLETED | OUTPATIENT
Start: 2022-09-02 | End: 2022-09-02

## 2022-09-02 RX ADMIN — KETOROLAC TROMETHAMINE 30 MG: 30 INJECTION, SOLUTION INTRAMUSCULAR; INTRAVENOUS at 22:58

## 2022-09-02 RX ADMIN — HYDROCODONE BITARTRATE AND ACETAMINOPHEN 1 TABLET: 5; 325 TABLET ORAL at 22:53

## 2022-09-02 RX ADMIN — NITROFURANTOIN (MONOHYDRATE/MACROCRYSTALS) 100 MG: 75; 25 CAPSULE ORAL at 23:45

## 2022-09-02 RX ADMIN — PHENAZOPYRIDINE HYDROCHLORIDE 200 MG: 100 TABLET ORAL at 23:45

## 2022-09-02 ASSESSMENT — PAIN DESCRIPTION - ORIENTATION
ORIENTATION: LOWER

## 2022-09-02 ASSESSMENT — PAIN DESCRIPTION - LOCATION
LOCATION: ABDOMEN

## 2022-09-02 ASSESSMENT — PAIN SCALES - GENERAL
PAINLEVEL_OUTOF10: 8
PAINLEVEL_OUTOF10: 6
PAINLEVEL_OUTOF10: 8
PAINLEVEL_OUTOF10: 8

## 2022-09-02 ASSESSMENT — PAIN DESCRIPTION - DESCRIPTORS
DESCRIPTORS: STABBING
DESCRIPTORS: ACHING

## 2022-09-02 ASSESSMENT — PAIN - FUNCTIONAL ASSESSMENT: PAIN_FUNCTIONAL_ASSESSMENT: NONE - DENIES PAIN

## 2022-09-03 NOTE — ED TRIAGE NOTES
Pt presents to ED from home with complaints of urinary retention. Pt had last void @ 1930. Void contained hematuria, a clot, and pain with urination. Pt complaining of mid lower abd pain, mid lower back pain, and perineal pain 10/10. Pt has hx of kidney stones.

## 2022-09-03 NOTE — ED PROVIDER NOTES
Triage Chief Complaint:   Abdominal Pain and Urinary Retention    [place]:  Marla Peña is a 50 y.o. female that presents with abdominal pain, dysuria, hematuria. Patient states that over the past 3 days she has had increasing urinary frequency, dysuria, hematuria with clot passage and severe aching pain in the suprapubic region going into her back. Pain is worse with urination. States that she feels like she cannot completely empty her bladder. She does have a history of kidney stones. Denies any nausea, vomiting, diarrhea, fevers. She has not taken anything for pain at home. ROS:  At least 10 systems reviewed and otherwise acutely negative except as in the [address]     Past Medical History:   Diagnosis Date    Anxiety     Bladder tumor     Dr. Navas Number    Depression     Diverticulitis     Last episode: 7/1/2018    Emphysema of lung ([address]     Follows with Betty Damon    GERD (gastroesophageal reflux disease)     History of blood transfusion 2018    After surgery for dog attack    Hypertension     Dr. Wiley Gaffney    IBS (irritable bowel syndrome)     Kidney stone     Last stone: 2013    PTSD (post-traumatic stress disorder) 02/03/2019    from dog attack    Sleep apnea     Uses BiPap     Past Surgical History:   Procedure Laterality Date    ABDOMEN SURGERY      BLADDER SURGERY  2012    CHOLECYSTECTOMY  2001    COLONOSCOPY  03/26/2018    normal colonoscopy - Dr. Lyle Fong  1980's    Nose     HYSTERECTOMY (CERVIX STATUS UNKNOWN)  2011    HYSTERECTOMY, TOTAL ABDOMINAL (CERVIX REMOVED)  2012    INCISION AND DRAINAGE Left 2/4/2019    ANKLE INCISION AND DRAINAGE performed by Celester Lennox, DO at [address] Right 2/4/2019    ARM INCISION AND DRAINAGE performed by Celester Lennox, DO at [address]  2012    tumor removal    OVARIAN CYST REMOVAL  2012    [address]  07/2011    UPPER GASTROINTESTINAL ENDOSCOPY N/A 1/4/2021    EGD POLYP ABLATION/OTHER OF ANTRAL POLYP AND BX performed by Sujey Cowart MD at [facility] ENDOSCOPY     Family History   Problem Relation Age of Onset    Alcohol Abuse Mother     Sleep Apnea Father      Social History     Socioeconomic History    Marital status: Single     Spouse name: Not on file    Number of children: Not on file    Years of education: Not on file    Highest education level: Not on file   Occupational History    Not on file   Tobacco Use    Smoking status: Every Day     Packs/day: 1.00     Years: 19.00     Pack years: 19.00     Types: Cigarettes     Start date: 2/11/2001    Smokeless tobacco: Never   Vaping Use    Vaping Use: Never used   Substance and Sexual Activity    Alcohol use: No    Drug use: Yes     Types: Marijuana Salma Postin)    Sexual activity: Yes     Partners: Male   Other Topics Concern    Not on file   Social History Narrative    Not on file     Social Determinants of Health     Financial Resource Strain: Low Risk     Difficulty of Paying Living Expenses: Not hard at all   Food Insecurity: No Food Insecurity    Worried About Running Out of Food in the Last Year: Never true    Ran Out of Food in the Last Year: Never true   Transportation Needs: Not on file   Physical Activity: Not on file   Stress: Not on file   Social Connections: Not on file   Intimate Partner Violence: Not on file   Housing Stability: Not on file     No current facility-administered medications for this encounter.      Current Outpatient Medications   Medication Sig Dispense Refill    nitrofurantoin, macrocrystal-monohydrate, (MACROBID) 100 MG capsule Take 1 capsule by mouth 2 times daily for 5 days 10 capsule 0    phenazopyridine (PYRIDIUM) 100 MG tablet Take 1 tablet by mouth 3 times daily as needed for Pain (dysuria) 10 tablet 0    fluticasone (FLONASE) 50 MCG/ACT nasal spray instill 2 sprays into each nostril once daily 16 g 5    gabapentin (NEURONTIN) 100 MG capsule take 1 capsule by mouth twice a day 60 capsule 2 ACETAMINOPHEN EXTRA STRENGTH 500 MG tablet take 1 tablet by mouth three times a day AS NEEDED FOR PAIN 90 tablet 0    SYMBICORT 160-4.5 MCG/ACT AERO inhale 2 puffs by mouth twice a day 10.2 g 2    potassium chloride (KLOR-CON M) 10 MEQ extended release tablet take 1 tablet by mouth once daily 30 tablet 2    lactulose (CHRONULAC) 10 GM/15ML solution Take 30 mLs by mouth 2 times daily 473 mL 1    furosemide (LASIX) 20 MG tablet take 1 tablet by mouth once daily 90 tablet 0    hydroCHLOROthiazide (HYDRODIURIL) 25 MG tablet Take 1 tablet by mouth daily 30 tablet 3    lubiprostone (AMITIZA) 24 MCG capsule Take 1 capsule by mouth 2 times daily (with meals) 60 capsule 3    dicyclomine (BENTYL) 10 MG capsule Take 1 capsule by mouth 4 times daily (before meals and nightly) 120 capsule 3    pantoprazole (PROTONIX) 40 MG tablet Take 1 tablet by mouth every morning (before breakfast) 30 tablet 3    amitriptyline (ELAVIL) 10 MG tablet Take 1 tablet by mouth nightly 30 tablet 3    losartan (COZAAR) 50 MG tablet Take 1 tablet by mouth daily 30 tablet 3    VENTOLIN  (90 Base) MCG/ACT inhaler inhale 2 puffs by mouth INTO THE LUNGS every 4 hours if needed for shortness of breath or wheezing 18 g 2     Allergies   Allergen Reactions    Dye [Iodides] Hives    Morphine Hives    Fentanyl        Nursing Notes Reviewed    Physical Exam:  ED Triage Vitals [09/02/22 2130]   Enc Vitals Group      BP (!) 171/92      Heart Rate 81      Resp 17      Temp 98.2 °F (36.8 °C)      Temp src       SpO2 97 %      Weight 180 lb (81.6 kg)      Height       Head Circumference       Peak Flow       Pain Score       Pain Loc       Pain Edu? Excl. in [address]? GENERAL APPEARANCE: Awake and alert. Cooperative. No acute distress. HEAD: Normocephalic. Atraumatic. EYES: EOM's grossly intact. Sclera anicteric. ENT: Mucous membranes are moist. Tolerates saliva. No trismus. NECK: Supple. Trachea midline. HEART: RRR. Radial pulses 2+.   LUNGS: Respirations unlabored. CTAB  ABDOMEN: Soft. Non-tender. No guarding or rebound. No CVA tenderness  EXTREMITIES: No acute deformities. SKIN: Warm and dry. NEUROLOGICAL: No gross facial drooping. Moves all 4 extremities spontaneously. PSYCHIATRIC: Normal mood.     I have reviewed and interpreted all of the currently available lab results from this visit (if applicable):  Results for orders placed or performed during the hospital encounter of 09/02/22   CBC with Auto Differential   Result Value Ref Range    WBC 8.2 4.0 - 10.5 K/CU MM    RBC 5.19 4.2 - 5.4 M/CU MM    Hemoglobin 14.6 12.5 - 16.0 GM/DL    Hematocrit 43.8 37 - 47 %    MCV 84.4 78 - 100 FL    MCH 28.1 27 - 31 PG    MCHC 33.3 32.0 - 36.0 %    RDW 13.1 11.7 - 14.9 %    Platelets 857 233 - 297 K/CU MM    MPV 11.2 (H) 7.5 - 11.1 FL    Differential Type AUTOMATED DIFFERENTIAL     Segs Relative 70.4 (H) 36 - 66 %    Lymphocytes % 18.1 (L) 24 - 44 %    Monocytes % 8.6 (H) 0 - 4 %    Eosinophils % 2.3 0 - 3 %    Basophils % 0.4 0 - 1 %    Segs Absolute 5.8 K/CU MM    Lymphocytes Absolute 1.5 K/CU MM    Monocytes Absolute 0.7 K/CU MM    Eosinophils Absolute 0.2 K/CU MM    Basophils Absolute 0.0 K/CU MM    Nucleated RBC % 0.0 %    Total Nucleated RBC 0.0 K/CU MM    Total Immature Neutrophil 0.02 K/CU MM    Immature Neutrophil % 0.2 0 - 0.43 %   Comprehensive Metabolic Panel w/ Reflex to MG   Result Value Ref Range    Sodium 140 135 - 145 MMOL/L    Potassium 4.1 3.5 - 5.1 MMOL/L    Chloride 104 99 - 110 mMol/L    CO2 26 21 - 32 MMOL/L    BUN 21 6 - 23 MG/DL    Creatinine 0.9 0.6 - 1.1 MG/DL    Glucose 121 (H) 70 - 99 MG/DL    Calcium 9.4 8.3 - 10.6 MG/DL    Albumin 4.2 3.4 - 5.0 GM/DL    Total Protein 6.8 6.4 - 8.2 GM/DL    Total Bilirubin 0.3 0.0 - 1.0 MG/DL    ALT 12 10 - 40 U/L    AST 15 15 - 37 IU/L    Alkaline Phosphatase 96 40 - 129 IU/L    GFR Non-African American >60 >60 mL/min/1.73m2    GFR African American >60 >60 mL/min/1.73m2    Anion Gap 10 4 - 16 Urinalysis with Microscopic   Result Value Ref Range    Color, UA YELLOW YELLOW    Clarity, UA TURBID (A) CLEAR    Glucose, Urine NEGATIVE NEGATIVE MG/DL    Bilirubin Urine NEGATIVE NEGATIVE MG/DL    Ketones, Urine NEGATIVE NEGATIVE MG/DL    Specific Gravity, UA >1.030 1.001 - 1.035    Blood, Urine LARGE NUMBER OR AMOUNT OF  (A) NEGATIVE    pH, Urine 6.0 5.0 - 8.0    Protein,  (A) NEGATIVE MG/DL    Urobilinogen, Urine 0.2 0.2 - 1.0 MG/DL    Nitrite Urine, Quantitative NEGATIVE NEGATIVE    Leukocyte Esterase, Urine MODERATE (A) NEGATIVE    RBC,  (H) 0 - 6 /HPF    WBC,  (H) 0 - 5 /HPF    Bacteria, UA NEGATIVE NEGATIVE /HPF    WBC Clumps, UA RARE /HPF    Squam Epithel, UA 10 /HPF    Trichomonas, UA NONE SEEN NONE SEEN /HPF      Radiographs (if obtained):  [] The following radiograph was interpreted by myself in the absence of a radiologist:  [x] Radiologist's Report Reviewed:    EKG (if obtained): (All EKG's are interpreted by myself in the absence of a cardiologist)    MDM:  Plan of care is discussed thoroughly with the patient and family if present. If performed, all imaging and lab work also discussed with patient. All relevant prior results and chart reviewed if available. Patient presents as above. She appears uncomfortable secondary to pain. She has dysuria and hematuria with symptoms most consistent with likely acute cystitis. Urinalysis is consistent with this diagnosis and urine culture sent. She does not have a leukocytosis. Metabolic work-up is unremarkable. CT does not show any evidence of pyelonephritis or nephrolithiasis. Patient with improving symptoms on reevaluation after Toradol. She is started on Macrobid. No evidence of urinary retention here. Discharged in good condition. Clinical Impression:  1.  Acute cystitis with hematuria      (Please note that portions of this note may have been completed with a voice recognition program. Efforts were made to edit the dictations but occasionally words are mis-transcribed.)    Orma Phoenix, MD Elfida Rossetti, MD  09/02/22 [id number]

## 2022-09-03 NOTE — ED NOTES
Discharge instructions reviewed with patient and all questions addressed. Patient alert and oriented x4 at time of discharge. Patient ambulatory and steady gait. IV removed and dressing applied.       Leidy Olson RN  09/02/22 1046

## 2022-09-03 NOTE — ACP (ADVANCE CARE PLANNING)
Patient does not have any ACP documents/Medical Power of . LSW notes hospital will follow [place]'s Next of Kin hierarchy in the following descending order for priority:    Guardian  Spouse  [de-identified] of adult Children  Parents  [de-identified] of adult Siblings  Nearest Relative not described above    Per [place]'s Next of Kin hierarchy: Both of patients' children will equally be [address]

## 2022-09-13 ENCOUNTER — OFFICE VISIT (OUTPATIENT)
Dept: INTERNAL MEDICINE CLINIC | Age: 47
End: 2022-09-13
Payer: MEDICAID

## 2022-09-13 VITALS
BODY MASS INDEX: 33.66 KG/M2 | SYSTOLIC BLOOD PRESSURE: 132 MMHG | HEIGHT: 63 IN | RESPIRATION RATE: 16 BRPM | WEIGHT: 190 LBS | OXYGEN SATURATION: 97 % | HEART RATE: 81 BPM | DIASTOLIC BLOOD PRESSURE: 82 MMHG

## 2022-09-13 DIAGNOSIS — N20.0 NEPHROLITHIASIS: ICD-10-CM

## 2022-09-13 DIAGNOSIS — R35.0 URINARY FREQUENCY: Primary | ICD-10-CM

## 2022-09-13 DIAGNOSIS — R31.9 HEMATURIA, UNSPECIFIED TYPE: ICD-10-CM

## 2022-09-13 LAB
BILIRUBIN, POC: NORMAL
BLOOD URINE, POC: NORMAL
CLARITY, POC: CLEAR
COLOR, POC: YELLOW
GLUCOSE URINE, POC: NORMAL
KETONES, POC: NORMAL
LEUKOCYTE EST, POC: NORMAL
NITRITE, POC: NORMAL
PH, POC: 5.5
PROTEIN, POC: NORMAL
SPECIFIC GRAVITY, POC: 1.02
UROBILINOGEN, POC: 0.2

## 2022-09-13 PROCEDURE — G8417 CALC BMI ABV UP PARAM F/U: HCPCS | Performed by: NURSE PRACTITIONER

## 2022-09-13 PROCEDURE — 99214 OFFICE O/P EST MOD 30 MIN: CPT | Performed by: NURSE PRACTITIONER

## 2022-09-13 PROCEDURE — 4004F PT TOBACCO SCREEN RCVD TLK: CPT | Performed by: NURSE PRACTITIONER

## 2022-09-13 PROCEDURE — 81002 URINALYSIS NONAUTO W/O SCOPE: CPT | Performed by: NURSE PRACTITIONER

## 2022-09-13 PROCEDURE — G8427 DOCREV CUR MEDS BY ELIG CLIN: HCPCS | Performed by: NURSE PRACTITIONER

## 2022-09-13 RX ORDER — CIPROFLOXACIN 500 MG/1
500 TABLET, FILM COATED ORAL 2 TIMES DAILY
Qty: 14 TABLET | Refills: 0 | Status: SHIPPED | OUTPATIENT
Start: 2022-09-13 | End: 2022-09-20

## 2022-09-13 RX ORDER — PHENAZOPYRIDINE HYDROCHLORIDE 200 MG/1
200 TABLET, FILM COATED ORAL 3 TIMES DAILY PRN
Qty: 30 TABLET | Refills: 0 | Status: SHIPPED | OUTPATIENT
Start: 2022-09-13 | End: 2022-09-23

## 2022-09-13 ASSESSMENT — ENCOUNTER SYMPTOMS
COLOR CHANGE: 0
CHEST TIGHTNESS: 0
NAUSEA: 0
SHORTNESS OF BREATH: 0
DIARRHEA: 0
VOMITING: 0
ABDOMINAL PAIN: 0
SINUS PRESSURE: 0
APNEA: 0
COUGH: 0
SINUS PAIN: 0

## 2022-09-13 ASSESSMENT — PATIENT HEALTH QUESTIONNAIRE - PHQ9
9. THOUGHTS THAT YOU WOULD BE BETTER OFF DEAD, OR OF HURTING YOURSELF: 0
SUM OF ALL RESPONSES TO PHQ QUESTIONS 1-9: 0
SUM OF ALL RESPONSES TO PHQ QUESTIONS 1-9: 0
5. POOR APPETITE OR OVEREATING: 0
3. TROUBLE FALLING OR STAYING ASLEEP: 0
6. FEELING BAD ABOUT YOURSELF - OR THAT YOU ARE A FAILURE OR HAVE LET YOURSELF OR YOUR FAMILY DOWN: 0
10. IF YOU CHECKED OFF ANY PROBLEMS, HOW DIFFICULT HAVE THESE PROBLEMS MADE IT FOR YOU TO DO YOUR WORK, TAKE CARE OF THINGS AT HOME, OR GET ALONG WITH OTHER PEOPLE: 0
1. LITTLE INTEREST OR PLEASURE IN DOING THINGS: 0
7. TROUBLE CONCENTRATING ON THINGS, SUCH AS READING THE NEWSPAPER OR WATCHING TELEVISION: 0
SUM OF ALL RESPONSES TO PHQ9 QUESTIONS 1 & 2: 0
8. MOVING OR SPEAKING SO SLOWLY THAT OTHER PEOPLE COULD HAVE NOTICED. OR THE OPPOSITE, BEING SO FIGETY OR RESTLESS THAT YOU HAVE BEEN MOVING AROUND A LOT MORE THAN USUAL: 0
SUM OF ALL RESPONSES TO PHQ QUESTIONS 1-9: 0
2. FEELING DOWN, DEPRESSED OR HOPELESS: 0
SUM OF ALL RESPONSES TO PHQ QUESTIONS 1-9: 0
4. FEELING TIRED OR HAVING LITTLE ENERGY: 0

## 2022-09-13 NOTE — PROGRESS NOTES
Carmel Benitez  1975  09/13/22    Chief Complaint   Patient presents with    Dysuria     Pt reports she is passing blood clots in urine, abdominal pain and having difficulty expressing urine sx started 08/31/2022       SUBJECTIVE:      Patient presents to the office this afternoon for ER follow-up. In brief, she states that approximately 2 weeks ago she developed worsening bilateral flank pain, urinary frequency, and intermittent hematuria. Was seen and evaluated at [facility] where she was found to have acute cystitis with hematuria. CT of the abdomen pelvis was significant for bladder wall thickening and also a 4 to 5 mm nephrolithiasis in the inferior right kidney. This is nonobstructing. She was given Macrobid and Pyridium which she states did help significantly but her symptoms are gradually returning over the last week. Review of Systems   Constitutional:  Negative for activity change, appetite change, fatigue and fever. HENT:  Negative for congestion, nosebleeds, sinus pressure and sinus pain. Respiratory:  Negative for apnea, cough, chest tightness and shortness of breath. Cardiovascular:  Negative for chest pain and palpitations. Gastrointestinal:  Negative for abdominal pain, diarrhea, nausea and vomiting. Genitourinary:  Positive for dysuria, flank pain, frequency and urgency. Negative for difficulty urinating and hematuria. Musculoskeletal:  Negative for arthralgias, joint swelling and myalgias. Skin:  Negative for color change and rash. Neurological:  Negative for dizziness, light-headedness and headaches. Psychiatric/Behavioral: Negative. Negative for behavioral problems. OBJECTIVE:    /82   Pulse 81   Resp 16   Ht 5' 3\" (1.6 m)   Wt 190 lb (86.2 kg)   LMP 06/05/2012   SpO2 97%   BMI 33.66 kg/m²     Physical Exam  Constitutional:       General: She is not in acute distress. Appearance: She is well-developed.  She is not diaphoretic. HENT:      Head: Normocephalic and atraumatic. Cardiovascular:      Rate and Rhythm: Normal rate and regular rhythm. Pulmonary:      Effort: Pulmonary effort is normal. No respiratory distress. Breath sounds: Normal breath sounds. Abdominal:      General: Bowel sounds are normal.      Palpations: Abdomen is soft. Tenderness: There is no abdominal tenderness. There is right CVA tenderness and left CVA tenderness. Musculoskeletal:         General: Normal range of motion. Cervical back: Normal range of motion and neck supple. No edema or erythema. No muscular tenderness. Skin:     General: Skin is warm and dry. Capillary Refill: Capillary refill takes less than 2 seconds. Neurological:      Mental Status: She is alert and oriented to person, place, and time. Coordination: Coordination normal.   Psychiatric:         Behavior: Behavior normal.         Thought Content: Thought content normal.         Judgment: Judgment normal.       ASSESSMENT:    1. Urinary frequency    2. Hematuria, unspecified type    3. Nephrolithiasis        PLAN:    Karina Logan was seen today for dysuria. Diagnoses and all orders for this visit:    Urinary frequency-point-of-care urinalysis significant for large leukocytes, negative for blood or nitrites. Mild CVA tenderness noted bilaterally on exam concerning for possible pyelonephritis. Will cover empirically with Cipro as below and also send urine for culture and sensitivity. Pyridium ordered for dysuria. Patient is to return and repeat UA studies in 1 week if symptoms not resolved. We will also ask urology to see patient in consultation given her known nephrolithiasis seen on recent CT imaging.  -     POCT Urinalysis no Micro  -     Culture, Urine  -     phenazopyridine (PYRIDIUM) 200 MG tablet; Take 1 tablet by mouth 3 times daily as needed for Pain  -     ciprofloxacin (CIPRO) 500 MG tablet;  Take 1 tablet by mouth 2 times daily for 7 days    Hematuria, unspecified type    Nephrolithiasis  -     AFL - Maxx Hassan MD, Urology, [facility]    Course of treatment, including any medications, possible imaging, referrals, and follow ups discussed with patient. All risks and benefits and possible side effects discussed with patient who agrees to plan of care and verbalizes understanding. All labs and imaging reviewed. No flowsheet data found. Return in about 4 months (around 1/13/2023). Pleas note this reports has been produced speech recognition software and may contain errors related to that system including errors in grammar, punctuation, and spelling, as well as words and phrases that may be appropriate. If there are any questions or concerns please feel free to contact the dictating provider for clarification.

## 2022-09-15 LAB — URINE CULTURE, ROUTINE: NORMAL

## 2022-10-18 DIAGNOSIS — J43.9 PULMONARY EMPHYSEMA, UNSPECIFIED EMPHYSEMA TYPE (HCC): ICD-10-CM

## 2022-10-18 RX ORDER — BUDESONIDE AND FORMOTEROL FUMARATE DIHYDRATE 160; 4.5 UG/1; UG/1
AEROSOL RESPIRATORY (INHALATION)
Qty: 10.2 G | Refills: 2 | Status: SHIPPED | OUTPATIENT
Start: 2022-10-18

## 2022-11-18 RX ORDER — ACETAMINOPHEN 500 MG
500 TABLET ORAL EVERY 6 HOURS PRN
Qty: 90 TABLET | Refills: 0 | Status: SHIPPED | OUTPATIENT
Start: 2022-11-18

## 2022-11-21 RX ORDER — GABAPENTIN 100 MG/1
CAPSULE ORAL
Qty: 60 CAPSULE | Refills: 2 | Status: SHIPPED | OUTPATIENT
Start: 2022-11-21 | End: 2022-12-21

## 2023-01-18 NOTE — TELEPHONE ENCOUNTER
Addended byAdina Craft on: 1/18/2023 01:01 PM     Modules accepted: Orders Can we please call pulmonology to see if we can get a sooner urgent appointment? Also please encourage to get cardiac work up completed.

## 2023-01-25 DIAGNOSIS — J43.9 PULMONARY EMPHYSEMA, UNSPECIFIED EMPHYSEMA TYPE (HCC): ICD-10-CM

## 2023-01-25 RX ORDER — BUDESONIDE AND FORMOTEROL FUMARATE DIHYDRATE 160; 4.5 UG/1; UG/1
AEROSOL RESPIRATORY (INHALATION)
Qty: 10.2 G | Refills: 2 | OUTPATIENT
Start: 2023-01-25

## 2023-04-03 DIAGNOSIS — R35.0 URINARY FREQUENCY: ICD-10-CM

## 2023-04-03 DIAGNOSIS — J43.9 PULMONARY EMPHYSEMA, UNSPECIFIED EMPHYSEMA TYPE (HCC): ICD-10-CM

## 2023-04-03 RX ORDER — LACTULOSE 10 G/15ML
20 SOLUTION ORAL 2 TIMES DAILY
Qty: 473 ML | Refills: 1 | Status: SHIPPED | OUTPATIENT
Start: 2023-04-03

## 2023-04-03 RX ORDER — GABAPENTIN 100 MG/1
100 CAPSULE ORAL 2 TIMES DAILY
Qty: 60 CAPSULE | Refills: 2 | Status: SHIPPED | OUTPATIENT
Start: 2023-04-03 | End: 2023-05-03

## 2023-04-03 RX ORDER — BUDESONIDE AND FORMOTEROL FUMARATE DIHYDRATE 160; 4.5 UG/1; UG/1
2 AEROSOL RESPIRATORY (INHALATION) 2 TIMES DAILY
Qty: 10.2 G | Refills: 2 | Status: SHIPPED | OUTPATIENT
Start: 2023-04-03

## 2023-04-03 RX ORDER — ALBUTEROL SULFATE 90 UG/1
1 AEROSOL, METERED RESPIRATORY (INHALATION) EVERY 6 HOURS PRN
Qty: 18 G | Refills: 2 | Status: SHIPPED | OUTPATIENT
Start: 2023-04-03

## 2023-04-03 RX ORDER — FUROSEMIDE 20 MG/1
20 TABLET ORAL DAILY
Qty: 90 TABLET | Refills: 0 | Status: SHIPPED | OUTPATIENT
Start: 2023-04-03

## 2023-04-03 RX ORDER — ACETAMINOPHEN 500 MG
500 TABLET ORAL EVERY 6 HOURS PRN
Qty: 90 TABLET | Refills: 0 | Status: SHIPPED | OUTPATIENT
Start: 2023-04-03

## 2023-04-03 RX ORDER — FLUTICASONE PROPIONATE 50 MCG
2 SPRAY, SUSPENSION (ML) NASAL DAILY
Qty: 16 G | Refills: 5 | Status: SHIPPED | OUTPATIENT
Start: 2023-04-03

## 2023-04-03 RX ORDER — POTASSIUM CHLORIDE 750 MG/1
10 TABLET, EXTENDED RELEASE ORAL DAILY
Qty: 30 TABLET | Refills: 2 | Status: SHIPPED | OUTPATIENT
Start: 2023-04-03

## 2023-12-26 ENCOUNTER — HOSPITAL ENCOUNTER (OUTPATIENT)
Age: 48
Setting detail: OBSERVATION
Discharge: HOME | End: 2023-12-29
Attending: STUDENT IN AN ORGANIZED HEALTH CARE EDUCATION/TRAINING PROGRAM | Admitting: STUDENT IN AN ORGANIZED HEALTH CARE EDUCATION/TRAINING PROGRAM
Payer: MEDICAID

## 2023-12-26 ENCOUNTER — APPOINTMENT (OUTPATIENT)
Dept: GENERAL RADIOLOGY | Age: 48
End: 2023-12-26
Payer: MEDICAID

## 2023-12-26 DIAGNOSIS — R79.89 ELEVATED BRAIN NATRIURETIC PEPTIDE (BNP) LEVEL: ICD-10-CM

## 2023-12-26 DIAGNOSIS — R79.89 ELEVATED TROPONIN: ICD-10-CM

## 2023-12-26 DIAGNOSIS — J10.1 INFLUENZA A: ICD-10-CM

## 2023-12-26 DIAGNOSIS — R07.82 INTERCOSTAL PAIN: Primary | ICD-10-CM

## 2023-12-26 LAB
ANION GAP SERPL CALCULATED.3IONS-SCNC: 14 MMOL/L (ref 7–16)
BASOPHILS ABSOLUTE: 0 K/CU MM
BASOPHILS RELATIVE PERCENT: 0.5 % (ref 0–1)
BUN SERPL-MCNC: 18 MG/DL (ref 6–23)
CALCIUM SERPL-MCNC: 9.4 MG/DL (ref 8.3–10.6)
CHLORIDE BLD-SCNC: 96 MMOL/L (ref 99–110)
CO2: 22 MMOL/L (ref 21–32)
CREAT SERPL-MCNC: 1.1 MG/DL (ref 0.6–1.1)
DIFFERENTIAL TYPE: ABNORMAL
EOSINOPHILS ABSOLUTE: 0 K/CU MM
EOSINOPHILS RELATIVE PERCENT: 0.2 % (ref 0–3)
GFR SERPL CREATININE-BSD FRML MDRD: >60 ML/MIN/1.73M2
GLUCOSE SERPL-MCNC: 107 MG/DL (ref 70–99)
HCT VFR BLD CALC: 43.4 % (ref 37–47)
HEMOGLOBIN: 14.3 GM/DL (ref 12.5–16)
IMMATURE NEUTROPHIL %: 0.2 % (ref 0–0.43)
INFLUENZA A ANTIGEN: DETECTED
INFLUENZA B ANTIGEN: NOT DETECTED
LYMPHOCYTES ABSOLUTE: 0.7 K/CU MM
LYMPHOCYTES RELATIVE PERCENT: 11.4 % (ref 24–44)
MAGNESIUM: 1.9 MG/DL (ref 1.8–2.4)
MCH RBC QN AUTO: 27.8 PG (ref 27–31)
MCHC RBC AUTO-ENTMCNC: 32.9 % (ref 32–36)
MCV RBC AUTO: 84.4 FL (ref 78–100)
MONOCYTES ABSOLUTE: 0.9 K/CU MM
MONOCYTES RELATIVE PERCENT: 14 % (ref 0–4)
NUCLEATED RBC %: 0 %
PDW BLD-RTO: 13.1 % (ref 11.7–14.9)
PLATELET # BLD: 180 K/CU MM (ref 140–440)
PMV BLD AUTO: 10.9 FL (ref 7.5–11.1)
POTASSIUM SERPL-SCNC: 3.6 MMOL/L (ref 3.5–5.1)
PRO-BNP: 1124 PG/ML
RBC # BLD: 5.14 M/CU MM (ref 4.2–5.4)
SARS-COV-2 RDRP RESP QL NAA+PROBE: NOT DETECTED
SEGMENTED NEUTROPHILS ABSOLUTE COUNT: 4.6 K/CU MM
SEGMENTED NEUTROPHILS RELATIVE PERCENT: 73.7 % (ref 36–66)
SODIUM BLD-SCNC: 132 MMOL/L (ref 135–145)
SOURCE: NORMAL
TOTAL IMMATURE NEUTOROPHIL: 0.01 K/CU MM
TOTAL NUCLEATED RBC: 0 K/CU MM
TROPONIN, HIGH SENSITIVITY: 22 NG/L (ref 0–14)
WBC # BLD: 6.2 K/CU MM (ref 4–10.5)

## 2023-12-26 PROCEDURE — 87635 SARS-COV-2 COVID-19 AMP PRB: CPT

## 2023-12-26 PROCEDURE — 84145 PROCALCITONIN (PCT): CPT

## 2023-12-26 PROCEDURE — 71046 X-RAY EXAM CHEST 2 VIEWS: CPT

## 2023-12-26 PROCEDURE — 83880 ASSAY OF NATRIURETIC PEPTIDE: CPT

## 2023-12-26 PROCEDURE — 80048 BASIC METABOLIC PNL TOTAL CA: CPT

## 2023-12-26 PROCEDURE — 99285 EMERGENCY DEPT VISIT HI MDM: CPT

## 2023-12-26 PROCEDURE — 6360000002 HC RX W HCPCS: Performed by: STUDENT IN AN ORGANIZED HEALTH CARE EDUCATION/TRAINING PROGRAM

## 2023-12-26 PROCEDURE — 6370000000 HC RX 637 (ALT 250 FOR IP): Performed by: STUDENT IN AN ORGANIZED HEALTH CARE EDUCATION/TRAINING PROGRAM

## 2023-12-26 PROCEDURE — 83735 ASSAY OF MAGNESIUM: CPT

## 2023-12-26 PROCEDURE — 93005 ELECTROCARDIOGRAM TRACING: CPT | Performed by: STUDENT IN AN ORGANIZED HEALTH CARE EDUCATION/TRAINING PROGRAM

## 2023-12-26 PROCEDURE — 84484 ASSAY OF TROPONIN QUANT: CPT

## 2023-12-26 PROCEDURE — 96374 THER/PROPH/DIAG INJ IV PUSH: CPT

## 2023-12-26 PROCEDURE — 85025 COMPLETE CBC W/AUTO DIFF WBC: CPT

## 2023-12-26 PROCEDURE — 85379 FIBRIN DEGRADATION QUANT: CPT

## 2023-12-26 PROCEDURE — 87502 INFLUENZA DNA AMP PROBE: CPT

## 2023-12-26 RX ORDER — LORAZEPAM 1 MG/1
1 TABLET ORAL ONCE
Status: COMPLETED | OUTPATIENT
Start: 2023-12-26 | End: 2023-12-26

## 2023-12-26 RX ORDER — DROPERIDOL 2.5 MG/ML
1.25 INJECTION, SOLUTION INTRAMUSCULAR; INTRAVENOUS ONCE
Status: COMPLETED | OUTPATIENT
Start: 2023-12-26 | End: 2023-12-26

## 2023-12-26 RX ORDER — LIDOCAINE 4 G/G
1 PATCH TOPICAL ONCE
Status: COMPLETED | OUTPATIENT
Start: 2023-12-26 | End: 2023-12-27

## 2023-12-26 RX ORDER — ACETAMINOPHEN 500 MG
1000 TABLET ORAL ONCE
Status: COMPLETED | OUTPATIENT
Start: 2023-12-26 | End: 2023-12-26

## 2023-12-26 RX ADMIN — DROPERIDOL 1.25 MG: 2.5 INJECTION, SOLUTION INTRAMUSCULAR; INTRAVENOUS at 23:01

## 2023-12-26 RX ADMIN — LORAZEPAM 1 MG: 1 TABLET ORAL at 22:51

## 2023-12-26 RX ADMIN — ACETAMINOPHEN 1000 MG: 500 TABLET ORAL at 22:51

## 2023-12-27 ENCOUNTER — APPOINTMENT (OUTPATIENT)
Dept: NUCLEAR MEDICINE | Age: 48
End: 2023-12-27
Payer: MEDICAID

## 2023-12-27 PROBLEM — J10.1 INFLUENZA A: Status: ACTIVE | Noted: 2023-12-27

## 2023-12-27 LAB
D DIMER: 0.56 UG/ML (FEU)
EKG ATRIAL RATE: 78 BPM
EKG DIAGNOSIS: NORMAL
EKG P AXIS: 31 DEGREES
EKG P-R INTERVAL: 148 MS
EKG Q-T INTERVAL: 384 MS
EKG QRS DURATION: 88 MS
EKG QTC CALCULATION (BAZETT): 437 MS
EKG R AXIS: 1 DEGREES
EKG T AXIS: 73 DEGREES
EKG VENTRICULAR RATE: 78 BPM
PROCALCITONIN SERPL-MCNC: 0.08 NG/ML
TROPONIN, HIGH SENSITIVITY: 19 NG/L (ref 0–14)

## 2023-12-27 PROCEDURE — 6370000000 HC RX 637 (ALT 250 FOR IP): Performed by: STUDENT IN AN ORGANIZED HEALTH CARE EDUCATION/TRAINING PROGRAM

## 2023-12-27 PROCEDURE — 84484 ASSAY OF TROPONIN QUANT: CPT

## 2023-12-27 PROCEDURE — 2580000003 HC RX 258: Performed by: STUDENT IN AN ORGANIZED HEALTH CARE EDUCATION/TRAINING PROGRAM

## 2023-12-27 PROCEDURE — 93010 ELECTROCARDIOGRAM REPORT: CPT | Performed by: INTERNAL MEDICINE

## 2023-12-27 PROCEDURE — 78582 LUNG VENTILAT&PERFUS IMAGING: CPT

## 2023-12-27 PROCEDURE — 6360000002 HC RX W HCPCS: Performed by: STUDENT IN AN ORGANIZED HEALTH CARE EDUCATION/TRAINING PROGRAM

## 2023-12-27 PROCEDURE — 6360000002 HC RX W HCPCS

## 2023-12-27 PROCEDURE — G0378 HOSPITAL OBSERVATION PER HR: HCPCS

## 2023-12-27 PROCEDURE — A9539 TC99M PENTETATE: HCPCS

## 2023-12-27 PROCEDURE — A9540 TC99M MAA: HCPCS

## 2023-12-27 PROCEDURE — 94761 N-INVAS EAR/PLS OXIMETRY MLT: CPT

## 2023-12-27 PROCEDURE — 3430000000 HC RX DIAGNOSTIC RADIOPHARMACEUTICAL

## 2023-12-27 RX ORDER — ENOXAPARIN SODIUM 100 MG/ML
40 INJECTION SUBCUTANEOUS DAILY
Status: DISCONTINUED | OUTPATIENT
Start: 2023-12-27 | End: 2023-12-29 | Stop reason: HOSPADM

## 2023-12-27 RX ORDER — MAGNESIUM SULFATE IN WATER 40 MG/ML
2000 INJECTION, SOLUTION INTRAVENOUS PRN
Status: DISCONTINUED | OUTPATIENT
Start: 2023-12-27 | End: 2023-12-29 | Stop reason: HOSPADM

## 2023-12-27 RX ORDER — LUBIPROSTONE 24 UG/1
24 CAPSULE ORAL 2 TIMES DAILY WITH MEALS
Status: DISCONTINUED | OUTPATIENT
Start: 2023-12-27 | End: 2023-12-29 | Stop reason: HOSPADM

## 2023-12-27 RX ORDER — HYDRALAZINE HYDROCHLORIDE 20 MG/ML
5 INJECTION INTRAMUSCULAR; INTRAVENOUS EVERY 6 HOURS PRN
Status: DISCONTINUED | OUTPATIENT
Start: 2023-12-27 | End: 2023-12-29 | Stop reason: HOSPADM

## 2023-12-27 RX ORDER — OSELTAMIVIR PHOSPHATE 30 MG/1
30 CAPSULE ORAL 2 TIMES DAILY
Status: DISCONTINUED | OUTPATIENT
Start: 2023-12-27 | End: 2023-12-29 | Stop reason: HOSPADM

## 2023-12-27 RX ORDER — FUROSEMIDE 10 MG/ML
20 INJECTION INTRAMUSCULAR; INTRAVENOUS ONCE
Status: COMPLETED | OUTPATIENT
Start: 2023-12-27 | End: 2023-12-27

## 2023-12-27 RX ORDER — OSELTAMIVIR PHOSPHATE 75 MG/1
75 CAPSULE ORAL ONCE
Status: COMPLETED | OUTPATIENT
Start: 2023-12-27 | End: 2023-12-27

## 2023-12-27 RX ORDER — PANTOPRAZOLE SODIUM 40 MG/1
40 TABLET, DELAYED RELEASE ORAL
Status: DISCONTINUED | OUTPATIENT
Start: 2023-12-28 | End: 2023-12-29 | Stop reason: HOSPADM

## 2023-12-27 RX ORDER — ACETAMINOPHEN 650 MG/1
650 SUPPOSITORY RECTAL EVERY 6 HOURS PRN
Status: DISCONTINUED | OUTPATIENT
Start: 2023-12-27 | End: 2023-12-29 | Stop reason: HOSPADM

## 2023-12-27 RX ORDER — TRAMADOL HYDROCHLORIDE 50 MG/1
50 TABLET ORAL
Status: COMPLETED | OUTPATIENT
Start: 2023-12-27 | End: 2023-12-28

## 2023-12-27 RX ORDER — OSELTAMIVIR PHOSPHATE 75 MG/1
75 CAPSULE ORAL 2 TIMES DAILY
Status: DISCONTINUED | OUTPATIENT
Start: 2023-12-27 | End: 2023-12-27

## 2023-12-27 RX ORDER — DICYCLOMINE HYDROCHLORIDE 10 MG/1
10 CAPSULE ORAL
Status: DISCONTINUED | OUTPATIENT
Start: 2023-12-27 | End: 2023-12-29 | Stop reason: HOSPADM

## 2023-12-27 RX ORDER — SODIUM CHLORIDE 9 MG/ML
INJECTION, SOLUTION INTRAVENOUS PRN
Status: DISCONTINUED | OUTPATIENT
Start: 2023-12-27 | End: 2023-12-29 | Stop reason: HOSPADM

## 2023-12-27 RX ORDER — KIT FOR THE PREPARATION OF TECHNETIUM TC 99M PENTETATE 20 MG/1
3 INJECTION, POWDER, LYOPHILIZED, FOR SOLUTION INTRAVENOUS; RESPIRATORY (INHALATION)
Status: COMPLETED | OUTPATIENT
Start: 2023-12-27 | End: 2023-12-27

## 2023-12-27 RX ORDER — POTASSIUM CHLORIDE 20 MEQ/1
40 TABLET, EXTENDED RELEASE ORAL PRN
Status: DISCONTINUED | OUTPATIENT
Start: 2023-12-27 | End: 2023-12-29 | Stop reason: HOSPADM

## 2023-12-27 RX ORDER — ACETAMINOPHEN 325 MG/1
650 TABLET ORAL EVERY 6 HOURS PRN
Status: DISCONTINUED | OUTPATIENT
Start: 2023-12-27 | End: 2023-12-29 | Stop reason: HOSPADM

## 2023-12-27 RX ORDER — ONDANSETRON 4 MG/1
4 TABLET, ORALLY DISINTEGRATING ORAL EVERY 8 HOURS PRN
Status: DISCONTINUED | OUTPATIENT
Start: 2023-12-27 | End: 2023-12-29 | Stop reason: HOSPADM

## 2023-12-27 RX ORDER — POTASSIUM CHLORIDE 7.45 MG/ML
10 INJECTION INTRAVENOUS PRN
Status: DISCONTINUED | OUTPATIENT
Start: 2023-12-27 | End: 2023-12-29 | Stop reason: HOSPADM

## 2023-12-27 RX ORDER — POLYETHYLENE GLYCOL 3350 17 G/17G
17 POWDER, FOR SOLUTION ORAL DAILY PRN
Status: DISCONTINUED | OUTPATIENT
Start: 2023-12-27 | End: 2023-12-29 | Stop reason: HOSPADM

## 2023-12-27 RX ORDER — HYDROCHLOROTHIAZIDE 25 MG/1
25 TABLET ORAL DAILY
Status: DISCONTINUED | OUTPATIENT
Start: 2023-12-27 | End: 2023-12-29 | Stop reason: HOSPADM

## 2023-12-27 RX ORDER — ONDANSETRON 2 MG/ML
4 INJECTION INTRAMUSCULAR; INTRAVENOUS EVERY 6 HOURS PRN
Status: DISCONTINUED | OUTPATIENT
Start: 2023-12-27 | End: 2023-12-29 | Stop reason: HOSPADM

## 2023-12-27 RX ORDER — SODIUM CHLORIDE 0.9 % (FLUSH) 0.9 %
5-40 SYRINGE (ML) INJECTION PRN
Status: DISCONTINUED | OUTPATIENT
Start: 2023-12-27 | End: 2023-12-29 | Stop reason: HOSPADM

## 2023-12-27 RX ORDER — AMITRIPTYLINE HYDROCHLORIDE 10 MG/1
10 TABLET, FILM COATED ORAL NIGHTLY
Status: DISCONTINUED | OUTPATIENT
Start: 2023-12-27 | End: 2023-12-29 | Stop reason: HOSPADM

## 2023-12-27 RX ORDER — GABAPENTIN 100 MG/1
100 CAPSULE ORAL 2 TIMES DAILY
Status: DISCONTINUED | OUTPATIENT
Start: 2023-12-27 | End: 2023-12-29 | Stop reason: HOSPADM

## 2023-12-27 RX ORDER — SODIUM CHLORIDE 0.9 % (FLUSH) 0.9 %
5-40 SYRINGE (ML) INJECTION EVERY 12 HOURS SCHEDULED
Status: DISCONTINUED | OUTPATIENT
Start: 2023-12-27 | End: 2023-12-29 | Stop reason: HOSPADM

## 2023-12-27 RX ORDER — ALBUTEROL SULFATE 90 UG/1
1 AEROSOL, METERED RESPIRATORY (INHALATION) EVERY 6 HOURS PRN
Status: DISCONTINUED | OUTPATIENT
Start: 2023-12-27 | End: 2023-12-29 | Stop reason: HOSPADM

## 2023-12-27 RX ORDER — BUDESONIDE AND FORMOTEROL FUMARATE DIHYDRATE 160; 4.5 UG/1; UG/1
2 AEROSOL RESPIRATORY (INHALATION) 2 TIMES DAILY
Status: DISCONTINUED | OUTPATIENT
Start: 2023-12-27 | End: 2023-12-29 | Stop reason: HOSPADM

## 2023-12-27 RX ADMIN — Medication 3 MILLICURIE: at 15:54

## 2023-12-27 RX ADMIN — AMITRIPTYLINE HYDROCHLORIDE 10 MG: 10 TABLET, FILM COATED ORAL at 20:25

## 2023-12-27 RX ADMIN — Medication 6 MILLICURIE: at 15:47

## 2023-12-27 RX ADMIN — FUROSEMIDE 20 MG: 10 INJECTION, SOLUTION INTRAMUSCULAR; INTRAVENOUS at 17:13

## 2023-12-27 RX ADMIN — OSELTAMIVIR PHOSPHATE 30 MG: 30 CAPSULE ORAL at 20:25

## 2023-12-27 RX ADMIN — ACETAMINOPHEN 650 MG: 325 TABLET ORAL at 19:43

## 2023-12-27 RX ADMIN — SODIUM CHLORIDE, PRESERVATIVE FREE 10 ML: 5 INJECTION INTRAVENOUS at 19:45

## 2023-12-27 RX ADMIN — ACETAMINOPHEN 650 MG: 325 TABLET ORAL at 13:16

## 2023-12-27 RX ADMIN — OSELTAMIVIR PHOSPHATE 75 MG: 75 CAPSULE ORAL at 06:16

## 2023-12-27 RX ADMIN — DICYCLOMINE HYDROCHLORIDE 10 MG: 10 CAPSULE ORAL at 13:05

## 2023-12-27 RX ADMIN — GABAPENTIN 100 MG: 100 CAPSULE ORAL at 13:05

## 2023-12-27 RX ADMIN — GABAPENTIN 100 MG: 100 CAPSULE ORAL at 19:43

## 2023-12-27 RX ADMIN — ONDANSETRON 4 MG: 2 INJECTION INTRAMUSCULAR; INTRAVENOUS at 19:59

## 2023-12-27 RX ADMIN — HYDROCHLOROTHIAZIDE 25 MG: 25 TABLET ORAL at 13:04

## 2023-12-27 RX ADMIN — ENOXAPARIN SODIUM 40 MG: 100 INJECTION SUBCUTANEOUS at 10:26

## 2023-12-27 RX ADMIN — DICYCLOMINE HYDROCHLORIDE 10 MG: 10 CAPSULE ORAL at 17:13

## 2023-12-27 RX ADMIN — HYDRALAZINE HYDROCHLORIDE 5 MG: 20 INJECTION INTRAMUSCULAR; INTRAVENOUS at 13:58

## 2023-12-27 RX ADMIN — DICYCLOMINE HYDROCHLORIDE 10 MG: 10 CAPSULE ORAL at 19:43

## 2023-12-27 RX ADMIN — LUBIPROSTONE 24 MCG: 24 CAPSULE ORAL at 13:05

## 2023-12-27 RX ADMIN — LUBIPROSTONE 24 MCG: 24 CAPSULE ORAL at 17:13

## 2023-12-27 RX ADMIN — ACETAMINOPHEN 650 MG: 325 TABLET ORAL at 06:16

## 2023-12-27 NOTE — ED NOTES
ED TO INPATIENT SBAR HANDOFF    Patient Name: Kelley Simmons   :  1975  46 y.o. Preferred Name  Karyn  Family/Caregiver Present no   Restraints no   C-SSRS: Risk of Suicide: No Risk  Sitter no   Sepsis Risk Score Sepsis Risk Score: 0.75      Situation  Chief Complaint   Patient presents with    Arm Pain     X1 day 10/10 pain in L arm and back     Brief Description of Patient's Condition: Patient came in for having pain down her left arm and back. Patient being admitted for elevated troponin and BNP. Patient also positive for flu. Patient Aox4 IV in RAC and independent of ADL's  Mental Status: oriented, alert, coherent, logical, thought processes intact, and able to concentrate and follow conversation  Arrived from: home    Imaging:   XR CHEST (2 VW)   Final Result   1. Bilateral basilar and perihilar predominant heterogeneous opacities may   represent subsegmental atelectasis, mild pulmonary edema or an   infectious/inflammatory process. 2.  Cardiomegaly.            Abnormal labs:   Abnormal Labs Reviewed   INFLUENZA A/B, MOLECULAR - Abnormal; Notable for the following components:       Result Value    Influenza A Antigen DETECTED (*)     All other components within normal limits   CBC WITH AUTO DIFFERENTIAL - Abnormal; Notable for the following components:    Segs Relative 73.7 (*)     Lymphocytes % 11.4 (*)     Monocytes % 14.0 (*)     All other components within normal limits   TROPONIN - Abnormal; Notable for the following components:    Troponin, High Sensitivity 22 (*)     All other components within normal limits   TROPONIN - Abnormal; Notable for the following components:    Troponin, High Sensitivity 19 (*)     All other components within normal limits   BRAIN NATRIURETIC PEPTIDE - Abnormal; Notable for the following components:    Pro-BNP 1,124 (*)     All other components within normal limits   BASIC METABOLIC PANEL - Abnormal; Notable for the following components:    Sodium 132 (*)

## 2023-12-27 NOTE — ED NOTES
ED TO INPATIENT SBAR HANDOFF    Patient Name: Michael Pelayor   :  1975  46 y.o. Preferred Name  Phi Crockett  Family/Caregiver Present no   Restraints no   C-SSRS: Risk of Suicide: No Risk  Sitter no   Sepsis Risk Score Sepsis Risk Score: 0.68      Situation  Chief Complaint   Patient presents with    Arm Pain     X1 day 10/10 pain in L arm and back     Brief Description of Patient's Condition: presents with complaint of arm pain   Mental Status: alert  Arrived from: home    Imaging:   XR CHEST (2 VW)   Final Result   1. Bilateral basilar and perihilar predominant heterogeneous opacities may   represent subsegmental atelectasis, mild pulmonary edema or an   infectious/inflammatory process. 2.  Cardiomegaly.          NM LUNG VENT/PERFUSION (VQ)    (Results Pending)     Abnormal labs:   Abnormal Labs Reviewed   INFLUENZA A/B, MOLECULAR - Abnormal; Notable for the following components:       Result Value    Influenza A Antigen DETECTED (*)     All other components within normal limits   CBC WITH AUTO DIFFERENTIAL - Abnormal; Notable for the following components:    Segs Relative 73.7 (*)     Lymphocytes % 11.4 (*)     Monocytes % 14.0 (*)     All other components within normal limits   TROPONIN - Abnormal; Notable for the following components:    Troponin, High Sensitivity 22 (*)     All other components within normal limits   TROPONIN - Abnormal; Notable for the following components:    Troponin, High Sensitivity 19 (*)     All other components within normal limits   BRAIN NATRIURETIC PEPTIDE - Abnormal; Notable for the following components:    Pro-BNP 1,124 (*)     All other components within normal limits   BASIC METABOLIC PANEL - Abnormal; Notable for the following components:    Sodium 132 (*)     Chloride 96 (*)     Glucose 107 (*)     All other components within normal limits   D-DIMER, RAPID - Abnormal; Notable for the following components:    D-Dimer, Quant 0.56 (*)     All other components within

## 2023-12-27 NOTE — PLAN OF CARE
Problem: Discharge Planning  Goal: Discharge to home or other facility with appropriate resources  Outcome: Progressing  Flowsheets (Taken 12/27/2023 6895)  Discharge to home or other facility with appropriate resources:   Identify barriers to discharge with patient and caregiver   Identify discharge learning needs (meds, wound care, etc)   Refer to discharge planning if patient needs post-hospital services based on physician order or complex needs related to functional status, cognitive ability or social support system   Arrange for needed discharge resources and transportation as appropriate     Problem: Pain  Goal: Verbalizes/displays adequate comfort level or baseline comfort level  Outcome: Progressing     Problem: Chronic Conditions and Co-morbidities  Goal: Patient's chronic conditions and co-morbidity symptoms are monitored and maintained or improved  Outcome: Progressing

## 2023-12-27 NOTE — H&P
workup     Blood Cultures: No results found for: \"BC\"  No results found for: \"BLOODCULT2\"  Organism: No results found for: \"ORG\"    Radiology results:  XR CHEST (2 VW)   Final Result   1. Bilateral basilar and perihilar predominant heterogeneous opacities may   represent subsegmental atelectasis, mild pulmonary edema or an   infectious/inflammatory process. 2.  Cardiomegaly.              Rajwinder Galo MD  12/27/23 4:56 AM

## 2023-12-27 NOTE — ED PROVIDER NOTES
electronically transcribed, and parts may have been transcribed with the assistance of an ED scribe and may contain errors related to that system including errors in grammar, punctuation, and spelling, as well as words and phrases that may be inappropriate. The transcription may contain errors not detected in proofreading. Efforts were made to edit the dictations. Electronically Signed: Miya Wasserman MD, 12/27/23, 6:06 AM    I am the Primary Clinician of Record. Clinical Impression:  1. Intercostal pain    2. Influenza A    3. Elevated troponin    4. Elevated brain natriuretic peptide (BNP) level      Disposition referral (if applicable):  No follow-up provider specified.   Disposition medications (if applicable):  New Prescriptions    No medications on file     ED Provider Disposition Time  DISPOSITION Admitted 12/27/2023 05:06:43 AM            Miya Wasserman MD  Resident  12/27/23 0034

## 2023-12-28 ENCOUNTER — APPOINTMENT (OUTPATIENT)
Dept: NON INVASIVE DIAGNOSTICS | Age: 48
End: 2023-12-28
Payer: MEDICAID

## 2023-12-28 LAB
ALBUMIN SERPL-MCNC: 3.9 GM/DL (ref 3.4–5)
ALP BLD-CCNC: 76 IU/L (ref 40–129)
ALT SERPL-CCNC: 12 U/L (ref 10–40)
ANION GAP SERPL CALCULATED.3IONS-SCNC: 13 MMOL/L (ref 7–16)
AST SERPL-CCNC: 14 IU/L (ref 15–37)
BASOPHILS ABSOLUTE: 0 K/CU MM
BASOPHILS RELATIVE PERCENT: 0.4 % (ref 0–1)
BILIRUB SERPL-MCNC: 0.2 MG/DL (ref 0–1)
BUN SERPL-MCNC: 20 MG/DL (ref 6–23)
CALCIUM SERPL-MCNC: 8.9 MG/DL (ref 8.3–10.6)
CHLORIDE BLD-SCNC: 94 MMOL/L (ref 99–110)
CO2: 24 MMOL/L (ref 21–32)
CREAT SERPL-MCNC: 1.4 MG/DL (ref 0.6–1.1)
DIFFERENTIAL TYPE: ABNORMAL
ECHO AO ROOT DIAM: 3 CM
ECHO AO ROOT INDEX: 1.63 CM/M2
ECHO AV AREA PEAK VELOCITY: 2.6 CM2
ECHO AV AREA VTI: 2.7 CM2
ECHO AV AREA/BSA PEAK VELOCITY: 1.4 CM2/M2
ECHO AV AREA/BSA VTI: 1.5 CM2/M2
ECHO AV MEAN GRADIENT: 4 MMHG
ECHO AV MEAN VELOCITY: 0.9 M/S
ECHO AV PEAK GRADIENT: 8 MMHG
ECHO AV PEAK VELOCITY: 1.4 M/S
ECHO AV VELOCITY RATIO: 0.79
ECHO AV VTI: 24.4 CM
ECHO BSA: 1.91 M2
ECHO EST RA PRESSURE: 3 MMHG
ECHO LA AREA 4C: 22.2 CM2
ECHO LA MAJOR AXIS: 5.7 CM
ECHO LA VOL MOD A4C: 70 ML (ref 22–52)
ECHO LA VOLUME INDEX MOD A4C: 38 ML/M2 (ref 16–34)
ECHO LV EDV A2C: 106 ML
ECHO LV EDV A4C: 104 ML
ECHO LV EDV INDEX A4C: 57 ML/M2
ECHO LV EDV NDEX A2C: 58 ML/M2
ECHO LV EJECTION FRACTION A2C: 51 %
ECHO LV EJECTION FRACTION A4C: 38 %
ECHO LV EJECTION FRACTION BIPLANE: 47 % (ref 55–100)
ECHO LV ESV A2C: 52 ML
ECHO LV ESV A4C: 65 ML
ECHO LV ESV INDEX A2C: 28 ML/M2
ECHO LV ESV INDEX A4C: 35 ML/M2
ECHO LV FRACTIONAL SHORTENING: 21 % (ref 28–44)
ECHO LV INTERNAL DIMENSION DIASTOLE INDEX: 2.88 CM/M2
ECHO LV INTERNAL DIMENSION DIASTOLIC: 5.3 CM (ref 3.9–5.3)
ECHO LV INTERNAL DIMENSION SYSTOLIC INDEX: 2.28 CM/M2
ECHO LV INTERNAL DIMENSION SYSTOLIC: 4.2 CM
ECHO LV IVSD: 1.3 CM (ref 0.6–0.9)
ECHO LV MASS 2D: 271.6 G (ref 67–162)
ECHO LV MASS INDEX 2D: 147.6 G/M2 (ref 43–95)
ECHO LV POSTERIOR WALL DIASTOLIC: 1.2 CM (ref 0.6–0.9)
ECHO LV RELATIVE WALL THICKNESS RATIO: 0.45
ECHO LVOT AREA: 3.5 CM2
ECHO LVOT AV VTI INDEX: 0.79
ECHO LVOT DIAM: 2.1 CM
ECHO LVOT MEAN GRADIENT: 2 MMHG
ECHO LVOT PEAK GRADIENT: 5 MMHG
ECHO LVOT PEAK VELOCITY: 1.1 M/S
ECHO LVOT STROKE VOLUME INDEX: 36.1 ML/M2
ECHO LVOT SV: 66.5 ML
ECHO LVOT VTI: 19.2 CM
ECHO MV A VELOCITY: 0.69 M/S
ECHO MV E DECELERATION TIME (DT): 204 MS
ECHO MV E VELOCITY: 1.03 M/S
ECHO MV E/A RATIO: 1.49
ECHO MV REGURGITANT ALIASING (NYQUIST) VELOCITY: 39 CM/S
ECHO MV REGURGITANT PEAK GRADIENT: 117 MMHG
ECHO MV REGURGITANT PEAK VELOCITY: 5.4 M/S
ECHO MV REGURGITANT RADIUS PISA: 1 CM
ECHO MV REGURGITANT VTIA: 174 CM
ECHO RIGHT VENTRICULAR SYSTOLIC PRESSURE (RVSP): 21 MMHG
ECHO TV REGURGITANT MAX VELOCITY: 2.12 M/S
ECHO TV REGURGITANT PEAK GRADIENT: 18 MMHG
EOSINOPHILS ABSOLUTE: 0 K/CU MM
EOSINOPHILS RELATIVE PERCENT: 1.3 % (ref 0–3)
GFR SERPL CREATININE-BSD FRML MDRD: 46 ML/MIN/1.73M2
GLUCOSE SERPL-MCNC: 117 MG/DL (ref 70–99)
HCT VFR BLD CALC: 42.8 % (ref 37–47)
HEMOGLOBIN: 14.5 GM/DL (ref 12.5–16)
IMMATURE NEUTROPHIL %: 0 % (ref 0–0.43)
INR BLD: 0.9 INDEX
LYMPHOCYTES ABSOLUTE: 0.7 K/CU MM
LYMPHOCYTES RELATIVE PERCENT: 32.9 % (ref 24–44)
MAGNESIUM: 2.2 MG/DL (ref 1.8–2.4)
MCH RBC QN AUTO: 27.6 PG (ref 27–31)
MCHC RBC AUTO-ENTMCNC: 33.9 % (ref 32–36)
MCV RBC AUTO: 81.5 FL (ref 78–100)
MONOCYTES ABSOLUTE: 0.5 K/CU MM
MONOCYTES RELATIVE PERCENT: 20.9 % (ref 0–4)
NUCLEATED RBC %: 0 %
PDW BLD-RTO: 13.2 % (ref 11.7–14.9)
PLATELET # BLD: 160 K/CU MM (ref 140–440)
PMV BLD AUTO: 10.8 FL (ref 7.5–11.1)
POTASSIUM SERPL-SCNC: 3.3 MMOL/L (ref 3.5–5.1)
PROTHROMBIN TIME: 12.5 SECONDS (ref 11.7–14.5)
RBC # BLD: 5.25 M/CU MM (ref 4.2–5.4)
SEGMENTED NEUTROPHILS ABSOLUTE COUNT: 1 K/CU MM
SEGMENTED NEUTROPHILS RELATIVE PERCENT: 44.5 % (ref 36–66)
SODIUM BLD-SCNC: 131 MMOL/L (ref 135–145)
TOTAL IMMATURE NEUTOROPHIL: 0 K/CU MM
TOTAL NUCLEATED RBC: 0 K/CU MM
TOTAL PROTEIN: 6.7 GM/DL (ref 6.4–8.2)
WBC # BLD: 2.3 K/CU MM (ref 4–10.5)

## 2023-12-28 PROCEDURE — 94761 N-INVAS EAR/PLS OXIMETRY MLT: CPT

## 2023-12-28 PROCEDURE — 6360000002 HC RX W HCPCS: Performed by: STUDENT IN AN ORGANIZED HEALTH CARE EDUCATION/TRAINING PROGRAM

## 2023-12-28 PROCEDURE — 6370000000 HC RX 637 (ALT 250 FOR IP): Performed by: FAMILY MEDICINE

## 2023-12-28 PROCEDURE — 2580000003 HC RX 258: Performed by: STUDENT IN AN ORGANIZED HEALTH CARE EDUCATION/TRAINING PROGRAM

## 2023-12-28 PROCEDURE — 80053 COMPREHEN METABOLIC PANEL: CPT

## 2023-12-28 PROCEDURE — 85025 COMPLETE CBC W/AUTO DIFF WBC: CPT

## 2023-12-28 PROCEDURE — 6370000000 HC RX 637 (ALT 250 FOR IP): Performed by: STUDENT IN AN ORGANIZED HEALTH CARE EDUCATION/TRAINING PROGRAM

## 2023-12-28 PROCEDURE — 6370000000 HC RX 637 (ALT 250 FOR IP)

## 2023-12-28 PROCEDURE — 94640 AIRWAY INHALATION TREATMENT: CPT

## 2023-12-28 PROCEDURE — G0378 HOSPITAL OBSERVATION PER HR: HCPCS

## 2023-12-28 PROCEDURE — 83735 ASSAY OF MAGNESIUM: CPT

## 2023-12-28 PROCEDURE — 85610 PROTHROMBIN TIME: CPT

## 2023-12-28 PROCEDURE — 36415 COLL VENOUS BLD VENIPUNCTURE: CPT

## 2023-12-28 PROCEDURE — 93306 TTE W/DOPPLER COMPLETE: CPT

## 2023-12-28 RX ORDER — TRAMADOL HYDROCHLORIDE 50 MG/1
50 TABLET ORAL ONCE
Status: COMPLETED | OUTPATIENT
Start: 2023-12-28 | End: 2023-12-28

## 2023-12-28 RX ADMIN — BUDESONIDE AND FORMOTEROL FUMARATE DIHYDRATE 2 PUFF: 160; 4.5 AEROSOL RESPIRATORY (INHALATION) at 07:41

## 2023-12-28 RX ADMIN — LUBIPROSTONE 24 MCG: 24 CAPSULE ORAL at 16:24

## 2023-12-28 RX ADMIN — DICYCLOMINE HYDROCHLORIDE 10 MG: 10 CAPSULE ORAL at 20:56

## 2023-12-28 RX ADMIN — OSELTAMIVIR PHOSPHATE 30 MG: 30 CAPSULE ORAL at 08:21

## 2023-12-28 RX ADMIN — AMITRIPTYLINE HYDROCHLORIDE 10 MG: 10 TABLET, FILM COATED ORAL at 20:56

## 2023-12-28 RX ADMIN — GABAPENTIN 100 MG: 100 CAPSULE ORAL at 08:22

## 2023-12-28 RX ADMIN — BUDESONIDE AND FORMOTEROL FUMARATE DIHYDRATE 2 PUFF: 160; 4.5 AEROSOL RESPIRATORY (INHALATION) at 21:05

## 2023-12-28 RX ADMIN — GABAPENTIN 100 MG: 100 CAPSULE ORAL at 20:56

## 2023-12-28 RX ADMIN — POTASSIUM BICARBONATE 40 MEQ: 782 TABLET, EFFERVESCENT ORAL at 11:57

## 2023-12-28 RX ADMIN — ENOXAPARIN SODIUM 40 MG: 100 INJECTION SUBCUTANEOUS at 08:22

## 2023-12-28 RX ADMIN — DICYCLOMINE HYDROCHLORIDE 10 MG: 10 CAPSULE ORAL at 05:14

## 2023-12-28 RX ADMIN — TRAMADOL HYDROCHLORIDE 50 MG: 50 TABLET, COATED ORAL at 11:58

## 2023-12-28 RX ADMIN — ACETAMINOPHEN 650 MG: 325 TABLET ORAL at 16:24

## 2023-12-28 RX ADMIN — DICYCLOMINE HYDROCHLORIDE 10 MG: 10 CAPSULE ORAL at 16:24

## 2023-12-28 RX ADMIN — DICYCLOMINE HYDROCHLORIDE 10 MG: 10 CAPSULE ORAL at 11:00

## 2023-12-28 RX ADMIN — OSELTAMIVIR PHOSPHATE 30 MG: 30 CAPSULE ORAL at 20:56

## 2023-12-28 RX ADMIN — TRAMADOL HYDROCHLORIDE 50 MG: 50 TABLET, COATED ORAL at 00:46

## 2023-12-28 RX ADMIN — SODIUM CHLORIDE, PRESERVATIVE FREE 10 ML: 5 INJECTION INTRAVENOUS at 08:22

## 2023-12-28 RX ADMIN — ACETAMINOPHEN 650 MG: 325 TABLET ORAL at 05:14

## 2023-12-28 RX ADMIN — ONDANSETRON 4 MG: 2 INJECTION INTRAMUSCULAR; INTRAVENOUS at 21:03

## 2023-12-28 RX ADMIN — ONDANSETRON 4 MG: 2 INJECTION INTRAMUSCULAR; INTRAVENOUS at 03:15

## 2023-12-28 RX ADMIN — LUBIPROSTONE 24 MCG: 24 CAPSULE ORAL at 08:22

## 2023-12-28 RX ADMIN — PANTOPRAZOLE SODIUM 40 MG: 40 TABLET, DELAYED RELEASE ORAL at 05:14

## 2023-12-28 RX ADMIN — SODIUM CHLORIDE, PRESERVATIVE FREE 10 ML: 5 INJECTION INTRAVENOUS at 20:57

## 2023-12-29 VITALS
OXYGEN SATURATION: 96 % | HEART RATE: 67 BPM | BODY MASS INDEX: 35.3 KG/M2 | TEMPERATURE: 97.5 F | DIASTOLIC BLOOD PRESSURE: 66 MMHG | WEIGHT: 187 LBS | SYSTOLIC BLOOD PRESSURE: 132 MMHG | RESPIRATION RATE: 19 BRPM | HEIGHT: 61 IN

## 2023-12-29 LAB
ALBUMIN SERPL-MCNC: 3.7 GM/DL (ref 3.4–5)
ALP BLD-CCNC: 90 IU/L (ref 40–128)
ALT SERPL-CCNC: 11 U/L (ref 10–40)
ANION GAP SERPL CALCULATED.3IONS-SCNC: 11 MMOL/L (ref 7–16)
AST SERPL-CCNC: 15 IU/L (ref 15–37)
B PARAP IS1001 DNA NPH QL NAA+NON-PROBE: NOT DETECTED
B PERT.PT PRMT NPH QL NAA+NON-PROBE: NOT DETECTED
BASOPHILS ABSOLUTE: 0 K/CU MM
BASOPHILS RELATIVE PERCENT: 0.5 % (ref 0–1)
BILIRUB SERPL-MCNC: 0.2 MG/DL (ref 0–1)
BUN SERPL-MCNC: 23 MG/DL (ref 6–23)
C PNEUM DNA NPH QL NAA+NON-PROBE: NOT DETECTED
CALCIUM SERPL-MCNC: 8.1 MG/DL (ref 8.3–10.6)
CHLORIDE BLD-SCNC: 95 MMOL/L (ref 99–110)
CO2: 25 MMOL/L (ref 21–32)
CREAT SERPL-MCNC: 1.2 MG/DL (ref 0.6–1.1)
DIFFERENTIAL TYPE: ABNORMAL
EOSINOPHILS ABSOLUTE: 0.1 K/CU MM
EOSINOPHILS RELATIVE PERCENT: 1.3 % (ref 0–3)
FLUAV H1 2009 PAN RNA NPH NAA+NON-PROBE: NOT DETECTED
FLUAV H1 RNA NPH QL NAA+NON-PROBE: NOT DETECTED
FLUAV H3 RNA NPH QL NAA+NON-PROBE: NOT DETECTED
FLUAV RNA NPH QL NAA+NON-PROBE: NOT DETECTED
FLUBV RNA NPH QL NAA+NON-PROBE: NOT DETECTED
GFR SERPL CREATININE-BSD FRML MDRD: 55 ML/MIN/1.73M2
GLUCOSE SERPL-MCNC: 91 MG/DL (ref 70–99)
HADV DNA NPH QL NAA+NON-PROBE: NOT DETECTED
HCOV 229E RNA NPH QL NAA+NON-PROBE: NOT DETECTED
HCOV HKU1 RNA NPH QL NAA+NON-PROBE: NOT DETECTED
HCOV NL63 RNA NPH QL NAA+NON-PROBE: NOT DETECTED
HCOV OC43 RNA NPH QL NAA+NON-PROBE: NOT DETECTED
HCT VFR BLD CALC: 40.2 % (ref 37–47)
HEMOGLOBIN: 13.6 GM/DL (ref 12.5–16)
HMPV RNA NPH QL NAA+NON-PROBE: NOT DETECTED
HPIV1 RNA NPH QL NAA+NON-PROBE: NOT DETECTED
HPIV2 RNA NPH QL NAA+NON-PROBE: NOT DETECTED
HPIV3 RNA NPH QL NAA+NON-PROBE: NOT DETECTED
HPIV4 RNA NPH QL NAA+NON-PROBE: NOT DETECTED
IMMATURE NEUTROPHIL %: 0.3 % (ref 0–0.43)
LYMPHOCYTES ABSOLUTE: 1 K/CU MM
LYMPHOCYTES RELATIVE PERCENT: 25.5 % (ref 24–44)
M PNEUMO DNA NPH QL NAA+NON-PROBE: NOT DETECTED
MCH RBC QN AUTO: 27.4 PG (ref 27–31)
MCHC RBC AUTO-ENTMCNC: 33.8 % (ref 32–36)
MCV RBC AUTO: 81 FL (ref 78–100)
MONOCYTES ABSOLUTE: 0.7 K/CU MM
MONOCYTES RELATIVE PERCENT: 18.8 % (ref 0–4)
NUCLEATED RBC %: 0 %
PDW BLD-RTO: 13.4 % (ref 11.7–14.9)
PLATELET # BLD: 171 K/CU MM (ref 140–440)
PMV BLD AUTO: 11.4 FL (ref 7.5–11.1)
POTASSIUM SERPL-SCNC: 3.9 MMOL/L (ref 3.5–5.1)
RBC # BLD: 4.96 M/CU MM (ref 4.2–5.4)
RSV RNA NPH QL NAA+NON-PROBE: NOT DETECTED
RV+EV RNA NPH QL NAA+NON-PROBE: NOT DETECTED
SARS-COV-2 RNA NPH QL NAA+NON-PROBE: NOT DETECTED
SEGMENTED NEUTROPHILS ABSOLUTE COUNT: 2 K/CU MM
SEGMENTED NEUTROPHILS RELATIVE PERCENT: 53.6 % (ref 36–66)
SODIUM BLD-SCNC: 131 MMOL/L (ref 135–145)
TOTAL IMMATURE NEUTOROPHIL: 0.01 K/CU MM
TOTAL NUCLEATED RBC: 0 K/CU MM
TOTAL PROTEIN: 5.8 GM/DL (ref 6.4–8.2)
WBC # BLD: 3.7 K/CU MM (ref 4–10.5)

## 2023-12-29 PROCEDURE — 94640 AIRWAY INHALATION TREATMENT: CPT

## 2023-12-29 PROCEDURE — 0202U NFCT DS 22 TRGT SARS-COV-2: CPT

## 2023-12-29 PROCEDURE — 6370000000 HC RX 637 (ALT 250 FOR IP): Performed by: STUDENT IN AN ORGANIZED HEALTH CARE EDUCATION/TRAINING PROGRAM

## 2023-12-29 PROCEDURE — 85025 COMPLETE CBC W/AUTO DIFF WBC: CPT

## 2023-12-29 PROCEDURE — 36415 COLL VENOUS BLD VENIPUNCTURE: CPT

## 2023-12-29 PROCEDURE — 6360000002 HC RX W HCPCS: Performed by: STUDENT IN AN ORGANIZED HEALTH CARE EDUCATION/TRAINING PROGRAM

## 2023-12-29 PROCEDURE — 2580000003 HC RX 258: Performed by: STUDENT IN AN ORGANIZED HEALTH CARE EDUCATION/TRAINING PROGRAM

## 2023-12-29 PROCEDURE — 94761 N-INVAS EAR/PLS OXIMETRY MLT: CPT

## 2023-12-29 PROCEDURE — 80053 COMPREHEN METABOLIC PANEL: CPT

## 2023-12-29 PROCEDURE — G0378 HOSPITAL OBSERVATION PER HR: HCPCS

## 2023-12-29 RX ORDER — TRAMADOL HYDROCHLORIDE 50 MG/1
50 TABLET ORAL EVERY 8 HOURS PRN
Qty: 6 TABLET | Refills: 0 | Status: SHIPPED | OUTPATIENT
Start: 2023-12-29 | End: 2023-12-31

## 2023-12-29 RX ORDER — OSELTAMIVIR PHOSPHATE 30 MG/1
30 CAPSULE ORAL 2 TIMES DAILY
Qty: 5 CAPSULE | Refills: 0 | Status: SHIPPED | OUTPATIENT
Start: 2023-12-29 | End: 2024-01-01

## 2023-12-29 RX ORDER — ONDANSETRON 4 MG/1
4 TABLET, ORALLY DISINTEGRATING ORAL EVERY 8 HOURS PRN
Qty: 14 TABLET | Refills: 0 | Status: SHIPPED | OUTPATIENT
Start: 2023-12-29

## 2023-12-29 RX ADMIN — ACETAMINOPHEN 650 MG: 325 TABLET ORAL at 09:00

## 2023-12-29 RX ADMIN — PANTOPRAZOLE SODIUM 40 MG: 40 TABLET, DELAYED RELEASE ORAL at 06:51

## 2023-12-29 RX ADMIN — SODIUM CHLORIDE, PRESERVATIVE FREE 10 ML: 5 INJECTION INTRAVENOUS at 09:02

## 2023-12-29 RX ADMIN — ACETAMINOPHEN 650 MG: 325 TABLET ORAL at 14:45

## 2023-12-29 RX ADMIN — ENOXAPARIN SODIUM 40 MG: 100 INJECTION SUBCUTANEOUS at 08:59

## 2023-12-29 RX ADMIN — BUDESONIDE AND FORMOTEROL FUMARATE DIHYDRATE 2 PUFF: 160; 4.5 AEROSOL RESPIRATORY (INHALATION) at 07:49

## 2023-12-29 RX ADMIN — ONDANSETRON 4 MG: 4 TABLET, ORALLY DISINTEGRATING ORAL at 09:00

## 2023-12-29 RX ADMIN — ACETAMINOPHEN 650 MG: 325 TABLET ORAL at 01:26

## 2023-12-29 RX ADMIN — GABAPENTIN 100 MG: 100 CAPSULE ORAL at 09:00

## 2023-12-29 RX ADMIN — LUBIPROSTONE 24 MCG: 24 CAPSULE ORAL at 09:00

## 2023-12-29 RX ADMIN — OSELTAMIVIR PHOSPHATE 30 MG: 30 CAPSULE ORAL at 09:00

## 2023-12-29 RX ADMIN — DICYCLOMINE HYDROCHLORIDE 10 MG: 10 CAPSULE ORAL at 06:51

## 2023-12-29 NOTE — PROGRESS NOTES
4 Eyes Skin Assessment     NAME:  April Preston  YOB: 1975  MEDICAL RECORD NUMBER:  [id number]    The patient is being assessed for  Admission    I agree that at least one RN has performed a thorough Head to Toe Skin Assessment on the patient. ALL assessment sites listed below have been assessed. Areas assessed by both nurses:    Head, Face, Ears, Shoulders, Back, Chest, Arms, Elbows, Hands, Sacrum. Buttock, Coccyx, Ischium, and Legs. Feet and Heels        Does the Patient have a Wound? No noted wound(s)    Scattered scars from previous dog attack.        Doroteo Prevention initiated by RN: No  Wound Care Orders initiated by RN: No    Pressure Injury (Stage 3,4, Unstageable, DTI, NWPT, and Complex wounds) if present, place Wound referral order by RN under : No    New Ostomies, if present place, Ostomy referral order under : No     Nurse 1 eSignature: Electronically signed by Kenya Siddiqi RN on 12/27/23 at 3:06 PM EST    **SHARE this note so that the co-signing nurse can place an eSignature**    Nurse 2 eSignature: Electronically signed by Sung Baum RN on 12/27/23 at 10:46 PM EST
LICO notified of continued chest pain worse with sitting and deep breathing. Reviewed EMR/OARRS. PO Tylenol given earlier in the shift w/o relief of symptoms. VS: 146/83, HR 91, RR 17, SpO2 97% RA; Per allergies pt stated with RN that she has taken PO Tramadol in the past and tolerated. Will add x1 dose PO Tramadol 50 MG for acute pain; updated RN HIRA Bran to monitor response.
Outpatient Pharmacy Progress Note for Meds-to-Beds    Total number of Prescriptions Filled: 2  The following medications were dispensed to the patient during the discharge process:  Oseltamivir  tramadol    Additional Documentation:  Medication(s) were delivered to the patient's room prior to discharge      Thank you for letting us serve your patients.   [address]    [address] [place] [address], [address] [place] [address]    Phone: [phone]    Fax: [phone]
Patient left floor for procedure.
Patient returned to floor from procedure
Patient transportation arranged, discharge paperwork reviewed and patient has filled prescriptions in hand. Questions answered and patient verbalizes understanding of medications and F/U with MD that she needs to make. Patient left with security waiting on transportation.
RENAL DOSE ADJUSTMENT MADE PER P/T PROTOCOL    PREVIOUS ORDER:  Oseltamivir 75 mg bid    Estimated Creatinine Clearance: 59 mL/min (based on SCr of 1.1 mg/dL).   Recent Labs     12/26/23 2306   BUN 18   CREATININE 1.1        NEW RENALLY ADJUSTED ORDER:  Oseltamivir 75 mg x 1, followed by 30 mg q12h    Davian Juarez [facility]  12/27/2023 6:05 AM
V2.0  [facility] Hospitalist Progress Note      Name:  Markell Gray /Age/Sex: 1975  (46 y.o. female)   MRN & CSN:  [id number] & [id number] Encounter Date/Time: 2023 1:51 PM EST    Location:  ED19/ED-19 PCP: No primary care provider on file. Hospital Day: 2    Assessment and Plan:   Markell Gray is a 46 y.o. female with pmh as noted below who presents with Influenza A      Plan:  Shortness of Breathe  Influenza A   - Presented with cough and SOB x 3-4 days   - non-septic and HDS   -Continue Tamiflu   --On RA  --Bnp 1,124- will order Echo, and Lasix. Pending Echo, may consider Cardiology consult         Non-MI troponin elevation  - Denied any typical CP.  - Initial Tn mildly elevated and plateaued. ECG without acute ischemic changes.   -Tele      Chest Pain  - non-cardiac as reproducible to touch and only present following excessive coughing   -Continue to monitor  -D-dimer 0.56- Will order VQ can. As patient has dye allergy       HTN  -Restart HCTZ, consider adding losartan if BP remains elevated         GERD  -Continue PPI     Mood Disorder/IBS  -Continue home meds     COPD  -Continue home inhalers     Diet ADULT DIET; Regular   DVT Prophylaxis [] Lovenox, []  Heparin, [] SCDs, [] Ambulation,  [] Eliquis, [] Xarelto  [] Coumadin   Code Status Full Code   Disposition From: Home  Expected Disposition: Home  Estimated Date of Discharge: 1-2 days  Patient requires continued admission due to Pulmonary work up   Surrogate Decision Maker/ POA Self     Subjective:     Chief Complaint: Arm Pain (X1 day 10/10 pain in L arm and back)     Patient admitted throughout the night for shortness of breath, and was found positive with influenza A.  H&P reviewed. Patient seen in ED this AM.Doing well, needing home Bp meds, will order.   Review of Systems:      Pertinent negatives/positives as noted per HPI    Objective:   No intake or output data in the 24 hours ending 23 1351     Vitals:   Vitals:
Glucose 117 (H) 70 - 99 MG/DL    BUN 20 6 - 23 MG/DL    Creatinine 1.4 (H) 0.6 - 1.1 MG/DL    Est, Glom Filt Rate 46 (L) >60 mL/min/1.73m2    Calcium 8.9 8.3 - 10.6 MG/DL    Total Protein 6.7 6.4 - 8.2 GM/DL    Albumin 3.9 3.4 - 5.0 GM/DL    Total Bilirubin 0.2 0.0 - 1.0 MG/DL    Alkaline Phosphatase 76 40 - 129 IU/L    ALT 12 10 - 40 U/L    AST 14 (L) 15 - 37 IU/L   CBC with Auto Differential    Collection Time: 12/28/23  7:46 AM   Result Value Ref Range    WBC 2.3 (L) 4.0 - 10.5 K/CU MM    RBC 5.25 4.2 - 5.4 M/CU MM    Hemoglobin 14.5 12.5 - 16.0 GM/DL    Hematocrit 42.8 37 - 47 %    MCV 81.5 78 - 100 FL    MCH 27.6 27 - 31 PG    MCHC 33.9 32.0 - 36.0 %    RDW 13.2 11.7 - 14.9 %    Platelets 433 133 - 766 K/CU MM    MPV 10.8 7.5 - 11.1 FL    Differential Type AUTOMATED DIFFERENTIAL     Segs Relative 44.5 36 - 66 %    Lymphocytes % 32.9 24 - 44 %    Monocytes % 20.9 (H) 0 - 4 %    Eosinophils % 1.3 0 - 3 %    Basophils % 0.4 0 - 1 %    Segs Absolute 1.0 K/CU MM    Lymphocytes Absolute 0.7 K/CU MM    Monocytes Absolute 0.5 K/CU MM    Eosinophils Absolute 0.0 K/CU MM    Basophils Absolute 0.0 K/CU MM    Nucleated RBC % 0.0 %    Total Nucleated RBC 0.0 K/CU MM    Total Immature Neutrophil 0.00 K/CU MM    Immature Neutrophil % 0.0 0 - 0.43 %   Protime-INR    Collection Time: 12/28/23  7:46 AM   Result Value Ref Range    Protime 12.5 11.7 - 14.5 SECONDS    INR 0.9 INDEX   Magnesium    Collection Time: 12/28/23  7:46 AM   Result Value Ref Range    Magnesium 2.2 1.8 - 2.4 mg/dl   Echo (TTE) complete (PRN contrast/bubble/strain/3D)    Collection Time: 12/28/23  9:19 AM   Result Value Ref Range    LV EDV A2C 106 mL    LV EDV A4C 104 mL    LV ESV A2C 52 mL    LV ESV A4C 65 mL    IVSd 1.3 (A) 0.6 - 0.9 cm    LVIDd 5.3 3.9 - 5.3 cm    LVIDs 4.2 cm    LVOT Diameter 2.1 cm    LVOT Mean Gradient 2 mmHg    LVOT VTI 19.2 cm    LVOT Peak Velocity 1.1 m/s    LVOT Peak Gradient 5 mmHg    LVPWd 1.2 (A) 0.6 - 0.9 cm    LV Ejection

## 2023-12-29 NOTE — DISCHARGE SUMMARY
V2.0  Discharge Summary    Name:  Blaine Jack /Age/Sex: 1975 (46 y.o. female)   Admit Date: 2023  Discharge Date: 23    MRN & CSN:  [id number] & [id number] Encounter Date and Time 23 9:48 AM EST    Attending:  Cherylene Coast, MD Discharging Provider: Elsa Swenson [address] [place] [address] Dr Course:     Brief HPI: Blaine Jack is a 46 y.o. female who presented with  cough and SOB x 3-4 days PTA. Brief Problem Based Course:   Shortness of Breathe  Influenza A   -   - non-septic and HDS   -Continue Tamiflu   --On RA  --Dc on Tamiflu, Zofran,   --Bnp 1,124-  Echo completed shows normal EF; no official cardiac consult, did discuss echo with cardiologist and Echo is normal.  Cardiologist to arrange outpatient follow-up     Non-MI troponin elevation  - Denied any typical CP.  - Initial Tn mildly elevated and plateaued. ECG without acute ischemic changes.   -Tele   --Echo resulted D/W Cards, Follow up with Cardiology as OP     Chest Pain  - non-cardiac as reproducible to touch and only present following excessive coughing   -Continue to monitor  -D-dimer 0.56- low probability for PE on VQ scan     HTN  -Hold  HCTZ for now 2/2 Cr     GERD  -Continue PPI     Mood Disorder/IBS  -Continue home meds     COPD  -Continue home inhalers       The patient expressed appropriate understanding of, and agreement with the discharge recommendations, medications, and plan. Consults this admission:  IP CONSULT TO CASE MANAGEMENT    Discharge Diagnosis:   Influenza A      Discharge Instruction:   Follow up appointments:   Primary care physician: No primary care provider on file.  within 2 weeks  Diet: Cardiac Diet  Activity: activity as tolerated  Disposition: Discharged to:   [x]Home, []HHC, []SNF, []Acute Rehab, []Other [address] [place] [address] [address], []Hospice   Condition on discharge: Stable  Labs and Tests to be Followed up as an outpatient by PCP or Specialist:     Discharge Medications:

## 2024-01-26 PROBLEM — J10.1 INFLUENZA A: Status: RESOLVED | Noted: 2023-12-27 | Resolved: 2024-01-26

## 2024-06-06 ENCOUNTER — OFFICE VISIT (OUTPATIENT)
Dept: PULMONOLOGY | Age: 49
End: 2024-06-06

## 2024-06-06 VITALS
HEART RATE: 88 BPM | HEIGHT: 61 IN | WEIGHT: 186.6 LBS | BODY MASS INDEX: 35.23 KG/M2 | SYSTOLIC BLOOD PRESSURE: 138 MMHG | OXYGEN SATURATION: 99 % | DIASTOLIC BLOOD PRESSURE: 78 MMHG

## 2024-06-06 DIAGNOSIS — J43.9 PULMONARY EMPHYSEMA, UNSPECIFIED EMPHYSEMA TYPE (HCC): ICD-10-CM

## 2024-06-06 DIAGNOSIS — G47.33 OSA ON CPAP: ICD-10-CM

## 2024-06-06 DIAGNOSIS — R05.9 COUGH IN ADULT: ICD-10-CM

## 2024-06-06 DIAGNOSIS — R09.02 OXYGEN DESATURATION: Primary | ICD-10-CM

## 2024-06-06 DIAGNOSIS — F17.200 SMOKER: ICD-10-CM

## 2024-06-06 RX ORDER — BUDESONIDE AND FORMOTEROL FUMARATE DIHYDRATE 160; 4.5 UG/1; UG/1
2 AEROSOL RESPIRATORY (INHALATION) 2 TIMES DAILY
Qty: 10.2 G | Refills: 5 | Status: SHIPPED | OUTPATIENT
Start: 2024-06-06

## 2024-06-06 RX ORDER — ALBUTEROL SULFATE 90 UG/1
1 AEROSOL, METERED RESPIRATORY (INHALATION) EVERY 6 HOURS PRN
Qty: 18 G | Refills: 5 | Status: SHIPPED | OUTPATIENT
Start: 2024-06-06

## 2024-06-06 ASSESSMENT — ENCOUNTER SYMPTOMS
SHORTNESS OF BREATH: 1
VOICE CHANGE: 1
COUGH: 1
WHEEZING: 1

## 2024-06-06 NOTE — PROGRESS NOTES
Diamond Robins (:  1975) is a 49 y.o. female,New patient, here for evaluation of the following chief complaint(s):  Follow-up (Needs to be seen to get paperwork to get her electric turned back on so she can use her cpap) and Cough (Has cough that's been going on since they turned her electric off on 2024. Cough is productive, coughing up very colorful sputum, coughs so hard that it makes her light headed )    Subjective   SUBJECTIVE/OBJECTIVE:  Diamond Robins is a pleasant 48 yo female. She presents in poor physical and mental health. She is a former patient of Reyna Glynn with a PMH of HTN, GERD, current smoker, morbid obesity, anxiety, COPD, pulmonary nodules, ALAN, and pneumonia is returning to clinic for management of breathing problems, medication refills on Symbicort and albuterol and reinstatement of electricity which has been turned off due to nonpayment. She states being impoverished. She reports being infected with Covid-19, influenza A and pneumonia in .  No hospitalization was required for Covid-19. Stayed home and used CPAP machine because of being afraid to leave and needed it to breathe. States that she has been isolated for some time. She states that in order to get her electricity turned on the electric company is requiring a signature from a provider. She does not have the forms with her today. She states that she asked the forms to be faxed to our office. We have not received any forms from the electric company on her behalf. She is currently not using her CPAP device and endorses that she hasn't been able to use it because she has no electricity. She reports having social anxiety and stays alone in her home.     She hs a mother who check up on her periodically. She is a current smoker. She expresses being interested in quitting and has tried before and failed. She was using the NicoDerm patches but is not using at this time.  Denies vaping but has used marijuana in the past,

## 2024-06-07 PROBLEM — R09.02 OXYGEN DESATURATION: Status: ACTIVE | Noted: 2024-06-07

## 2024-06-21 ENCOUNTER — HOSPITAL ENCOUNTER (OUTPATIENT)
Dept: CT IMAGING | Age: 49
Discharge: HOME | End: 2024-06-21
Payer: MEDICAID

## 2024-06-21 DIAGNOSIS — F17.200 SMOKER: ICD-10-CM

## 2024-06-21 DIAGNOSIS — J43.9 PULMONARY EMPHYSEMA, UNSPECIFIED EMPHYSEMA TYPE (HCC): ICD-10-CM

## 2024-06-21 PROCEDURE — 71250 CT THORAX DX C-: CPT

## 2024-06-29 ENCOUNTER — PATIENT MESSAGE (OUTPATIENT)
Dept: PULMONOLOGY | Age: 49
End: 2024-06-29

## 2024-07-18 ENCOUNTER — TELEPHONE (OUTPATIENT)
Dept: PULMONOLOGY | Age: 49
End: 2024-07-18

## 2024-07-18 NOTE — TELEPHONE ENCOUNTER
Called to reschedule pts appt. Pts mother answered and stated pt had a coronary 7/12 and was buried 7/15. We did check to see if there is an obituary for pt, confirmed pt passed 7/12.

## 2024-12-12 NOTE — ED NOTES
PATIENT VOMITING PROVIDER NOTIFIED     Sueann Skiff, RN  03/29/22 1133 Detail Level: Zone Patient Specific Otc Recommendations (Will Not Stick From Patient To Patient): Vaseline BID

## (undated) DEVICE — SPONGE LAP W18XL18IN WHT COT 4 PLY FLD STRUNG RADPQ DISP ST

## (undated) DEVICE — DRAPE,U/ SHT,SPLIT,PLAS,STERIL: Brand: MEDLINE

## (undated) DEVICE — NEEDLE HYPO 23GA L1.5IN TURQ POLYPR HUB S STL THN WALL IM

## (undated) DEVICE — MARKER,SKIN,WI/RULER AND LABELS: Brand: MEDLINE

## (undated) DEVICE — BANDAGE COMPR W6INXL5YD WHT BGE POLY COT M E WRP WV HK AND

## (undated) DEVICE — BANDAGE COMPR M W6INXL10YD WHT BGE VELC E MTRX HK AND LOOP

## (undated) DEVICE — HIGH CAPACITY FAN SPRAY TIP WITH SHIELD: Brand: PULSAVAC®

## (undated) DEVICE — TUBING, SUCTION, 9/32" X 10', STRAIGHT: Brand: MEDLINE

## (undated) DEVICE — Z DISCONTINUED (USE MFG CAT MVABO)  TUBING GAS SAMPLING STD 6.5 FT FEMALE CONN SMRT CAPNOLINE

## (undated) DEVICE — CORD ES L15FT PT RET REUSE VALLEYLAB REM

## (undated) DEVICE — Z INACTIVE USE 2660664 SOLUTION IRRIG 3000ML 0.9% SOD CHL USP UROMATIC PLAS CONT

## (undated) DEVICE — BANDAGE,SELF ADHRNT,COFLEX,4"X5YD,STRL: Brand: COLABEL

## (undated) DEVICE — SNARE ENDOSCP M L240CM W27MM SHTH DIA2.4MM CHN 2.8MM HEX

## (undated) DEVICE — DUAL SPIKE Y-CONNECTOR SET, PACKAGED: Brand: PULSAVAC®

## (undated) DEVICE — SOLUTION IV IRRIG WATER 1000ML POUR BRL 2F7114

## (undated) DEVICE — ELECTRODE ES AD CRDLSS PT RET REM POLYHESIVE

## (undated) DEVICE — TRAY PREP DRY W/ PREM GLV 2 APPL 6 SPNG 2 UNDPD 1 OVERWRAP

## (undated) DEVICE — SUTURE PERMA HND 2-0 L18IN NONABSORBABLE BLK X-1 L22IN 1/2 737G

## (undated) DEVICE — PENCIL ES CRD L10FT HND SWCHING ROCK SWCH W/ EDGE COAT BLDE

## (undated) DEVICE — SPONGE GZ W4XL8IN COT WVN 12 PLY

## (undated) DEVICE — GOWN,SIRUS,FABRNF,RAGLAN,L,ST,30/CS: Brand: MEDLINE

## (undated) DEVICE — TOWEL,OR,DSP,ST,BLUE,STD,6/PK,12PK/CS: Brand: MEDLINE

## (undated) DEVICE — STERILE LATEX POWDER-FREE SURGICAL GLOVESWITH NITRILE COATING: Brand: PROTEXIS

## (undated) DEVICE — ANESTHESIA CIRCUIT ADULT-LF: Brand: MEDLINE INDUSTRIES, INC.

## (undated) DEVICE — BANDAGE COMPR W4INXL5YD WHT BGE POLY COT M E WRP WV HK AND

## (undated) DEVICE — FORCEPS BX L240CM JAW DIA2.8MM L CAP W/ NDL MIC MESH TOOTH

## (undated) DEVICE — THE DISPOSABLE ROTH NET PLATINUM FOOD BOLUS RETRIEVAL DEVICE IS USED IN THE ENDOSCOPIC RETRIEVAL FOOD BOLUS.: Brand: ROTH NET PLATINUM

## (undated) DEVICE — GLOVE SURG SZ 8 L12IN THK75MIL DK GRN LTX FREE

## (undated) DEVICE — SUTURE VCRL SZ 2-0 L18IN ABSRB UD CT-1 L36MM 1/2 CIR J839D

## (undated) DEVICE — LINER SUCT CANSTR 1500CC SEMI RIG W/ POR HYDROPHOBIC SHUT

## (undated) DEVICE — DRESSING PETRO W3XL3IN OIL EMUL N ADH GZ KNIT IMPREG CELOS

## (undated) DEVICE — GLOVE SURG SZ 65 L12IN FNGR THK87MIL WHT LTX FREE

## (undated) DEVICE — COUNTER NDL 60 COUNT FOAM STRP SGL MAG

## (undated) DEVICE — DRAPE SHEET ULTRAGARD: Brand: MEDLINE

## (undated) DEVICE — FAN SPRAY KIT: Brand: PULSAVAC®

## (undated) DEVICE — MAT FLOOR ULTRA ABS 28X48IN

## (undated) DEVICE — PADDING,UNDERCAST,COTTON, 4"X4YD STERILE: Brand: MEDLINE

## (undated) DEVICE — SOLUTION SURG PREP ANTIMICROBIAL 4 OZ SKIN WND EXIDINE

## (undated) DEVICE — INTENDED FOR TISSUE SEPARATION, AND OTHER PROCEDURES THAT REQUIRE A SHARP SURGICAL BLADE TO PUNCTURE OR CUT.: Brand: BARD-PARKER ® STAINLESS STEEL BLADES

## (undated) DEVICE — PAD,ABDOMINAL,5"X9",ST,LF,25/BX: Brand: MEDLINE INDUSTRIES, INC.

## (undated) DEVICE — Z DISCONTINUED BY MEDLINE USE 2711682 TRAY SKIN PREP DRY W/ PREM GLV

## (undated) DEVICE — DRAPE,EXTREMITY,89X128,STERILE: Brand: MEDLINE

## (undated) DEVICE — LINER,SEMI-RIGID,3000CC,50EA/CS: Brand: MEDLINE

## (undated) DEVICE — GLOVE ORANGE PI 7   MSG9070

## (undated) DEVICE — TUBING, SUCTION, 3/16" X 10', STRAIGHT: Brand: MEDLINE

## (undated) DEVICE — YANKAUER,FLEXIBLE HANDLE,REGLR CAPACITY: Brand: MEDLINE INDUSTRIES, INC.

## (undated) DEVICE — SYRINGE IRRIG 60ML SFT PLIABLE BLB EZ TO GRP 1 HND USE W/

## (undated) DEVICE — GLOVE ORANGE PI 7 1/2   MSG9075